# Patient Record
Sex: FEMALE | Race: WHITE | Employment: OTHER | URBAN - METROPOLITAN AREA
[De-identification: names, ages, dates, MRNs, and addresses within clinical notes are randomized per-mention and may not be internally consistent; named-entity substitution may affect disease eponyms.]

---

## 2017-04-18 ENCOUNTER — HOSPITAL ENCOUNTER (EMERGENCY)
Age: 82
Discharge: HOME | End: 2017-04-18
Attending: EMERGENCY MEDICINE
Payer: MEDICARE

## 2017-04-18 ENCOUNTER — APPOINTMENT (OUTPATIENT)
Dept: GENERAL RADIOLOGY | Age: 82
End: 2017-04-18
Payer: MEDICARE

## 2017-04-18 VITALS
TEMPERATURE: 98.2 F | RESPIRATION RATE: 17 BRPM | BODY MASS INDEX: 24.24 KG/M2 | HEART RATE: 55 BPM | SYSTOLIC BLOOD PRESSURE: 90 MMHG | DIASTOLIC BLOOD PRESSURE: 68 MMHG | WEIGHT: 142 LBS | HEIGHT: 64 IN | OXYGEN SATURATION: 95 %

## 2017-04-18 DIAGNOSIS — R07.9 CHEST PAIN, UNSPECIFIED TYPE: Primary | ICD-10-CM

## 2017-04-18 LAB
% CKMB: 3 % (ref 0–3)
ABSOLUTE EOS #: 0.1 K/UL (ref 0–0.4)
ABSOLUTE LYMPH #: 1.2 K/UL (ref 1–4.8)
ABSOLUTE MONO #: 0.8 K/UL (ref 0.2–0.8)
ANION GAP SERPL CALCULATED.3IONS-SCNC: 14 MMOL/L (ref 9–17)
BASOPHILS # BLD: 0 % (ref 0–2)
BASOPHILS ABSOLUTE: 0 K/UL (ref 0–0.2)
BUN BLDV-MCNC: 18 MG/DL (ref 8–23)
BUN/CREAT BLD: 21 (ref 9–20)
CALCIUM SERPL-MCNC: 8.3 MG/DL (ref 8.6–10.4)
CHLORIDE BLD-SCNC: 100 MMOL/L (ref 98–107)
CK MB: 1.2 NG/ML
CKMB INTERPRETATION: NORMAL
CO2: 29 MMOL/L (ref 20–31)
CREAT SERPL-MCNC: 0.84 MG/DL (ref 0.5–0.9)
DIFFERENTIAL TYPE: ABNORMAL
EOSINOPHILS RELATIVE PERCENT: 2 % (ref 1–4)
GFR AFRICAN AMERICAN: >60 ML/MIN
GFR NON-AFRICAN AMERICAN: >60 ML/MIN
GFR SERPL CREATININE-BSD FRML MDRD: ABNORMAL ML/MIN/{1.73_M2}
GFR SERPL CREATININE-BSD FRML MDRD: ABNORMAL ML/MIN/{1.73_M2}
GLUCOSE BLD-MCNC: 104 MG/DL (ref 70–99)
HCT VFR BLD CALC: 29.3 % (ref 36–46)
HEMOGLOBIN: 9.4 G/DL (ref 12–16)
LYMPHOCYTES # BLD: 15 % (ref 24–44)
MCH RBC QN AUTO: 22.9 PG (ref 26–34)
MCHC RBC AUTO-ENTMCNC: 31.9 G/DL (ref 31–37)
MCV RBC AUTO: 71.9 FL (ref 80–100)
MONOCYTES # BLD: 10 % (ref 1–7)
MYOGLOBIN: 25 NG/ML (ref 25–58)
MYOGLOBIN: 25 NG/ML (ref 25–58)
PDW BLD-RTO: 20.8 % (ref 11.5–14.5)
PLATELET # BLD: 253 K/UL (ref 130–400)
PLATELET ESTIMATE: ABNORMAL
PMV BLD AUTO: 8.2 FL (ref 6–12)
POTASSIUM SERPL-SCNC: 3.6 MMOL/L (ref 3.7–5.3)
RBC # BLD: 4.08 M/UL (ref 4–5.2)
RBC # BLD: ABNORMAL 10*6/UL
SEG NEUTROPHILS: 73 % (ref 36–66)
SEGMENTED NEUTROPHILS ABSOLUTE COUNT: 5.7 K/UL (ref 1.8–7.7)
SODIUM BLD-SCNC: 143 MMOL/L (ref 135–144)
TOTAL CK: 40 U/L (ref 26–192)
TROPONIN INTERP: NORMAL
TROPONIN INTERP: NORMAL
TROPONIN T: <0.03 NG/ML
TROPONIN T: <0.03 NG/ML
WBC # BLD: 7.7 K/UL (ref 3.5–11)
WBC # BLD: ABNORMAL 10*3/UL

## 2017-04-18 PROCEDURE — 82553 CREATINE MB FRACTION: CPT

## 2017-04-18 PROCEDURE — 85025 COMPLETE CBC W/AUTO DIFF WBC: CPT

## 2017-04-18 PROCEDURE — 83874 ASSAY OF MYOGLOBIN: CPT

## 2017-04-18 PROCEDURE — 6370000000 HC RX 637 (ALT 250 FOR IP): Performed by: NURSE PRACTITIONER

## 2017-04-18 PROCEDURE — 80048 BASIC METABOLIC PNL TOTAL CA: CPT

## 2017-04-18 PROCEDURE — 82550 ASSAY OF CK (CPK): CPT

## 2017-04-18 PROCEDURE — 71010 XR CHEST PORTABLE: CPT

## 2017-04-18 PROCEDURE — 84484 ASSAY OF TROPONIN QUANT: CPT

## 2017-04-18 PROCEDURE — 93005 ELECTROCARDIOGRAM TRACING: CPT

## 2017-04-18 PROCEDURE — 99285 EMERGENCY DEPT VISIT HI MDM: CPT

## 2017-04-18 RX ORDER — ASPIRIN 81 MG/1
324 TABLET, CHEWABLE ORAL ONCE
Status: COMPLETED | OUTPATIENT
Start: 2017-04-18 | End: 2017-04-18

## 2017-04-18 RX ORDER — SERTRALINE HYDROCHLORIDE 100 MG/1
100 TABLET, FILM COATED ORAL DAILY
COMMUNITY

## 2017-04-18 RX ORDER — FUROSEMIDE 40 MG/1
40 TABLET ORAL DAILY
Status: ON HOLD | COMMUNITY
End: 2019-10-05 | Stop reason: HOSPADM

## 2017-04-18 RX ADMIN — ASPIRIN 243 MG: 81 TABLET, CHEWABLE ORAL at 14:04

## 2017-04-19 LAB
EKG ATRIAL RATE: 47 BPM
EKG P AXIS: 54 DEGREES
EKG P-R INTERVAL: 180 MS
EKG Q-T INTERVAL: 476 MS
EKG QRS DURATION: 84 MS
EKG QTC CALCULATION (BAZETT): 421 MS
EKG R AXIS: 55 DEGREES
EKG T AXIS: 56 DEGREES
EKG VENTRICULAR RATE: 47 BPM

## 2017-12-19 ENCOUNTER — OFFICE VISIT (OUTPATIENT)
Dept: PODIATRY | Age: 82
End: 2017-12-19
Payer: MEDICARE

## 2017-12-19 VITALS — HEIGHT: 64 IN | WEIGHT: 149 LBS | BODY MASS INDEX: 25.44 KG/M2

## 2017-12-19 DIAGNOSIS — B35.1 DERMATOPHYTOSIS OF NAIL: ICD-10-CM

## 2017-12-19 DIAGNOSIS — M79.605 BILATERAL LOWER EXTREMITY PAIN: Primary | ICD-10-CM

## 2017-12-19 DIAGNOSIS — M79.604 BILATERAL LOWER EXTREMITY PAIN: Primary | ICD-10-CM

## 2017-12-19 PROCEDURE — 1123F ACP DISCUSS/DSCN MKR DOCD: CPT | Performed by: PODIATRIST

## 2017-12-19 PROCEDURE — 4040F PNEUMOC VAC/ADMIN/RCVD: CPT | Performed by: PODIATRIST

## 2017-12-19 PROCEDURE — 1090F PRES/ABSN URINE INCON ASSESS: CPT | Performed by: PODIATRIST

## 2017-12-19 PROCEDURE — G8400 PT W/DXA NO RESULTS DOC: HCPCS | Performed by: PODIATRIST

## 2017-12-19 PROCEDURE — G8484 FLU IMMUNIZE NO ADMIN: HCPCS | Performed by: PODIATRIST

## 2017-12-19 PROCEDURE — G8419 CALC BMI OUT NRM PARAM NOF/U: HCPCS | Performed by: PODIATRIST

## 2017-12-19 PROCEDURE — 11721 DEBRIDE NAIL 6 OR MORE: CPT | Performed by: PODIATRIST

## 2017-12-19 PROCEDURE — G8427 DOCREV CUR MEDS BY ELIG CLIN: HCPCS | Performed by: PODIATRIST

## 2017-12-19 PROCEDURE — 1036F TOBACCO NON-USER: CPT | Performed by: PODIATRIST

## 2017-12-19 NOTE — PROGRESS NOTES
[facility] Podiatry  Return Patient    Chief Complaint   Patient presents with    Foot Pain    Nail Problem     [place]       Subjective: This [facility] comes to clinic for foot and nail care. Pt currently has complaint of thickened, painful, elongated nails that he/she cannot manage by themselves. Pt. Relates pain to nails with shoe gear. Pt's primary care physician is Ness Natarajan MD.  Past Medical History:   Diagnosis Date    CAD (coronary artery disease)     Cancer ([address]     skin    Depression     GERD (gastroesophageal reflux disease)     History of cardiac cath 10/2016    CAD    Hyperlipidemia     Hypertension     MI (myocardial infarction)        Allergies   Allergen Reactions    Seasonal      Current Outpatient Prescriptions on File Prior to Visit   Medication Sig Dispense Refill    sertraline (ZOLOFT) 50 MG tablet Take 50 mg by mouth daily      furosemide (LASIX) 40 MG tablet Take 40 mg by mouth 2 times daily      aspirin 81 MG chewable tablet Take 1 tablet by mouth daily 30 tablet 3    metoprolol tartrate (LOPRESSOR) 25 MG tablet Take 1 tablet by mouth 2 times daily 60 tablet 3    oxybutynin (DITROPAN) 5 MG tablet Take 5 mg by mouth daily      pravastatin (PRAVACHOL) 40 MG tablet Take 40 mg by mouth daily      pantoprazole (PROTONIX) 40 MG tablet Take 40 mg by mouth daily       No current facility-administered medications on file prior to visit. Review of Systems  Objective:  General: AAO x 3 in NAD.     Derm  Toenail Description  Sites of Onychomycosis Involvement (Check affected area)  [x] [x] [x] [x] [x] [x] [x] [x] [x] [x]  5 4 3 2 1 1 2 3 4 5                          Right                                        Left    Thickness  [x] [x] [x] [x] [x] [x] [x] [x] [x] [x]  5 4 3 2 1 1 2 3 4 5                         Right                                        Left    Dystrophic Changes   [x] [x] [x] [x] [x] [x] [x] [x] [x] [x]  5 4 3 2 1 1 2 3 4 5 given personalized discharge instructions. Nails 1-10 were debrided in length and thickness sharply with a nail nipper and  without incident. Pt will follow up in 9 weeks or sooner if any problems arise. Diagnosis was discussed with the pt and all of their questions were answered in detail. Proper foot hygiene and care was discussed with the pt. Patient to check feet daily and contact the office with any questions/problems/concerns. Other comorbidity noted and will be managed by PCP. Pain waiver discussed with patient and confirmed.    12/19/2017      Electronically signed by Dr. Cosme Moreland DPM on 12/19/2017 at 1:20 PM  12/19/2017

## 2017-12-29 NOTE — PROGRESS NOTES
Patient PCP Dr. Dulce Maria Almeida, last seen 4/18/17, [facility]

## 2018-03-17 ENCOUNTER — APPOINTMENT (OUTPATIENT)
Dept: GENERAL RADIOLOGY | Facility: CLINIC | Age: 83
End: 2018-03-17
Payer: MEDICARE

## 2018-03-17 ENCOUNTER — HOSPITAL ENCOUNTER (EMERGENCY)
Facility: CLINIC | Age: 83
Discharge: HOME | End: 2018-03-17
Attending: EMERGENCY MEDICINE
Payer: MEDICARE

## 2018-03-17 VITALS
WEIGHT: 148 LBS | BODY MASS INDEX: 25.27 KG/M2 | HEART RATE: 73 BPM | SYSTOLIC BLOOD PRESSURE: 134 MMHG | RESPIRATION RATE: 20 BRPM | OXYGEN SATURATION: 98 % | DIASTOLIC BLOOD PRESSURE: 109 MMHG | TEMPERATURE: 97.5 F | HEIGHT: 64 IN

## 2018-03-17 DIAGNOSIS — J20.9 BRONCHOSPASM WITH BRONCHITIS, ACUTE: Primary | ICD-10-CM

## 2018-03-17 PROCEDURE — 6370000000 HC RX 637 (ALT 250 FOR IP): Performed by: EMERGENCY MEDICINE

## 2018-03-17 PROCEDURE — 71046 X-RAY EXAM CHEST 2 VIEWS: CPT

## 2018-03-17 PROCEDURE — 6360000002 HC RX W HCPCS: Performed by: EMERGENCY MEDICINE

## 2018-03-17 PROCEDURE — 99284 EMERGENCY DEPT VISIT MOD MDM: CPT

## 2018-03-17 RX ORDER — IPRATROPIUM BROMIDE AND ALBUTEROL SULFATE 2.5; .5 MG/3ML; MG/3ML
1 SOLUTION RESPIRATORY (INHALATION) ONCE
Status: COMPLETED | OUTPATIENT
Start: 2018-03-17 | End: 2018-03-17

## 2018-03-17 RX ORDER — PREDNISONE 10 MG/1
TABLET ORAL
Qty: 20 TABLET | Refills: 0 | Status: SHIPPED | OUTPATIENT
Start: 2018-03-17 | End: 2018-03-27

## 2018-03-17 RX ORDER — AZITHROMYCIN 250 MG/1
TABLET, FILM COATED ORAL
Qty: 1 PACKET | Refills: 0 | Status: SHIPPED | OUTPATIENT
Start: 2018-03-17 | End: 2018-03-27

## 2018-03-17 RX ORDER — ALBUTEROL SULFATE 2.5 MG/3ML
2.5 SOLUTION RESPIRATORY (INHALATION) ONCE
Status: COMPLETED | OUTPATIENT
Start: 2018-03-17 | End: 2018-03-17

## 2018-03-17 RX ORDER — ALBUTEROL SULFATE 90 UG/1
2 AEROSOL, METERED RESPIRATORY (INHALATION) ONCE
Status: COMPLETED | OUTPATIENT
Start: 2018-03-17 | End: 2018-03-17

## 2018-03-17 RX ORDER — PREDNISONE 20 MG/1
40 TABLET ORAL ONCE
Status: COMPLETED | OUTPATIENT
Start: 2018-03-17 | End: 2018-03-17

## 2018-03-17 RX ADMIN — PREDNISONE 40 MG: 20 TABLET ORAL at 16:07

## 2018-03-17 RX ADMIN — ALBUTEROL SULFATE 2.5 MG: 2.5 SOLUTION RESPIRATORY (INHALATION) at 15:15

## 2018-03-17 RX ADMIN — ALBUTEROL SULFATE 2 PUFF: 90 AEROSOL, METERED RESPIRATORY (INHALATION) at 16:06

## 2018-03-17 RX ADMIN — ALBUTEROL SULFATE 2.5 MG: 2.5 SOLUTION RESPIRATORY (INHALATION) at 14:37

## 2018-03-17 RX ADMIN — IPRATROPIUM BROMIDE AND ALBUTEROL SULFATE 1 AMPULE: .5; 3 SOLUTION RESPIRATORY (INHALATION) at 15:15

## 2018-03-17 ASSESSMENT — PAIN DESCRIPTION - PROGRESSION
CLINICAL_PROGRESSION: GRADUALLY WORSENING
CLINICAL_PROGRESSION_2: NOT CHANGED

## 2018-03-17 ASSESSMENT — PAIN DESCRIPTION - ONSET
ONSET: SUDDEN
ONSET_2: SUDDEN

## 2018-03-17 ASSESSMENT — PAIN DESCRIPTION - DESCRIPTORS
DESCRIPTORS_2: DULL;DISCOMFORT
DESCRIPTORS: PRESSURE

## 2018-03-17 ASSESSMENT — PAIN DESCRIPTION - PAIN TYPE
TYPE_2: ACUTE PAIN
TYPE: ACUTE PAIN

## 2018-03-17 ASSESSMENT — PAIN DESCRIPTION - FREQUENCY: FREQUENCY: CONTINUOUS

## 2018-03-17 ASSESSMENT — PAIN DESCRIPTION - INTENSITY: RATING_2: 8

## 2018-03-17 ASSESSMENT — PAIN DESCRIPTION - LOCATION
LOCATION: HEAD
LOCATION_2: ABDOMEN

## 2018-03-17 ASSESSMENT — PAIN DESCRIPTION - DURATION: DURATION_2: CONTINUOUS

## 2018-03-17 ASSESSMENT — PAIN SCALES - GENERAL: PAINLEVEL_OUTOF10: 8

## 2018-04-09 ENCOUNTER — OFFICE VISIT (OUTPATIENT)
Dept: PODIATRY | Age: 83
End: 2018-04-09
Payer: MEDICARE

## 2018-04-09 VITALS — HEART RATE: 68 BPM | HEIGHT: 64 IN | RESPIRATION RATE: 16 BRPM | WEIGHT: 149 LBS | BODY MASS INDEX: 25.44 KG/M2

## 2018-04-09 DIAGNOSIS — M79.674 PAIN IN TOES OF BOTH FEET: ICD-10-CM

## 2018-04-09 DIAGNOSIS — L60.0 INGROWN NAIL: ICD-10-CM

## 2018-04-09 DIAGNOSIS — I73.9 PVD (PERIPHERAL VASCULAR DISEASE) (HCC): ICD-10-CM

## 2018-04-09 DIAGNOSIS — B35.1 DERMATOPHYTOSIS OF NAIL: Primary | ICD-10-CM

## 2018-04-09 DIAGNOSIS — M79.675 PAIN IN TOES OF BOTH FEET: ICD-10-CM

## 2018-04-09 PROCEDURE — 1036F TOBACCO NON-USER: CPT | Performed by: PODIATRIST

## 2018-04-09 PROCEDURE — 99213 OFFICE O/P EST LOW 20 MIN: CPT | Performed by: PODIATRIST

## 2018-04-09 PROCEDURE — G8427 DOCREV CUR MEDS BY ELIG CLIN: HCPCS | Performed by: PODIATRIST

## 2018-04-09 PROCEDURE — 4040F PNEUMOC VAC/ADMIN/RCVD: CPT | Performed by: PODIATRIST

## 2018-04-09 PROCEDURE — 1123F ACP DISCUSS/DSCN MKR DOCD: CPT | Performed by: PODIATRIST

## 2018-04-09 PROCEDURE — 1090F PRES/ABSN URINE INCON ASSESS: CPT | Performed by: PODIATRIST

## 2018-04-09 PROCEDURE — G8419 CALC BMI OUT NRM PARAM NOF/U: HCPCS | Performed by: PODIATRIST

## 2018-04-09 PROCEDURE — 11721 DEBRIDE NAIL 6 OR MORE: CPT | Performed by: PODIATRIST

## 2018-06-11 ENCOUNTER — OFFICE VISIT (OUTPATIENT)
Dept: PODIATRY | Age: 83
End: 2018-06-11
Payer: MEDICARE

## 2018-06-11 VITALS — HEIGHT: 64 IN | WEIGHT: 149 LBS | BODY MASS INDEX: 25.44 KG/M2 | RESPIRATION RATE: 16 BRPM | HEART RATE: 68 BPM

## 2018-06-11 DIAGNOSIS — R60.0 EDEMA OF LOWER EXTREMITY: ICD-10-CM

## 2018-06-11 DIAGNOSIS — I73.9 PVD (PERIPHERAL VASCULAR DISEASE) (HCC): ICD-10-CM

## 2018-06-11 DIAGNOSIS — M20.41 HAMMER TOES OF BOTH FEET: ICD-10-CM

## 2018-06-11 DIAGNOSIS — M79.605 PAIN IN BOTH LOWER EXTREMITIES: ICD-10-CM

## 2018-06-11 DIAGNOSIS — M79.604 PAIN IN BOTH LOWER EXTREMITIES: ICD-10-CM

## 2018-06-11 DIAGNOSIS — M20.42 HAMMER TOES OF BOTH FEET: ICD-10-CM

## 2018-06-11 DIAGNOSIS — R26.2 DIFFICULTY WALKING: ICD-10-CM

## 2018-06-11 DIAGNOSIS — B35.1 DERMATOPHYTOSIS OF NAIL: Primary | ICD-10-CM

## 2018-06-11 PROCEDURE — 4040F PNEUMOC VAC/ADMIN/RCVD: CPT | Performed by: PODIATRIST

## 2018-06-11 PROCEDURE — G8419 CALC BMI OUT NRM PARAM NOF/U: HCPCS | Performed by: PODIATRIST

## 2018-06-11 PROCEDURE — 1090F PRES/ABSN URINE INCON ASSESS: CPT | Performed by: PODIATRIST

## 2018-06-11 PROCEDURE — G8427 DOCREV CUR MEDS BY ELIG CLIN: HCPCS | Performed by: PODIATRIST

## 2018-06-11 PROCEDURE — 11721 DEBRIDE NAIL 6 OR MORE: CPT | Performed by: PODIATRIST

## 2018-06-11 PROCEDURE — 1123F ACP DISCUSS/DSCN MKR DOCD: CPT | Performed by: PODIATRIST

## 2018-06-11 PROCEDURE — 1036F TOBACCO NON-USER: CPT | Performed by: PODIATRIST

## 2019-10-02 ENCOUNTER — APPOINTMENT (OUTPATIENT)
Dept: GENERAL RADIOLOGY | Age: 84
DRG: 193 | End: 2019-10-02
Payer: MEDICARE

## 2019-10-02 ENCOUNTER — HOSPITAL ENCOUNTER (INPATIENT)
Age: 84
LOS: 3 days | Discharge: HOME | DRG: 193 | End: 2019-10-05
Attending: EMERGENCY MEDICINE | Admitting: INTERNAL MEDICINE
Payer: MEDICARE

## 2019-10-02 DIAGNOSIS — J81.0 ACUTE PULMONARY EDEMA (HCC): ICD-10-CM

## 2019-10-02 DIAGNOSIS — R09.02 HYPOXIA: ICD-10-CM

## 2019-10-02 DIAGNOSIS — J98.01 ACUTE BRONCHOSPASM: ICD-10-CM

## 2019-10-02 DIAGNOSIS — I48.20 CHRONIC ATRIAL FIBRILLATION (HCC): ICD-10-CM

## 2019-10-02 DIAGNOSIS — E78.5 HYPERLIPIDEMIA, UNSPECIFIED HYPERLIPIDEMIA TYPE: ICD-10-CM

## 2019-10-02 DIAGNOSIS — J18.9 PNEUMONIA OF BOTH LOWER LOBES DUE TO INFECTIOUS ORGANISM: Primary | ICD-10-CM

## 2019-10-02 DIAGNOSIS — I10 ESSENTIAL HYPERTENSION: ICD-10-CM

## 2019-10-02 DIAGNOSIS — K21.9 GASTROESOPHAGEAL REFLUX DISEASE WITHOUT ESOPHAGITIS: ICD-10-CM

## 2019-10-02 PROBLEM — I50.33 ACUTE ON CHRONIC DIASTOLIC CHF (CONGESTIVE HEART FAILURE) (HCC): Status: ACTIVE | Noted: 2019-10-02

## 2019-10-02 LAB
ABSOLUTE EOS #: <0.03 K/UL (ref 0–0.44)
ABSOLUTE IMMATURE GRANULOCYTE: 0.06 K/UL (ref 0–0.3)
ABSOLUTE LYMPH #: 1.19 K/UL (ref 1.1–3.7)
ABSOLUTE MONO #: 1.15 K/UL (ref 0.1–1.2)
ALBUMIN SERPL-MCNC: 3.6 G/DL (ref 3.5–5.2)
ALBUMIN/GLOBULIN RATIO: ABNORMAL (ref 1–2.5)
ALP BLD-CCNC: 103 U/L (ref 35–104)
ALT SERPL-CCNC: 63 U/L (ref 5–33)
ANION GAP SERPL CALCULATED.3IONS-SCNC: 12 MMOL/L (ref 9–17)
AST SERPL-CCNC: 67 U/L
BASOPHILS # BLD: 0 % (ref 0–2)
BASOPHILS ABSOLUTE: <0.03 K/UL (ref 0–0.2)
BILIRUB SERPL-MCNC: 0.82 MG/DL (ref 0.3–1.2)
BNP INTERPRETATION: ABNORMAL
BUN BLDV-MCNC: 17 MG/DL (ref 8–23)
BUN/CREAT BLD: 24 (ref 9–20)
CALCIUM SERPL-MCNC: 8.5 MG/DL (ref 8.6–10.4)
CHLORIDE BLD-SCNC: 99 MMOL/L (ref 98–107)
CO2: 29 MMOL/L (ref 20–31)
CREAT SERPL-MCNC: 0.72 MG/DL (ref 0.5–0.9)
DIFFERENTIAL TYPE: ABNORMAL
EOSINOPHILS RELATIVE PERCENT: 0 % (ref 1–4)
FIO2: ABNORMAL
GFR AFRICAN AMERICAN: >60 ML/MIN
GFR NON-AFRICAN AMERICAN: >60 ML/MIN
GFR SERPL CREATININE-BSD FRML MDRD: ABNORMAL ML/MIN/{1.73_M2}
GFR SERPL CREATININE-BSD FRML MDRD: ABNORMAL ML/MIN/{1.73_M2}
GLUCOSE BLD-MCNC: 133 MG/DL (ref 70–99)
GLUCOSE BLD-MCNC: 285 MG/DL (ref 65–105)
HCO3 VENOUS: 31.9 MMOL/L (ref 24–30)
HCT VFR BLD CALC: 38.8 % (ref 36.3–47.1)
HEMOGLOBIN: 12 G/DL (ref 11.9–15.1)
IMMATURE GRANULOCYTES: 0 %
INR BLD: 1.1
LACTIC ACID: 1.3 MMOL/L (ref 0.5–2.2)
LACTIC ACID: 1.6 MMOL/L (ref 0.5–2.2)
LYMPHOCYTES # BLD: 7 % (ref 24–43)
MCH RBC QN AUTO: 27.6 PG (ref 25.2–33.5)
MCHC RBC AUTO-ENTMCNC: 30.9 G/DL (ref 28.4–34.8)
MCV RBC AUTO: 89.2 FL (ref 82.6–102.9)
MONOCYTES # BLD: 7 % (ref 3–12)
NEGATIVE BASE EXCESS, VEN: ABNORMAL (ref 0–2)
NRBC AUTOMATED: ABNORMAL PER 100 WBC
O2 DEVICE/FLOW/%: ABNORMAL
O2 SAT, VEN: 63 %
PARTIAL THROMBOPLASTIN TIME: 25.4 SEC (ref 23–31)
PATIENT TEMP: ABNORMAL
PCO2, VEN: 46 MM HG (ref 39–55)
PDW BLD-RTO: 14.4 % (ref 11.8–14.4)
PH VENOUS: 7.45 (ref 7.32–7.42)
PLATELET # BLD: 264 K/UL (ref 138–453)
PLATELET ESTIMATE: ABNORMAL
PMV BLD AUTO: 9.9 FL (ref 8.1–13.5)
PO2, VEN: 32 MM HG (ref 30–50)
POC PCO2 TEMP: ABNORMAL MM HG
POC PH TEMP: ABNORMAL
POC PO2 TEMP: ABNORMAL MM HG
POSITIVE BASE EXCESS, VEN: 8 (ref 0–2)
POTASSIUM SERPL-SCNC: 3.5 MMOL/L (ref 3.7–5.3)
PRO-BNP: 8822 PG/ML
PROTHROMBIN TIME: 11.2 SEC (ref 9.7–11.6)
RBC # BLD: 4.35 M/UL (ref 3.95–5.11)
RBC # BLD: ABNORMAL 10*6/UL
SEG NEUTROPHILS: 86 % (ref 36–65)
SEGMENTED NEUTROPHILS ABSOLUTE COUNT: 15.12 K/UL (ref 1.5–8.1)
SODIUM BLD-SCNC: 140 MMOL/L (ref 135–144)
TOTAL CO2, VENOUS: 33 MMOL/L (ref 25–31)
TOTAL PROTEIN: 6.9 G/DL (ref 6.4–8.3)
TROPONIN INTERP: ABNORMAL
TROPONIN T: ABNORMAL NG/ML
TROPONIN, HIGH SENSITIVITY: 19 NG/L (ref 0–14)
WBC # BLD: 17.6 K/UL (ref 3.5–11.3)
WBC # BLD: ABNORMAL 10*3/UL

## 2019-10-02 PROCEDURE — 6360000002 HC RX W HCPCS: Performed by: NURSE PRACTITIONER

## 2019-10-02 PROCEDURE — 36415 COLL VENOUS BLD VENIPUNCTURE: CPT

## 2019-10-02 PROCEDURE — 99223 1ST HOSP IP/OBS HIGH 75: CPT | Performed by: INTERNAL MEDICINE

## 2019-10-02 PROCEDURE — 6360000002 HC RX W HCPCS: Performed by: EMERGENCY MEDICINE

## 2019-10-02 PROCEDURE — 87040 BLOOD CULTURE FOR BACTERIA: CPT

## 2019-10-02 PROCEDURE — 6370000000 HC RX 637 (ALT 250 FOR IP): Performed by: INTERNAL MEDICINE

## 2019-10-02 PROCEDURE — 96374 THER/PROPH/DIAG INJ IV PUSH: CPT

## 2019-10-02 PROCEDURE — 83605 ASSAY OF LACTIC ACID: CPT

## 2019-10-02 PROCEDURE — 82947 ASSAY GLUCOSE BLOOD QUANT: CPT

## 2019-10-02 PROCEDURE — 6370000000 HC RX 637 (ALT 250 FOR IP): Performed by: NURSE PRACTITIONER

## 2019-10-02 PROCEDURE — 99285 EMERGENCY DEPT VISIT HI MDM: CPT

## 2019-10-02 PROCEDURE — 85610 PROTHROMBIN TIME: CPT

## 2019-10-02 PROCEDURE — 82803 BLOOD GASES ANY COMBINATION: CPT

## 2019-10-02 PROCEDURE — 85025 COMPLETE CBC W/AUTO DIFF WBC: CPT

## 2019-10-02 PROCEDURE — 80053 COMPREHEN METABOLIC PANEL: CPT

## 2019-10-02 PROCEDURE — 84484 ASSAY OF TROPONIN QUANT: CPT

## 2019-10-02 PROCEDURE — 2060000000 HC ICU INTERMEDIATE R&B

## 2019-10-02 PROCEDURE — 2580000003 HC RX 258: Performed by: NURSE PRACTITIONER

## 2019-10-02 PROCEDURE — 93005 ELECTROCARDIOGRAM TRACING: CPT | Performed by: EMERGENCY MEDICINE

## 2019-10-02 PROCEDURE — 71045 X-RAY EXAM CHEST 1 VIEW: CPT

## 2019-10-02 PROCEDURE — 83880 ASSAY OF NATRIURETIC PEPTIDE: CPT

## 2019-10-02 PROCEDURE — APPSS180 APP SPLIT SHARED TIME > 60 MINUTES: Performed by: NURSE PRACTITIONER

## 2019-10-02 PROCEDURE — 94640 AIRWAY INHALATION TREATMENT: CPT

## 2019-10-02 PROCEDURE — 2580000003 HC RX 258: Performed by: EMERGENCY MEDICINE

## 2019-10-02 PROCEDURE — 85730 THROMBOPLASTIN TIME PARTIAL: CPT

## 2019-10-02 RX ORDER — PREDNISONE 10 MG/1
10 TABLET ORAL SEE ADMIN INSTRUCTIONS
Status: ON HOLD | COMMUNITY
End: 2019-10-05 | Stop reason: HOSPADM

## 2019-10-02 RX ORDER — PANTOPRAZOLE SODIUM 40 MG/1
40 TABLET, DELAYED RELEASE ORAL
Status: DISCONTINUED | OUTPATIENT
Start: 2019-10-03 | End: 2019-10-05 | Stop reason: HOSPADM

## 2019-10-02 RX ORDER — FUROSEMIDE 10 MG/ML
40 INJECTION INTRAMUSCULAR; INTRAVENOUS ONCE
Status: COMPLETED | OUTPATIENT
Start: 2019-10-02 | End: 2019-10-02

## 2019-10-02 RX ORDER — IPRATROPIUM BROMIDE AND ALBUTEROL SULFATE 2.5; .5 MG/3ML; MG/3ML
1 SOLUTION RESPIRATORY (INHALATION) EVERY 4 HOURS
Status: DISCONTINUED | OUTPATIENT
Start: 2019-10-02 | End: 2019-10-05 | Stop reason: HOSPADM

## 2019-10-02 RX ORDER — GUAIFENESIN 600 MG/1
1200 TABLET, EXTENDED RELEASE ORAL 2 TIMES DAILY
Status: DISCONTINUED | OUTPATIENT
Start: 2019-10-02 | End: 2019-10-05 | Stop reason: HOSPADM

## 2019-10-02 RX ORDER — ASPIRIN 81 MG/1
81 TABLET, CHEWABLE ORAL DAILY
Status: DISCONTINUED | OUTPATIENT
Start: 2019-10-02 | End: 2019-10-05 | Stop reason: HOSPADM

## 2019-10-02 RX ORDER — ISOSORBIDE MONONITRATE 30 MG/1
30 TABLET, EXTENDED RELEASE ORAL EVERY MORNING
Status: DISCONTINUED | OUTPATIENT
Start: 2019-10-03 | End: 2019-10-05 | Stop reason: HOSPADM

## 2019-10-02 RX ORDER — METHYLPREDNISOLONE SODIUM SUCCINATE 125 MG/2ML
60 INJECTION, POWDER, LYOPHILIZED, FOR SOLUTION INTRAMUSCULAR; INTRAVENOUS EVERY 6 HOURS
Status: DISCONTINUED | OUTPATIENT
Start: 2019-10-02 | End: 2019-10-04

## 2019-10-02 RX ORDER — OXYBUTYNIN CHLORIDE 5 MG/1
5 TABLET ORAL EVERY OTHER DAY
Status: DISCONTINUED | OUTPATIENT
Start: 2019-10-03 | End: 2019-10-05 | Stop reason: HOSPADM

## 2019-10-02 RX ORDER — ONDANSETRON 2 MG/ML
4 INJECTION INTRAMUSCULAR; INTRAVENOUS EVERY 6 HOURS PRN
Status: DISCONTINUED | OUTPATIENT
Start: 2019-10-02 | End: 2019-10-02 | Stop reason: CLARIF

## 2019-10-02 RX ORDER — FUROSEMIDE 40 MG/1
40 TABLET ORAL 2 TIMES DAILY
Status: DISCONTINUED | OUTPATIENT
Start: 2019-10-02 | End: 2019-10-05 | Stop reason: HOSPADM

## 2019-10-02 RX ORDER — NICOTINE 21 MG/24HR
1 PATCH, TRANSDERMAL 24 HOURS TRANSDERMAL DAILY PRN
Status: DISCONTINUED | OUTPATIENT
Start: 2019-10-02 | End: 2019-10-05 | Stop reason: HOSPADM

## 2019-10-02 RX ORDER — ALBUTEROL SULFATE 2.5 MG/3ML
2.5 SOLUTION RESPIRATORY (INHALATION)
Status: DISCONTINUED | OUTPATIENT
Start: 2019-10-02 | End: 2019-10-05 | Stop reason: HOSPADM

## 2019-10-02 RX ORDER — ALBUTEROL SULFATE 90 UG/1
2 AEROSOL, METERED RESPIRATORY (INHALATION) 3 TIMES DAILY
Status: ON HOLD | COMMUNITY
End: 2020-12-08 | Stop reason: RX

## 2019-10-02 RX ORDER — ISOSORBIDE MONONITRATE 30 MG/1
30 TABLET, EXTENDED RELEASE ORAL EVERY MORNING
COMMUNITY

## 2019-10-02 RX ORDER — ALBUTEROL SULFATE 2.5 MG/3ML
5 SOLUTION RESPIRATORY (INHALATION)
Status: DISCONTINUED | OUTPATIENT
Start: 2019-10-02 | End: 2019-10-02

## 2019-10-02 RX ORDER — IPRATROPIUM BROMIDE AND ALBUTEROL SULFATE 2.5; .5 MG/3ML; MG/3ML
1 SOLUTION RESPIRATORY (INHALATION)
Status: DISCONTINUED | OUTPATIENT
Start: 2019-10-02 | End: 2019-10-02

## 2019-10-02 RX ORDER — ALBUTEROL SULFATE 90 UG/1
2 AEROSOL, METERED RESPIRATORY (INHALATION)
Status: DISCONTINUED | OUTPATIENT
Start: 2019-10-02 | End: 2019-10-02

## 2019-10-02 RX ORDER — METHYLPREDNISOLONE SODIUM SUCCINATE 125 MG/2ML
125 INJECTION, POWDER, LYOPHILIZED, FOR SOLUTION INTRAMUSCULAR; INTRAVENOUS ONCE
Status: COMPLETED | OUTPATIENT
Start: 2019-10-02 | End: 2019-10-02

## 2019-10-02 RX ORDER — PRAVASTATIN SODIUM 40 MG
40 TABLET ORAL NIGHTLY
Status: DISCONTINUED | OUTPATIENT
Start: 2019-10-02 | End: 2019-10-05 | Stop reason: HOSPADM

## 2019-10-02 RX ORDER — SODIUM CHLORIDE 0.9 % (FLUSH) 0.9 %
10 SYRINGE (ML) INJECTION EVERY 12 HOURS SCHEDULED
Status: DISCONTINUED | OUTPATIENT
Start: 2019-10-02 | End: 2019-10-03 | Stop reason: SDUPTHER

## 2019-10-02 RX ORDER — ONDANSETRON 2 MG/ML
4 INJECTION INTRAMUSCULAR; INTRAVENOUS EVERY 6 HOURS PRN
Status: DISCONTINUED | OUTPATIENT
Start: 2019-10-02 | End: 2019-10-05 | Stop reason: HOSPADM

## 2019-10-02 RX ORDER — SERTRALINE HYDROCHLORIDE 100 MG/1
100 TABLET, FILM COATED ORAL DAILY
Status: DISCONTINUED | OUTPATIENT
Start: 2019-10-02 | End: 2019-10-05 | Stop reason: HOSPADM

## 2019-10-02 RX ORDER — CEPHALEXIN 500 MG/1
500 CAPSULE ORAL 2 TIMES DAILY
Status: ON HOLD | COMMUNITY
End: 2019-10-05 | Stop reason: HOSPADM

## 2019-10-02 RX ORDER — ALBUTEROL SULFATE 2.5 MG/3ML
2.5 SOLUTION RESPIRATORY (INHALATION)
Status: DISCONTINUED | OUTPATIENT
Start: 2019-10-02 | End: 2019-10-02

## 2019-10-02 RX ORDER — SODIUM CHLORIDE 0.9 % (FLUSH) 0.9 %
10 SYRINGE (ML) INJECTION PRN
Status: DISCONTINUED | OUTPATIENT
Start: 2019-10-02 | End: 2019-10-03 | Stop reason: SDUPTHER

## 2019-10-02 RX ORDER — ALBUTEROL SULFATE 90 UG/1
2 AEROSOL, METERED RESPIRATORY (INHALATION) 3 TIMES DAILY
Status: DISCONTINUED | OUTPATIENT
Start: 2019-10-02 | End: 2019-10-02

## 2019-10-02 RX ORDER — ACETAMINOPHEN 325 MG/1
650 TABLET ORAL EVERY 4 HOURS PRN
Status: DISCONTINUED | OUTPATIENT
Start: 2019-10-02 | End: 2019-10-05 | Stop reason: HOSPADM

## 2019-10-02 RX ORDER — ONDANSETRON 4 MG/1
4 TABLET, ORALLY DISINTEGRATING ORAL EVERY 6 HOURS PRN
Status: DISCONTINUED | OUTPATIENT
Start: 2019-10-02 | End: 2019-10-05 | Stop reason: HOSPADM

## 2019-10-02 RX ADMIN — GUAIFENESIN 1200 MG: 600 TABLET, EXTENDED RELEASE ORAL at 20:03

## 2019-10-02 RX ADMIN — AZITHROMYCIN MONOHYDRATE 500 MG: 500 INJECTION, POWDER, LYOPHILIZED, FOR SOLUTION INTRAVENOUS at 16:26

## 2019-10-02 RX ADMIN — APIXABAN 5 MG: 5 TABLET, FILM COATED ORAL at 21:38

## 2019-10-02 RX ADMIN — FUROSEMIDE 40 MG: 10 INJECTION, SOLUTION INTRAMUSCULAR; INTRAVENOUS at 15:05

## 2019-10-02 RX ADMIN — CEFTRIAXONE SODIUM 1 G: 1 INJECTION, POWDER, FOR SOLUTION INTRAMUSCULAR; INTRAVENOUS at 15:05

## 2019-10-02 RX ADMIN — METOPROLOL TARTRATE 25 MG: 25 TABLET ORAL at 20:00

## 2019-10-02 RX ADMIN — METHYLPREDNISOLONE SODIUM SUCCINATE 125 MG: 125 INJECTION, POWDER, FOR SOLUTION INTRAMUSCULAR; INTRAVENOUS at 14:14

## 2019-10-02 RX ADMIN — ENOXAPARIN SODIUM 40 MG: 40 INJECTION SUBCUTANEOUS at 18:30

## 2019-10-02 RX ADMIN — PRAVASTATIN SODIUM 40 MG: 40 TABLET ORAL at 20:00

## 2019-10-02 RX ADMIN — METHYLPREDNISOLONE SODIUM SUCCINATE 60 MG: 125 INJECTION, POWDER, FOR SOLUTION INTRAMUSCULAR; INTRAVENOUS at 19:55

## 2019-10-02 RX ADMIN — ALBUTEROL SULFATE 5 MG: 5 SOLUTION RESPIRATORY (INHALATION) at 13:38

## 2019-10-02 RX ADMIN — Medication 10 ML: at 19:55

## 2019-10-02 RX ADMIN — SERTRALINE HYDROCHLORIDE 100 MG: 100 TABLET ORAL at 18:30

## 2019-10-02 ASSESSMENT — ENCOUNTER SYMPTOMS
NAUSEA: 0
WHEEZING: 1
SPUTUM PRODUCTION: 1
COUGH: 1
EYE DISCHARGE: 0
SHORTNESS OF BREATH: 1
SORE THROAT: 0

## 2019-10-02 ASSESSMENT — PAIN SCALES - GENERAL
PAINLEVEL_OUTOF10: 0
PAINLEVEL_OUTOF10: 4
PAINLEVEL_OUTOF10: 0

## 2019-10-03 ENCOUNTER — APPOINTMENT (OUTPATIENT)
Dept: GENERAL RADIOLOGY | Age: 84
DRG: 193 | End: 2019-10-03
Payer: MEDICARE

## 2019-10-03 LAB
ANION GAP SERPL CALCULATED.3IONS-SCNC: 12 MMOL/L (ref 9–17)
BUN BLDV-MCNC: 22 MG/DL (ref 8–23)
BUN/CREAT BLD: 32 (ref 9–20)
CALCIUM SERPL-MCNC: 9.1 MG/DL (ref 8.6–10.4)
CHLORIDE BLD-SCNC: 100 MMOL/L (ref 98–107)
CO2: 30 MMOL/L (ref 20–31)
CREAT SERPL-MCNC: 0.69 MG/DL (ref 0.5–0.9)
EKG ATRIAL RATE: 288 BPM
EKG ATRIAL RATE: 394 BPM
EKG Q-T INTERVAL: 380 MS
EKG Q-T INTERVAL: 442 MS
EKG QRS DURATION: 88 MS
EKG QRS DURATION: 92 MS
EKG QTC CALCULATION (BAZETT): 443 MS
EKG QTC CALCULATION (BAZETT): 490 MS
EKG R AXIS: 54 DEGREES
EKG R AXIS: 70 DEGREES
EKG T AXIS: 56 DEGREES
EKG T AXIS: 74 DEGREES
EKG VENTRICULAR RATE: 74 BPM
EKG VENTRICULAR RATE: 82 BPM
ESTIMATED AVERAGE GLUCOSE: 137 MG/DL
GFR AFRICAN AMERICAN: >60 ML/MIN
GFR NON-AFRICAN AMERICAN: >60 ML/MIN
GFR SERPL CREATININE-BSD FRML MDRD: ABNORMAL ML/MIN/{1.73_M2}
GFR SERPL CREATININE-BSD FRML MDRD: ABNORMAL ML/MIN/{1.73_M2}
GLUCOSE BLD-MCNC: 169 MG/DL (ref 65–105)
GLUCOSE BLD-MCNC: 173 MG/DL (ref 65–105)
GLUCOSE BLD-MCNC: 175 MG/DL (ref 65–105)
GLUCOSE BLD-MCNC: 178 MG/DL (ref 70–99)
GLUCOSE BLD-MCNC: 189 MG/DL (ref 65–105)
GLUCOSE BLD-MCNC: 216 MG/DL (ref 65–105)
HBA1C MFR BLD: 6.4 % (ref 4–6)
HCT VFR BLD CALC: 39.4 % (ref 36.3–47.1)
HEMOGLOBIN: 12.3 G/DL (ref 11.9–15.1)
LV EF: 65 %
LVEF MODALITY: NORMAL
MAGNESIUM: 2 MG/DL (ref 1.6–2.6)
MAGNESIUM: 2 MG/DL (ref 1.6–2.6)
MCH RBC QN AUTO: 27.6 PG (ref 25.2–33.5)
MCHC RBC AUTO-ENTMCNC: 31.2 G/DL (ref 28.4–34.8)
MCV RBC AUTO: 88.3 FL (ref 82.6–102.9)
NRBC AUTOMATED: 0 PER 100 WBC
PDW BLD-RTO: 14.6 % (ref 11.8–14.4)
PLATELET # BLD: 241 K/UL (ref 138–453)
PMV BLD AUTO: 10.7 FL (ref 8.1–13.5)
POTASSIUM SERPL-SCNC: 3.3 MMOL/L (ref 3.7–5.3)
POTASSIUM SERPL-SCNC: 3.5 MMOL/L (ref 3.7–5.3)
RBC # BLD: 4.46 M/UL (ref 3.95–5.11)
SODIUM BLD-SCNC: 142 MMOL/L (ref 135–144)
TROPONIN INTERP: NORMAL
TROPONIN T: NORMAL NG/ML
TROPONIN, HIGH SENSITIVITY: 11 NG/L (ref 0–14)
WBC # BLD: 12.5 K/UL (ref 3.5–11.3)

## 2019-10-03 PROCEDURE — 36415 COLL VENOUS BLD VENIPUNCTURE: CPT

## 2019-10-03 PROCEDURE — 97166 OT EVAL MOD COMPLEX 45 MIN: CPT

## 2019-10-03 PROCEDURE — 97162 PT EVAL MOD COMPLEX 30 MIN: CPT

## 2019-10-03 PROCEDURE — 80048 BASIC METABOLIC PNL TOTAL CA: CPT

## 2019-10-03 PROCEDURE — 84132 ASSAY OF SERUM POTASSIUM: CPT

## 2019-10-03 PROCEDURE — 94640 AIRWAY INHALATION TREATMENT: CPT

## 2019-10-03 PROCEDURE — 83735 ASSAY OF MAGNESIUM: CPT

## 2019-10-03 PROCEDURE — 85027 COMPLETE CBC AUTOMATED: CPT

## 2019-10-03 PROCEDURE — 6370000000 HC RX 637 (ALT 250 FOR IP): Performed by: INTERNAL MEDICINE

## 2019-10-03 PROCEDURE — 6370000000 HC RX 637 (ALT 250 FOR IP): Performed by: NURSE PRACTITIONER

## 2019-10-03 PROCEDURE — 93306 TTE W/DOPPLER COMPLETE: CPT

## 2019-10-03 PROCEDURE — 2500000003 HC RX 250 WO HCPCS: Performed by: NURSE PRACTITIONER

## 2019-10-03 PROCEDURE — 82947 ASSAY GLUCOSE BLOOD QUANT: CPT

## 2019-10-03 PROCEDURE — 99232 SBSQ HOSP IP/OBS MODERATE 35: CPT | Performed by: INTERNAL MEDICINE

## 2019-10-03 PROCEDURE — 71046 X-RAY EXAM CHEST 2 VIEWS: CPT

## 2019-10-03 PROCEDURE — 2580000003 HC RX 258: Performed by: INTERNAL MEDICINE

## 2019-10-03 PROCEDURE — 93005 ELECTROCARDIOGRAM TRACING: CPT | Performed by: NURSE PRACTITIONER

## 2019-10-03 PROCEDURE — 94760 N-INVAS EAR/PLS OXIMETRY 1: CPT

## 2019-10-03 PROCEDURE — 97116 GAIT TRAINING THERAPY: CPT

## 2019-10-03 PROCEDURE — 83036 HEMOGLOBIN GLYCOSYLATED A1C: CPT

## 2019-10-03 PROCEDURE — 97535 SELF CARE MNGMENT TRAINING: CPT

## 2019-10-03 PROCEDURE — 6360000002 HC RX W HCPCS: Performed by: NURSE PRACTITIONER

## 2019-10-03 PROCEDURE — 2700000000 HC OXYGEN THERAPY PER DAY

## 2019-10-03 PROCEDURE — APPSS45 APP SPLIT SHARED TIME 31-45 MINUTES: Performed by: NURSE PRACTITIONER

## 2019-10-03 PROCEDURE — 84484 ASSAY OF TROPONIN QUANT: CPT

## 2019-10-03 PROCEDURE — 97530 THERAPEUTIC ACTIVITIES: CPT

## 2019-10-03 PROCEDURE — 2060000000 HC ICU INTERMEDIATE R&B

## 2019-10-03 PROCEDURE — 2580000003 HC RX 258: Performed by: NURSE PRACTITIONER

## 2019-10-03 RX ORDER — METOPROLOL TARTRATE 5 MG/5ML
5 INJECTION INTRAVENOUS EVERY 6 HOURS PRN
Status: DISCONTINUED | OUTPATIENT
Start: 2019-10-03 | End: 2019-10-05 | Stop reason: HOSPADM

## 2019-10-03 RX ORDER — SODIUM CHLORIDE 0.9 % (FLUSH) 0.9 %
10 SYRINGE (ML) INJECTION EVERY 12 HOURS SCHEDULED
Status: DISCONTINUED | OUTPATIENT
Start: 2019-10-03 | End: 2019-10-05 | Stop reason: HOSPADM

## 2019-10-03 RX ORDER — LATANOPROST 50 UG/ML
1 SOLUTION/ DROPS OPHTHALMIC NIGHTLY
Status: DISCONTINUED | OUTPATIENT
Start: 2019-10-03 | End: 2019-10-05 | Stop reason: HOSPADM

## 2019-10-03 RX ORDER — NICOTINE POLACRILEX 4 MG
15 LOZENGE BUCCAL PRN
Status: DISCONTINUED | OUTPATIENT
Start: 2019-10-03 | End: 2019-10-05 | Stop reason: HOSPADM

## 2019-10-03 RX ORDER — POTASSIUM CHLORIDE 7.45 MG/ML
10 INJECTION INTRAVENOUS PRN
Status: DISCONTINUED | OUTPATIENT
Start: 2019-10-03 | End: 2019-10-05 | Stop reason: HOSPADM

## 2019-10-03 RX ORDER — DEXTROSE MONOHYDRATE 25 G/50ML
12.5 INJECTION, SOLUTION INTRAVENOUS PRN
Status: DISCONTINUED | OUTPATIENT
Start: 2019-10-03 | End: 2019-10-05 | Stop reason: HOSPADM

## 2019-10-03 RX ORDER — POTASSIUM CHLORIDE 20 MEQ/1
40 TABLET, EXTENDED RELEASE ORAL PRN
Status: DISCONTINUED | OUTPATIENT
Start: 2019-10-03 | End: 2019-10-05 | Stop reason: HOSPADM

## 2019-10-03 RX ORDER — SODIUM CHLORIDE 0.9 % (FLUSH) 0.9 %
10 SYRINGE (ML) INJECTION PRN
Status: DISCONTINUED | OUTPATIENT
Start: 2019-10-03 | End: 2019-10-05 | Stop reason: HOSPADM

## 2019-10-03 RX ORDER — DEXTROSE MONOHYDRATE 50 MG/ML
100 INJECTION, SOLUTION INTRAVENOUS PRN
Status: DISCONTINUED | OUTPATIENT
Start: 2019-10-03 | End: 2019-10-05 | Stop reason: HOSPADM

## 2019-10-03 RX ADMIN — PRAVASTATIN SODIUM 40 MG: 40 TABLET ORAL at 20:46

## 2019-10-03 RX ADMIN — APIXABAN 5 MG: 5 TABLET, FILM COATED ORAL at 20:45

## 2019-10-03 RX ADMIN — INSULIN LISPRO 1 UNITS: 100 INJECTION, SOLUTION INTRAVENOUS; SUBCUTANEOUS at 20:44

## 2019-10-03 RX ADMIN — IPRATROPIUM BROMIDE AND ALBUTEROL SULFATE 1 AMPULE: .5; 3 SOLUTION RESPIRATORY (INHALATION) at 05:40

## 2019-10-03 RX ADMIN — METHYLPREDNISOLONE SODIUM SUCCINATE 60 MG: 125 INJECTION, POWDER, FOR SOLUTION INTRAMUSCULAR; INTRAVENOUS at 09:37

## 2019-10-03 RX ADMIN — METHYLPREDNISOLONE SODIUM SUCCINATE 60 MG: 125 INJECTION, POWDER, FOR SOLUTION INTRAMUSCULAR; INTRAVENOUS at 20:45

## 2019-10-03 RX ADMIN — CEFTRIAXONE SODIUM 1 G: 1 INJECTION, POWDER, FOR SOLUTION INTRAMUSCULAR; INTRAVENOUS at 12:02

## 2019-10-03 RX ADMIN — Medication 10 ML: at 09:37

## 2019-10-03 RX ADMIN — GUAIFENESIN 1200 MG: 600 TABLET, EXTENDED RELEASE ORAL at 20:45

## 2019-10-03 RX ADMIN — FUROSEMIDE 40 MG: 40 TABLET ORAL at 09:37

## 2019-10-03 RX ADMIN — ISOSORBIDE MONONITRATE 30 MG: 30 TABLET ORAL at 09:37

## 2019-10-03 RX ADMIN — POTASSIUM CHLORIDE 40 MEQ: 20 TABLET, EXTENDED RELEASE ORAL at 03:10

## 2019-10-03 RX ADMIN — INSULIN LISPRO 4 UNITS: 100 INJECTION, SOLUTION INTRAVENOUS; SUBCUTANEOUS at 17:15

## 2019-10-03 RX ADMIN — SERTRALINE HYDROCHLORIDE 100 MG: 100 TABLET ORAL at 09:37

## 2019-10-03 RX ADMIN — IPRATROPIUM BROMIDE AND ALBUTEROL SULFATE 1 AMPULE: .5; 3 SOLUTION RESPIRATORY (INHALATION) at 18:25

## 2019-10-03 RX ADMIN — OXYBUTYNIN CHLORIDE 5 MG: 5 TABLET ORAL at 09:36

## 2019-10-03 RX ADMIN — METOPROLOL TARTRATE 5 MG: 5 INJECTION INTRAVENOUS at 05:24

## 2019-10-03 RX ADMIN — INSULIN LISPRO 2 UNITS: 100 INJECTION, SOLUTION INTRAVENOUS; SUBCUTANEOUS at 13:12

## 2019-10-03 RX ADMIN — PANTOPRAZOLE SODIUM 40 MG: 40 TABLET, DELAYED RELEASE ORAL at 05:29

## 2019-10-03 RX ADMIN — METHYLPREDNISOLONE SODIUM SUCCINATE 60 MG: 125 INJECTION, POWDER, FOR SOLUTION INTRAMUSCULAR; INTRAVENOUS at 02:30

## 2019-10-03 RX ADMIN — METOPROLOL TARTRATE 25 MG: 25 TABLET ORAL at 09:36

## 2019-10-03 RX ADMIN — FUROSEMIDE 40 MG: 40 TABLET ORAL at 17:18

## 2019-10-03 RX ADMIN — POTASSIUM CHLORIDE 40 MEQ: 20 TABLET, EXTENDED RELEASE ORAL at 13:54

## 2019-10-03 RX ADMIN — ASPIRIN 81 MG 81 MG: 81 TABLET ORAL at 09:36

## 2019-10-03 RX ADMIN — METHYLPREDNISOLONE SODIUM SUCCINATE 60 MG: 125 INJECTION, POWDER, FOR SOLUTION INTRAMUSCULAR; INTRAVENOUS at 13:12

## 2019-10-03 RX ADMIN — Medication 10 ML: at 20:46

## 2019-10-03 RX ADMIN — GUAIFENESIN 1200 MG: 600 TABLET, EXTENDED RELEASE ORAL at 09:37

## 2019-10-03 RX ADMIN — LATANOPROST 1 DROP: 50 SOLUTION OPHTHALMIC at 20:49

## 2019-10-03 RX ADMIN — METOPROLOL TARTRATE 25 MG: 25 TABLET ORAL at 20:46

## 2019-10-03 RX ADMIN — IPRATROPIUM BROMIDE AND ALBUTEROL SULFATE 1 AMPULE: .5; 3 SOLUTION RESPIRATORY (INHALATION) at 10:52

## 2019-10-03 RX ADMIN — INSULIN LISPRO 1 UNITS: 100 INJECTION, SOLUTION INTRAVENOUS; SUBCUTANEOUS at 09:38

## 2019-10-03 RX ADMIN — IPRATROPIUM BROMIDE AND ALBUTEROL SULFATE 1 AMPULE: .5; 3 SOLUTION RESPIRATORY (INHALATION) at 14:34

## 2019-10-03 RX ADMIN — APIXABAN 5 MG: 5 TABLET, FILM COATED ORAL at 09:37

## 2019-10-03 RX ADMIN — AZITHROMYCIN MONOHYDRATE 500 MG: 500 INJECTION, POWDER, LYOPHILIZED, FOR SOLUTION INTRAVENOUS at 13:12

## 2019-10-03 ASSESSMENT — PAIN SCALES - GENERAL
PAINLEVEL_OUTOF10: 0

## 2019-10-04 LAB
ABSOLUTE EOS #: 0 K/UL (ref 0–0.4)
ABSOLUTE IMMATURE GRANULOCYTE: 0.16 K/UL (ref 0–0.3)
ABSOLUTE LYMPH #: 0.32 K/UL (ref 1–4.8)
ABSOLUTE MONO #: 0.32 K/UL (ref 0.2–0.8)
ANION GAP SERPL CALCULATED.3IONS-SCNC: 13 MMOL/L (ref 9–17)
BASOPHILS # BLD: 0 %
BASOPHILS ABSOLUTE: 0 K/UL (ref 0–0.2)
BNP INTERPRETATION: ABNORMAL
BUN BLDV-MCNC: 31 MG/DL (ref 8–23)
BUN/CREAT BLD: 39 (ref 9–20)
CALCIUM SERPL-MCNC: 8.7 MG/DL (ref 8.6–10.4)
CHLORIDE BLD-SCNC: 99 MMOL/L (ref 98–107)
CO2: 27 MMOL/L (ref 20–31)
CREAT SERPL-MCNC: 0.79 MG/DL (ref 0.5–0.9)
DIFFERENTIAL TYPE: ABNORMAL
EOSINOPHILS RELATIVE PERCENT: 0 % (ref 1–4)
GFR AFRICAN AMERICAN: >60 ML/MIN
GFR NON-AFRICAN AMERICAN: >60 ML/MIN
GFR SERPL CREATININE-BSD FRML MDRD: ABNORMAL ML/MIN/{1.73_M2}
GFR SERPL CREATININE-BSD FRML MDRD: ABNORMAL ML/MIN/{1.73_M2}
GLUCOSE BLD-MCNC: 126 MG/DL (ref 65–105)
GLUCOSE BLD-MCNC: 170 MG/DL (ref 65–105)
GLUCOSE BLD-MCNC: 206 MG/DL (ref 70–99)
GLUCOSE BLD-MCNC: 218 MG/DL (ref 65–105)
GLUCOSE BLD-MCNC: 287 MG/DL (ref 65–105)
HCT VFR BLD CALC: 37.8 % (ref 36.3–47.1)
HEMOGLOBIN: 11.6 G/DL (ref 11.9–15.1)
IMMATURE GRANULOCYTES: 1 %
LYMPHOCYTES # BLD: 2 % (ref 24–44)
MCH RBC QN AUTO: 27 PG (ref 25.2–33.5)
MCHC RBC AUTO-ENTMCNC: 30.7 G/DL (ref 28.4–34.8)
MCV RBC AUTO: 88.1 FL (ref 82.6–102.9)
MONOCYTES # BLD: 2 % (ref 1–7)
NRBC AUTOMATED: 0 PER 100 WBC
PDW BLD-RTO: 14.6 % (ref 11.8–14.4)
PLATELET # BLD: 263 K/UL (ref 138–453)
PLATELET ESTIMATE: ABNORMAL
PMV BLD AUTO: 11.2 FL (ref 8.1–13.5)
POTASSIUM SERPL-SCNC: 3.7 MMOL/L (ref 3.7–5.3)
PRO-BNP: 2787 PG/ML
RBC # BLD: 4.29 M/UL (ref 3.95–5.11)
RBC # BLD: ABNORMAL 10*6/UL
SEG NEUTROPHILS: 95 % (ref 36–66)
SEGMENTED NEUTROPHILS ABSOLUTE COUNT: 15.4 K/UL (ref 1.8–7.7)
SODIUM BLD-SCNC: 139 MMOL/L (ref 135–144)
WBC # BLD: 16.2 K/UL (ref 3.5–11.3)
WBC # BLD: ABNORMAL 10*3/UL

## 2019-10-04 PROCEDURE — 97116 GAIT TRAINING THERAPY: CPT

## 2019-10-04 PROCEDURE — 82947 ASSAY GLUCOSE BLOOD QUANT: CPT

## 2019-10-04 PROCEDURE — 99232 SBSQ HOSP IP/OBS MODERATE 35: CPT | Performed by: INTERNAL MEDICINE

## 2019-10-04 PROCEDURE — 2700000000 HC OXYGEN THERAPY PER DAY

## 2019-10-04 PROCEDURE — 94640 AIRWAY INHALATION TREATMENT: CPT

## 2019-10-04 PROCEDURE — 85025 COMPLETE CBC W/AUTO DIFF WBC: CPT

## 2019-10-04 PROCEDURE — 94760 N-INVAS EAR/PLS OXIMETRY 1: CPT

## 2019-10-04 PROCEDURE — APPSS45 APP SPLIT SHARED TIME 31-45 MINUTES: Performed by: NURSE PRACTITIONER

## 2019-10-04 PROCEDURE — 6370000000 HC RX 637 (ALT 250 FOR IP): Performed by: INTERNAL MEDICINE

## 2019-10-04 PROCEDURE — 83880 ASSAY OF NATRIURETIC PEPTIDE: CPT

## 2019-10-04 PROCEDURE — 97530 THERAPEUTIC ACTIVITIES: CPT

## 2019-10-04 PROCEDURE — 6360000002 HC RX W HCPCS: Performed by: NURSE PRACTITIONER

## 2019-10-04 PROCEDURE — 80048 BASIC METABOLIC PNL TOTAL CA: CPT

## 2019-10-04 PROCEDURE — 94761 N-INVAS EAR/PLS OXIMETRY MLT: CPT

## 2019-10-04 PROCEDURE — 2060000000 HC ICU INTERMEDIATE R&B

## 2019-10-04 PROCEDURE — 97535 SELF CARE MNGMENT TRAINING: CPT

## 2019-10-04 PROCEDURE — 36415 COLL VENOUS BLD VENIPUNCTURE: CPT

## 2019-10-04 PROCEDURE — 6370000000 HC RX 637 (ALT 250 FOR IP): Performed by: NURSE PRACTITIONER

## 2019-10-04 PROCEDURE — 97110 THERAPEUTIC EXERCISES: CPT

## 2019-10-04 PROCEDURE — 2580000003 HC RX 258: Performed by: INTERNAL MEDICINE

## 2019-10-04 PROCEDURE — 2580000003 HC RX 258: Performed by: NURSE PRACTITIONER

## 2019-10-04 PROCEDURE — 94669 MECHANICAL CHEST WALL OSCILL: CPT

## 2019-10-04 RX ORDER — METHYLPREDNISOLONE SODIUM SUCCINATE 40 MG/ML
40 INJECTION, POWDER, LYOPHILIZED, FOR SOLUTION INTRAMUSCULAR; INTRAVENOUS EVERY 6 HOURS
Status: DISCONTINUED | OUTPATIENT
Start: 2019-10-04 | End: 2019-10-04

## 2019-10-04 RX ORDER — METOPROLOL TARTRATE 50 MG/1
50 TABLET, FILM COATED ORAL 2 TIMES DAILY
Status: DISCONTINUED | OUTPATIENT
Start: 2019-10-04 | End: 2019-10-05 | Stop reason: HOSPADM

## 2019-10-04 RX ORDER — PREDNISONE 20 MG/1
40 TABLET ORAL DAILY
Status: DISCONTINUED | OUTPATIENT
Start: 2019-10-04 | End: 2019-10-05 | Stop reason: HOSPADM

## 2019-10-04 RX ORDER — LEVOFLOXACIN 500 MG/1
500 TABLET, FILM COATED ORAL DAILY
Status: DISCONTINUED | OUTPATIENT
Start: 2019-10-04 | End: 2019-10-05 | Stop reason: HOSPADM

## 2019-10-04 RX ORDER — AZITHROMYCIN 250 MG/1
250 TABLET, FILM COATED ORAL DAILY
Status: DISCONTINUED | OUTPATIENT
Start: 2019-10-04 | End: 2019-10-04

## 2019-10-04 RX ADMIN — IPRATROPIUM BROMIDE AND ALBUTEROL SULFATE 1 AMPULE: .5; 3 SOLUTION RESPIRATORY (INHALATION) at 12:01

## 2019-10-04 RX ADMIN — METOPROLOL TARTRATE 50 MG: 50 TABLET ORAL at 20:59

## 2019-10-04 RX ADMIN — APIXABAN 5 MG: 5 TABLET, FILM COATED ORAL at 20:59

## 2019-10-04 RX ADMIN — IPRATROPIUM BROMIDE AND ALBUTEROL SULFATE 1 AMPULE: .5; 3 SOLUTION RESPIRATORY (INHALATION) at 00:05

## 2019-10-04 RX ADMIN — IPRATROPIUM BROMIDE AND ALBUTEROL SULFATE 1 AMPULE: .5; 3 SOLUTION RESPIRATORY (INHALATION) at 18:14

## 2019-10-04 RX ADMIN — ISOSORBIDE MONONITRATE 30 MG: 30 TABLET ORAL at 08:54

## 2019-10-04 RX ADMIN — INSULIN LISPRO 2 UNITS: 100 INJECTION, SOLUTION INTRAVENOUS; SUBCUTANEOUS at 08:56

## 2019-10-04 RX ADMIN — SERTRALINE HYDROCHLORIDE 100 MG: 100 TABLET ORAL at 08:55

## 2019-10-04 RX ADMIN — IPRATROPIUM BROMIDE AND ALBUTEROL SULFATE 1 AMPULE: .5; 3 SOLUTION RESPIRATORY (INHALATION) at 16:03

## 2019-10-04 RX ADMIN — IPRATROPIUM BROMIDE AND ALBUTEROL SULFATE 1 AMPULE: .5; 3 SOLUTION RESPIRATORY (INHALATION) at 04:05

## 2019-10-04 RX ADMIN — INSULIN LISPRO 2 UNITS: 100 INJECTION, SOLUTION INTRAVENOUS; SUBCUTANEOUS at 20:59

## 2019-10-04 RX ADMIN — MAGNESIUM HYDROXIDE 30 ML: 400 SUSPENSION ORAL at 23:45

## 2019-10-04 RX ADMIN — METHYLPREDNISOLONE SODIUM SUCCINATE 60 MG: 125 INJECTION, POWDER, FOR SOLUTION INTRAMUSCULAR; INTRAVENOUS at 08:57

## 2019-10-04 RX ADMIN — METOPROLOL TARTRATE 25 MG: 25 TABLET ORAL at 12:27

## 2019-10-04 RX ADMIN — PREDNISONE 40 MG: 20 TABLET ORAL at 17:18

## 2019-10-04 RX ADMIN — METHYLPREDNISOLONE SODIUM SUCCINATE 60 MG: 125 INJECTION, POWDER, FOR SOLUTION INTRAMUSCULAR; INTRAVENOUS at 14:42

## 2019-10-04 RX ADMIN — Medication 10 ML: at 08:57

## 2019-10-04 RX ADMIN — APIXABAN 5 MG: 5 TABLET, FILM COATED ORAL at 08:54

## 2019-10-04 RX ADMIN — ASPIRIN 81 MG 81 MG: 81 TABLET ORAL at 08:55

## 2019-10-04 RX ADMIN — FUROSEMIDE 40 MG: 40 TABLET ORAL at 08:55

## 2019-10-04 RX ADMIN — LEVOFLOXACIN 500 MG: 500 TABLET, FILM COATED ORAL at 17:18

## 2019-10-04 RX ADMIN — FUROSEMIDE 40 MG: 40 TABLET ORAL at 17:18

## 2019-10-04 RX ADMIN — Medication 10 ML: at 21:00

## 2019-10-04 RX ADMIN — CEFTRIAXONE SODIUM 1 G: 1 INJECTION, POWDER, FOR SOLUTION INTRAMUSCULAR; INTRAVENOUS at 12:30

## 2019-10-04 RX ADMIN — GUAIFENESIN 1200 MG: 600 TABLET, EXTENDED RELEASE ORAL at 20:59

## 2019-10-04 RX ADMIN — GUAIFENESIN 1200 MG: 600 TABLET, EXTENDED RELEASE ORAL at 08:55

## 2019-10-04 RX ADMIN — METOPROLOL TARTRATE 25 MG: 25 TABLET ORAL at 08:55

## 2019-10-04 RX ADMIN — METHYLPREDNISOLONE SODIUM SUCCINATE 60 MG: 125 INJECTION, POWDER, FOR SOLUTION INTRAMUSCULAR; INTRAVENOUS at 01:34

## 2019-10-04 RX ADMIN — LATANOPROST 1 DROP: 50 SOLUTION OPHTHALMIC at 21:02

## 2019-10-04 RX ADMIN — IPRATROPIUM BROMIDE AND ALBUTEROL SULFATE 1 AMPULE: .5; 3 SOLUTION RESPIRATORY (INHALATION) at 07:55

## 2019-10-04 RX ADMIN — PRAVASTATIN SODIUM 40 MG: 40 TABLET ORAL at 20:59

## 2019-10-04 RX ADMIN — PANTOPRAZOLE SODIUM 40 MG: 40 TABLET, DELAYED RELEASE ORAL at 06:04

## 2019-10-04 RX ADMIN — INSULIN LISPRO 6 UNITS: 100 INJECTION, SOLUTION INTRAVENOUS; SUBCUTANEOUS at 12:29

## 2019-10-04 ASSESSMENT — PAIN SCALES - GENERAL
PAINLEVEL_OUTOF10: 0
PAINLEVEL_OUTOF10: 0

## 2019-10-05 VITALS
BODY MASS INDEX: 24.17 KG/M2 | DIASTOLIC BLOOD PRESSURE: 80 MMHG | HEART RATE: 92 BPM | HEIGHT: 64 IN | TEMPERATURE: 97.3 F | WEIGHT: 141.6 LBS | OXYGEN SATURATION: 97 % | RESPIRATION RATE: 18 BRPM | SYSTOLIC BLOOD PRESSURE: 156 MMHG

## 2019-10-05 LAB — GLUCOSE BLD-MCNC: 141 MG/DL (ref 65–105)

## 2019-10-05 PROCEDURE — 6370000000 HC RX 637 (ALT 250 FOR IP): Performed by: INTERNAL MEDICINE

## 2019-10-05 PROCEDURE — 99239 HOSP IP/OBS DSCHRG MGMT >30: CPT | Performed by: INTERNAL MEDICINE

## 2019-10-05 PROCEDURE — 82947 ASSAY GLUCOSE BLOOD QUANT: CPT

## 2019-10-05 PROCEDURE — 6370000000 HC RX 637 (ALT 250 FOR IP): Performed by: NURSE PRACTITIONER

## 2019-10-05 PROCEDURE — 94640 AIRWAY INHALATION TREATMENT: CPT

## 2019-10-05 RX ORDER — LEVOFLOXACIN 500 MG/1
500 TABLET, FILM COATED ORAL DAILY
Qty: 7 TABLET | Refills: 0 | Status: SHIPPED | OUTPATIENT
Start: 2019-10-06 | End: 2019-10-13

## 2019-10-05 RX ORDER — METOPROLOL TARTRATE 50 MG/1
50 TABLET, FILM COATED ORAL 2 TIMES DAILY
Qty: 60 TABLET | Refills: 3 | Status: SHIPPED | OUTPATIENT
Start: 2019-10-05 | End: 2020-01-16 | Stop reason: DRUGHIGH

## 2019-10-05 RX ORDER — PREDNISONE 10 MG/1
30 TABLET ORAL DAILY
Qty: 18 TABLET | Refills: 0 | Status: SHIPPED | OUTPATIENT
Start: 2019-10-06 | End: 2019-10-16 | Stop reason: ALTCHOICE

## 2019-10-05 RX ORDER — FUROSEMIDE 40 MG/1
40 TABLET ORAL 2 TIMES DAILY
Qty: 60 TABLET | Refills: 3 | Status: ON HOLD | OUTPATIENT
Start: 2019-10-05 | End: 2020-12-11 | Stop reason: SDUPTHER

## 2019-10-05 RX ORDER — GUAIFENESIN 600 MG/1
1200 TABLET, EXTENDED RELEASE ORAL 2 TIMES DAILY
Qty: 20 TABLET | Refills: 0 | Status: SHIPPED | OUTPATIENT
Start: 2019-10-05 | End: 2019-10-10

## 2019-10-05 RX ORDER — POTASSIUM CHLORIDE 20 MEQ/1
20 TABLET, EXTENDED RELEASE ORAL 2 TIMES DAILY
Qty: 60 TABLET | Refills: 0 | Status: ON HOLD | OUTPATIENT
Start: 2019-10-05 | End: 2020-12-08 | Stop reason: ALTCHOICE

## 2019-10-05 RX ADMIN — PANTOPRAZOLE SODIUM 40 MG: 40 TABLET, DELAYED RELEASE ORAL at 05:59

## 2019-10-05 RX ADMIN — ASPIRIN 81 MG 81 MG: 81 TABLET ORAL at 08:34

## 2019-10-05 RX ADMIN — INSULIN LISPRO 2 UNITS: 100 INJECTION, SOLUTION INTRAVENOUS; SUBCUTANEOUS at 08:35

## 2019-10-05 RX ADMIN — IPRATROPIUM BROMIDE AND ALBUTEROL SULFATE 1 AMPULE: .5; 3 SOLUTION RESPIRATORY (INHALATION) at 03:30

## 2019-10-05 RX ADMIN — IPRATROPIUM BROMIDE AND ALBUTEROL SULFATE 1 AMPULE: .5; 3 SOLUTION RESPIRATORY (INHALATION) at 00:10

## 2019-10-05 RX ADMIN — APIXABAN 5 MG: 5 TABLET, FILM COATED ORAL at 08:34

## 2019-10-05 RX ADMIN — SERTRALINE HYDROCHLORIDE 100 MG: 100 TABLET ORAL at 08:34

## 2019-10-05 RX ADMIN — FUROSEMIDE 40 MG: 40 TABLET ORAL at 08:34

## 2019-10-05 RX ADMIN — ISOSORBIDE MONONITRATE 30 MG: 30 TABLET ORAL at 08:34

## 2019-10-05 RX ADMIN — IPRATROPIUM BROMIDE AND ALBUTEROL SULFATE 1 AMPULE: .5; 3 SOLUTION RESPIRATORY (INHALATION) at 09:16

## 2019-10-05 RX ADMIN — GUAIFENESIN 1200 MG: 600 TABLET, EXTENDED RELEASE ORAL at 08:34

## 2019-10-05 RX ADMIN — METOPROLOL TARTRATE 50 MG: 50 TABLET ORAL at 08:34

## 2019-10-05 RX ADMIN — PREDNISONE 40 MG: 20 TABLET ORAL at 08:34

## 2019-10-05 RX ADMIN — LEVOFLOXACIN 500 MG: 500 TABLET, FILM COATED ORAL at 08:34

## 2019-10-05 RX ADMIN — OXYBUTYNIN CHLORIDE 5 MG: 5 TABLET ORAL at 08:34

## 2019-10-08 LAB
CULTURE: NORMAL
CULTURE: NORMAL
Lab: NORMAL
Lab: NORMAL
SPECIMEN DESCRIPTION: NORMAL
SPECIMEN DESCRIPTION: NORMAL

## 2019-10-09 ENCOUNTER — TELEPHONE (OUTPATIENT)
Dept: PHARMACY | Age: 84
End: 2019-10-09

## 2019-10-11 ENCOUNTER — APPOINTMENT (OUTPATIENT)
Dept: GENERAL RADIOLOGY | Age: 84
End: 2019-10-11
Payer: MEDICARE

## 2019-10-11 ENCOUNTER — HOSPITAL ENCOUNTER (EMERGENCY)
Age: 84
Discharge: HOME | End: 2019-10-11
Attending: EMERGENCY MEDICINE
Payer: MEDICARE

## 2019-10-11 VITALS
TEMPERATURE: 98.6 F | HEIGHT: 64 IN | RESPIRATION RATE: 18 BRPM | DIASTOLIC BLOOD PRESSURE: 70 MMHG | OXYGEN SATURATION: 95 % | HEART RATE: 88 BPM | BODY MASS INDEX: 23.85 KG/M2 | WEIGHT: 139.7 LBS | SYSTOLIC BLOOD PRESSURE: 122 MMHG

## 2019-10-11 DIAGNOSIS — R58 ECCHYMOSIS: ICD-10-CM

## 2019-10-11 DIAGNOSIS — M79.605 BILATERAL LEG PAIN: Primary | ICD-10-CM

## 2019-10-11 DIAGNOSIS — M79.604 BILATERAL LEG PAIN: Primary | ICD-10-CM

## 2019-10-11 LAB
ABSOLUTE EOS #: 0.08 K/UL (ref 0–0.44)
ABSOLUTE IMMATURE GRANULOCYTE: 0.22 K/UL (ref 0–0.3)
ABSOLUTE LYMPH #: 1.28 K/UL (ref 1.1–3.7)
ABSOLUTE MONO #: 1.1 K/UL (ref 0.1–1.2)
ANION GAP SERPL CALCULATED.3IONS-SCNC: 10 MMOL/L (ref 9–17)
BASOPHILS # BLD: 0 % (ref 0–2)
BASOPHILS ABSOLUTE: <0.03 K/UL (ref 0–0.2)
BUN BLDV-MCNC: 29 MG/DL (ref 8–23)
BUN/CREAT BLD: 25 (ref 9–20)
CALCIUM SERPL-MCNC: 8.5 MG/DL (ref 8.6–10.4)
CHLORIDE BLD-SCNC: 102 MMOL/L (ref 98–107)
CO2: 30 MMOL/L (ref 20–31)
CREAT SERPL-MCNC: 1.14 MG/DL (ref 0.5–0.9)
D-DIMER QUANTITATIVE: 0.89 MG/L FEU
DIFFERENTIAL TYPE: ABNORMAL
EOSINOPHILS RELATIVE PERCENT: 1 % (ref 1–4)
GFR AFRICAN AMERICAN: 55 ML/MIN
GFR NON-AFRICAN AMERICAN: 45 ML/MIN
GFR SERPL CREATININE-BSD FRML MDRD: ABNORMAL ML/MIN/{1.73_M2}
GFR SERPL CREATININE-BSD FRML MDRD: ABNORMAL ML/MIN/{1.73_M2}
GLUCOSE BLD-MCNC: 105 MG/DL (ref 70–99)
HCT VFR BLD CALC: 39.9 % (ref 36.3–47.1)
HEMOGLOBIN: 12.4 G/DL (ref 11.9–15.1)
IMMATURE GRANULOCYTES: 2 %
LYMPHOCYTES # BLD: 9 % (ref 24–43)
MCH RBC QN AUTO: 27.2 PG (ref 25.2–33.5)
MCHC RBC AUTO-ENTMCNC: 31.1 G/DL (ref 28.4–34.8)
MCV RBC AUTO: 87.5 FL (ref 82.6–102.9)
MONOCYTES # BLD: 8 % (ref 3–12)
NRBC AUTOMATED: 0 PER 100 WBC
PDW BLD-RTO: 15.5 % (ref 11.8–14.4)
PLATELET # BLD: 296 K/UL (ref 138–453)
PLATELET ESTIMATE: ABNORMAL
PMV BLD AUTO: 9.8 FL (ref 8.1–13.5)
POTASSIUM SERPL-SCNC: 4.2 MMOL/L (ref 3.7–5.3)
RBC # BLD: 4.56 M/UL (ref 3.95–5.11)
RBC # BLD: ABNORMAL 10*6/UL
SEG NEUTROPHILS: 81 % (ref 36–65)
SEGMENTED NEUTROPHILS ABSOLUTE COUNT: 11.42 K/UL (ref 1.5–8.1)
SODIUM BLD-SCNC: 142 MMOL/L (ref 135–144)
WBC # BLD: 14.1 K/UL (ref 3.5–11.3)
WBC # BLD: ABNORMAL 10*3/UL

## 2019-10-11 PROCEDURE — 80048 BASIC METABOLIC PNL TOTAL CA: CPT

## 2019-10-11 PROCEDURE — 85379 FIBRIN DEGRADATION QUANT: CPT

## 2019-10-11 PROCEDURE — 73620 X-RAY EXAM OF FOOT: CPT

## 2019-10-11 PROCEDURE — 73590 X-RAY EXAM OF LOWER LEG: CPT

## 2019-10-11 PROCEDURE — 85025 COMPLETE CBC W/AUTO DIFF WBC: CPT

## 2019-10-11 PROCEDURE — 99284 EMERGENCY DEPT VISIT MOD MDM: CPT

## 2019-10-11 PROCEDURE — 93970 EXTREMITY STUDY: CPT

## 2019-10-11 ASSESSMENT — ENCOUNTER SYMPTOMS
BACK PAIN: 0
EYE PAIN: 0
ABDOMINAL PAIN: 0
EYE DISCHARGE: 0
FACIAL SWELLING: 0
SHORTNESS OF BREATH: 0
ABDOMINAL DISTENTION: 0
CHEST TIGHTNESS: 0

## 2019-10-11 ASSESSMENT — PAIN DESCRIPTION - LOCATION: LOCATION: LEG

## 2019-10-11 ASSESSMENT — PAIN DESCRIPTION - ORIENTATION: ORIENTATION: RIGHT;LEFT

## 2019-10-14 ENCOUNTER — TELEPHONE (OUTPATIENT)
Dept: OTHER | Facility: CLINIC | Age: 84
End: 2019-10-14

## 2019-10-16 ENCOUNTER — HOSPITAL ENCOUNTER (OUTPATIENT)
Dept: PHARMACY | Age: 84
Setting detail: THERAPIES SERIES
Discharge: HOME | End: 2019-10-16
Payer: MEDICARE

## 2019-10-16 DIAGNOSIS — I48.20 CHRONIC ATRIAL FIBRILLATION (HCC): ICD-10-CM

## 2019-10-16 PROCEDURE — 99211 OFF/OP EST MAY X REQ PHY/QHP: CPT

## 2019-12-16 RX ORDER — LORATADINE 10 MG/1
10 TABLET ORAL DAILY
Status: ON HOLD | COMMUNITY
End: 2020-12-08

## 2019-12-16 RX ORDER — AMIODARONE HYDROCHLORIDE 200 MG/1
200 TABLET ORAL DAILY
Status: ON HOLD | COMMUNITY
End: 2021-02-25 | Stop reason: HOSPADM

## 2019-12-16 RX ORDER — RANOLAZINE 500 MG/1
500 TABLET, EXTENDED RELEASE ORAL 2 TIMES DAILY
Status: ON HOLD | COMMUNITY
End: 2020-12-08

## 2019-12-17 ENCOUNTER — ANESTHESIA EVENT (OUTPATIENT)
Dept: CARDIAC CATH/INVASIVE PROCEDURES | Age: 84
End: 2019-12-17

## 2019-12-17 ENCOUNTER — HOSPITAL ENCOUNTER (OUTPATIENT)
Dept: CARDIAC CATH/INVASIVE PROCEDURES | Age: 84
Discharge: HOME | End: 2019-12-17
Payer: MEDICARE

## 2019-12-17 ENCOUNTER — ANESTHESIA (OUTPATIENT)
Dept: CARDIAC CATH/INVASIVE PROCEDURES | Age: 84
End: 2019-12-17

## 2019-12-17 VITALS
TEMPERATURE: 97.9 F | RESPIRATION RATE: 27 BRPM | HEIGHT: 64 IN | OXYGEN SATURATION: 94 % | SYSTOLIC BLOOD PRESSURE: 147 MMHG | HEART RATE: 55 BPM | DIASTOLIC BLOOD PRESSURE: 86 MMHG | BODY MASS INDEX: 24.36 KG/M2 | WEIGHT: 142.7 LBS

## 2019-12-17 VITALS — SYSTOLIC BLOOD PRESSURE: 189 MMHG | DIASTOLIC BLOOD PRESSURE: 79 MMHG | OXYGEN SATURATION: 96 %

## 2019-12-17 LAB
ANION GAP SERPL CALCULATED.3IONS-SCNC: 14 MMOL/L (ref 9–17)
CHLORIDE BLD-SCNC: 102 MMOL/L (ref 98–107)
CO2: 27 MMOL/L (ref 20–31)
POTASSIUM SERPL-SCNC: 3.8 MMOL/L (ref 3.7–5.3)
SODIUM BLD-SCNC: 143 MMOL/L (ref 135–144)

## 2019-12-17 PROCEDURE — 3700000000 HC ANESTHESIA ATTENDED CARE

## 2019-12-17 PROCEDURE — 7100000011 HC PHASE II RECOVERY - ADDTL 15 MIN

## 2019-12-17 PROCEDURE — 80051 ELECTROLYTE PANEL: CPT

## 2019-12-17 PROCEDURE — 2580000003 HC RX 258: Performed by: INTERNAL MEDICINE

## 2019-12-17 PROCEDURE — 92960 CARDIOVERSION ELECTRIC EXT: CPT | Performed by: INTERNAL MEDICINE

## 2019-12-17 PROCEDURE — 7100000010 HC PHASE II RECOVERY - FIRST 15 MIN

## 2019-12-17 PROCEDURE — 93005 ELECTROCARDIOGRAM TRACING: CPT | Performed by: INTERNAL MEDICINE

## 2019-12-17 PROCEDURE — 6360000002 HC RX W HCPCS: Performed by: NURSE ANESTHETIST, CERTIFIED REGISTERED

## 2019-12-17 RX ORDER — OXYCODONE HYDROCHLORIDE AND ACETAMINOPHEN 5; 325 MG/1; MG/1
1 TABLET ORAL PRN
Status: ACTIVE | OUTPATIENT
Start: 2019-12-17 | End: 2019-12-17

## 2019-12-17 RX ORDER — SODIUM CHLORIDE 9 MG/ML
INJECTION, SOLUTION INTRAVENOUS CONTINUOUS
Status: DISCONTINUED | OUTPATIENT
Start: 2019-12-17 | End: 2019-12-18 | Stop reason: HOSPADM

## 2019-12-17 RX ORDER — OXYCODONE HYDROCHLORIDE AND ACETAMINOPHEN 5; 325 MG/1; MG/1
2 TABLET ORAL PRN
Status: ACTIVE | OUTPATIENT
Start: 2019-12-17 | End: 2019-12-17

## 2019-12-17 RX ORDER — HYDRALAZINE HYDROCHLORIDE 20 MG/ML
5 INJECTION INTRAMUSCULAR; INTRAVENOUS EVERY 10 MIN PRN
Status: DISCONTINUED | OUTPATIENT
Start: 2019-12-17 | End: 2019-12-18 | Stop reason: HOSPADM

## 2019-12-17 RX ORDER — LABETALOL HYDROCHLORIDE 5 MG/ML
5 INJECTION, SOLUTION INTRAVENOUS EVERY 10 MIN PRN
Status: DISCONTINUED | OUTPATIENT
Start: 2019-12-17 | End: 2019-12-18 | Stop reason: HOSPADM

## 2019-12-17 RX ORDER — ONDANSETRON 2 MG/ML
4 INJECTION INTRAMUSCULAR; INTRAVENOUS
Status: ACTIVE | OUTPATIENT
Start: 2019-12-17 | End: 2019-12-17

## 2019-12-17 RX ORDER — HYDROMORPHONE HCL 110MG/55ML
0.5 PATIENT CONTROLLED ANALGESIA SYRINGE INTRAVENOUS EVERY 5 MIN PRN
Status: DISCONTINUED | OUTPATIENT
Start: 2019-12-17 | End: 2019-12-18 | Stop reason: HOSPADM

## 2019-12-17 RX ORDER — FENTANYL CITRATE 50 UG/ML
25 INJECTION, SOLUTION INTRAMUSCULAR; INTRAVENOUS EVERY 5 MIN PRN
Status: DISCONTINUED | OUTPATIENT
Start: 2019-12-17 | End: 2019-12-18 | Stop reason: HOSPADM

## 2019-12-17 RX ORDER — PROMETHAZINE HYDROCHLORIDE 25 MG/ML
6.25 INJECTION, SOLUTION INTRAMUSCULAR; INTRAVENOUS
Status: ACTIVE | OUTPATIENT
Start: 2019-12-17 | End: 2019-12-17

## 2019-12-17 RX ORDER — PROPOFOL 10 MG/ML
INJECTION, EMULSION INTRAVENOUS PRN
Status: DISCONTINUED | OUTPATIENT
Start: 2019-12-17 | End: 2019-12-17 | Stop reason: SDUPTHER

## 2019-12-17 RX ADMIN — SODIUM CHLORIDE: 9 INJECTION, SOLUTION INTRAVENOUS at 14:51

## 2019-12-17 RX ADMIN — PROPOFOL 30 MG: 10 INJECTION, EMULSION INTRAVENOUS at 14:52

## 2019-12-17 ASSESSMENT — PULMONARY FUNCTION TESTS
PIF_VALUE: 1
PIF_VALUE: 0
PIF_VALUE: 0
PIF_VALUE: 1
PIF_VALUE: 0
PIF_VALUE: 0

## 2019-12-17 ASSESSMENT — PAIN SCALES - GENERAL
PAINLEVEL_OUTOF10: 0

## 2019-12-17 ASSESSMENT — PAIN - FUNCTIONAL ASSESSMENT: PAIN_FUNCTIONAL_ASSESSMENT: 0-10

## 2019-12-18 LAB
EKG ATRIAL RATE: 326 BPM
EKG ATRIAL RATE: 50 BPM
EKG P AXIS: 58 DEGREES
EKG P-R INTERVAL: 204 MS
EKG Q-T INTERVAL: 408 MS
EKG Q-T INTERVAL: 492 MS
EKG QRS DURATION: 86 MS
EKG QRS DURATION: 90 MS
EKG QTC CALCULATION (BAZETT): 433 MS
EKG QTC CALCULATION (BAZETT): 448 MS
EKG R AXIS: 34 DEGREES
EKG R AXIS: 49 DEGREES
EKG T AXIS: 25 DEGREES
EKG T AXIS: 43 DEGREES
EKG VENTRICULAR RATE: 50 BPM
EKG VENTRICULAR RATE: 68 BPM

## 2019-12-18 PROCEDURE — 93010 ELECTROCARDIOGRAM REPORT: CPT | Performed by: INTERNAL MEDICINE

## 2020-01-16 ENCOUNTER — HOSPITAL ENCOUNTER (OUTPATIENT)
Dept: PHARMACY | Age: 85
Setting detail: THERAPIES SERIES
Discharge: HOME | End: 2020-01-16
Payer: MEDICARE

## 2020-01-16 LAB
CREAT SERPL-MCNC: 1.26 MG/DL (ref 0.5–0.9)
GFR AFRICAN AMERICAN: 49 ML/MIN
GFR NON-AFRICAN AMERICAN: 40 ML/MIN
GFR SERPL CREATININE-BSD FRML MDRD: ABNORMAL ML/MIN/{1.73_M2}
GFR SERPL CREATININE-BSD FRML MDRD: ABNORMAL ML/MIN/{1.73_M2}
HCT VFR BLD CALC: 42.3 % (ref 36.3–47.1)
HEMOGLOBIN: 12.6 G/DL (ref 11.9–15.1)
MCH RBC QN AUTO: 27 PG (ref 25.2–33.5)
MCHC RBC AUTO-ENTMCNC: 29.8 G/DL (ref 28.4–34.8)
MCV RBC AUTO: 90.8 FL (ref 82.6–102.9)
NRBC AUTOMATED: 0 PER 100 WBC
PDW BLD-RTO: 15.3 % (ref 11.8–14.4)
PLATELET # BLD: 284 K/UL (ref 138–453)
PMV BLD AUTO: 10.9 FL (ref 8.1–13.5)
RBC # BLD: 4.66 M/UL (ref 3.95–5.11)
WBC # BLD: 8.2 K/UL (ref 3.5–11.3)

## 2020-01-16 PROCEDURE — 82565 ASSAY OF CREATININE: CPT

## 2020-01-16 PROCEDURE — 99211 OFF/OP EST MAY X REQ PHY/QHP: CPT

## 2020-01-16 PROCEDURE — 85027 COMPLETE CBC AUTOMATED: CPT

## 2020-01-16 PROCEDURE — 36415 COLL VENOUS BLD VENIPUNCTURE: CPT

## 2020-01-16 RX ORDER — POTASSIUM CHLORIDE 1500 MG/1
TABLET, FILM COATED, EXTENDED RELEASE ORAL
Status: ON HOLD | COMMUNITY
Start: 2019-11-04 | End: 2020-05-01 | Stop reason: HOSPADM

## 2020-01-16 NOTE — PROGRESS NOTES
include: Brochure to order medication alert michelle     Patient states her cardiologist is closely following her Eliquis therapy and she is due to go back in 3 months and potentially taken off of therapy. Will not schedule follow up visit in clinic at this time. Progress note routed to referring physicians office. Patient acknowledges working in consult agreement with pharmacist as referred by his/her physician.

## 2020-04-29 ENCOUNTER — HOSPITAL ENCOUNTER (INPATIENT)
Age: 85
LOS: 2 days | Discharge: HOME | DRG: 292 | End: 2020-05-01
Attending: EMERGENCY MEDICINE | Admitting: FAMILY MEDICINE
Payer: MEDICARE

## 2020-04-29 ENCOUNTER — APPOINTMENT (OUTPATIENT)
Dept: CT IMAGING | Age: 85
DRG: 292 | End: 2020-04-29
Payer: MEDICARE

## 2020-04-29 ENCOUNTER — APPOINTMENT (OUTPATIENT)
Dept: GENERAL RADIOLOGY | Age: 85
DRG: 292 | End: 2020-04-29
Payer: MEDICARE

## 2020-04-29 PROBLEM — I25.10 ATHEROSCLEROTIC HEART DISEASE OF NATIVE CORONARY ARTERY WITHOUT ANGINA PECTORIS: Status: ACTIVE | Noted: 2020-04-29

## 2020-04-29 PROBLEM — F32.A DEPRESSIVE DISORDER: Status: ACTIVE | Noted: 2020-04-29

## 2020-04-29 PROBLEM — Z20.822 SUSPECTED COVID-19 VIRUS INFECTION: Status: ACTIVE | Noted: 2020-04-29

## 2020-04-29 PROBLEM — I50.33 HEART FAILURE, DIASTOLIC, ACUTE ON CHRONIC (HCC): Status: ACTIVE | Noted: 2020-04-29

## 2020-04-29 PROBLEM — I36.9 NONRHEUMATIC TRICUSPID VALVE DISORDER: Status: ACTIVE | Noted: 2020-04-29

## 2020-04-29 PROBLEM — F41.1 GENERALIZED ANXIETY DISORDER: Status: ACTIVE | Noted: 2020-04-29

## 2020-04-29 LAB
ABSOLUTE EOS #: 0.09 K/UL (ref 0–0.44)
ABSOLUTE IMMATURE GRANULOCYTE: 0.04 K/UL (ref 0–0.3)
ABSOLUTE LYMPH #: 0.75 K/UL (ref 1.1–3.7)
ABSOLUTE MONO #: 0.57 K/UL (ref 0.1–1.2)
ADENOVIRUS PCR: NOT DETECTED
ANION GAP SERPL CALCULATED.3IONS-SCNC: 13 MMOL/L (ref 9–17)
BASOPHILS # BLD: 0 % (ref 0–2)
BASOPHILS ABSOLUTE: <0.03 K/UL (ref 0–0.2)
BNP INTERPRETATION: ABNORMAL
BORDETELLA PARAPERTUSSIS: NOT DETECTED
BORDETELLA PERTUSSIS PCR: NOT DETECTED
BUN BLDV-MCNC: 13 MG/DL (ref 8–23)
BUN/CREAT BLD: 17 (ref 9–20)
CALCIUM SERPL-MCNC: 8.6 MG/DL (ref 8.6–10.4)
CHLAMYDIA PNEUMONIAE BY PCR: NOT DETECTED
CHLORIDE BLD-SCNC: 105 MMOL/L (ref 98–107)
CO2: 24 MMOL/L (ref 20–31)
CORONAVIRUS 229E PCR: NOT DETECTED
CORONAVIRUS HKU1 PCR: NOT DETECTED
CORONAVIRUS NL63 PCR: NOT DETECTED
CORONAVIRUS OC43 PCR: NOT DETECTED
CREAT SERPL-MCNC: 0.78 MG/DL (ref 0.5–0.9)
DIFFERENTIAL TYPE: ABNORMAL
EOSINOPHILS RELATIVE PERCENT: 1 % (ref 1–4)
FERRITIN: 55 UG/L (ref 13–150)
GFR AFRICAN AMERICAN: >60 ML/MIN
GFR NON-AFRICAN AMERICAN: >60 ML/MIN
GFR SERPL CREATININE-BSD FRML MDRD: ABNORMAL ML/MIN/{1.73_M2}
GFR SERPL CREATININE-BSD FRML MDRD: ABNORMAL ML/MIN/{1.73_M2}
GLUCOSE BLD-MCNC: 158 MG/DL (ref 70–99)
HCT VFR BLD CALC: 38.1 % (ref 36.3–47.1)
HEMOGLOBIN: 12 G/DL (ref 11.9–15.1)
HUMAN METAPNEUMOVIRUS PCR: NOT DETECTED
IMMATURE GRANULOCYTES: 1 %
INFLUENZA A BY PCR: NOT DETECTED
INFLUENZA A H1 (2009) PCR: NORMAL
INFLUENZA A H1 PCR: NORMAL
INFLUENZA A H3 PCR: NORMAL
INFLUENZA B BY PCR: NOT DETECTED
LACTATE DEHYDROGENASE: 210 U/L (ref 135–214)
LACTIC ACID: 1.6 MMOL/L (ref 0.5–2.2)
LYMPHOCYTES # BLD: 10 % (ref 24–43)
MCH RBC QN AUTO: 28.6 PG (ref 25.2–33.5)
MCHC RBC AUTO-ENTMCNC: 31.5 G/DL (ref 28.4–34.8)
MCV RBC AUTO: 90.9 FL (ref 82.6–102.9)
MONOCYTES # BLD: 7 % (ref 3–12)
MYCOPLASMA PNEUMONIAE PCR: NOT DETECTED
MYOGLOBIN: 24 NG/ML (ref 25–58)
MYOGLOBIN: 34 NG/ML (ref 25–58)
NRBC AUTOMATED: 0 PER 100 WBC
PARAINFLUENZA 1 PCR: NOT DETECTED
PARAINFLUENZA 2 PCR: NOT DETECTED
PARAINFLUENZA 3 PCR: NOT DETECTED
PARAINFLUENZA 4 PCR: NOT DETECTED
PDW BLD-RTO: 15.4 % (ref 11.8–14.4)
PLATELET # BLD: 176 K/UL (ref 138–453)
PLATELET ESTIMATE: ABNORMAL
PMV BLD AUTO: 10.3 FL (ref 8.1–13.5)
POTASSIUM SERPL-SCNC: 3.7 MMOL/L (ref 3.7–5.3)
PRO-BNP: 2562 PG/ML
RBC # BLD: 4.19 M/UL (ref 3.95–5.11)
RBC # BLD: ABNORMAL 10*6/UL
RESP SYNCYTIAL VIRUS PCR: NOT DETECTED
RHINO/ENTEROVIRUS PCR: NOT DETECTED
SEG NEUTROPHILS: 81 % (ref 36–65)
SEGMENTED NEUTROPHILS ABSOLUTE COUNT: 6.34 K/UL (ref 1.5–8.1)
SODIUM BLD-SCNC: 142 MMOL/L (ref 135–144)
SPECIMEN DESCRIPTION: NORMAL
TROPONIN INTERP: ABNORMAL
TROPONIN INTERP: ABNORMAL
TROPONIN T: ABNORMAL NG/ML
TROPONIN T: ABNORMAL NG/ML
TROPONIN, HIGH SENSITIVITY: 17 NG/L (ref 0–14)
TROPONIN, HIGH SENSITIVITY: 17 NG/L (ref 0–14)
WBC # BLD: 7.8 K/UL (ref 3.5–11.3)
WBC # BLD: ABNORMAL 10*3/UL

## 2020-04-29 PROCEDURE — 71045 X-RAY EXAM CHEST 1 VIEW: CPT

## 2020-04-29 PROCEDURE — 2060000000 HC ICU INTERMEDIATE R&B

## 2020-04-29 PROCEDURE — 94761 N-INVAS EAR/PLS OXIMETRY MLT: CPT

## 2020-04-29 PROCEDURE — 6360000004 HC RX CONTRAST MEDICATION: Performed by: NURSE PRACTITIONER

## 2020-04-29 PROCEDURE — 2580000003 HC RX 258: Performed by: NURSE PRACTITIONER

## 2020-04-29 PROCEDURE — 2700000000 HC OXYGEN THERAPY PER DAY

## 2020-04-29 PROCEDURE — 83615 LACTATE (LD) (LDH) ENZYME: CPT

## 2020-04-29 PROCEDURE — 85025 COMPLETE CBC W/AUTO DIFF WBC: CPT

## 2020-04-29 PROCEDURE — 83874 ASSAY OF MYOGLOBIN: CPT

## 2020-04-29 PROCEDURE — 93005 ELECTROCARDIOGRAM TRACING: CPT | Performed by: NURSE PRACTITIONER

## 2020-04-29 PROCEDURE — 36415 COLL VENOUS BLD VENIPUNCTURE: CPT

## 2020-04-29 PROCEDURE — 99222 1ST HOSP IP/OBS MODERATE 55: CPT | Performed by: NURSE PRACTITIONER

## 2020-04-29 PROCEDURE — 94640 AIRWAY INHALATION TREATMENT: CPT

## 2020-04-29 PROCEDURE — 87086 URINE CULTURE/COLONY COUNT: CPT

## 2020-04-29 PROCEDURE — 83605 ASSAY OF LACTIC ACID: CPT

## 2020-04-29 PROCEDURE — 0100U HC RESPIRPTHGN MULT REV TRANS & AMP PRB TECH 21 TRGT: CPT

## 2020-04-29 PROCEDURE — 83880 ASSAY OF NATRIURETIC PEPTIDE: CPT

## 2020-04-29 PROCEDURE — 6370000000 HC RX 637 (ALT 250 FOR IP): Performed by: NURSE PRACTITIONER

## 2020-04-29 PROCEDURE — U0002 COVID-19 LAB TEST NON-CDC: HCPCS

## 2020-04-29 PROCEDURE — 71260 CT THORAX DX C+: CPT

## 2020-04-29 PROCEDURE — 6360000002 HC RX W HCPCS: Performed by: NURSE PRACTITIONER

## 2020-04-29 PROCEDURE — 82728 ASSAY OF FERRITIN: CPT

## 2020-04-29 PROCEDURE — 84484 ASSAY OF TROPONIN QUANT: CPT

## 2020-04-29 PROCEDURE — 87040 BLOOD CULTURE FOR BACTERIA: CPT

## 2020-04-29 PROCEDURE — 99285 EMERGENCY DEPT VISIT HI MDM: CPT

## 2020-04-29 PROCEDURE — 80048 BASIC METABOLIC PNL TOTAL CA: CPT

## 2020-04-29 RX ORDER — AMIODARONE HYDROCHLORIDE 200 MG/1
200 TABLET ORAL DAILY
Status: DISCONTINUED | OUTPATIENT
Start: 2020-04-29 | End: 2020-05-01 | Stop reason: HOSPADM

## 2020-04-29 RX ORDER — ACETAMINOPHEN 325 MG/1
650 TABLET ORAL EVERY 6 HOURS PRN
Status: DISCONTINUED | OUTPATIENT
Start: 2020-04-29 | End: 2020-05-01 | Stop reason: HOSPADM

## 2020-04-29 RX ORDER — CETIRIZINE HYDROCHLORIDE 10 MG/1
10 TABLET ORAL DAILY
Status: DISCONTINUED | OUTPATIENT
Start: 2020-04-29 | End: 2020-05-01 | Stop reason: HOSPADM

## 2020-04-29 RX ORDER — ACETAMINOPHEN 650 MG/1
650 SUPPOSITORY RECTAL EVERY 6 HOURS PRN
Status: DISCONTINUED | OUTPATIENT
Start: 2020-04-29 | End: 2020-05-01 | Stop reason: HOSPADM

## 2020-04-29 RX ORDER — SODIUM CHLORIDE 0.9 % (FLUSH) 0.9 %
10 SYRINGE (ML) INJECTION PRN
Status: DISCONTINUED | OUTPATIENT
Start: 2020-04-29 | End: 2020-05-01 | Stop reason: HOSPADM

## 2020-04-29 RX ORDER — FUROSEMIDE 10 MG/ML
20 INJECTION INTRAMUSCULAR; INTRAVENOUS ONCE
Status: COMPLETED | OUTPATIENT
Start: 2020-04-29 | End: 2020-04-29

## 2020-04-29 RX ORDER — ASPIRIN 81 MG/1
81 TABLET, CHEWABLE ORAL DAILY
Status: DISCONTINUED | OUTPATIENT
Start: 2020-04-29 | End: 2020-05-01 | Stop reason: HOSPADM

## 2020-04-29 RX ORDER — ALBUTEROL SULFATE 90 UG/1
2 AEROSOL, METERED RESPIRATORY (INHALATION) 3 TIMES DAILY
Status: DISCONTINUED | OUTPATIENT
Start: 2020-04-29 | End: 2020-05-01 | Stop reason: HOSPADM

## 2020-04-29 RX ORDER — PROMETHAZINE HYDROCHLORIDE 12.5 MG/1
12.5 TABLET ORAL EVERY 6 HOURS PRN
Status: DISCONTINUED | OUTPATIENT
Start: 2020-04-29 | End: 2020-05-01 | Stop reason: HOSPADM

## 2020-04-29 RX ORDER — ISOSORBIDE MONONITRATE 30 MG/1
30 TABLET, EXTENDED RELEASE ORAL EVERY MORNING
Status: DISCONTINUED | OUTPATIENT
Start: 2020-04-30 | End: 2020-05-01 | Stop reason: HOSPADM

## 2020-04-29 RX ORDER — ONDANSETRON 2 MG/ML
4 INJECTION INTRAMUSCULAR; INTRAVENOUS EVERY 6 HOURS PRN
Status: DISCONTINUED | OUTPATIENT
Start: 2020-04-29 | End: 2020-05-01 | Stop reason: HOSPADM

## 2020-04-29 RX ORDER — ACETAMINOPHEN 650 MG/1
650 SUPPOSITORY RECTAL EVERY 6 HOURS PRN
Status: DISCONTINUED | OUTPATIENT
Start: 2020-04-29 | End: 2020-04-29 | Stop reason: SDUPTHER

## 2020-04-29 RX ORDER — NICOTINE 21 MG/24HR
1 PATCH, TRANSDERMAL 24 HOURS TRANSDERMAL DAILY PRN
Status: DISCONTINUED | OUTPATIENT
Start: 2020-04-29 | End: 2020-05-01 | Stop reason: HOSPADM

## 2020-04-29 RX ORDER — POLYETHYLENE GLYCOL 3350 17 G/17G
17 POWDER, FOR SOLUTION ORAL DAILY PRN
Status: DISCONTINUED | OUTPATIENT
Start: 2020-04-29 | End: 2020-05-01 | Stop reason: HOSPADM

## 2020-04-29 RX ORDER — 0.9 % SODIUM CHLORIDE 0.9 %
80 INTRAVENOUS SOLUTION INTRAVENOUS ONCE
Status: COMPLETED | OUTPATIENT
Start: 2020-04-29 | End: 2020-04-29

## 2020-04-29 RX ORDER — PRAVASTATIN SODIUM 40 MG
40 TABLET ORAL NIGHTLY
Status: DISCONTINUED | OUTPATIENT
Start: 2020-04-29 | End: 2020-05-01 | Stop reason: HOSPADM

## 2020-04-29 RX ORDER — RANOLAZINE 500 MG/1
500 TABLET, EXTENDED RELEASE ORAL 2 TIMES DAILY
Status: DISCONTINUED | OUTPATIENT
Start: 2020-04-29 | End: 2020-05-01 | Stop reason: HOSPADM

## 2020-04-29 RX ORDER — PANTOPRAZOLE SODIUM 40 MG/1
40 TABLET, DELAYED RELEASE ORAL
Status: DISCONTINUED | OUTPATIENT
Start: 2020-04-30 | End: 2020-05-01 | Stop reason: HOSPADM

## 2020-04-29 RX ORDER — SERTRALINE HYDROCHLORIDE 100 MG/1
100 TABLET, FILM COATED ORAL DAILY
Status: DISCONTINUED | OUTPATIENT
Start: 2020-04-29 | End: 2020-05-01 | Stop reason: HOSPADM

## 2020-04-29 RX ORDER — OXYBUTYNIN CHLORIDE 5 MG/1
5 TABLET ORAL EVERY OTHER DAY
Status: DISCONTINUED | OUTPATIENT
Start: 2020-04-29 | End: 2020-05-01 | Stop reason: HOSPADM

## 2020-04-29 RX ORDER — POTASSIUM CHLORIDE 7.45 MG/ML
10 INJECTION INTRAVENOUS PRN
Status: DISCONTINUED | OUTPATIENT
Start: 2020-04-29 | End: 2020-05-01 | Stop reason: HOSPADM

## 2020-04-29 RX ORDER — SODIUM CHLORIDE 0.9 % (FLUSH) 0.9 %
10 SYRINGE (ML) INJECTION EVERY 12 HOURS SCHEDULED
Status: DISCONTINUED | OUTPATIENT
Start: 2020-04-29 | End: 2020-05-01 | Stop reason: HOSPADM

## 2020-04-29 RX ORDER — POTASSIUM CHLORIDE 20 MEQ/1
40 TABLET, EXTENDED RELEASE ORAL PRN
Status: DISCONTINUED | OUTPATIENT
Start: 2020-04-29 | End: 2020-05-01 | Stop reason: HOSPADM

## 2020-04-29 RX ORDER — FUROSEMIDE 10 MG/ML
60 INJECTION INTRAMUSCULAR; INTRAVENOUS 2 TIMES DAILY
Status: DISCONTINUED | OUTPATIENT
Start: 2020-04-29 | End: 2020-04-30

## 2020-04-29 RX ORDER — ACETAMINOPHEN 325 MG/1
650 TABLET ORAL EVERY 6 HOURS PRN
Status: DISCONTINUED | OUTPATIENT
Start: 2020-04-29 | End: 2020-04-29 | Stop reason: SDUPTHER

## 2020-04-29 RX ORDER — 0.9 % SODIUM CHLORIDE 0.9 %
500 INTRAVENOUS SOLUTION INTRAVENOUS ONCE
Status: COMPLETED | OUTPATIENT
Start: 2020-04-29 | End: 2020-04-29

## 2020-04-29 RX ADMIN — FUROSEMIDE 20 MG: 10 INJECTION, SOLUTION INTRAVENOUS at 13:37

## 2020-04-29 RX ADMIN — METOPROLOL TARTRATE 25 MG: 25 TABLET, FILM COATED ORAL at 20:57

## 2020-04-29 RX ADMIN — SODIUM CHLORIDE, PRESERVATIVE FREE 10 ML: 5 INJECTION INTRAVENOUS at 21:00

## 2020-04-29 RX ADMIN — SODIUM CHLORIDE 500 ML: 0.9 INJECTION, SOLUTION INTRAVENOUS at 13:00

## 2020-04-29 RX ADMIN — Medication 2 PUFF: at 20:14

## 2020-04-29 RX ADMIN — FUROSEMIDE 60 MG: 10 INJECTION, SOLUTION INTRAMUSCULAR; INTRAVENOUS at 18:55

## 2020-04-29 RX ADMIN — PRAVASTATIN SODIUM 40 MG: 40 TABLET ORAL at 20:57

## 2020-04-29 RX ADMIN — APIXABAN 5 MG: 5 TABLET, FILM COATED ORAL at 20:57

## 2020-04-29 RX ADMIN — RANOLAZINE 500 MG: 500 TABLET, FILM COATED, EXTENDED RELEASE ORAL at 20:57

## 2020-04-29 RX ADMIN — Medication 10 ML: at 12:02

## 2020-04-29 RX ADMIN — IOPAMIDOL 80 ML: 755 INJECTION, SOLUTION INTRAVENOUS at 12:02

## 2020-04-29 RX ADMIN — SODIUM CHLORIDE 80 ML: 9 INJECTION, SOLUTION INTRAVENOUS at 12:02

## 2020-04-29 ASSESSMENT — ENCOUNTER SYMPTOMS
COUGH: 1
COLOR CHANGE: 0
SINUS PRESSURE: 0
NAUSEA: 0
ABDOMINAL PAIN: 0
DIARRHEA: 0
VOMITING: 0
RHINORRHEA: 0
SORE THROAT: 0
SHORTNESS OF BREATH: 1

## 2020-04-29 ASSESSMENT — PAIN SCALES - GENERAL: PAINLEVEL_OUTOF10: 0

## 2020-04-29 NOTE — ED NOTES
Patient is laying on cot. Respirations easy and unlabored on 2 liters via nasal cannula. Patient is updated on plan of care. Patient denies any needs at this time. Bassett flowing freely with clear yellow urine present.        Melanie oBb RN  04/29/20 [id number]

## 2020-04-29 NOTE — ED PROVIDER NOTES
(ZOLOFT) 100 MG TABLET    Take 100 mg by mouth daily     TRAVOPROST, BENZALKONIUM, (TRAVATAN) 0.004 % OPHTHALMIC SOLUTION    Place 1 drop into the right eye nightly       PAST MEDICAL HISTORY         Diagnosis Date    Acute on chronic diastolic CHF (congestive heart failure) ([facility]) 10/2/2019    Bradycardia     CAD (coronary artery disease)     Cancer ([facility])     Skin CA on nose    Depression     Gallstones     GERD (gastroesophageal reflux disease)     Hiatal hernia     History of cardiac cath 10/2016    CAD    Hyperlipidemia     Hypertension     Insomnia     MI (myocardial infarction) ([facility])     PNA (pneumonia)     SOB (shortness of breath)     UTI (urinary tract infection)        SURGICAL HISTORY           Procedure Laterality Date    APPENDECTOMY      CARDIAC SURGERY  2016    CABG x 3; LIMA-LAD, SVG-PDA, SVG-OM    CARDIOVERSION  2019    CHOLECYSTECTOMY      COLONOSCOPY      CORONARY ANGIOPLASTY WITH STENT PLACEMENT      CORONARY ARTERY BYPASS GRAFT  11/08/2016    X 3    HYSTERECTOMY           FAMILY HISTORY           Problem Relation Age of Onset    Heart Disease Mother     Cancer Mother     Heart Disease Father      Family Status   Relation Name Status    Mother      Father          SOCIAL HISTORY      reports that she quit smoking about 48 years ago. Her smoking use included cigarettes. She has never used smokeless tobacco. She reports current alcohol use. She reports that she does not use drugs. REVIEW OF SYSTEMS    (2-9 systems for level 4, 10 or more for level 5)     Review of Systems   Constitutional: Positive for fatigue. Negative for chills, diaphoresis and fever. HENT: Negative for congestion, ear discharge, ear pain, postnasal drip, rhinorrhea, sinus pressure and sore throat. Respiratory: Positive for cough and shortness of breath. Cardiovascular: Negative for chest pain, palpitations and leg swelling.    Gastrointestinal: Negative for images are visualized and preliminarily interpreted by the emergency physician with the below findings:    Interpretation per the Radiologist below, if available at the time of this note:    Xr Chest Portable    Result Date: 4/29/2020  EXAMINATION: ONE XRAY VIEW OF THE CHEST 4/29/2020 10:53 am COMPARISON: 10/03/2019 HISTORY: ORDERING SYSTEM PROVIDED HISTORY: cough, SOB TECHNOLOGIST PROVIDED HISTORY: cough, SOB Reason for Exam: SOB upon exertion Acuity: Acute Type of Exam: Initial FINDINGS: Median sternotomy wires are noted from CABG. There is pulmonary vascular congestion. The cardiac silhouette is mildly enlarged. Mediastinal contours are normal. There are small bilateral pleural effusions with associated bibasilar atelectasis. The visualized osseous structures are unremarkable. 1. Cardiomegaly with mild pulmonary vascular congestion and small bilateral pleural effusions, slightly increased. Ct Chest Pulmonary Embolism W Contrast    Result Date: 4/29/2020  EXAMINATION: CTA OF THE CHEST 4/29/2020 12:04 pm TECHNIQUE: CTA of the chest was performed after the administration of intravenous contrast.  Multiplanar reformatted images are provided for review. MIP images are provided for review. Dose modulation, iterative reconstruction, and/or weight based adjustment of the mA/kV was utilized to reduce the radiation dose to as low as reasonably achievable. COMPARISON: 04/29/2020 HISTORY: ORDERING SYSTEM PROVIDED HISTORY: r/o PE TECHNOLOGIST PROVIDED HISTORY: r/o PE Reason for Exam: cough, sob Acuity: Acute Type of Exam: Initial Relevant Medical/Surgical History: open heart surgery FINDINGS: Pulmonary Arteries: The pulmonary artery is enlarged. No filling defects to indicate acute pulmonary embolism. Mediastinum: The heart is borderline enlarged. Mass effect upon the heart by a large hiatal hernia. Coronary artery disease. No pericardial effusion. No mediastinal mass. No suspicious lymph nodes.  Lungs/pleura: ED:  Medications   0.9 % sodium chloride bolus (has no administration in time range)   sodium chloride flush 0.9 % injection 10 mL (10 mLs Intravenous Given 4/29/20 1202)   furosemide (LASIX) injection 20 mg (has no administration in time range)   iopamidol (ISOVUE-370) 76 % injection 80 mL (80 mLs Intravenous Given 4/29/20 1202)   0.9 % sodium chloride bolus (0 mLs Intravenous Stopped 4/29/20 1203)       CLINICAL DECISION MAKING:  The patient presented alert with a nontoxic appearance and was seen in conjunction with Dr. Gab Song. Imaging showed findings concerning for volume overload. The patient becomes short of breath with little exertion. She will be admitted for further evaluation and treatment. She was given IV lasix. I spoke with Samantha Lawrence CNP from [employer]. She is a Covid-19 rule out. At this time there is significant evidence of Covid-19 community spread due to this pandemic. Direct patient contact is avoided at this time due to the critically low supplies of PPE worldwide and an effort to duane appropriate use of PPE. I performed a medical screening exam that is compliant with the Declaration of [facility] [place], secondary to the Pandemic Infectious Disease of SARS-Coronavirus-2. CONSULTS:  IP CONSULT TO INTERNAL MEDICINE  IP CONSULT TO DIETITIAN      CRITICAL CARE TIME     Due to the high probability of sudden and clinically significant deterioration in the patient's condition she required highest level of my preparedness to intervene urgently. I provided critical care time including documentation time, medication orders and management, reevaluation, vital sign assessment, ordering and reviewing of of lab tests ordering and reviewing of x-ray studies, and admission orders.  Aggregate critical care time is 35 minutes including only time during which I was engaged in work directly related to her care and did not include time spent treating other patients

## 2020-04-29 NOTE — CARE COORDINATION
unlikely, would that change your answer? No    Resuscitation  CPR works best to restart the heart when there is a sudden event, like a heart attack, in someone who is otherwise healthy. Unfortunately, CPR does not typically restart the heart for people who have serious health conditions or who are very sick. In the event your heart stopped, would you want attempts to restart your heart (answer \"yes\") or would you prefer a natural death (answer \"no\")? yes    If your health were to worsen and it became clear that your chance of recovery was unlikely, would that change your answer? No    [x] Yes  [] No   Educated Patient / John Waller regarding differences between Advance Directives and portable DNR orders. Length of ACP Conversation in minutes:  15 minutes    Conversation Outcomes:  [x] ACP discussion completed  [] Existing advance directive reviewed with patient; no changes to patient's previously recorded wishes   [] New Advance Directive completed   [] Portable Do Not Rescitate prepared for Provider review and signature  [] POLST/POST/MOLST/MOST prepared for Provider review and signature      Follow-up plan:    [x] Schedule follow-up conversation to continue planning  [] Referred individual to Provider for additional questions/concerns   [] Advised patient/agent/surrogate to review completed ACP document and update if needed with changes in condition, patient preferences or care setting     [] This note routed to one or more involved healthcare providers        Advance Care Planning   The patient has the following advanced directives on file:  Advanced Directives     Power of [address] Will    Not on File Not on File          The patient has appointed the following active healthcare agents:       The Patient has the following current code status:    Code Status: Prior    Visit Documentation:  I called and spoke with Anna Cedeno regarding Advance Care Planning and Advance Care Directives, including Living Will and [facility]. I discussed Advance Care Planning with Kimberlee Krusesenthil and daughter Lenin Dewitt today which included the patient's choices for care and treatment in the case of a health event that adversely affects decision-making abilities. She stated the Advance Care Directives on file are current. We discussed her current values, goals and care preferences at the end of life. Kimberlee Washington and daughter Lenin Dewitt have no questions at this time and has agreed to keep me up-to-date should anything change.        MARCK Baca  4/29/2020                  Electronically signed by MARCK Baca on 4/29/20 at 3:17 PM EDT

## 2020-04-29 NOTE — CARE COORDINATION
Discharge planning    Patient chart reviewed and appreciate initial assessment from ED SS. Per ss notes daughter is interested [facility] if needed otherwise to return to her AL at [facility]. She has a [employer]. Patient is admitted with CHF along with COVID 19 Rule out. She is currently on 2 liters NC. Will need therapy to assist in determining final POC if back to assisted living will need to set up with home 02 evaluation.

## 2020-04-29 NOTE — CONSULTS
polyethylene glycol (GLYCOLAX) packet 17 g  17 g Oral Daily PRN Rosia See, APRN - NP        promethazine (PHENERGAN) tablet 12.5 mg  12.5 mg Oral Q6H PRN Rosia See, APRN - NP        Or    ondansetron [facility]) injection 4 mg  4 mg Intravenous Q6H PRN Rosia See, APRN - NP        nicotine (NICODERM CQ) 21 MG/24HR 1 patch  1 patch Transdermal Daily PRN Rosia See, APRN - NP        potassium chloride (KLOR-CON M) extended release tablet 40 mEq  40 mEq Oral PRN Rosia See, APRN - NP        Or    potassium bicarb-citric acid (EFFER-K) effervescent tablet 40 mEq  40 mEq Oral PRN Rosia See, APRN - NP        Or   Alpha Bathe potassium chloride 10 mEq/100 mL IVPB (Peripheral Line)  10 mEq Intravenous PRN Rosia See, APRN - NP        magnesium sulfate 2 g in dextrose 5 % 100 mL IVPB  2 g Intravenous PRN Rosia See, APRN - NP        furosemide (LASIX) injection 60 mg  60 mg Intravenous BID Rosia See, APRN - NP        acetaminophen (TYLENOL) tablet 650 mg  650 mg Oral Q6H PRN Rosia See, APRN - NP        Or   Alpha Bathe acetaminophen (TYLENOL) suppository 650 mg  650 mg Rectal Q6H PRN Rosia See, APRN - NP         Allergies:  Seasonal    Social History:    Social History     Socioeconomic History    Marital status:       Spouse name: Not on file    Number of children: Not on file    Years of education: Not on file    Highest education level: Not on file   Occupational History    Not on file   Social Needs    Financial resource strain: Not on file    Food insecurity     Worry: Not on file     Inability: Not on file    Transportation needs     Medical: Not on file     Non-medical: Not on file   Tobacco Use    Smoking status: Former Smoker     Types: Cigarettes     Last attempt to quit: 1971     Years since quittin.4    Smokeless tobacco: Never Used    Tobacco comment: quit smoking yrs ago   Substance and Sexual Activity    Alcohol use: Yes     Comment: Rarely    Drug use: No    Sexual activity: Not on file   Lifestyle    Physical activity     Days per week: Not on file     Minutes per session: Not on file    Stress: Not on file   Relationships    Social connections     Talks on phone: Not on file     Gets together: Not on file     Attends [employer] service: Not on file     Active member of club or organization: Not on file     Attends meetings of clubs or organizations: Not on file     Relationship status: Not on file    Intimate partner violence     Fear of current or ex partner: Not on file     Emotionally abused: Not on file     Physically abused: Not on file     Forced sexual activity: Not on file   Other Topics Concern    Not on file   Social History Narrative    Not on file     Family History:   Family History   Problem Relation Age of Onset    Heart Disease Mother     Cancer Mother     Heart Disease Father        · REVIEW OF SYSTEMS   Not visible, considering that she is COVID-19 rule out, the hospital does not advise more than 1 physician per day to see the patient. PHYSICAL EXAM: As per ER physical exam visit  Vitals:    VITALS:  BP (!) 147/62   Pulse 54   Temp 99.1 °F (37.3 °C) (Oral)   Resp 18   Ht 5' 4\" (1.626 m)   Wt 152 lb (68.9 kg)   SpO2 97%   BMI 26.09 kg/m²   24HR INTAKE/OUTPUT:  No intake or output data in the 24 hours ending 04/29/20 1742    CONSTITUTIONAL:  awake, alert, cooperative, no apparent distress, and appears stated age  NECK:  supple, symmetrical, trachea midline, no carotid bruit ,   No  JVD  BACK:  symmetric  LUNGS: Non-labored, good air exchange, clear to auscultation bilaterally, no crackles or wheezing  CARDIOVASCULAR:  Normal apical impulse, regular rate and rhythm, normal S1 and S2, no S3 or S4, and no murmur noted, no rub.  equal  ABDOMEN:  No scars, normal bowel sounds, soft, non-distended, non-tender, no masses palpated, no hepatosplenomegally, no bruit.   MUSCULOSKELETAL:  there is no redness, warmth, or swelling of the joints   No leg edema. NEUROLOGIC:  Awake, alert, oriented to name, place and time. SKIN:  no bruising or bleeding, normal skin color, texture, turgor and no jaundice    DATA:   ECG: Sinus rhythm with possible old inferior wall MI.  ECHO: Date:   Not performed to date  Stress Test: Patient had stress test last summer and was negative for ischemia  Angiography: She had a cardiac catheterization 2016 and subsequently she had open heart surgery due to multivessel coronary artery disease.   CAT scan of the chest reported vascular congestion    Cardiology Labs:  Recent Labs     04/29/20  1031   MYOGLOBIN 24*   TROPONINT NOT REPORTED     Warfarin PT/INR:  Lab Results   Component Value Date    PROTIME 11.2 10/02/2019    INR 1.1 10/02/2019     CBC:  Lab Results   Component Value Date    WBC 7.8 04/29/2020    RBC 4.19 04/29/2020    HGB 12.0 04/29/2020    HCT 38.1 04/29/2020    MCV 90.9 04/29/2020    MCH 28.6 04/29/2020    MCHC 31.5 04/29/2020    RDW 15.4 04/29/2020     04/29/2020    MPV 10.3 04/29/2020     CMP:  Lab Results   Component Value Date     04/29/2020    K 3.7 04/29/2020     04/29/2020    CO2 24 04/29/2020    BUN 13 04/29/2020    CREATININE 0.78 04/29/2020    GFRAA >60 04/29/2020    LABGLOM >60 04/29/2020    GLUCOSE 158 04/29/2020    CALCIUM 8.6 04/29/2020     Magnesium:    Lab Results   Component Value Date    MG 2.0 10/03/2019     PTT:    Lab Results   Component Value Date    APTT 25.4 10/02/2019     TSH:    Lab Results   Component Value Date    TSH 2.55 04/15/2016     BMP:  Lab Results   Component Value Date     04/29/2020    K 3.7 04/29/2020     04/29/2020    CO2 24 04/29/2020    BUN 13 04/29/2020    LABALBU 3.6 10/02/2019    CREATININE 0.78 04/29/2020    CALCIUM 8.6 04/29/2020    GFRAA >60 04/29/2020    LABGLOM >60 04/29/2020    GLUCOSE 158 04/29/2020     LIVER PROFILE:No results for input(s): AST, ALT, LABALBU, ALKPHOS, BILITOT, BILIDIR, IBILI, PROT, GLOB, ALBUMIN in the last 72 hours. FLP:  No results found for: CHOL, TRIG, HDL, LDLCHOLESTEROL          IMPRESSION  1. Mild acute on chronic diastolic CHF  2. History of paroxysmal atrial fibrillation currently in sinus rhythm  3. History of coronary artery disease, multivessel  4. History of CABG in 2016  5. History of hypertension  6. History of hyperlipidemia. Patient Active Problem List   Diagnosis    Symptomatic bradycardia    Suspected sleep apnea    Benign essential HTN    Unexplained night sweats    Pneumonia    GERD (gastroesophageal reflux disease)    Hyperlipidemia    Hypertension    Chronic atrial fibrillation    Acute on chronic diastolic CHF (congestive heart failure) (HCC)    Generalized anxiety disorder    Depressive disorder    Nonrheumatic tricuspid valve disorder    Atherosclerotic heart disease of native coronary artery without angina pectoris    Heart failure, diastolic, acute on chronic ([address]    Suspected COVID-19 virus infection    CAD (coronary artery disease)           RECOMMENDATIONS:     Continue current medications  Management plan was discussed with patient     We will monitor her symptoms. Her proBNP is mildly elevated for age group. The CAT scan and chest x-ray reports were reviewed. We will await COVID-19 test to come back and then I will see her physically if she ruled out for that infection. Continue beta-blocker, aspirin, anticoagulation, statin, amiodarone. She has normal QT interval.  Prognosis is guarded. Case discussed with the patient's nurses  Thank you for consultation.       Electronically signed by Pat Curtis MD on 4/29/2020 at 5:42 PM     CC: Tish Cuevas MD

## 2020-04-30 ENCOUNTER — APPOINTMENT (OUTPATIENT)
Dept: GENERAL RADIOLOGY | Age: 85
DRG: 292 | End: 2020-04-30
Payer: MEDICARE

## 2020-04-30 LAB
BNP INTERPRETATION: ABNORMAL
CHOLESTEROL/HDL RATIO: 2.6
CHOLESTEROL: 146 MG/DL
EKG ATRIAL RATE: 63 BPM
EKG P AXIS: 66 DEGREES
EKG P-R INTERVAL: 196 MS
EKG Q-T INTERVAL: 448 MS
EKG QRS DURATION: 94 MS
EKG QTC CALCULATION (BAZETT): 458 MS
EKG R AXIS: 61 DEGREES
EKG T AXIS: 40 DEGREES
EKG VENTRICULAR RATE: 63 BPM
HCT VFR BLD CALC: 39.6 % (ref 36.3–47.1)
HDLC SERPL-MCNC: 57 MG/DL
HEMOGLOBIN: 12.6 G/DL (ref 11.9–15.1)
LDL CHOLESTEROL: 72 MG/DL (ref 0–130)
MAGNESIUM: 1.8 MG/DL (ref 1.6–2.6)
MCH RBC QN AUTO: 28.3 PG (ref 25.2–33.5)
MCHC RBC AUTO-ENTMCNC: 31.8 G/DL (ref 28.4–34.8)
MCV RBC AUTO: 89 FL (ref 82.6–102.9)
MYOGLOBIN: 50 NG/ML (ref 25–58)
MYOGLOBIN: 56 NG/ML (ref 25–58)
MYOGLOBIN: 57 NG/ML (ref 25–58)
MYOGLOBIN: 72 NG/ML (ref 25–58)
NRBC AUTOMATED: 0 PER 100 WBC
PDW BLD-RTO: 15.3 % (ref 11.8–14.4)
PLATELET # BLD: 197 K/UL (ref 138–453)
PMV BLD AUTO: 11.1 FL (ref 8.1–13.5)
PRO-BNP: 2038 PG/ML
RBC # BLD: 4.45 M/UL (ref 3.95–5.11)
SARS-COV-2, PCR: NORMAL
SARS-COV-2, RAPID: NORMAL
SARS-COV-2: NOT DETECTED
SOURCE: NORMAL
TRIGL SERPL-MCNC: 83 MG/DL
TROPONIN INTERP: ABNORMAL
TROPONIN T: ABNORMAL NG/ML
TROPONIN, HIGH SENSITIVITY: 17 NG/L (ref 0–14)
TROPONIN, HIGH SENSITIVITY: 19 NG/L (ref 0–14)
TROPONIN, HIGH SENSITIVITY: 20 NG/L (ref 0–14)
TROPONIN, HIGH SENSITIVITY: 22 NG/L (ref 0–14)
TSH SERPL DL<=0.05 MIU/L-ACNC: 5.7 MIU/L (ref 0.3–5)
VLDLC SERPL CALC-MCNC: NORMAL MG/DL (ref 1–30)
WBC # BLD: 10.3 K/UL (ref 3.5–11.3)

## 2020-04-30 PROCEDURE — 93010 ELECTROCARDIOGRAM REPORT: CPT | Performed by: INTERNAL MEDICINE

## 2020-04-30 PROCEDURE — 83735 ASSAY OF MAGNESIUM: CPT

## 2020-04-30 PROCEDURE — 94640 AIRWAY INHALATION TREATMENT: CPT

## 2020-04-30 PROCEDURE — 97535 SELF CARE MNGMENT TRAINING: CPT

## 2020-04-30 PROCEDURE — 6360000002 HC RX W HCPCS: Performed by: INTERNAL MEDICINE

## 2020-04-30 PROCEDURE — 2580000003 HC RX 258: Performed by: NURSE PRACTITIONER

## 2020-04-30 PROCEDURE — 80061 LIPID PANEL: CPT

## 2020-04-30 PROCEDURE — 83874 ASSAY OF MYOGLOBIN: CPT

## 2020-04-30 PROCEDURE — 84484 ASSAY OF TROPONIN QUANT: CPT

## 2020-04-30 PROCEDURE — 94761 N-INVAS EAR/PLS OXIMETRY MLT: CPT

## 2020-04-30 PROCEDURE — 6370000000 HC RX 637 (ALT 250 FOR IP): Performed by: NURSE PRACTITIONER

## 2020-04-30 PROCEDURE — 2700000000 HC OXYGEN THERAPY PER DAY

## 2020-04-30 PROCEDURE — 6360000002 HC RX W HCPCS: Performed by: NURSE PRACTITIONER

## 2020-04-30 PROCEDURE — 36415 COLL VENOUS BLD VENIPUNCTURE: CPT

## 2020-04-30 PROCEDURE — 97165 OT EVAL LOW COMPLEX 30 MIN: CPT

## 2020-04-30 PROCEDURE — 97530 THERAPEUTIC ACTIVITIES: CPT

## 2020-04-30 PROCEDURE — 85027 COMPLETE CBC AUTOMATED: CPT

## 2020-04-30 PROCEDURE — 97162 PT EVAL MOD COMPLEX 30 MIN: CPT

## 2020-04-30 PROCEDURE — 83880 ASSAY OF NATRIURETIC PEPTIDE: CPT

## 2020-04-30 PROCEDURE — 84443 ASSAY THYROID STIM HORMONE: CPT

## 2020-04-30 PROCEDURE — 1200000000 HC SEMI PRIVATE

## 2020-04-30 PROCEDURE — 97116 GAIT TRAINING THERAPY: CPT

## 2020-04-30 PROCEDURE — 97110 THERAPEUTIC EXERCISES: CPT

## 2020-04-30 PROCEDURE — 71045 X-RAY EXAM CHEST 1 VIEW: CPT

## 2020-04-30 RX ORDER — HYDRALAZINE HYDROCHLORIDE 20 MG/ML
10 INJECTION INTRAMUSCULAR; INTRAVENOUS EVERY 4 HOURS PRN
Status: DISCONTINUED | OUTPATIENT
Start: 2020-04-30 | End: 2020-05-01 | Stop reason: HOSPADM

## 2020-04-30 RX ORDER — FUROSEMIDE 40 MG/1
40 TABLET ORAL 2 TIMES DAILY
Status: DISCONTINUED | OUTPATIENT
Start: 2020-04-30 | End: 2020-05-01 | Stop reason: HOSPADM

## 2020-04-30 RX ADMIN — METOPROLOL TARTRATE 25 MG: 25 TABLET, FILM COATED ORAL at 21:14

## 2020-04-30 RX ADMIN — APIXABAN 5 MG: 5 TABLET, FILM COATED ORAL at 08:47

## 2020-04-30 RX ADMIN — PANTOPRAZOLE SODIUM 40 MG: 40 TABLET, DELAYED RELEASE ORAL at 08:47

## 2020-04-30 RX ADMIN — SODIUM CHLORIDE, PRESERVATIVE FREE 10 ML: 5 INJECTION INTRAVENOUS at 21:18

## 2020-04-30 RX ADMIN — CETIRIZINE HYDROCHLORIDE 10 MG: 10 TABLET, FILM COATED ORAL at 08:47

## 2020-04-30 RX ADMIN — APIXABAN 5 MG: 5 TABLET, FILM COATED ORAL at 21:12

## 2020-04-30 RX ADMIN — RANOLAZINE 500 MG: 500 TABLET, FILM COATED, EXTENDED RELEASE ORAL at 21:12

## 2020-04-30 RX ADMIN — SODIUM CHLORIDE, PRESERVATIVE FREE 10 ML: 5 INJECTION INTRAVENOUS at 08:58

## 2020-04-30 RX ADMIN — Medication 2 PUFF: at 09:43

## 2020-04-30 RX ADMIN — PRAVASTATIN SODIUM 40 MG: 40 TABLET ORAL at 21:12

## 2020-04-30 RX ADMIN — AMIODARONE HYDROCHLORIDE 200 MG: 200 TABLET ORAL at 08:47

## 2020-04-30 RX ADMIN — SERTRALINE HYDROCHLORIDE 100 MG: 100 TABLET ORAL at 08:47

## 2020-04-30 RX ADMIN — FUROSEMIDE 60 MG: 10 INJECTION, SOLUTION INTRAMUSCULAR; INTRAVENOUS at 08:56

## 2020-04-30 RX ADMIN — ISOSORBIDE MONONITRATE 30 MG: 30 TABLET ORAL at 08:47

## 2020-04-30 RX ADMIN — HYDRALAZINE HYDROCHLORIDE 10 MG: 20 INJECTION INTRAMUSCULAR; INTRAVENOUS at 02:58

## 2020-04-30 RX ADMIN — RANOLAZINE 500 MG: 500 TABLET, FILM COATED, EXTENDED RELEASE ORAL at 08:47

## 2020-04-30 RX ADMIN — Medication 2 PUFF: at 19:38

## 2020-04-30 ASSESSMENT — PAIN SCALES - GENERAL: PAINLEVEL_OUTOF10: 0

## 2020-04-30 NOTE — PROGRESS NOTES
diastolic CHF (congestive heart failure) (HCC), Bradycardia, CAD (coronary artery disease), Cancer ([facility] Depression, Gallstones, GERD (gastroesophageal reflux disease), Hiatal hernia, History of cardiac cath, Hyperlipidemia, Hypertension, Insomnia, MI (myocardial infarction) ([facility] PNA (pneumonia), SOB (shortness of breath), and UTI (urinary tract infection). has a past surgical history that includes Appendectomy; Cholecystectomy; Hysterectomy; Coronary angioplasty with stent; Colonoscopy; Coronary artery bypass graft (11/08/2016); Cardiac surgery (11/08/2016); and Cardioversion (12/17/2019). PER H&P: Gab Haile is a 80 y.o. Non-/non  female who presents with Shortness of Breath   and is admitted to the hospital for the management of Acute on chronic diastolic CHF (congestive heart failure) ([facility]    Patient reports emergency department with a one-week history of shortness of breath and general fatigue. Patient reports that her fatigue and dyspnea become significantly worse with exertion. Patient denies any fever or chills. Patient has no known exposure to COVID. Patient denies any pain or discomfort. Patient reports that her only complaint at this time is dyspnea at rest and a persistent wet cough. Cough is described as mild and intermittent and only occurs every few minutes. Patient symptoms are more consistent with fluid volume overload secondary to congestive heart failure. Differentials could include COVID-19 infection. Patient will be placed in COVID precautions pending a COVID swab. Patient will also be treated with diuretics. Patient has a known history of TR reports that her symptoms are similar to previous CHF exacerbations.   Pending patient's COVID results we will consult cardiology for continued evaluation and treatment of her likely acute exacerbation of diastolic heart failure.         Restrictions  Restrictions/Precautions  Restrictions/Precautions: General

## 2020-04-30 NOTE — PROGRESS NOTES
(12/17/2019). Restrictions  Restrictions/Precautions  Restrictions/Precautions: General Precautions  Required Braces or Orthoses?: No  Position Activity Restriction  Other position/activity restrictions: 2 liters 02, romo, IV LUE, up with assist, negative COVID  Vision/Hearing  Vision: Impaired(blurred vision, needs cataracts removed)  Vision Exceptions: Wears glasses at all times  Hearing: Within functional limits     Subjective  General  Chart Reviewed: Yes  Patient assessed for rehabilitation services?: Yes  Additional Pertinent Hx: CHF, UTI, MI, CABG x 3  Response To Previous Treatment: Not applicable  Family / Caregiver Present: No  Diagnosis: CHF  Follows Commands: Within Functional Limits  General Comment  Comments: RN reports patient medically appropriate for PT. Subjective  Subjective: Patient pleasant and agreeable to PT.   Pain Screening  Patient Currently in Pain: Denies  Vital Signs  Patient Currently in Pain: Denies  Pre Treatment Pain Screening  Intervention List: Patient able to continue with treatment    Orientation  Orientation  Overall Orientation Status: Within Normal Limits  Social/Functional History  Social/Functional History  Lives With: Alone  Type of Home: Assisted living([facility])  Home Layout: One level  Home Access: Level entry  Bathroom Shower/Tub: Tub/Shower unit, Walk-in shower  Bathroom Toilet: Standard  Bathroom Equipment: Grab bars in shower, Built-in shower seat  Home Equipment: Cane(would like script for walker)  Receives Help From: Family  ADL Assistance: Needs assistance(Does her own lunchm dining room for breakfast and lunch, family also brings meals)  Homemaking Assistance: (Does own laundry, assist with deep cleaning)  Homemaking Responsibilities: Yes  Ambulation Assistance: Independent(cane  )  Transfer Assistance: Independent  Active : Yes  Occupation: Retired  Type of occupation: bank  Leisure & Hobbies: Calls hunter at assisted living  Additional

## 2020-04-30 NOTE — PLAN OF CARE
Problem: Falls - Risk of:  Goal: Will remain free from falls  Description: Will remain free from falls  4/30/2020 1408 by Alberto Victoria RN  Outcome: Ongoing  4/30/2020 1202 by Fabi Aguilar RN  Outcome: Ongoing  4/30/2020 0444 by Marla Edmonds RN  Outcome: Ongoing  Note: Fall risk assessment completed. Patient instructed to use call light. Bed locked and in lowest position, side rails up 2/4, call light and bedside table within reach, clutter removed, and non-skid footwear on when pt out of bed. Hourly rounds will continue.    Goal: Absence of physical injury  Description: Absence of physical injury  4/30/2020 1408 by Alberto Victoria RN  Outcome: Ongoing  4/30/2020 1202 by Fabi Aguilar RN  Outcome: Ongoing     Problem: Infection:  Goal: Will remain free from infection  Description: Will remain free from infection  4/30/2020 1408 by Alberto Victoria RN  Outcome: Ongoing  4/30/2020 1202 by Fabi Aguilar RN  Outcome: Ongoing  4/30/2020 0444 by Marla Edmonds RN  Outcome: Ongoing     Problem: Safety:  Goal: Free from accidental physical injury  Description: Free from accidental physical injury  4/30/2020 1408 by Alberto Victoria RN  Outcome: Ongoing  4/30/2020 1202 by Fabi Aguilar RN  Outcome: Ongoing  Goal: Free from intentional harm  Description: Free from intentional harm  4/30/2020 1408 by Alberto Victoria RN  Outcome: Ongoing  4/30/2020 1202 by Fabi Aguilar RN  Outcome: Ongoing     Problem: Daily Care:  Goal: Daily care needs are met  Description: Daily care needs are met  4/30/2020 1408 by Alberto Victoria RN  Outcome: Ongoing  4/30/2020 1202 by Fabi Aguilar RN  Outcome: Ongoing     Problem: Pain:  Goal: Patient's pain/discomfort is manageable  Description: Patient's pain/discomfort is manageable  4/30/2020 1408 by Alberto Victoria RN  Outcome: Ongoing  4/30/2020 1202 by Fabi Aguilar RN  Outcome: Ongoing     Problem: Skin Integrity:  Goal: Skin integrity will stabilize  Description: Skin integrity will stabilize  4/30/2020 1408 by James Wasserman RN  Outcome: Ongoing  4/30/2020 1202 by Pako Daniel RN  Outcome: Ongoing  4/30/2020 0444 by Félix Johnson RN  Outcome: Ongoing     Problem: OXYGENATION/RESPIRATORY FUNCTION  Goal: Patient will maintain patent airway  4/30/2020 1408 by James Wasserman RN  Outcome: Ongoing  4/30/2020 1202 by Pako Daniel RN  Outcome: Ongoing  4/30/2020 0444 by Félix Johnson RN  Outcome: Ongoing  Goal: Patient will achieve/maintain normal respiratory rate/effort  Description: Respiratory rate and effort will be within normal limits for the patient  4/30/2020 1408 by James Wasserman RN  Outcome: Ongoing  4/30/2020 1202 by Pako Daniel RN  Outcome: Ongoing     Problem: HEMODYNAMIC STATUS  Goal: Patient has stable vital signs and fluid balance  4/30/2020 1408 by James Wassemran RN  Outcome: Ongoing  4/30/2020 1202 by Pako Daniel RN  Outcome: Ongoing  4/30/2020 0444 by Félix Johnson RN  Outcome: Ongoing

## 2020-05-01 VITALS
WEIGHT: 154 LBS | HEART RATE: 57 BPM | RESPIRATION RATE: 18 BRPM | TEMPERATURE: 97.5 F | BODY MASS INDEX: 26.29 KG/M2 | DIASTOLIC BLOOD PRESSURE: 47 MMHG | HEIGHT: 64 IN | SYSTOLIC BLOOD PRESSURE: 128 MMHG | OXYGEN SATURATION: 98 %

## 2020-05-01 LAB
ABSOLUTE EOS #: 0.2 K/UL (ref 0–0.44)
ABSOLUTE IMMATURE GRANULOCYTE: 0.03 K/UL (ref 0–0.3)
ABSOLUTE LYMPH #: 1.12 K/UL (ref 1.1–3.7)
ABSOLUTE MONO #: 0.8 K/UL (ref 0.1–1.2)
ALBUMIN SERPL-MCNC: 3.3 G/DL (ref 3.5–5.2)
ALBUMIN/GLOBULIN RATIO: ABNORMAL (ref 1–2.5)
ALP BLD-CCNC: 72 U/L (ref 35–104)
ALT SERPL-CCNC: 8 U/L (ref 5–33)
ANION GAP SERPL CALCULATED.3IONS-SCNC: 13 MMOL/L (ref 9–17)
AST SERPL-CCNC: 16 U/L
BASOPHILS # BLD: 0 % (ref 0–2)
BASOPHILS ABSOLUTE: <0.03 K/UL (ref 0–0.2)
BILIRUB SERPL-MCNC: 0.71 MG/DL (ref 0.3–1.2)
BILIRUBIN DIRECT: 0.19 MG/DL
BILIRUBIN, INDIRECT: 0.52 MG/DL (ref 0–1)
BUN BLDV-MCNC: 19 MG/DL (ref 8–23)
BUN/CREAT BLD: 18 (ref 9–20)
CALCIUM SERPL-MCNC: 8.7 MG/DL (ref 8.6–10.4)
CHLORIDE BLD-SCNC: 98 MMOL/L (ref 98–107)
CO2: 28 MMOL/L (ref 20–31)
CREAT SERPL-MCNC: 1.03 MG/DL (ref 0.5–0.9)
CULTURE: NO GROWTH
DIFFERENTIAL TYPE: ABNORMAL
EOSINOPHILS RELATIVE PERCENT: 3 % (ref 1–4)
GFR AFRICAN AMERICAN: >60 ML/MIN
GFR NON-AFRICAN AMERICAN: 51 ML/MIN
GFR SERPL CREATININE-BSD FRML MDRD: ABNORMAL ML/MIN/{1.73_M2}
GFR SERPL CREATININE-BSD FRML MDRD: ABNORMAL ML/MIN/{1.73_M2}
GLOBULIN: ABNORMAL G/DL (ref 1.5–3.8)
GLUCOSE BLD-MCNC: 101 MG/DL (ref 70–99)
HCT VFR BLD CALC: 38.2 % (ref 36.3–47.1)
HEMOGLOBIN: 12 G/DL (ref 11.9–15.1)
IMMATURE GRANULOCYTES: 0 %
LYMPHOCYTES # BLD: 16 % (ref 24–43)
Lab: NORMAL
MCH RBC QN AUTO: 28.5 PG (ref 25.2–33.5)
MCHC RBC AUTO-ENTMCNC: 31.4 G/DL (ref 28.4–34.8)
MCV RBC AUTO: 90.7 FL (ref 82.6–102.9)
MONOCYTES # BLD: 11 % (ref 3–12)
MYOGLOBIN: 42 NG/ML (ref 25–58)
MYOGLOBIN: 42 NG/ML (ref 25–58)
MYOGLOBIN: 50 NG/ML (ref 25–58)
NRBC AUTOMATED: 0 PER 100 WBC
PDW BLD-RTO: 15.4 % (ref 11.8–14.4)
PLATELET # BLD: 183 K/UL (ref 138–453)
PLATELET ESTIMATE: ABNORMAL
PMV BLD AUTO: 10.9 FL (ref 8.1–13.5)
POTASSIUM SERPL-SCNC: 3.6 MMOL/L (ref 3.7–5.3)
RBC # BLD: 4.21 M/UL (ref 3.95–5.11)
RBC # BLD: ABNORMAL 10*6/UL
SEG NEUTROPHILS: 70 % (ref 36–65)
SEGMENTED NEUTROPHILS ABSOLUTE COUNT: 4.92 K/UL (ref 1.5–8.1)
SODIUM BLD-SCNC: 139 MMOL/L (ref 135–144)
SPECIMEN DESCRIPTION: NORMAL
THYROXINE, FREE: 1.41 NG/DL (ref 0.93–1.7)
TOTAL PROTEIN: 6.1 G/DL (ref 6.4–8.3)
TROPONIN INTERP: ABNORMAL
TROPONIN T: ABNORMAL NG/ML
TROPONIN, HIGH SENSITIVITY: 18 NG/L (ref 0–14)
TROPONIN, HIGH SENSITIVITY: 19 NG/L (ref 0–14)
TROPONIN, HIGH SENSITIVITY: 21 NG/L (ref 0–14)
TSH SERPL DL<=0.05 MIU/L-ACNC: 5.84 MIU/L (ref 0.3–5)
WBC # BLD: 7.1 K/UL (ref 3.5–11.3)
WBC # BLD: ABNORMAL 10*3/UL

## 2020-05-01 PROCEDURE — 2700000000 HC OXYGEN THERAPY PER DAY

## 2020-05-01 PROCEDURE — 36415 COLL VENOUS BLD VENIPUNCTURE: CPT

## 2020-05-01 PROCEDURE — 80048 BASIC METABOLIC PNL TOTAL CA: CPT

## 2020-05-01 PROCEDURE — 97530 THERAPEUTIC ACTIVITIES: CPT

## 2020-05-01 PROCEDURE — 6370000000 HC RX 637 (ALT 250 FOR IP): Performed by: NURSE PRACTITIONER

## 2020-05-01 PROCEDURE — 80076 HEPATIC FUNCTION PANEL: CPT

## 2020-05-01 PROCEDURE — 97535 SELF CARE MNGMENT TRAINING: CPT

## 2020-05-01 PROCEDURE — 84439 ASSAY OF FREE THYROXINE: CPT

## 2020-05-01 PROCEDURE — 84443 ASSAY THYROID STIM HORMONE: CPT

## 2020-05-01 PROCEDURE — 83874 ASSAY OF MYOGLOBIN: CPT

## 2020-05-01 PROCEDURE — 97116 GAIT TRAINING THERAPY: CPT

## 2020-05-01 PROCEDURE — 6370000000 HC RX 637 (ALT 250 FOR IP): Performed by: FAMILY MEDICINE

## 2020-05-01 PROCEDURE — 84484 ASSAY OF TROPONIN QUANT: CPT

## 2020-05-01 PROCEDURE — 94640 AIRWAY INHALATION TREATMENT: CPT

## 2020-05-01 PROCEDURE — 85025 COMPLETE CBC W/AUTO DIFF WBC: CPT

## 2020-05-01 PROCEDURE — 6370000000 HC RX 637 (ALT 250 FOR IP): Performed by: INTERNAL MEDICINE

## 2020-05-01 PROCEDURE — 97110 THERAPEUTIC EXERCISES: CPT

## 2020-05-01 PROCEDURE — 94761 N-INVAS EAR/PLS OXIMETRY MLT: CPT

## 2020-05-01 RX ADMIN — OXYBUTYNIN CHLORIDE 5 MG: 5 TABLET ORAL at 09:25

## 2020-05-01 RX ADMIN — ASPIRIN 81 MG 81 MG: 81 TABLET ORAL at 09:25

## 2020-05-01 RX ADMIN — APIXABAN 5 MG: 5 TABLET, FILM COATED ORAL at 09:25

## 2020-05-01 RX ADMIN — PANTOPRAZOLE SODIUM 40 MG: 40 TABLET, DELAYED RELEASE ORAL at 09:24

## 2020-05-01 RX ADMIN — Medication 2 PUFF: at 16:22

## 2020-05-01 RX ADMIN — Medication 2 PUFF: at 11:37

## 2020-05-01 RX ADMIN — SERTRALINE HYDROCHLORIDE 100 MG: 100 TABLET ORAL at 09:25

## 2020-05-01 RX ADMIN — RANOLAZINE 500 MG: 500 TABLET, FILM COATED, EXTENDED RELEASE ORAL at 09:25

## 2020-05-01 RX ADMIN — ISOSORBIDE MONONITRATE 30 MG: 30 TABLET ORAL at 09:25

## 2020-05-01 RX ADMIN — FUROSEMIDE 40 MG: 40 TABLET ORAL at 09:23

## 2020-05-01 RX ADMIN — CETIRIZINE HYDROCHLORIDE 10 MG: 10 TABLET, FILM COATED ORAL at 09:24

## 2020-05-01 RX ADMIN — AMIODARONE HYDROCHLORIDE 200 MG: 200 TABLET ORAL at 09:24

## 2020-05-01 RX ADMIN — FUROSEMIDE 40 MG: 40 TABLET ORAL at 18:33

## 2020-05-01 NOTE — PROGRESS NOTES
[address] Hospitalist        5/1/2020   5:13 PM    Name:  Gardenia Marx  MRN:    [id number]     [place]:     [de-identified]   Room:  2002/2002-02  IP Day: 2     Admit Date: 4/29/2020 10:11 AM  PCP: Zayda Leon MD    C/C:   Chief Complaint   Patient presents with    Shortness of Breath       Assessment:      · Acute on chronic diastolic CHF  · Essential hypertension  · Paroxysmal atrial fibrillation  · Coronary artery disease  · S/p CABG 2016  · Mixed hyperlipidemia   · Gastro esophageal reflux disease   · Major depressive disorder  · Urge incontinence   · COVID-19 ruled out         Plan:        · Admit to Progressive  · Monitor vitals closely  · Keep SpO2 above 90%  · IV diuresis - Lasix  · Resume Isosorbide  · Resume Lopressor  · Add parameters  · Resume Pravachol   · Resume Amiodarone  · Resume Eliquis  · Resume Ranexa   · Resume Zoloft   · Resume Ditropan   · Resume albuterol   · Pain control  · Anti emetics prn  · Hydralazine IV prn if SBP above 160  · Check CBC, BMP  · Check LFTs  · Check BNP  · Diet - cardiac  · Daily weights   · Cardiology on board   · DVT and GI prophylaxis  · DC if OK w all      Subjective:     Patient seen and examined at bedside. Pt looks much better  No overnight events. No acute complaints today. Afebrile  Pt. Denies any CP, SOB, palpitation, HA, dizziness, chills, cough, cold, changes in urination, BM or skin changes or any pain. CV ok w DC    ROS:  A 10 point system reviewed and negative otherwise mentioned above. History of Present Illness:      Gardenia Marx is a 80 y.o. Non-/non  female who presents with Shortness of Breath   and is admitted to the hospital for the management of Acute on chronic diastolic CHF (congestive heart failure) ([facility] [address]    Patient reports emergency department with a one-week history of shortness of breath and general fatigue.   Patient reports that her fatigue and dyspnea become significantly worse with exertion. Patient denies any fever or chills. Patient has no known exposure to COVID. Patient denies any pain or discomfort. Patient reports that her only complaint at this time is dyspnea at rest and a persistent wet cough. Cough is described as mild and intermittent and only occurs every few minutes. Patient symptoms are more consistent with fluid volume overload secondary to congestive heart failure. Differentials could include COVID-19 infection. Patient will be placed in COVID precautions pending a COVID swab. Patient will also be treated with diuretics. Patient has a known history of TR reports that her symptoms are similar to previous CHF exacerbations. Pending patient's COVID results we will consult cardiology for continued evaluation and treatment of her likely acute exacerbation of diastolic heart failure. Physical Examination:      Vitals:  BP (!) 128/47   Pulse 57   Temp 97.5 °F (36.4 °C) (Oral)   Resp 18   Ht 5' 4\" (1.626 m)   Wt 154 lb (69.9 kg)   SpO2 98%   BMI 26.43 kg/m²   Temp (24hrs), Av.9 °F (36.6 °C), Min:97.5 °F (36.4 °C), Max:98.1 °F (36.7 °C)    Weight:   Wt Readings from Last 3 Encounters:   20 154 lb (69.9 kg)   19 142 lb 11.2 oz (64.7 kg)   10/11/19 139 lb 11.2 oz (63.4 kg)     I/O last 3 completed shifts:  I/O last 3 completed shifts:  In: -   Out: 400 [Urine:400]     No results for input(s): POCGLU in the last 72 hours.       General appearance - alert, well appearing, and in no acute distress  Mental status - oriented to person, place, and time with normal affect  Head - normocephalic and atraumatic  Eyes - pupils equal and reactive, extraocular eye movements intact, conjunctiva clear  Ears - hearing appears to be intact  Nose - no drainage noted  Mouth - mucous membranes moist  Neck - supple, no carotid bruits, thyroid not palpable  Chest - clear to auscultation except few coarse crackles, normal effort  Heart - normal rate, irregular rhythm, no mg, 40 mg, Oral, Nightly, [place] Naima, APRN - NP, 40 mg at 04/30/20 2112    ranolazine (RANEXA) extended release tablet 500 mg, 500 mg, Oral, BID, [place] Naima, APRN - NP, 500 mg at 05/01/20 6726    sertraline (ZOLOFT) tablet 100 mg, 100 mg, Oral, Daily, [place] Naima, APRN - NP, 100 mg at 05/01/20 6217    sodium chloride flush 0.9 % injection 10 mL, 10 mL, Intravenous, 2 times per day, [place] Naima, APRN - NP, 10 mL at 04/30/20 2118    sodium chloride flush 0.9 % injection 10 mL, 10 mL, Intravenous, PRN, [place] Naima, APRN - NP    polyethylene glycol (GLYCOLAX) packet 17 g, 17 g, Oral, Daily PRN, [place] Naima, APRN - NP    promethazine (PHENERGAN) tablet 12.5 mg, 12.5 mg, Oral, Q6H PRN **OR** ondansetron (ZOFRAN) injection 4 mg, 4 mg, Intravenous, Q6H PRN, [place] Naima, APRN - NP    nicotine (NICODERM CQ) 21 MG/24HR 1 patch, 1 patch, Transdermal, Daily PRN, [place] Naima, APRN - NP    potassium chloride (KLOR-CON M) extended release tablet 40 mEq, 40 mEq, Oral, PRN **OR** potassium bicarb-citric acid (EFFER-K) effervescent tablet 40 mEq, 40 mEq, Oral, PRN **OR** potassium chloride 10 mEq/100 mL IVPB (Peripheral Line), 10 mEq, Intravenous, PRN, [place] Naima, APRN - NP    magnesium sulfate 2 g in dextrose 5 % 100 mL IVPB, 2 g, Intravenous, PRN, [place] Naima, APRN - NP    acetaminophen (TYLENOL) tablet 650 mg, 650 mg, Oral, Q6H PRN **OR** acetaminophen (TYLENOL) suppository 650 mg, 650 mg, Rectal, Q6H PRN, [place] Naima, APRN - NP      I/O (24Hr):     Intake/Output Summary (Last 24 hours) at 5/1/2020 1713  Last data filed at 5/1/2020 0622  Gross per 24 hour   Intake --   Output 400 ml   Net -400 ml       Data:           Labs:    Hematology:  Recent Labs     04/29/20  1031 04/30/20  0442 05/01/20  0551   WBC 7.8 10.3 7.1   RBC 4.19 4.45 4.21   HGB 12.0 12.6 12.0   HCT 38.1 39.6 38.2   MCV 90.9 89.0 90.7   MCH 28.6 28.3 28.5   MCHC 31.5 31.8 31.4   RDW 15.4* 15.3* 15.4*    197 183 4/30/2020  Improving aeration of the lungs suggesting diminishing pulmonary edema. Xr Chest Portable    Result Date: 4/29/2020  1. Cardiomegaly with mild pulmonary vascular congestion and small bilateral pleural effusions, slightly increased. Ct Chest Pulmonary Embolism W Contrast    Result Date: 4/29/2020  Diffuse prominence of the interstitium with some vague ground-glass most likely from volume overload. No evidence of pulmonary embolism. Enlarged pulmonary artery can be seen the setting of pulmonary hypertension. All radiological studies reviewed  Code Status:  Full Code        Electronically signed by Bill Dejesus MD on 5/1/2020 at 5:13 PM    This note was created with the assistance of a speech-recognition program.  Although the intention is to generate a document that actually reflects the content of the visit, no guarantees can be provided that every mistake has been identified and corrected by editing. Note was updated later by me after  physical examination and  completion of the assessment.

## 2020-05-01 NOTE — PROGRESS NOTES
discussed the care of Ms. Shaila Correia, including pertinent history and exam findings with my [place] Phan R.N. I have seen and examined Miladys and the elements of all parts of the encounter have been performed by me. I agree with the assessment, plan, and orders as documented by Ricci Almanza R.N., after I have modified the exam findings, the plan of treatment, and the final version is my approved version of the assessment. SAHRA Diamond, RN, Mono Iglesias M.D. Electronically signed by Vandana Cummings RN on 5/1/2020 at 10:10 AM  Patient seen and examined in her room. She is sitting out of bed comfortably. She is feeling well. No further edema. She has chronic cough most likely from postnasal drip. She is on chronic use of expectorant. She is on oral Lasix like home dose. Discharge planning.

## 2020-05-01 NOTE — PROGRESS NOTES
Pt discharged to home in stable condition with [facility]  Discharge instructions given  Discharge checklist reviewed and completed with pt and family. Pt denies having any further questions at this time  Locked up home medication(s)/personal items given to patient at discharge  Patient/family state they have everything they were admitted with.

## 2020-05-02 ENCOUNTER — CARE COORDINATION (OUTPATIENT)
Dept: CASE MANAGEMENT | Age: 85
End: 2020-05-02

## 2020-05-02 NOTE — CARE COORDINATION
following risk factors of: heart failure and pneumonia. CTN/ACM reviewed discharge instructions, medical action plan and red flags related to discharge diagnosis. Reviewed and educated them on any new and changed medications related to discharge diagnosis. Education provided regarding infection prevention, and signs and symptoms of COVID-19 and when to seek medical attention with patient who verbalized understanding. Discussed exposure protocols and quarantine from [address] you at higher risk for severe illness 2019 and given an opportunity for questions and concerns. The patient agrees to contact the COVID-19 hotline [phone] or PCP office for questions related to their healthcare. CTN/ACM provided contact information for future reference. From CDC: Are you at higher risk for severe illness?  Wash your hands often.  Avoid close contact (6 feet, which is about two arm lengths) with people who are sick.  Put distance between yourself and other people if COVID-19 is spreading in your community.  Clean and disinfect frequently touched surfaces.  Avoid all cruise travel and non-essential air travel.  Call your healthcare professional if you have concerns about COVID-19 and your underlying condition or if you are sick. For more information on steps you can take to protect yourself, see CDC's How to [address] [place] [address] for follow-up call in 5-7 days based on severity of symptoms and risk factors. No future appointments.     Shawn Kehr, RN

## 2020-05-08 ENCOUNTER — CARE COORDINATION (OUTPATIENT)
Dept: CASE MANAGEMENT | Age: 85
End: 2020-05-08

## 2020-05-13 ENCOUNTER — CARE COORDINATION (OUTPATIENT)
Dept: CASE MANAGEMENT | Age: 85
End: 2020-05-13

## 2020-05-13 NOTE — CARE COORDINATION
[facility] Transitions Follow Up Call & Final covid Call. 2020    Patient: Jaylon Chaudhari  Patient : 3/26/1932   MRN: <[id number]>  Reason for Admission:   Discharge Date: 20 RARS: Readmission Risk Score: 14         Spoke with: Patient    Patient states she is doing Okay. She gets a little SOB at times. No oxygen use. Has a slight cough. States it is from sinus problems. Patient states is taking medications as directed and has no questions concerning medications at this time. Patient has no needs or concerns for writer at this time. Patient states knows when to contact physician or report to ED with worsening or severe symptoms, changes, or concerns. Jose Eduardo Fischer LPN     [facility] / [facility] Transitions Subsequent and Final Call    Subsequent and Final Calls  Do you have any ongoing symptoms?:  Yes  Patient-reported symptoms:  Shortness of Breath, Cough  Have your medications changed?:  No  Do you have any questions related to your medications?:  No  Do you currently have any active services?:  Yes  Are you currently active with any services?:  Home Health  Do you have any needs or concerns that I can assist you with?:  No  Care Transitions Interventions  Other Interventions: Follow Up  No future appointments. Jose Eduardo Fischer LPN       Patient contacted regarding COVID-19 risk and screening. Care Transition Nurse/ Ambulatory Care Manager contacted the patient by telephone to perform follow-up assessment. Verified name and  with patient as identifiers. Patient has following risk factors of: heart failure. Symptoms reviewed with patient who verbalized the following symptoms: no new symptoms. Due to no new or worsening symptoms encounter was not routed to provider for escalation.        Education provided regarding infection prevention, and signs and symptoms of COVID-19 and when to seek medical attention with patient who

## 2020-05-15 ENCOUNTER — CARE COORDINATION (OUTPATIENT)
Dept: CASE MANAGEMENT | Age: 85
End: 2020-05-15

## 2020-05-15 NOTE — CARE COORDINATION
[facility] Trans[facility] Follow Up Call- 1st attempt at final COVID 19 risk follow up call     5/15/2020    Patient: Jaylon Chaudhari  Patient : 3/26/1932   MRN: [id number]  Reason for Admission: CHF   Discharge Date: 20 RARS: Readmission Risk Score: 14         Attempted to reach patient for final call. VM left to return call to [phone] (pt on COVID- 19 risk discharge list). Will attempt later         Follow Up  No future appointments.     Aimee Reed RN

## 2020-05-18 ENCOUNTER — CARE COORDINATION (OUTPATIENT)
Dept: CASE MANAGEMENT | Age: 85
End: 2020-05-18

## 2020-11-03 PROBLEM — I25.10 CAD (CORONARY ARTERY DISEASE): Status: RESOLVED | Noted: 2020-11-03 | Resolved: 2020-11-03

## 2020-12-07 ENCOUNTER — APPOINTMENT (OUTPATIENT)
Dept: GENERAL RADIOLOGY | Age: 85
DRG: 292 | End: 2020-12-07
Payer: MEDICARE

## 2020-12-07 ENCOUNTER — HOSPITAL ENCOUNTER (INPATIENT)
Age: 85
LOS: 4 days | Discharge: HOME | DRG: 292 | End: 2020-12-11
Attending: EMERGENCY MEDICINE | Admitting: FAMILY MEDICINE
Payer: MEDICARE

## 2020-12-07 PROBLEM — I50.1 ACUTE LEFT-SIDED CHF (CONGESTIVE HEART FAILURE) (HCC): Status: ACTIVE | Noted: 2020-12-07

## 2020-12-07 LAB
ABSOLUTE EOS #: 0.27 K/UL (ref 0–0.44)
ABSOLUTE IMMATURE GRANULOCYTE: 0.04 K/UL (ref 0–0.3)
ABSOLUTE LYMPH #: 1.41 K/UL (ref 1.1–3.7)
ABSOLUTE MONO #: 0.98 K/UL (ref 0.1–1.2)
ANION GAP SERPL CALCULATED.3IONS-SCNC: 11 MMOL/L (ref 9–17)
BASOPHILS # BLD: 0 % (ref 0–2)
BASOPHILS ABSOLUTE: 0.03 K/UL (ref 0–0.2)
BNP INTERPRETATION: ABNORMAL
BUN BLDV-MCNC: 21 MG/DL (ref 8–23)
BUN/CREAT BLD: 17 (ref 9–20)
CALCIUM SERPL-MCNC: 8.6 MG/DL (ref 8.6–10.4)
CHLORIDE BLD-SCNC: 103 MMOL/L (ref 98–107)
CO2: 27 MMOL/L (ref 20–31)
CREAT SERPL-MCNC: 1.21 MG/DL (ref 0.5–0.9)
DIFFERENTIAL TYPE: ABNORMAL
EOSINOPHILS RELATIVE PERCENT: 3 % (ref 1–4)
GFR AFRICAN AMERICAN: 51 ML/MIN
GFR NON-AFRICAN AMERICAN: 42 ML/MIN
GFR SERPL CREATININE-BSD FRML MDRD: ABNORMAL ML/MIN/{1.73_M2}
GFR SERPL CREATININE-BSD FRML MDRD: ABNORMAL ML/MIN/{1.73_M2}
GLUCOSE BLD-MCNC: 115 MG/DL (ref 70–99)
HCT VFR BLD CALC: 30.4 % (ref 36.3–47.1)
HEMOGLOBIN: 9.3 G/DL (ref 11.9–15.1)
IMMATURE GRANULOCYTES: 0 %
IRON SATURATION: 11 % (ref 20–55)
IRON: 34 UG/DL (ref 37–145)
LYMPHOCYTES # BLD: 14 % (ref 24–43)
MCH RBC QN AUTO: 28.4 PG (ref 25.2–33.5)
MCHC RBC AUTO-ENTMCNC: 30.6 G/DL (ref 28.4–34.8)
MCV RBC AUTO: 92.7 FL (ref 82.6–102.9)
MONOCYTES # BLD: 10 % (ref 3–12)
MYOGLOBIN: 37 NG/ML (ref 25–58)
MYOGLOBIN: 40 NG/ML (ref 25–58)
NRBC AUTOMATED: 0 PER 100 WBC
PDW BLD-RTO: 14.9 % (ref 11.8–14.4)
PLATELET # BLD: 276 K/UL (ref 138–453)
PLATELET ESTIMATE: ABNORMAL
PMV BLD AUTO: 11.2 FL (ref 8.1–13.5)
POTASSIUM SERPL-SCNC: 3.3 MMOL/L (ref 3.7–5.3)
PRO-BNP: 1853 PG/ML
RBC # BLD: 3.28 M/UL (ref 3.95–5.11)
RBC # BLD: ABNORMAL 10*6/UL
SARS-COV-2, RAPID: NOT DETECTED
SARS-COV-2: NORMAL
SARS-COV-2: NORMAL
SEG NEUTROPHILS: 73 % (ref 36–65)
SEGMENTED NEUTROPHILS ABSOLUTE COUNT: 7.53 K/UL (ref 1.5–8.1)
SODIUM BLD-SCNC: 141 MMOL/L (ref 135–144)
SOURCE: NORMAL
TOTAL IRON BINDING CAPACITY: 319 UG/DL (ref 250–450)
TROPONIN INTERP: ABNORMAL
TROPONIN INTERP: ABNORMAL
TROPONIN T: ABNORMAL NG/ML
TROPONIN T: ABNORMAL NG/ML
TROPONIN, HIGH SENSITIVITY: 17 NG/L (ref 0–14)
TROPONIN, HIGH SENSITIVITY: 17 NG/L (ref 0–14)
UNSATURATED IRON BINDING CAPACITY: 285 UG/DL (ref 112–347)
WBC # BLD: 10.3 K/UL (ref 3.5–11.3)
WBC # BLD: ABNORMAL 10*3/UL

## 2020-12-07 PROCEDURE — 93005 ELECTROCARDIOGRAM TRACING: CPT | Performed by: EMERGENCY MEDICINE

## 2020-12-07 PROCEDURE — 85025 COMPLETE CBC W/AUTO DIFF WBC: CPT

## 2020-12-07 PROCEDURE — 83874 ASSAY OF MYOGLOBIN: CPT

## 2020-12-07 PROCEDURE — 2060000000 HC ICU INTERMEDIATE R&B

## 2020-12-07 PROCEDURE — 83880 ASSAY OF NATRIURETIC PEPTIDE: CPT

## 2020-12-07 PROCEDURE — 6370000000 HC RX 637 (ALT 250 FOR IP): Performed by: FAMILY MEDICINE

## 2020-12-07 PROCEDURE — 83550 IRON BINDING TEST: CPT

## 2020-12-07 PROCEDURE — 84484 ASSAY OF TROPONIN QUANT: CPT

## 2020-12-07 PROCEDURE — 71045 X-RAY EXAM CHEST 1 VIEW: CPT

## 2020-12-07 PROCEDURE — 80048 BASIC METABOLIC PNL TOTAL CA: CPT

## 2020-12-07 PROCEDURE — 96374 THER/PROPH/DIAG INJ IV PUSH: CPT

## 2020-12-07 PROCEDURE — U0002 COVID-19 LAB TEST NON-CDC: HCPCS

## 2020-12-07 PROCEDURE — 99283 EMERGENCY DEPT VISIT LOW MDM: CPT

## 2020-12-07 PROCEDURE — 2580000003 HC RX 258: Performed by: FAMILY MEDICINE

## 2020-12-07 PROCEDURE — 83540 ASSAY OF IRON: CPT

## 2020-12-07 PROCEDURE — 36415 COLL VENOUS BLD VENIPUNCTURE: CPT

## 2020-12-07 PROCEDURE — 6360000002 HC RX W HCPCS: Performed by: NURSE PRACTITIONER

## 2020-12-07 RX ORDER — SERTRALINE HYDROCHLORIDE 100 MG/1
100 TABLET, FILM COATED ORAL DAILY
Status: DISCONTINUED | OUTPATIENT
Start: 2020-12-07 | End: 2020-12-11 | Stop reason: HOSPADM

## 2020-12-07 RX ORDER — FUROSEMIDE 10 MG/ML
40 INJECTION INTRAMUSCULAR; INTRAVENOUS ONCE
Status: COMPLETED | OUTPATIENT
Start: 2020-12-07 | End: 2020-12-07

## 2020-12-07 RX ORDER — FUROSEMIDE 10 MG/ML
40 INJECTION INTRAMUSCULAR; INTRAVENOUS 2 TIMES DAILY
Status: DISCONTINUED | OUTPATIENT
Start: 2020-12-07 | End: 2020-12-11

## 2020-12-07 RX ORDER — PROMETHAZINE HYDROCHLORIDE 12.5 MG/1
12.5 TABLET ORAL EVERY 6 HOURS PRN
Status: DISCONTINUED | OUTPATIENT
Start: 2020-12-07 | End: 2020-12-11 | Stop reason: HOSPADM

## 2020-12-07 RX ORDER — LISINOPRIL 5 MG/1
5 TABLET ORAL DAILY
Status: DISCONTINUED | OUTPATIENT
Start: 2020-12-08 | End: 2020-12-11

## 2020-12-07 RX ORDER — RANOLAZINE 500 MG/1
500 TABLET, EXTENDED RELEASE ORAL 2 TIMES DAILY
Status: DISCONTINUED | OUTPATIENT
Start: 2020-12-07 | End: 2020-12-11 | Stop reason: HOSPADM

## 2020-12-07 RX ORDER — ISOSORBIDE MONONITRATE 30 MG/1
30 TABLET, EXTENDED RELEASE ORAL EVERY MORNING
Status: DISCONTINUED | OUTPATIENT
Start: 2020-12-08 | End: 2020-12-11 | Stop reason: HOSPADM

## 2020-12-07 RX ORDER — ONDANSETRON 2 MG/ML
4 INJECTION INTRAMUSCULAR; INTRAVENOUS EVERY 6 HOURS PRN
Status: DISCONTINUED | OUTPATIENT
Start: 2020-12-07 | End: 2020-12-11 | Stop reason: HOSPADM

## 2020-12-07 RX ORDER — SODIUM CHLORIDE 0.9 % (FLUSH) 0.9 %
10 SYRINGE (ML) INJECTION EVERY 12 HOURS SCHEDULED
Status: DISCONTINUED | OUTPATIENT
Start: 2020-12-07 | End: 2020-12-11 | Stop reason: HOSPADM

## 2020-12-07 RX ORDER — SODIUM CHLORIDE 0.9 % (FLUSH) 0.9 %
10 SYRINGE (ML) INJECTION PRN
Status: DISCONTINUED | OUTPATIENT
Start: 2020-12-07 | End: 2020-12-11 | Stop reason: HOSPADM

## 2020-12-07 RX ORDER — ACETAMINOPHEN 325 MG/1
650 TABLET ORAL EVERY 6 HOURS PRN
Status: DISCONTINUED | OUTPATIENT
Start: 2020-12-07 | End: 2020-12-11 | Stop reason: HOSPADM

## 2020-12-07 RX ORDER — ACETAMINOPHEN 650 MG/1
650 SUPPOSITORY RECTAL EVERY 6 HOURS PRN
Status: DISCONTINUED | OUTPATIENT
Start: 2020-12-07 | End: 2020-12-11 | Stop reason: HOSPADM

## 2020-12-07 RX ORDER — LATANOPROST 50 UG/ML
1 SOLUTION/ DROPS OPHTHALMIC NIGHTLY
Status: DISCONTINUED | OUTPATIENT
Start: 2020-12-07 | End: 2020-12-11 | Stop reason: HOSPADM

## 2020-12-07 RX ORDER — AMIODARONE HYDROCHLORIDE 200 MG/1
200 TABLET ORAL DAILY
Status: DISCONTINUED | OUTPATIENT
Start: 2020-12-07 | End: 2020-12-11 | Stop reason: HOSPADM

## 2020-12-07 RX ORDER — POTASSIUM CHLORIDE 20 MEQ/1
20 TABLET, EXTENDED RELEASE ORAL 2 TIMES DAILY
Status: DISCONTINUED | OUTPATIENT
Start: 2020-12-07 | End: 2020-12-11 | Stop reason: HOSPADM

## 2020-12-07 RX ORDER — PANTOPRAZOLE SODIUM 40 MG/1
40 TABLET, DELAYED RELEASE ORAL
Status: DISCONTINUED | OUTPATIENT
Start: 2020-12-08 | End: 2020-12-11 | Stop reason: HOSPADM

## 2020-12-07 RX ORDER — NICOTINE 21 MG/24HR
1 PATCH, TRANSDERMAL 24 HOURS TRANSDERMAL DAILY PRN
Status: DISCONTINUED | OUTPATIENT
Start: 2020-12-07 | End: 2020-12-11 | Stop reason: HOSPADM

## 2020-12-07 RX ORDER — ASPIRIN 81 MG/1
81 TABLET, CHEWABLE ORAL DAILY
Status: DISCONTINUED | OUTPATIENT
Start: 2020-12-07 | End: 2020-12-11 | Stop reason: HOSPADM

## 2020-12-07 RX ORDER — POLYETHYLENE GLYCOL 3350 17 G/17G
17 POWDER, FOR SOLUTION ORAL DAILY PRN
Status: DISCONTINUED | OUTPATIENT
Start: 2020-12-07 | End: 2020-12-11 | Stop reason: HOSPADM

## 2020-12-07 RX ORDER — PRAVASTATIN SODIUM 40 MG
40 TABLET ORAL NIGHTLY
Status: DISCONTINUED | OUTPATIENT
Start: 2020-12-07 | End: 2020-12-11 | Stop reason: HOSPADM

## 2020-12-07 RX ADMIN — SERTRALINE HYDROCHLORIDE 100 MG: 100 TABLET ORAL at 22:15

## 2020-12-07 RX ADMIN — APIXABAN 5 MG: 5 TABLET, FILM COATED ORAL at 22:14

## 2020-12-07 RX ADMIN — POTASSIUM CHLORIDE 20 MEQ: 20 TABLET, EXTENDED RELEASE ORAL at 22:15

## 2020-12-07 RX ADMIN — RANOLAZINE 500 MG: 500 TABLET, FILM COATED, EXTENDED RELEASE ORAL at 22:15

## 2020-12-07 RX ADMIN — FUROSEMIDE 40 MG: 10 INJECTION, SOLUTION INTRAMUSCULAR; INTRAVENOUS at 16:41

## 2020-12-07 RX ADMIN — METOPROLOL TARTRATE 25 MG: 25 TABLET, FILM COATED ORAL at 22:15

## 2020-12-07 RX ADMIN — PRAVASTATIN SODIUM 40 MG: 40 TABLET ORAL at 22:14

## 2020-12-07 RX ADMIN — SODIUM CHLORIDE, PRESERVATIVE FREE 10 ML: 5 INJECTION INTRAVENOUS at 22:15

## 2020-12-07 RX ADMIN — LATANOPROST 1 DROP: 50 SOLUTION OPHTHALMIC at 22:27

## 2020-12-07 RX ADMIN — ASPIRIN 81 MG: 81 TABLET, CHEWABLE ORAL at 22:15

## 2020-12-07 ASSESSMENT — ENCOUNTER SYMPTOMS
COUGH: 1
SHORTNESS OF BREATH: 1
SORE THROAT: 0
NAUSEA: 0
BACK PAIN: 0
VOMITING: 0
COLOR CHANGE: 0

## 2020-12-07 NOTE — ED PROVIDER NOTES
 UTI (urinary tract infection)      SURGICAL HISTORY       Past Surgical History:   Procedure Laterality Date    APPENDECTOMY      CARDIAC SURGERY  2016    CABG x 3; LIMA-LAD, SVG-PDA, SVG-OM    CARDIOVERSION  2019    CHOLECYSTECTOMY      COLONOSCOPY      CORONARY ANGIOPLASTY WITH STENT PLACEMENT      CORONARY ARTERY BYPASS GRAFT  11/08/2016    X 3    HYSTERECTOMY       CURRENT MEDICATIONS       Previous Medications    ALBUTEROL SULFATE  (90 BASE) MCG/ACT INHALER    Inhale 2 puffs into the lungs 3 times daily    AMIODARONE (CORDARONE) 200 MG TABLET    Take 200 mg by mouth daily    APIXABAN (ELIQUIS) 5 MG TABS TABLET    Take 1 tablet by mouth 2 times daily    ASPIRIN 81 MG CHEWABLE TABLET    Take 1 tablet by mouth daily    FUROSEMIDE (LASIX) 40 MG TABLET    Take 1 tablet by mouth 2 times daily    ISOSORBIDE MONONITRATE (IMDUR) 30 MG EXTENDED RELEASE TABLET    Take 30 mg by mouth every morning    LORATADINE (CLARITIN) 10 MG TABLET    Take 10 mg by mouth daily Take half a tablet per day    METOPROLOL TARTRATE (LOPRESSOR) 25 MG TABLET        OXYBUTYNIN (DITROPAN) 5 MG TABLET    Take 5 mg by mouth every other day     PANTOPRAZOLE (PROTONIX) 40 MG TABLET    Take 40 mg by mouth every morning (before breakfast)     POTASSIUM CHLORIDE (KLOR-CON M) 20 MEQ EXTENDED RELEASE TABLET    Take 1 tablet by mouth 2 times daily    PRAVASTATIN (PRAVACHOL) 40 MG TABLET    Take 40 mg by mouth nightly     RANOLAZINE (RANEXA) 500 MG EXTENDED RELEASE TABLET    Take 500 mg by mouth 2 times daily    SERTRALINE (ZOLOFT) 100 MG TABLET    Take 100 mg by mouth daily     TRAVOPROST, BENZALKONIUM, (TRAVATAN) 0.004 % OPHTHALMIC SOLUTION    Place 1 drop into the right eye nightly     ALLERGIES     is allergic to seasonal.  FAMILY HISTORY     She indicated that her mother is . She indicated that her father is .      SOCIAL HISTORY       Social History     Tobacco Use    Smoking status: Former Smoker DIFFERENTIAL - Abnormal; Notable for the following components:       Result Value    RBC 3.28 (*)     Hemoglobin 9.3 (*)     Hematocrit 30.4 (*)     RDW 14.9 (*)     Seg Neutrophils 73 (*)     Lymphocytes 14 (*)     All other components within normal limits   BASIC METABOLIC PANEL - Abnormal; Notable for the following components:    Glucose 115 (*)     CREATININE 1.21 (*)     Potassium 3.3 (*)     GFR Non- 42 (*)     GFR  51 (*)     All other components within normal limits   BRAIN NATRIURETIC PEPTIDE - Abnormal; Notable for the following components:    Pro-BNP 1,853 (*)     All other components within normal limits   TROP/MYOGLOBIN - Abnormal; Notable for the following components:    Troponin, High Sensitivity 17 (*)     All other components within normal limits   TROP/MYOGLOBIN - Abnormal; Notable for the following components:    Troponin, High Sensitivity 17 (*)     All other components within normal limits   COVID-19       All other labs were within normal range or not returned as of this dictation. EMERGENCY DEPARTMENT COURSE and DIFFERENTIAL DIAGNOSIS/MDM:   Vitals:    Vitals:    20 1634 20 1649 20 1704 20 1749   BP: (!) 146/62 (!) 144/60 (!) 144/57 (!) 162/59   Pulse: (!) 43 (!) 43 (!) 39 (!) 44   Resp:       Temp:       TempSrc:       SpO2: 99% 99% 99% 99%   Weight:       Height:           Medical Decision Makin-year-old female who is in no distress, however, her oxygen level was in the 80s on arrival.  She has been satting in the high 90s to 100% since being placed on 2 L. There is evidence of CHF exacerbation. She received 40 of IV Lasix here in emergency room and will be admitted for further evaluation and treatment. ED Course as of Dec 07 1841   Mon Dec 07, 2020   1708 Spoke with Dr. Frances Arana for admission. Rapid Covid test has been ordered.     [EL]      ED Course User Index  [EL] SERA Magana - CNP       CONSULTS:  IP CONSULT TO

## 2020-12-07 NOTE — ED PROVIDER NOTES
The patient was seen and examined by me in conjunction with the mid-level provider. I agree with his/her assessment and treatment plan. My clinical impression is that the patient has CHF exacerbation. She is being given IV Lasix and is being admitted. CRITICAL CARE TIME     Due to the high probability of sudden and clinically significant deterioration in the patient's condition she required highest level of my preparedness to intervene urgently. I provided critical care time including documentation time, medication orders and management, reevaluation, vital sign assessment, ordering and reviewing of of lab tests ordering and reviewing of x-ray studies, and admission orders. Aggregate critical care time is 35 minutes including only time during which I was engaged in work directly related to her care and did not include time spent treating other patients simultaneously.         Salas Munson MD  12/07/20 1223

## 2020-12-07 NOTE — H&P
History & Physical  [facility]    Adult Hospitalist      Name: Ramirez Alvarez  MRN: [id number]     Acct: [de-identified]  [facility]: [email or web address]    Admit Date: 12/7/2020  3:43 PM  PCP: Rene Yen MD    Primary Problem  Active Problems:    Acute left-sided CHF (congestive heart failure) ([facility] [address]  Resolved Problems:    * No resolved hospital problems. *        Assesment:     · Acute on chronic diastolic congestive heart failure, CHFpEF  · Hypoxia  · Paroxysmal atrial fibrillation  · Coronary artery disease, native  · Essential hypertension  · Mixed hyperlipidemia  · Major depressive disorder  · Mild intermittent asthma  · Former smoker  · Gastroesophageal reflux disease  · Urgent continence        Plan:     · Admit to progressive  · Monitor vitals closely  · Keep SPO2 above 90%  · I's and O's  · Daily weights  · IV Hep-Lock  · Lasix 40 mg IV now  · Continue twice daily  · Resume amiodarone  · Resume Eliquis  · Resume aspirin  · Resume Imdur, add parameters  · Resume Lopressor with parameters  · Resume Ranexa  · Add lisinopril  · Resume Zoloft  · Hold Ditropan, loratadine  · Check CBC, BMP, BNP  · Check lipid panel magnesium TSH  · Recheck x-ray  · Low-sodium diet  · DVT and GI prophylaxis  · Covid tested in ER-results pending        Chief Complaint:     Chief Complaint   Patient presents with    Cough    Shortness of Breath         History of Present Illness: Ramirez Alvarez is a 80 y.o.  female who presents with Cough and Shortness of Breath    Patient presents with the complaints of progressive dyspnea over the last 3 to 4 days. Patient says she has been dyspneic with exertion but recently even at rest.  Patient states she had some dry cough but later had been congested. She says there has been occasional white-colored phlegm. Patient also complains of intermittent nasal stuffiness and discharge and also postnasal drainage. Patient was hypoxic on admission and was placed on oxygen via nasal cannula.   Patient tolerated that well but remained dyspneic after which she was given IV Lasix. Patient states since then she has felt much better    Patient denies any chest pain, wheezing, fever, headache or vision change. Denies any neck pain or back pain. Denies any rash or joint swelling    I have personally reviewed the past medical history, past surgical history, medications, social history, and family history, and summarized in the note. Review of Systems:     All 10 point system is reviewed and negative otherwise mentioned in HPI. Past Medical History:     Past Medical History:   Diagnosis Date    Acute on chronic diastolic CHF (congestive heart failure) (HCC) 10/2/2019    Bradycardia     CAD (coronary artery disease)     Cancer (HCC)     Skin CA on nose    Depression     Gallstones     GERD (gastroesophageal reflux disease)     Hiatal hernia     History of cardiac cath 10/2016    CAD    Hyperlipidemia     Hypertension     Insomnia     MI (myocardial infarction) ([facility] [address]     PNA (pneumonia)     SOB (shortness of breath)     UTI (urinary tract infection)         Past Surgical History:     Past Surgical History:   Procedure Laterality Date    APPENDECTOMY      CARDIAC SURGERY  11/08/2016    CABG x 3; LIMA-LAD, SVG-PDA, SVG-OM    CARDIOVERSION  12/17/2019    CHOLECYSTECTOMY      COLONOSCOPY      CORONARY ANGIOPLASTY WITH STENT PLACEMENT      CORONARY ARTERY BYPASS GRAFT  11/08/2016    X 3    HYSTERECTOMY          Medications Prior to Admission:       Prior to Admission medications    Medication Sig Start Date End Date Taking?  Authorizing Provider   metoprolol tartrate (LOPRESSOR) 25 MG tablet  11/4/19   Historical Provider, MD   loratadine (CLARITIN) 10 MG tablet Take 10 mg by mouth daily Take half a tablet per day    Historical Provider, MD   ranolazine (RANEXA) 500 MG extended release tablet Take 500 mg by mouth 2 times daily    Historical Provider, MD   amiodarone (CORDARONE) 200 MG tablet Take 200 mg by mouth daily    Historical Provider, MD   apixaban (ELIQUIS) 5 MG TABS tablet Take 1 tablet by mouth 2 times daily 10/5/19   Venida Manner, MD   furosemide (LASIX) 40 MG tablet Take 1 tablet by mouth 2 times daily 10/5/19   Venida Manner, MD   potassium chloride (KLOR-CON M) 20 MEQ extended release tablet Take 1 tablet by mouth 2 times daily 10/5/19 12/17/19  Venida Manner, MD   travoprost, benzalkonium, (TRAVATAN) 0.004 % ophthalmic solution Place 1 drop into the right eye nightly    Historical Provider, MD   albuterol sulfate  (90 Base) MCG/ACT inhaler Inhale 2 puffs into the lungs 3 times daily    Historical Provider, MD   isosorbide mononitrate (IMDUR) 30 MG extended release tablet Take 30 mg by mouth every morning    Historical Provider, MD   sertraline (ZOLOFT) 100 MG tablet Take 100 mg by mouth daily     Historical Provider, MD   aspirin 81 MG chewable tablet Take 1 tablet by mouth daily 10/25/16   Ricky Hudson MD   oxybutynin (DITROPAN) 5 MG tablet Take 5 mg by mouth every other day     Historical Provider, MD   pravastatin (PRAVACHOL) 40 MG tablet Take 40 mg by mouth nightly     Historical Provider, MD   pantoprazole (PROTONIX) 40 MG tablet Take 40 mg by mouth every morning (before breakfast)     Historical Provider, MD        Allergies:       Seasonal    Social History:     Tobacco:    reports that she quit smoking about 49 years ago. Her smoking use included cigarettes. She has never used smokeless tobacco.  Alcohol:      reports current alcohol use. Drug Use:  reports no history of drug use.     Family History:     Family History   Problem Relation Age of Onset    Heart Disease Mother     Cancer Mother     Heart Disease Father          Physical Exam:     Vitals:  BP (!) 162/59   Pulse (!) 44   Temp 98.2 °F (36.8 °C) (Oral)   Resp (!) 6   Ht 5' 4\" (1.626 m)   Wt 140 lb (63.5 kg)   SpO2 99%   BMI 24.03 kg/m²   Temp (24hrs), Av.2 °F (36.8 °C), Min:98.2 °F (36.8 °C), Max:98.2 °F (36.8 °C)          General appearance - alert, well appearing, and in no acute distress  Mental status - oriented to person, place, and time with normal affect  Head - normocephalic and atraumatic  Eyes - pupils equal and reactive, extraocular eye movements intact, conjunctiva clear  Ears - hearing appears to be intact  Nose - no drainage noted  Mouth - mucous membranes moist  Neck - supple, no carotid bruits, thyroid not palpable  Chest - clear to auscultation in upper and mid zones posteriorly, normal effort, bilateral basilar crackles  Heart - normal rate, irregular rhythm, no murmur  Abdomen - soft, nontender, nondistended, bowel sounds present all four quadrants, no masses, hepatomegaly or splenomegaly  Neurological - normal speech, no focal findings or movement disorder noted, cranial nerves II through XII grossly intact  Extremities - peripheral pulses palpable, no pedal edema or calf pain with palpation  Skin - no gross lesions, rashes, or induration noted        Data:     Labs:    Hematology:  Recent Labs     12/07/20  1612   WBC 10.3   RBC 3.28*   HGB 9.3*   HCT 30.4*   MCV 92.7   MCH 28.4   MCHC 30.6   RDW 14.9*      MPV 11.2     Chemistry:  Recent Labs     12/07/20  1612      K 3.3*      CO2 27   GLUCOSE 115*   BUN 21   CREATININE 1.21*   ANIONGAP 11   LABGLOM 42*   GFRAA 51*   CALCIUM 8.6   PROBNP 1,853*   TROPHS 17*   MYOGLOBIN 40     No results for input(s): PROT, LABALBU, LABA1C, D3LOWPI, F6HSKVE, FT4, TSH, AST, ALT, LDH, GGT, ALKPHOS, LABGGT, BILITOT, BILIDIR, AMMONIA, AMYLASE, LIPASE, LACTATE, CHOL, HDL, LDLCHOLESTEROL, CHOLHDLRATIO, TRIG, VLDL, LZE40WA, PHENYTOIN, PHENYF, URICACID, POCGLU in the last 72 hours.     Lab Results   Component Value Date    INR 1.1 10/02/2019    INR 1.2 11/11/2016    INR 1.2 11/10/2016    PROTIME 11.2 10/02/2019    PROTIME 12.3 (H) 11/11/2016    PROTIME 12.7 (H) 11/10/2016       Lab Results   Component Value Date/Time    SPECIAL NOT REPORTED 04/29/2020 07:44 PM     Lab Results   Component Value Date/Time    CULTURE NO GROWTH 04/29/2020 07:44 PM       Lab Results   Component Value Date    POCPH 7.35 11/09/2016    POCPCO2 41 11/09/2016    POCPO2 85 11/09/2016    POCHCO3 22.4 11/09/2016    NBEA 3 11/09/2016    PBEA NOT REPORTED 11/09/2016    COG1NRL 24 11/09/2016    YVYB8LSF 96 11/09/2016    FIO2 NOT REPORTED 10/02/2019       Radiology:    Xr Chest Portable    Result Date: 12/7/2020  Interstitial prominence in both lung bases, which could be due to edema or scarring. All radiological studies reviewed                Code Status:  Prior    Electronically signed by Arnol Gil MD on 12/7/2020 at 6:08 PM     Copy sent to Dr. Gabe Chowdhury MD    This note was created with the assistance of a speech-recognition program.  Although the intention is to generate a document that actually reflects the content of the visit, no guarantees can be provided that every mistake has been identified and corrected by editing. Note was updated later by me after  physical examination and  completion of the assessment.

## 2020-12-08 ENCOUNTER — APPOINTMENT (OUTPATIENT)
Dept: GENERAL RADIOLOGY | Age: 85
DRG: 292 | End: 2020-12-08
Payer: MEDICARE

## 2020-12-08 LAB
ANION GAP SERPL CALCULATED.3IONS-SCNC: 9 MMOL/L (ref 9–17)
BNP INTERPRETATION: ABNORMAL
BUN BLDV-MCNC: 19 MG/DL (ref 8–23)
BUN/CREAT BLD: 20 (ref 9–20)
CALCIUM SERPL-MCNC: 8.5 MG/DL (ref 8.6–10.4)
CHLORIDE BLD-SCNC: 105 MMOL/L (ref 98–107)
CHOLESTEROL/HDL RATIO: 2.9
CHOLESTEROL: 109 MG/DL
CO2: 32 MMOL/L (ref 20–31)
CREAT SERPL-MCNC: 0.93 MG/DL (ref 0.5–0.9)
EKG ATRIAL RATE: 50 BPM
EKG P AXIS: 54 DEGREES
EKG P-R INTERVAL: 212 MS
EKG Q-T INTERVAL: 494 MS
EKG QRS DURATION: 92 MS
EKG QTC CALCULATION (BAZETT): 450 MS
EKG R AXIS: 47 DEGREES
EKG T AXIS: 52 DEGREES
EKG VENTRICULAR RATE: 50 BPM
GFR AFRICAN AMERICAN: >60 ML/MIN
GFR NON-AFRICAN AMERICAN: 57 ML/MIN
GFR SERPL CREATININE-BSD FRML MDRD: ABNORMAL ML/MIN/{1.73_M2}
GFR SERPL CREATININE-BSD FRML MDRD: ABNORMAL ML/MIN/{1.73_M2}
GLUCOSE BLD-MCNC: 110 MG/DL (ref 70–99)
HCT VFR BLD CALC: 35.2 % (ref 36.3–47.1)
HDLC SERPL-MCNC: 38 MG/DL
HEMOGLOBIN: 10.7 G/DL (ref 11.9–15.1)
LDL CHOLESTEROL: 55 MG/DL (ref 0–130)
LV EF: 58 %
LVEF MODALITY: NORMAL
MAGNESIUM: 1.9 MG/DL (ref 1.6–2.6)
MCH RBC QN AUTO: 27.6 PG (ref 25.2–33.5)
MCHC RBC AUTO-ENTMCNC: 30.4 G/DL (ref 28.4–34.8)
MCV RBC AUTO: 91 FL (ref 82.6–102.9)
NRBC AUTOMATED: 0 PER 100 WBC
PDW BLD-RTO: 14.8 % (ref 11.8–14.4)
PLATELET # BLD: 201 K/UL (ref 138–453)
PMV BLD AUTO: 10.4 FL (ref 8.1–13.5)
POTASSIUM SERPL-SCNC: 3.9 MMOL/L (ref 3.7–5.3)
PRO-BNP: 1803 PG/ML
RBC # BLD: 3.87 M/UL (ref 3.95–5.11)
SODIUM BLD-SCNC: 146 MMOL/L (ref 135–144)
TRIGL SERPL-MCNC: 78 MG/DL
TSH SERPL DL<=0.05 MIU/L-ACNC: 4.67 MIU/L (ref 0.3–5)
VLDLC SERPL CALC-MCNC: ABNORMAL MG/DL (ref 1–30)
WBC # BLD: 8.5 K/UL (ref 3.5–11.3)

## 2020-12-08 PROCEDURE — 2580000003 HC RX 258: Performed by: FAMILY MEDICINE

## 2020-12-08 PROCEDURE — 85027 COMPLETE CBC AUTOMATED: CPT

## 2020-12-08 PROCEDURE — 36415 COLL VENOUS BLD VENIPUNCTURE: CPT

## 2020-12-08 PROCEDURE — 97116 GAIT TRAINING THERAPY: CPT

## 2020-12-08 PROCEDURE — 84443 ASSAY THYROID STIM HORMONE: CPT

## 2020-12-08 PROCEDURE — 80061 LIPID PANEL: CPT

## 2020-12-08 PROCEDURE — 2060000000 HC ICU INTERMEDIATE R&B

## 2020-12-08 PROCEDURE — 71046 X-RAY EXAM CHEST 2 VIEWS: CPT

## 2020-12-08 PROCEDURE — 83735 ASSAY OF MAGNESIUM: CPT

## 2020-12-08 PROCEDURE — 6360000002 HC RX W HCPCS: Performed by: FAMILY MEDICINE

## 2020-12-08 PROCEDURE — 6370000000 HC RX 637 (ALT 250 FOR IP): Performed by: FAMILY MEDICINE

## 2020-12-08 PROCEDURE — 97535 SELF CARE MNGMENT TRAINING: CPT

## 2020-12-08 PROCEDURE — 97165 OT EVAL LOW COMPLEX 30 MIN: CPT

## 2020-12-08 PROCEDURE — 97161 PT EVAL LOW COMPLEX 20 MIN: CPT

## 2020-12-08 PROCEDURE — 93010 ELECTROCARDIOGRAM REPORT: CPT | Performed by: INTERNAL MEDICINE

## 2020-12-08 PROCEDURE — 93306 TTE W/DOPPLER COMPLETE: CPT

## 2020-12-08 PROCEDURE — 83880 ASSAY OF NATRIURETIC PEPTIDE: CPT

## 2020-12-08 PROCEDURE — 80048 BASIC METABOLIC PNL TOTAL CA: CPT

## 2020-12-08 PROCEDURE — B245ZZ4 ULTRASONOGRAPHY OF LEFT HEART, TRANSESOPHAGEAL: ICD-10-PCS | Performed by: INTERNAL MEDICINE

## 2020-12-08 RX ADMIN — LATANOPROST 1 DROP: 50 SOLUTION OPHTHALMIC at 21:01

## 2020-12-08 RX ADMIN — FUROSEMIDE 40 MG: 10 INJECTION, SOLUTION INTRAMUSCULAR; INTRAVENOUS at 21:01

## 2020-12-08 RX ADMIN — POTASSIUM CHLORIDE 20 MEQ: 20 TABLET, EXTENDED RELEASE ORAL at 09:45

## 2020-12-08 RX ADMIN — RANOLAZINE 500 MG: 500 TABLET, FILM COATED, EXTENDED RELEASE ORAL at 09:45

## 2020-12-08 RX ADMIN — ISOSORBIDE MONONITRATE 30 MG: 30 TABLET ORAL at 09:45

## 2020-12-08 RX ADMIN — POTASSIUM CHLORIDE 20 MEQ: 20 TABLET, EXTENDED RELEASE ORAL at 21:01

## 2020-12-08 RX ADMIN — METOPROLOL TARTRATE 25 MG: 25 TABLET, FILM COATED ORAL at 09:46

## 2020-12-08 RX ADMIN — SERTRALINE HYDROCHLORIDE 100 MG: 100 TABLET ORAL at 09:46

## 2020-12-08 RX ADMIN — APIXABAN 5 MG: 5 TABLET, FILM COATED ORAL at 09:46

## 2020-12-08 RX ADMIN — PANTOPRAZOLE SODIUM 40 MG: 40 TABLET, DELAYED RELEASE ORAL at 06:32

## 2020-12-08 RX ADMIN — ASPIRIN 81 MG: 81 TABLET, CHEWABLE ORAL at 09:45

## 2020-12-08 RX ADMIN — LISINOPRIL 5 MG: 5 TABLET ORAL at 09:48

## 2020-12-08 RX ADMIN — RANOLAZINE 500 MG: 500 TABLET, FILM COATED, EXTENDED RELEASE ORAL at 21:01

## 2020-12-08 RX ADMIN — APIXABAN 5 MG: 5 TABLET, FILM COATED ORAL at 21:01

## 2020-12-08 RX ADMIN — FUROSEMIDE 40 MG: 10 INJECTION, SOLUTION INTRAMUSCULAR; INTRAVENOUS at 09:45

## 2020-12-08 RX ADMIN — PRAVASTATIN SODIUM 40 MG: 40 TABLET ORAL at 21:01

## 2020-12-08 RX ADMIN — SODIUM CHLORIDE, PRESERVATIVE FREE 10 ML: 5 INJECTION INTRAVENOUS at 21:02

## 2020-12-08 RX ADMIN — SODIUM CHLORIDE, PRESERVATIVE FREE 10 ML: 5 INJECTION INTRAVENOUS at 09:47

## 2020-12-08 ASSESSMENT — PAIN SCALES - GENERAL: PAINLEVEL_OUTOF10: 0

## 2020-12-08 NOTE — PROGRESS NOTES
Patient arrived to PCU alert and oriented X4, VS stable, and orientation to the room was performed. Patient was left in a safe environment, call light within reach, side rails 2/4, and bed alarm on. RN will continue to monitor and reassess.

## 2020-12-08 NOTE — PROGRESS NOTES
[facility]    Adult Hospitalist      Name: Casey Hendrickson  MRN: [id number]     Acct: [de-identified]  Room: 1003/1003-02    Admit Date: 12/7/2020  3:43 PM  PCP: Jody Valencia MD    Primary Problem  Active Problems:    Acute left-sided CHF (congestive heart failure) ([facility] [address]  Resolved Problems:    * No resolved hospital problems. *        Assesment:     · Acute on chronic diastolic congestive heart failure, CHFpEF  · Hypoxia  · Paroxysmal atrial fibrillation  · Coronary artery disease, native  · Essential hypertension  · Mixed hyperlipidemia  · Major depressive disorder  · Mild intermittent asthma  · Former smoker  · Gastroesophageal reflux disease  · Urge continence        Plan:     · Admit to progressive  · Monitor vitals closely  · Keep SPO2 above 90%  · I's and O's  · Daily weights  · IV Hep-Lock  · Lasix 40 mg IV now  · Continue twice daily  · Resume amiodarone  · Resume Eliquis  · Resume aspirin  · Resume Imdur, add parameters  · Resume Lopressor with parameters  · Resume Ranexa  · Add lisinopril  · Resume Zoloft  · Hold Ditropan, loratadine  · Check CBC, BMP, BNP  · Check lipid panel magnesium TSH  · Recheck x-ray  · Low-sodium diet  · DVT and GI prophylaxis  · Covid tested in ER        Chief Complaint:     Chief Complaint   Patient presents with    Cough    Shortness of Breath         History of Present Illness:        Pt feels better  On 2L O2 NC  XR worse  D/w Pt and RN        Initial HPI  Casey Hendrickson is a 80 y.o.  female who presents with Cough and Shortness of Breath    Patient presents with the complaints of progressive dyspnea over the last 3 to 4 days. Patient says she has been dyspneic with exertion but recently even at rest.  Patient states she had some dry cough but later had been congested. She says there has been occasional white-colored phlegm. Patient also complains of intermittent nasal stuffiness and discharge and also postnasal drainage.   Patient was hypoxic on admission and was placed on oxygen via nasal cannula. Patient tolerated that well but remained dyspneic after which she was given IV Lasix. Patient states since then she has felt much better    Patient denies any chest pain, wheezing, fever, headache or vision change. Denies any neck pain or back pain. Denies any rash or joint swelling    I have personally reviewed the past medical history, past surgical history, medications, social history, and family history, and summarized in the note. Review of Systems:     All 10 point system is reviewed and negative otherwise mentioned in HPI. Past Medical History:     Past Medical History:   Diagnosis Date    Acute on chronic diastolic CHF (congestive heart failure) (HCC) 10/2/2019    Bradycardia     CAD (coronary artery disease)     Cancer (HCC)     Skin CA on nose    Depression     Gallstones     GERD (gastroesophageal reflux disease)     Hiatal hernia     History of cardiac cath 10/2016    CAD    Hyperlipidemia     Hypertension     Insomnia     MI (myocardial infarction) ([facility] [address]     PNA (pneumonia)     SOB (shortness of breath)     UTI (urinary tract infection)         Past Surgical History:     Past Surgical History:   Procedure Laterality Date    APPENDECTOMY      CARDIAC SURGERY  11/08/2016    CABG x 3; LIMA-LAD, SVG-PDA, SVG-OM    CARDIOVERSION  12/17/2019    CHOLECYSTECTOMY      COLONOSCOPY      CORONARY ANGIOPLASTY WITH STENT PLACEMENT      CORONARY ARTERY BYPASS GRAFT  11/08/2016    X 3    HYSTERECTOMY          Medications Prior to Admission:       Prior to Admission medications    Medication Sig Start Date End Date Taking?  Authorizing Provider   metoprolol tartrate (LOPRESSOR) 25 MG tablet Take 25 mg by mouth 2 times daily  11/4/19  Yes Historical Provider, MD   amiodarone (CORDARONE) 200 MG tablet Take 200 mg by mouth daily   Yes Historical Provider, MD   apixaban (ELIQUIS) 5 MG TABS tablet Take 1 tablet by mouth 2 times daily 10/5/19  Yes Denece Mt Linette Nava MD   furosemide (LASIX) 40 MG tablet Take 1 tablet by mouth 2 times daily 10/5/19  Yes Alexia Lange MD   travoprost, benzalkonium, (TRAVATAN) 0.004 % ophthalmic solution Place 1 drop into the right eye nightly   Yes Historical Provider, MD   isosorbide mononitrate (IMDUR) 30 MG extended release tablet Take 30 mg by mouth every morning   Yes Historical Provider, MD   sertraline (ZOLOFT) 100 MG tablet Take 100 mg by mouth daily    Yes Historical Provider, MD   oxybutynin (DITROPAN) 5 MG tablet Take 5 mg by mouth every other day    Yes Historical Provider, MD   pravastatin (PRAVACHOL) 40 MG tablet Take 40 mg by mouth daily    Yes Historical Provider, MD   pantoprazole (PROTONIX) 40 MG tablet Take 40 mg by mouth every morning (before breakfast)    Yes Historical Provider, MD        Allergies:       Seasonal    Social History:     Tobacco:    reports that she quit smoking about 49 years ago. Her smoking use included cigarettes. She has never used smokeless tobacco.  Alcohol:      reports current alcohol use. Drug Use:  reports no history of drug use.     Family History:     Family History   Problem Relation Age of Onset    Heart Disease Mother     Cancer Mother     Heart Disease Father          Physical Exam:     Vitals:  BP 93/81   Pulse (!) 48   Temp 97.9 °F (36.6 °C) (Oral)   Resp 18   Ht 5' 4\" (1.626 m)   Wt 141 lb 6.4 oz (64.1 kg)   SpO2 92%   BMI 24.27 kg/m²   Temp (24hrs), Av.8 °F (36.6 °C), Min:97.3 °F (36.3 °C), Max:98.4 °F (36.9 °C)          General appearance - alert, well appearing, and in no acute distress  Mental status - oriented to person, place, and time with normal affect  Head - normocephalic and atraumatic  Eyes - pupils equal and reactive, extraocular eye movements intact, conjunctiva clear  Ears - hearing appears to be intact  Nose - no drainage noted  Mouth - mucous membranes moist  Neck - supple, no carotid bruits, thyroid not palpable  Chest - coarse crackles to REPORTED 10/02/2019       Radiology:    Xr Chest Portable    Result Date: 12/7/2020  Interstitial prominence in both lung bases, which could be due to edema or scarring. All radiological studies reviewed                Code Status:  Full Code    Electronically signed by Jia Ortiz MD on 12/8/2020 at 5:56 PM     Copy sent to Dr. Tori Hernandez MD    This note was created with the assistance of a speech-recognition program.  Although the intention is to generate a document that actually reflects the content of the visit, no guarantees can be provided that every mistake has been identified and corrected by editing. Note was updated later by me after  physical examination and  completion of the assessment.

## 2020-12-08 NOTE — PROGRESS NOTES
Physical Therapy    Facility/Department: [facility] PROGRESSIVE CARE  Initial Assessment    NAME: Chaparrita Vallejo  : 3/26/1932  MRN: [id number]    Date of Service: 2020    Discharge Recommendations:  Home with Home health PT     Pt presented to ED on 20 with shortness of breath that started a few days ago. Shortness of breath is constant but worse with activity. No history of pulmonary disease. She does have history of CAD and has had open heart surgery. She does not have any chest pain, dizziness or diaphoresis. She admits to an occasional cough which she says is due to her sinuses. No body aches or fevers. Oxygen saturation was 88% on room air on arrival.  She was placed on 2 L and is now satting 99%. RN reports patient is medically stable for therapy treatment this date. Chart reviewed prior to treatment and patient is agreeable for therapy. Assessment   Body structures, Functions, Activity limitations: Decreased functional mobility ; Decreased endurance;Decreased balance  Assessment: Pt with deficits noted in transfers, ambulation, balance, and endurance this session. Pt requires continued IP PT & is appropriate to D/C Home with assist & HH PT to maximize independence with functional mobility, balance, safety awareness & activity tolerance. Prognosis: Good  Decision Making: Medium Complexity  Exam: ROM, MMT, functional mobility, activity tolerance, Balance, & [employer] AM-PAC 6 Clicks Basic Mobility  Clinical Presentation: evolving  PT Education: Goals;PT Role;Plan of Care;Transfer Training;General Safety; Functional Mobility Training;Gait Training  Patient Education: Ed pt on functional mobility, safety awareness, prevention of sedentary complications & optimal breathing techniques  REQUIRES PT FOLLOW UP: Yes  Activity Tolerance  Activity Tolerance: Patient limited by endurance       Patient Diagnosis(es): The encounter diagnosis was Acute on chronic congestive heart failure, Balance  Sitting - Static: Good  Sitting - Dynamic: Good  Standing - Static: Good;-  Standing - Dynamic: Fair;+  Exercises  Comments: Ed pt on functional mobility, safety awareness, prevention of sedentary complications & optimal breathing techniques     Plan   Plan  Times per week: 1-2x/D, 5-6D/week  Current Treatment Recommendations: Strengthening, Balance Training, Functional Mobility Training, Transfer Training, Endurance Training, Gait Training, Home Exercise Program, Safety Education & Training, Patient/Caregiver Education & Training  Safety Devices  Type of devices: Bed alarm in place, Call light within reach, Gait belt, Patient at risk for falls, Chair alarm in place, Left in chair, Nurse notified    G-Code       OutComes Score                                                  AM-PAC Score  AM-PAC Inpatient Mobility Raw Score : 20 (12/08/20 0851)  AM-PAC Inpatient T-Scale Score : 47.67 (12/08/20 0851)  Mobility Inpatient CMS 0-100% Score: 35.83 (12/08/20 0851)  Mobility Inpatient CMS G-Code Modifier : Dmitriy Camp (12/08/20 8333)          Goals  Short term goals  Time Frame for Short term goals: 12 visits  Short term goal 1: Inc transfers to [facility]; Short term goal 2: Inc gait to amb 300ft or > with  R/walker & demonstrates good activity tolerance  Short term goal 3: Pt able to tolerate 30-40 min of activity to include 15-20 reps of ex & functional mobility including 8 minutes of standing to facilitate activity tolerance to [facility]; Short term goal 4:  Inc standing balance to good to facilitate pt independence for performance of ADL's & functional mobility, & reduce fall risk  Short term goal 5: Ed pt on home ex's, safety & energy principles & issue written home program;       Therapy Time   Individual Concurrent Group Co-treatment   Time In 0824         Time Out 0851         Minutes 27           Additional 10 minutes for chart review             [address], PT

## 2020-12-08 NOTE — PROGRESS NOTES
Occupational Therapy   Occupational Therapy Initial Assessment  Date: 2020   Patient Name: Mónica Hernandez  MRN: [id number]     : 3/26/1932    RN Henry Zaidi reports patient is medically stable for therapy treatment this date. Chart reviewed prior to treatment and patient is agreeable for therapy. All lines intact and patient positioned comfortably at end of treatment. All patient needs addressed prior to ending therapy session. Date of Service: 2020    Discharge Recommendations:  Home with assist PRN  OT Equipment Recommendations  Equipment Needed: Yes  Mobility Devices: ADL Assistive Devices  ADL Assistive Devices: Reacher;Long-handled [place];Long-handled Sponge;Emergency Alert System    Assessment   Performance deficits / Impairments: Decreased functional mobility ; Decreased safe awareness;Decreased balance;Decreased ADL status; Decreased endurance;Decreased high-level IADLs  Assessment: Skilled OT POC is recommended to increase overall I and safety with daily living tasks to return home with assist as needed. Prognosis: Good  Decision Making: Low Complexity  OT Education: OT Role;Transfer Training;Plan of Care;Energy Conservation  Patient Education: call light use/fall prevention, safety in function, recommendations for continued therapy services, benefits of being up OOB as able, pursed lip breathing  REQUIRES OT FOLLOW UP: Yes  Activity Tolerance  Activity Tolerance: Patient limited by fatigue;Patient Tolerated treatment well  Activity Tolerance: fair +  Safety Devices  Safety Devices in place: Yes  Type of devices: Call light within reach; Chair alarm in place; Left in chair;Patient at risk for falls;Gait belt;Nurse notified           Patient Diagnosis(es): The encounter diagnosis was Acute on chronic congestive heart failure, unspecified heart failure type ([facility]      has a past medical history of Acute on chronic diastolic CHF (congestive heart failure) ([address] Bradycardia, CAD (coronary artery disease), Cancer ([facility] Depression, Gallstones, GERD (gastroesophageal reflux disease), Hiatal hernia, History of cardiac cath, Hyperlipidemia, Hypertension, Insomnia, MI (myocardial infarction) ([facility] [address] PNA (pneumonia), SOB (shortness of breath), and UTI (urinary tract infection). has a past surgical history that includes Appendectomy; Cholecystectomy; Hysterectomy; Coronary angioplasty with stent; Colonoscopy; Coronary artery bypass graft (11/08/2016); Cardiac surgery (11/08/2016); and Cardioversion (12/17/2019). PER H&P:  Ghislaine Luis is a 80 y.o.  female who presents with Cough and Shortness of Breath     Patient presents with the complaints of progressive dyspnea over the last 3 to 4 days. Patient says she has been dyspneic with exertion but recently even at rest.  Patient states she had some dry cough but later had been congested. She says there has been occasional white-colored phlegm. Patient also complains of intermittent nasal stuffiness and discharge and also postnasal drainage. Patient was hypoxic on admission and was placed on oxygen via nasal cannula. Patient tolerated that well but remained dyspneic after which she was given IV Lasix.   Patient states since then she has felt much better     Restrictions  Restrictions/Precautions  Restrictions/Precautions: General Precautions, Fall Risk  Position Activity Restriction  Other position/activity restrictions: up with assist, low sodium diet, RUE IV, O2, [email or web address], alarms    Subjective   General  Chart Reviewed: Yes  Patient assessed for rehabilitation services?: Yes  Family / Caregiver Present: No  *pt denies pain     Social/Functional History  Social/Functional History  Lives With: Alone  Type of Home: Assisted living/[place]  Home Layout: Multi-level(pt reports laundry on same floor; pt apt is on the 4th floor)  Home Access: Level entry, Elevator  Bathroom Shower/Tub: Walk-in shower, Tub/Shower unit  Bathroom Toilet: Handicap height  Bathroom Equipment: Shower chair, Grab bars in shower, Grab bars around toilet  Home Equipment: 4 wheeled walker  Receives Help From: Family(Pt states she has 4 local supportive children)  ADL Assistance: Independent  Homemaking Assistance: Needs assistance(Pt states her meals are included for breakfast & supper (M-FRI), & supper on weekends; pt states she does some meal prep in her unit and has cleaning service every other week. 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 Objective   Vision: Impaired  Vision Exceptions: Wears glasses for reading  Hearing: Exceptions to [facility]  Hearing Exceptions: Hard of hearing/hearing concerns(pt states she has hearing aids ordered however has not received)    Orientation  Overall Orientation Status: Within Functional Limits  Observation/Palpation  Posture: Good  Observation: resting in bed, wearing O2, 2L NC & reports some SOB  Edema: none  Balance  Sitting Balance: Supervision  Standing Balance: Stand by assistance(no AD)  Standing Balance  Time: stand josie > 5 mins with functional tasks  Functional Mobility  Functional - Mobility Device: No device  Activity: (bed to door and to sink; back to bedside chair)  Assist Level: Stand by assistance  Functional Mobility Comments: MIN verbal instruction needed for pacing self and slowinfg down movements to increase safety/reduce falls.   Toilet Transfers  Toilet Transfers Comments: N/T and pt states she completed prior to writer arrival.    ADL  Feeding: Independent  Grooming: Setup;Stand by assistance(to stand at sink for denture care)  UE Bathing: Setup;Stand by assistance  LE Bathing: Setup;Stand by assistance  UE Dressing: Setup;Minimal assistance(with hosp gown)  LE Dressing: Setup;Stand by assistance(Pt able to suzy B socks seated EOB with set up/SBA and crossing BLE's.)  Toileting: Minimal assistance  Tone RUE  RUE Tone: Normotonic  Tone LUE  LUE Tone: Normotonic  Coordination  Movements Are Fluid And Coordinated: Yes     Bed mobility  Supine to Sit: Stand by assistance  Sit to Supine: Unable to assess(Pt agreed to sit up in chair for breakfast)  Comment: Pt with good use of rail and verbal instruction needed for pursed lip breathing. Transfers  Stand Step Transfers: Stand by assistance(with no AD)  Sit to stand: Stand by assistance  Stand to sit: Stand by assistance  Transfer Comments: MIN verbal instruction needed for pacing self/slowing down movements and hand placement reaching back to surface with stand to sits to increase overall safety/reduce falls. Cognition  Overall Cognitive Status: Exceptions  Arousal/Alertness: Appropriate responses to stimuli  Following Commands:  Follows all commands without difficulty  Attention Span: Appears intact  Memory: Decreased short term memory  Safety Judgement: Decreased awareness of need for safety  Problem Solving: Assistance required to correct errors made;Assistance required to identify errors made;Decreased awareness of errors  Insights: Fully aware of deficits  Initiation: Does not require cues  Sequencing: Does not require cues  Perception  Overall Perceptual Status: WFL     Sensation  Overall Sensation Status: Impaired(pt states she has intermittent paresthesias in B hand and feet)        LUE AROM (degrees)  LUE AROM : WFL  RUE AROM (degrees)  RUE AROM : WFL  LUE Strength  Gross LUE Strength: WFL  LUE Strength Comment: BUE strength grossly 4 plus/5  RUE Strength  Gross RUE Strength: WFL                   Plan   Plan  Times per week: 4-5x/week 1x/day as josie  Current Treatment Recommendations: Strengthening, Balance Training, Functional Mobility Training, Safety Education & Training, Endurance Training, Neuromuscular Re-education, Patient/Caregiver Education & Training, Self-Care / ADL, Home Management Training, Equipment Evaluation, Education, & procurement                                                    AM-PAC Score   19          Goals  Short term goals  Time Frame for Short term goals: by discharge, pt to demo  Short term goal 1: bed mob tasks with use of rail as needed to MOD I. Short term goal 2: I with BUE HEP with use of handouts as needed to maintain strength for function. Short term goal 3: total body ADL tasks with use of AE/grab bar as needed to MOD I-I. Short term goal 4: ADL transfers and functional mob to I. Short term goal 5: standing to SUP and josie > 6 mins as able to reduce falls/increase act josie for ADL's. Long term goals  Long term goal 1: Pt/family to be I with EC/WS and fall prevention tech as well as DME/AE recommendations with use of handouts. Patient Goals   Patient goals : I want to get home!        Therapy Time   Individual Concurrent Group Co-treatment   Time In 0824(plus 10 min chart review/RN communication)         Time Out 0902         Minutes [address], [place]

## 2020-12-08 NOTE — CARE COORDINATION
Case Management Initial Discharge Plan  [facility],         Readmission Risk              Risk of Unplanned Readmission:        16             Met with:patient to discuss discharge plans. Information verified: address, contacts, phone number, , insurance Yes  PCP: Cristiane Cherry MD  Date of last visit:  [address] [place] [address] Provider: Grace Beard part D     Discharge Planning  Current Residence:   at [facility] senior independent living [facility])     Living Arrangements:  Other (Comment)     Home an apt at [facility], has elevator  Support Systems:  Children, Family Members, ECF/Assisted Living       Current Services PTA:  None  Agency:  None       Patient able to perform ADL's:Independent  DME in home:  Walker, bath bench   DME used to aid ambulation prior to admission:   Nabila Larios on long walks  DME used during admission:  None     Potential Assistance Needed:  N/A    Pharmacy: Mail in [facility] and RA on [address] [place]    Potential Assistance Purchasing Medications:  No  Does patient want to participate in local refill/ meds to beds program?  No    Patient agreeable to home care: yes  Freedom of choice provided:  yes     The Plan for Transition of Care is related to the following treatment goals: skilled RN assessment for CHF     The Patient and/or patient representative   was provided with a choice of provider and agrees   with the discharge plan. [x] Yes [] No    Freedom of choice list was provided with basic dialogue that supports the patient's individualized plan of care/goals, treatment preferences and shares the quality data associated with the providers. [x] Yes [] No      Type of Home Care Services:  None  Patient expects to be discharged to:   [facility]    Prior SNF/Rehab Placement and Facility: [facility]   Agreeable to SNF/Rehab: No  [place] of choice provided: n/a   Evaluation: n/a    Expected Discharge date:  12/10/20  Follow Up Appointment: Best Day/ Time: Monday AM    Transportation provider: per family   Transportation arrangements needed for discharge: No    Discharge Plan:   Met with patient to discuss a discharge plan of care. Patient lives at the [facility] senior living at . [address]. She is very active and independent. She uses her walker only if going long distances outside the apartment    Patient has been to snf side in past. She has been admitted with CHF. Did discuss visiting nurse for CHF education and follow up and she is agreeable. She has had home care in past but unsure of the name of the agency. Gave [facility] list for her to review. Her daughter Shahrzad Clarke works at [facility]. Patient did not have a part d listed on face sheet. She states [id number]    [id number]  RX PCN    ADV   RX GRP  [id number]  ISSUER   [id number]  ID   [id number]    Will follow up with patient choice for home care. . ORIANA initiated.      Patient on 02 at 2 liters NC will follow for potential need for home 02 evaluation for CHF>     Electronically signed by Timothy Mas RN on 12/8/20 at 9:21 AM EST

## 2020-12-08 NOTE — PROGRESS NOTES
Nutrition Education    · Verbally reviewed information with Patient  · Educated on Heart Failure Nutrition Therapy  · Written educational materials provided. · Contact name and number provided. · Refer to Patient Education activity for more details.           Vivien DUMONT, RDN, LDN  Lead Clinical Dietitian  RD Office Phone [phone]

## 2020-12-08 NOTE — PLAN OF CARE
Problem: Falls - Risk of:  Goal: Will remain free from falls  Description: Will remain free from falls  Outcome: Ongoing  Goal: Absence of physical injury  Description: Absence of physical injury  Outcome: Ongoing     Problem: Cardiac:  Goal: Hemodynamic stability will improve  Description: Hemodynamic stability will improve  Outcome: Ongoing

## 2020-12-08 NOTE — CONSULTS
Reason for Consult: CHF  Requesting Physician: Alon Jules MD    CHIEF COMPLAINT: Shortness of breath    History Obtained From:  patient, electronic medical record    HISTORY OF PRESENT ILLNESS:      The patient is a 80 y.o. female with significant past medical history of CHF and CAD who presents with dyspnea on exertion that has been worse for the past couple of days. On admission she was found to be hypoxic requiring oxygen. Her proBNP was elevated and chest x-ray showed interstitial prominence possible edema. She was admitted for CHF exacerbation. She received diuretics and feels her breathing is improved. She denies any edema, PND, or orthopnea. Denies chest pain, palpitations, syncope, or near syncope. She denies any recent fever, chills, or illness. She is sitting up in the chair crocheting without any signs of distress when I saw her. She was also found to have mild sinus bradycardia this admission and her amiodarone was held last night. The patient denies any associated symptoms.     Past Medical History:    Past Medical History:   Diagnosis Date    Acute on chronic diastolic CHF (congestive heart failure) (HCC) 10/2/2019    Bradycardia     CAD (coronary artery disease)     Cancer (HCC)     Skin CA on nose    Depression     Gallstones     GERD (gastroesophageal reflux disease)     Hiatal hernia     History of cardiac cath 10/2016    CAD    Hyperlipidemia     Hypertension     Insomnia     MI (myocardial infarction) ([address]     PNA (pneumonia)     SOB (shortness of breath)     UTI (urinary tract infection)      Past Surgical History:    Past Surgical History:   Procedure Laterality Date    APPENDECTOMY      CARDIAC SURGERY  11/08/2016    CABG x 3; LIMA-LAD, SVG-PDA, SVG-OM    CARDIOVERSION  12/17/2019    CHOLECYSTECTOMY      COLONOSCOPY      CORONARY ANGIOPLASTY WITH STENT PLACEMENT      CORONARY ARTERY BYPASS GRAFT  11/08/2016    X 3    HYSTERECTOMY       Home Medications:  Prior to Admission medications    Medication Sig Start Date End Date Taking?  Authorizing Provider   metoprolol tartrate (LOPRESSOR) 25 MG tablet  11/4/19  Yes Historical Provider, MD   amiodarone (CORDARONE) 200 MG tablet Take 200 mg by mouth daily   Yes Historical Provider, MD   apixaban (ELIQUIS) 5 MG TABS tablet Take 1 tablet by mouth 2 times daily 10/5/19  Yes Liliam Fajardo MD   furosemide (LASIX) 40 MG tablet Take 1 tablet by mouth 2 times daily 10/5/19  Yes Liliam Fajardo MD   isosorbide mononitrate (IMDUR) 30 MG extended release tablet Take 30 mg by mouth every morning   Yes Historical Provider, MD   sertraline (ZOLOFT) 100 MG tablet Take 100 mg by mouth daily    Yes Historical Provider, MD   oxybutynin (DITROPAN) 5 MG tablet Take 5 mg by mouth every other day    Yes Historical Provider, MD   pravastatin (PRAVACHOL) 40 MG tablet Take 40 mg by mouth nightly    Yes Historical Provider, MD   pantoprazole (PROTONIX) 40 MG tablet Take 40 mg by mouth every morning (before breakfast)    Yes Historical Provider, MD   loratadine (CLARITIN) 10 MG tablet Take 10 mg by mouth daily Take half a tablet per day    Historical Provider, MD   ranolazine (RANEXA) 500 MG extended release tablet Take 500 mg by mouth 2 times daily    Historical Provider, MD   potassium chloride (KLOR-CON M) 20 MEQ extended release tablet Take 1 tablet by mouth 2 times daily 10/5/19 12/17/19  Liliam Fajardo MD   travoprost, benzalkonium, (TRAVATAN) 0.004 % ophthalmic solution Place 1 drop into the right eye nightly    Historical Provider, MD   albuterol sulfate  (90 Base) MCG/ACT inhaler Inhale 2 puffs into the lungs 3 times daily    Historical Provider, MD   aspirin 81 MG chewable tablet Take 1 tablet by mouth daily 10/25/16   Tracey Michelle MD     Current Medications:    Current Facility-Administered Medications   Medication Dose Route Frequency Provider Last Rate Last Dose    albuterol (PROVENTIL) nebulizer solution MG/24HR 1 patch  1 patch Transdermal Daily PRN Tucker Arvizu MD        lisinopril (PRINIVIL;ZESTRIL) tablet 5 mg  5 mg Oral Daily Tucker Arvizu MD   5 mg at 20 0948    furosemide (LASIX) injection 40 mg  40 mg Intravenous BID Trinity Yao MD   40 mg at 20 0945     Allergies:  Seasonal    Social History:    Social History     Socioeconomic History    Marital status:       Spouse name: Not on file    Number of children: Not on file    Years of education: Not on file    Highest education level: Not on file   Occupational History    Not on file   Social Needs    Financial resource strain: Not on file    Food insecurity     Worry: Not on file     Inability: Not on file    Transportation needs     Medical: Not on file     Non-medical: Not on file   Tobacco Use    Smoking status: Former Smoker     Types: Cigarettes     Last attempt to quit: 1971     Years since quittin.0    Smokeless tobacco: Never Used    Tobacco comment: quit smoking yrs ago   Substance and Sexual Activity    Alcohol use: Yes     Comment: Rarely    Drug use: No    Sexual activity: Not on file   Lifestyle    Physical activity     Days per week: Not on file     Minutes per session: Not on file    Stress: Not on file   Relationships    Social connections     Talks on phone: Not on file     Gets together: Not on file     Attends Restorationist service: Not on file     Active member of club or organization: Not on file     Attends meetings of clubs or organizations: Not on file     Relationship status: Not on file    Intimate partner violence     Fear of current or ex partner: Not on file     Emotionally abused: Not on file     Physically abused: Not on file     Forced sexual activity: Not on file   Other Topics Concern    Not on file   Social History Narrative    Not on file     Family History:   Family History   Problem Relation Age of Onset    Heart Disease Mother     Cancer Mother     Heart Disease Father        · REVIEW OF SYSTEMS   CONSTITUTIONAL: negative except for  fatigue  EYES:  negative  HEENT:  negative  RESPIRATORY: negative except for  dyspnea   CARDIOVASCULAR:  negative except for  dyspnea  GASTROINTESTINAL:  negative  GENITOURINARY:  negative  INTEGUMENT:  negative  HEMATOLOGIC/LYMPHATIC:  negative except for chronic anticoagulation  ALLERGIC/IMMUNOLOGIC:  negative except for seasonal allergies  ENDOCRINE:  negative  MUSCULOSKELETAL:  negative  NEUROLOGICAL:  negative    PHYSICAL EXAM:    Vitals:    VITALS:  BP (!) 148/66   Pulse 51   Temp 97.5 °F (36.4 °C) (Oral)   Resp 18   Ht 5' 4\" (1.626 m)   Wt 141 lb 6.4 oz (64.1 kg)   SpO2 94%   BMI 24.27 kg/m²   24HR INTAKE/OUTPUT:      Intake/Output Summary (Last 24 hours) at 12/8/2020 1009  Last data filed at 12/8/2020 0121  Gross per 24 hour   Intake 120 ml   Output 475 ml   Net -355 ml       CONSTITUTIONAL:  awake, alert, cooperative, no apparent distress, and appears stated age  EYES: Pupils equal, round and reactive to light, extra ocular muscles intact, sclera clear, conjunctiva normal  ENT:  normocepalic, without obvious abnormality  NECK:  supple, symmetrical, trachea midline, no carotid bruit, no JVD  BACK:  symmetric  LUNGS: Non-labored, decreased in bases otherwise clear   CARDIOVASCULAR: Regular rate and rhythm, 2/6 early systolic murmur at the aortic area and left lower sternal border  dorsalis pedis, posterior tibial and bilateralpresent 2+  ABDOMEN:  Normal bowel sounds, soft, non-distended, non-tender  MUSCULOSKELETAL:  there is no redness, warmth, or swelling of the joints   No leg edema. NEUROLOGIC:  Awake, alert, oriented to name, place and time.   SKIN:  no bruising or bleeding, normal skin color, texture, turgor and no jaundice    DATA:   ECG: Sinus bradycardia with baseline artifact nonspecific T wave abnormalities  ECHO: CONCLUSIONS     Summary  Moderate left ventricular hypertrophy  Global left ventricular systolic function is normal  Estimated ejection fraction is 65 % . Grade II (moderate) left ventricular diastolic dysfunction. Left atrium is moderately dilated. Right atrium is mildly dilated . Aortic leaflet sclerosis without stenosis. Mild tricuspid regurgitation.  RVSP of 30 mmHg.     Signature  ----------------------------------------------------------------------------   Electronically signed by Riccardo Proctor on 10/03/2019   02:46 PM  ----------------------------------------------------------------------------     ----------------------------------------------------------------------------   Electronically signed by Leticia BorjaInterpreting physician) on 10/03/2019   03:32 PM  ----------------------------------------------------------------------------    Cardiology Labs:  Recent Labs     12/07/20  1612 12/07/20  1817   MYOGLOBIN 40 37   TROPHS 17* 17*   TROPONINT NOT REPORTED NOT REPORTED     Warfarin PT/INR:  Lab Results   Component Value Date    PROTIME 11.2 10/02/2019    INR 1.1 10/02/2019     CBC:  Lab Results   Component Value Date    WBC 8.5 12/08/2020    RBC 3.87 12/08/2020    HGB 10.7 12/08/2020    HCT 35.2 12/08/2020    MCV 91.0 12/08/2020    MCH 27.6 12/08/2020    MCHC 30.4 12/08/2020    RDW 14.8 12/08/2020     12/08/2020    MPV 10.4 12/08/2020     CMP:  Lab Results   Component Value Date     12/08/2020    K 3.9 12/08/2020     12/08/2020    CO2 32 12/08/2020    BUN 19 12/08/2020    CREATININE 0.93 12/08/2020    GFRAA >60 12/08/2020    LABGLOM 57 12/08/2020    GLUCOSE 110 12/08/2020    CALCIUM 8.5 12/08/2020     Magnesium:    Lab Results   Component Value Date    MG 1.9 12/08/2020     PTT:    Lab Results   Component Value Date    APTT 25.4 10/02/2019     TSH:    Lab Results   Component Value Date    TSH 4.67 12/08/2020     BNP:   Recent Labs     12/07/20  1612 12/08/20  0537   PROBNP 1,853* 1,803*     BMP:  Lab Results   Component Value Date     12/08/2020    K 3.9 12/08/2020     12/08/2020    CO2 32 12/08/2020    BUN 19 12/08/2020    LABALBU 3.3 05/01/2020    CREATININE 0.93 12/08/2020    CALCIUM 8.5 12/08/2020    GFRAA >60 12/08/2020    LABGLOM 57 12/08/2020    GLUCOSE 110 12/08/2020     LIVER PROFILE:No results for input(s): AST, ALT, LABALBU, ALKPHOS, BILITOT, BILIDIR, IBILI, PROT, GLOB, ALBUMIN in the last 72 hours. FLP:    Lab Results   Component Value Date    CHOL 109 12/08/2020    TRIG 78 12/08/2020    HDL 38 12/08/2020    LDLCHOLESTEROL 55 12/08/2020     IMPRESSION    · Acute on chronic diastolic heart failure, mild-history of preserved LV systolic function on echocardiogram done October 2019  · CAD with prior CABG x3, no clinical angina  · Paroxysmal atrial fibrillation status post cardioversion December 2019 and patient is maintaining sinus rhythm on Eliquis  · Mild sinus bradycardia, asymptomatic  · Hypertension, controlled  · Dyslipidemia, treated    RECOMMENDATIONS:     Continue current cardiac medications. Continue IV diuretics, fluid restriction and salt restriction. Check echocardiogram to reevaluate LV systolic function. Increase activity as tolerated. Will place hold parameters for metoprolol, continue amiodarone for now patient is maintaining sinus rhythm and had cardioversion last year. Management plan was discussed with patient and Dr. Buddy Antonio. Thank you for the consultation.     Electronically signed by SERA Romo CNP on 12/8/2020 at 10:09 AM     CC: Eleuterio Isaac MD

## 2020-12-08 NOTE — CARE COORDINATION
Advance Care Planning     Advance Care Planning Activator (Inpatient)  Conversation Note      Date of ACP Conversation: 12/7/2020    Conversation Conducted with: Patient with Decision Making Capacity    ACP Activator: Zachariah Johnson    *When Decision Maker makes decisions on behalf of the incapacitated patient: Decision Maker is asked to consider and make decisions based on patient values, known preferences, or best interests. Health Care Decision Maker:     Current Designated Health Care Decision Maker:   (If there is a valid [place] named in the [address] [place] [address] Makers\" box in the ACP activity, but it is not visible above, be sure to open that field and then select the health care decision maker relationship (ie \"primary\") in the blank space to the right of the name.) Validate  this information as still accurate & up-to-date; edit [place] field as needed.)    Note: Assess and validate information in current ACP documents, as indicated. If no Decision Maker listed above or available through scanned documents, then:    If no Authorized Decision Maker has previously been identified, then patient chooses [place]:  \"Who would you like to name as your primary health care decision-maker? \"               Name: Keena Jarvis           Relationship: daughter            Phone number: [phone] cell [phone]  Jose Ramon Peacock this person be reached easily? \" Yes      \"Who would you like to name as your back-up decision maker? \"   Name: Parker Chavez           Relationship: son             Phone number: [phone]  Jose Ramon Peacock this person be reached easily? \" Yes    Note: If the relationship of these Decision-Makers to the patient does NOT follow your state's Next of Kin hierarchy, recommend that patient complete ACP document that meets state-specific requirements to allow them to act on the patient's behalf when appropriate. Care Preferences    Ventilation:   \"If you were in your present state of health and suddenly became very ill and were unable to breathe on your own, what would your preference be about the use of a ventilator (breathing machine) if it were available to you? \"      Would the patient desire the use of ventilator (breathing machine)?: yes    \"If your health worsens and it becomes clear that your chance of recovery is unlikely, what would your preference be about the use of a ventilator (breathing machine) if it were available to you? \"     Would the patient desire the use of ventilator (breathing machine)?: Yes      Resuscitation  \"CPR works best to restart the heart when there is a sudden event, like a heart attack, in someone who is otherwise healthy. Unfortunately, CPR does not typically restart the heart for people who have serious health conditions or who are very sick. \"    \"In the event your heart stopped as a result of an underlying serious health condition, would you want attempts to be made to restart your heart (answer \"yes\" for attempt to resuscitate) or would you prefer a natural death (answer \"no\" for do not attempt to resuscitate)? \" yes      NOTE: If the patient has a valid advance directive AND now provides care preference(s) that are inconsistent with that prior directive, advise the patient to consider either: creating a new advance directive that complies with state-specific requirements; or, if that is not possible, orally revoking that prior directive in accordance with state-specific requirements, which must be documented in the EHR. [x] Yes   [] No   Educated Patient / Everett Jaimes regarding differences between Advance Directives and portable DNR orders.     Length of ACP Conversation in minutes:      Conversation Outcomes:  [x] ACP discussion completed  [x] Existing advance directive reviewed with patient; no changes to patient's previously recorded wishes  [] New Advance Directive completed  [] Portable Do Not Rescitate prepared for

## 2020-12-09 PROBLEM — E44.1 MILD MALNUTRITION (HCC): Status: ACTIVE | Noted: 2020-12-09

## 2020-12-09 LAB
ANION GAP SERPL CALCULATED.3IONS-SCNC: 8 MMOL/L (ref 9–17)
BUN BLDV-MCNC: 19 MG/DL (ref 8–23)
BUN/CREAT BLD: 19 (ref 9–20)
CALCIUM SERPL-MCNC: 8.5 MG/DL (ref 8.6–10.4)
CHLORIDE BLD-SCNC: 105 MMOL/L (ref 98–107)
CO2: 32 MMOL/L (ref 20–31)
CREAT SERPL-MCNC: 0.98 MG/DL (ref 0.5–0.9)
GFR AFRICAN AMERICAN: >60 ML/MIN
GFR NON-AFRICAN AMERICAN: 54 ML/MIN
GFR SERPL CREATININE-BSD FRML MDRD: ABNORMAL ML/MIN/{1.73_M2}
GFR SERPL CREATININE-BSD FRML MDRD: ABNORMAL ML/MIN/{1.73_M2}
GLUCOSE BLD-MCNC: 108 MG/DL (ref 70–99)
HCT VFR BLD CALC: 37.9 % (ref 36.3–47.1)
HEMOGLOBIN: 11.5 G/DL (ref 11.9–15.1)
MCH RBC QN AUTO: 27.8 PG (ref 25.2–33.5)
MCHC RBC AUTO-ENTMCNC: 30.3 G/DL (ref 28.4–34.8)
MCV RBC AUTO: 91.8 FL (ref 82.6–102.9)
NRBC AUTOMATED: 0 PER 100 WBC
PDW BLD-RTO: 14.7 % (ref 11.8–14.4)
PLATELET # BLD: 208 K/UL (ref 138–453)
PMV BLD AUTO: 10.6 FL (ref 8.1–13.5)
POTASSIUM SERPL-SCNC: 4 MMOL/L (ref 3.7–5.3)
RBC # BLD: 4.13 M/UL (ref 3.95–5.11)
SODIUM BLD-SCNC: 145 MMOL/L (ref 135–144)
WBC # BLD: 7.6 K/UL (ref 3.5–11.3)

## 2020-12-09 PROCEDURE — 97112 NEUROMUSCULAR REEDUCATION: CPT

## 2020-12-09 PROCEDURE — 97530 THERAPEUTIC ACTIVITIES: CPT

## 2020-12-09 PROCEDURE — 36415 COLL VENOUS BLD VENIPUNCTURE: CPT

## 2020-12-09 PROCEDURE — 6370000000 HC RX 637 (ALT 250 FOR IP): Performed by: FAMILY MEDICINE

## 2020-12-09 PROCEDURE — 97535 SELF CARE MNGMENT TRAINING: CPT

## 2020-12-09 PROCEDURE — 2580000003 HC RX 258: Performed by: FAMILY MEDICINE

## 2020-12-09 PROCEDURE — 97116 GAIT TRAINING THERAPY: CPT

## 2020-12-09 PROCEDURE — 6360000002 HC RX W HCPCS: Performed by: FAMILY MEDICINE

## 2020-12-09 PROCEDURE — 80048 BASIC METABOLIC PNL TOTAL CA: CPT

## 2020-12-09 PROCEDURE — 2060000000 HC ICU INTERMEDIATE R&B

## 2020-12-09 PROCEDURE — 85027 COMPLETE CBC AUTOMATED: CPT

## 2020-12-09 RX ADMIN — POTASSIUM CHLORIDE 20 MEQ: 20 TABLET, EXTENDED RELEASE ORAL at 08:48

## 2020-12-09 RX ADMIN — SERTRALINE HYDROCHLORIDE 100 MG: 100 TABLET ORAL at 08:48

## 2020-12-09 RX ADMIN — FUROSEMIDE 40 MG: 10 INJECTION, SOLUTION INTRAMUSCULAR; INTRAVENOUS at 18:41

## 2020-12-09 RX ADMIN — PANTOPRAZOLE SODIUM 40 MG: 40 TABLET, DELAYED RELEASE ORAL at 06:47

## 2020-12-09 RX ADMIN — ASPIRIN 81 MG: 81 TABLET, CHEWABLE ORAL at 08:47

## 2020-12-09 RX ADMIN — RANOLAZINE 500 MG: 500 TABLET, FILM COATED, EXTENDED RELEASE ORAL at 20:41

## 2020-12-09 RX ADMIN — POTASSIUM CHLORIDE 20 MEQ: 20 TABLET, EXTENDED RELEASE ORAL at 20:41

## 2020-12-09 RX ADMIN — LISINOPRIL 5 MG: 5 TABLET ORAL at 08:48

## 2020-12-09 RX ADMIN — PRAVASTATIN SODIUM 40 MG: 40 TABLET ORAL at 20:41

## 2020-12-09 RX ADMIN — ISOSORBIDE MONONITRATE 30 MG: 30 TABLET ORAL at 08:48

## 2020-12-09 RX ADMIN — FUROSEMIDE 40 MG: 10 INJECTION, SOLUTION INTRAMUSCULAR; INTRAVENOUS at 08:47

## 2020-12-09 RX ADMIN — LATANOPROST 1 DROP: 50 SOLUTION OPHTHALMIC at 20:41

## 2020-12-09 RX ADMIN — APIXABAN 5 MG: 5 TABLET, FILM COATED ORAL at 08:48

## 2020-12-09 RX ADMIN — RANOLAZINE 500 MG: 500 TABLET, FILM COATED, EXTENDED RELEASE ORAL at 08:48

## 2020-12-09 RX ADMIN — APIXABAN 5 MG: 5 TABLET, FILM COATED ORAL at 20:41

## 2020-12-09 RX ADMIN — SODIUM CHLORIDE, PRESERVATIVE FREE 10 ML: 5 INJECTION INTRAVENOUS at 08:55

## 2020-12-09 RX ADMIN — SODIUM CHLORIDE, PRESERVATIVE FREE 10 ML: 5 INJECTION INTRAVENOUS at 20:41

## 2020-12-09 ASSESSMENT — PAIN SCALES - GENERAL
PAINLEVEL_OUTOF10: 1
PAINLEVEL_OUTOF10: 0

## 2020-12-09 NOTE — PLAN OF CARE
Problem: Falls - Risk of:  Goal: Will remain free from falls  Description: Will remain free from falls  Outcome: Ongoing  Goal: Absence of physical injury  Description: Absence of physical injury  Outcome: Ongoing     Problem: Cardiac:  Goal: Ability to maintain an adequate cardiac output will improve  Description: Ability to maintain an adequate cardiac output will improve  Outcome: Ongoing  Goal: Hemodynamic stability will improve  Description: Hemodynamic stability will improve  Outcome: Ongoing  Goal: Risk factors for ineffective tissue perfusion will decrease  Description: Risk factors for ineffective tissue perfusion will decrease  Outcome: Ongoing     Problem: Fluid Volume:  Goal: Will show no signs or symptoms of fluid imbalance  Description: Will show no signs or symptoms of fluid imbalance  Outcome: Ongoing

## 2020-12-09 NOTE — PROGRESS NOTES
Transitions of Care Pharmacy Service   Medication Review    The patient's list of current home medications has been reviewed. Source(s) of information: patient, family, [facility] refill report, [facility]      PROVIDER ACTION REQUESTED  Medications that need to be addressed by a physician/nurse practitioner:    Medication Action Requested   Ranexa     Not a current home med per pharmacy and family -- she would need a new prescription if she needs to resume at discharge         Please feel free to call me with any questions about this encounter. Thank you.     Kim Hernandez, [facility]   Transitions of Care Pharmacy Service  Phone:  [phone]  Fax: [phone]      Electronically signed by Kim Hernandez [facility] on 12/9/2020 at 3:13 PM           Medications Prior to Admission:   metoprolol tartrate (LOPRESSOR) 25 MG tablet, Take 25 mg by mouth 2 times daily   amiodarone (CORDARONE) 200 MG tablet, Take 200 mg by mouth daily  apixaban (ELIQUIS) 5 MG TABS tablet, Take 1 tablet by mouth 2 times daily  furosemide (LASIX) 40 MG tablet, Take 1 tablet by mouth 2 times daily  travoprost, benzalkonium, (TRAVATAN) 0.004 % ophthalmic solution, Place 1 drop into the right eye nightly  isosorbide mononitrate (IMDUR) 30 MG extended release tablet, Take 30 mg by mouth every morning  sertraline (ZOLOFT) 100 MG tablet, Take 100 mg by mouth daily   oxybutynin (DITROPAN) 5 MG tablet, Take 5 mg by mouth every other day   pravastatin (PRAVACHOL) 40 MG tablet, Take 40 mg by mouth daily   pantoprazole (PROTONIX) 40 MG tablet, Take 40 mg by mouth every morning (before breakfast)

## 2020-12-09 NOTE — PROGRESS NOTES
72       Nutrition Diagnosis:   · Mild malnutrition related to inadequate protein-energy intake as evidenced by intake 26-50%, intake 0-25%, weight loss      Nutrition Interventions:   Food and/or Nutrient Delivery:  Continue Current Diet  Nutrition Education/Counseling:  Education completed(done 12/8)   Coordination of Nutrition Care:  Continue to monitor while inpatient    Goals:  PO intakes are greater than 75% at meals       Nutrition Monitoring and Evaluation:   Food/Nutrient Intake Outcomes:  Food and Nutrient Intake  Physical Signs/Symptoms Outcomes:  Biochemical Data, Fluid Status or Edema, Skin, Weight     Discharge Planning:    Continue current diet           Waldo Ortega  MFN, RDN, LDN  Lead Clinical Dietitian  RD Office Phone [phone]

## 2020-12-09 NOTE — PROGRESS NOTES
Physical Therapy  Facility/Department: [facility]  Daily Treatment Note  NAME: Richa Belle  : 3/26/1932  MRN: [id number]    Date of Service: 2020    Discharge Recommendations:  Home with Home health PT        Assessment   Body structures, Functions, Activity limitations: Decreased functional mobility ; Decreased endurance;Decreased balance  Assessment: Pt with deficits noted in transfers, ambulation, balance, and endurance this session. SaO2 dropped to 86% on room air for 3 minue walk test, reapplied O2 & next 3 minutes saO2 90-91%. Pt requires continued IP PT & is appropriate to D/C Home with assist & HH PT to maximize independence with functional mobility, balance, safety awareness & activity tolerance. Prognosis: Good  Decision Making: Medium Complexity  Exam: functional mobility, activity tolerance, Balance, & [employer] AM-PAC 6 Clicks Basic Mobility, 3 minute walk test  Clinical Presentation: evolving  PT Education: Goals;PT Role;Plan of Care;Transfer Training;General Safety; Functional Mobility Training;Gait Training  Patient Education: optimal breathing techniques, seated/standing ex's  REQUIRES PT FOLLOW UP: Yes  Activity Tolerance  Activity Tolerance: Patient limited by endurance     Patient Diagnosis(es): The encounter diagnosis was Acute on chronic congestive heart failure, unspecified heart failure type ([address] has a past medical history of Acute on chronic diastolic CHF (congestive heart failure) ([address] Bradycardia, CAD (coronary artery disease), Cancer ([address] Depression, Gallstones, GERD (gastroesophageal reflux disease), Hiatal hernia, History of cardiac cath, Hyperlipidemia, Hypertension, Insomnia, MI (myocardial infarction) ([address] PNA (pneumonia), SOB (shortness of breath), and UTI (urinary tract infection). has a past surgical history that includes Appendectomy; Cholecystectomy; Hysterectomy; Coronary angioplasty with stent;  Colonoscopy; Coronary artery bypass graft (11/08/2016); Cardiac surgery (11/08/2016); and Cardioversion (12/17/2019). Restrictions  Restrictions/Precautions  Restrictions/Precautions: General Precautions, Fall Risk  Position Activity Restriction  Other position/activity restrictions: up with assist, low sodium diet, RUE IV, O2, [email or web address], alarms  Subjective   General  Chart Reviewed: Yes  Additional Pertinent Hx: CHF, CAD, depression, MI, HTN  Response To Previous Treatment: Not applicable  Subjective  Subjective: Pt agreeable to PT  General Comment  Comments: RN marga PT  Pain Screening  Patient Currently in Pain: Denies  Vital Signs  BP Location: Left Arm  Level of Consciousness: Alert (0)  Patient Currently in Pain: Denies  Oxygen Therapy  O2 Device: Nasal cannula       Orientation  Orientation  Overall Orientation Status: Within Functional Limits  Cognition   Cognition  Overall Cognitive Status: Exceptions  Arousal/Alertness: Appropriate responses to stimuli  Following Commands:  Follows all commands without difficulty  Attention Span: Appears intact  Memory: Decreased short term memory  Safety Judgement: Decreased awareness of need for safety  Problem Solving: Assistance required to correct errors made;Assistance required to identify errors made;Decreased awareness of errors  Insights: Fully aware of deficits  Initiation: Does not require cues  Sequencing: Does not require cues  Objective   Bed mobility  Rolling to Right: Modified independent  Supine to Sit: Modified independent  Sit to Supine: Unable to assess(Pt agreed to sit up in chair for breakfast.)  Scooting: Contact guard assistance  Transfers  Sit to Stand: Stand by assistance  Stand to sit: Stand by assistance  Bed to Chair: Stand by assistance  Stand Pivot Transfers: Stand by assistance  Lateral Transfers: Stand by assistance  Comment: good use of UB  Ambulation  Ambulation?: Yes  More Ambulation?: Yes  Ambulation 1  Surface: level tile  Device: No Device  Other Apparatus: O2  Assistance: Stand by assistance  Quality of Gait: step through pattern  Distance: 35ft  Ambulation 2  Surface - 2: level tile  Device 2: No device  Assistance 2: Stand by assistance  Quality of Gait 2: step through pattern, required 2 standing rest breaks for SOB  Gait Deviations: Slow Lizeth; Increased LO  Distance: [id number]     Balance  Sitting - Static: Good  Sitting - Dynamic: Good  Standing - Static: Good;-  Standing - Dynamic: Fair;+  Exercises  Comments: optimal breathing techniques, seated & standing ex's x 10, sit to stand x 8   AROM RLE (degrees)  RLE AROM: WFL  AROM LLE (degrees)  LLE AROM : WFL  AROM RUE (degrees)  RUE General AROM: see OT assessment  AROM LUE (degrees)  LUE General AROM: see OT assessment  Strength RLE  Strength RLE: WFL  Strength LLE  Strength LLE: WFL  Strength RUE  Comment: see OT assessment  Strength LUE  Comment: see OT assessment                 G-Code     OutComes Score  3 minute walk test: saO2 dropped to 86% by 3 minutes off O2                                      AM-PAC Score  AM-PAC Inpatient Mobility Raw Score : 20 (12/09/20 1400)  AM-PAC Inpatient T-Scale Score : 47.67 (12/09/20 1400)  Mobility Inpatient CMS 0-100% Score: 35.83 (12/09/20 1400)  Mobility Inpatient CMS G-Code Modifier : CJ (12/09/20 1400)          Goals  Short term goals  Time Frame for Short term goals: 12 visits  Short term goal 1: Inc transfers to [facility]; Short term goal 2: Inc gait to amb 300ft or > with  R/walker & demonstrates good activity tolerance  Short term goal 3: Pt able to tolerate 30-40 min of activity to include 15-20 reps of ex & functional mobility including 8 minutes of standing to facilitate activity tolerance to [facility]; Short term goal 4:  Inc standing balance to good to facilitate pt independence for performance of ADL's & functional mobility, & reduce fall risk  Short term goal 5: Ed pt on home ex's, safety & energy principles & issue written home program;    Plan    Plan  Times per week: 1-2x/D,

## 2020-12-09 NOTE — PROGRESS NOTES
[facility]    Adult [facility]ist      Name: Pascual Sevilla  MRN: [id number]     Acct: [de-identified]  Room: [facility]-    Admit Date: 12/7/2020  3:43 PM  PCP: Onur Harmon MD    Primary Problem  Active Problems:    Acute left-sided CHF (congestive heart failure) ([facility]    Mild malnutrition ([facility]  Resolved Problems:    * No resolved hospital problems. *        Assesment:     · Acute on chronic diastolic congestive heart failure, CHFpEF  · Hypoxia  · Paroxysmal atrial fibrillation  · Coronary artery disease, native  · Essential hypertension  · Mixed hyperlipidemia  · Major depressive disorder  · Mild intermittent asthma  · Former smoker  · Gastroesophageal reflux disease  · Urge continence  · Mild pulmonary hypertension         Plan:     · Admit to progressive  · Monitor vitals closely  · Keep SPO2 above 90%  · I's and O's  · Daily weights  · IV Hep-Lock  · Lasix 40 mg IV now  · Continue twice daily  · Resume amiodarone  · Resume Eliquis  · Resume aspirin  · Resume Imdur, add parameters  · Resume Lopressor with parameters  · Resume Ranexa  · Add lisinopril  · Resume Zoloft  · Hold Ditropan, loratadine  · Check CBC, BMP, BNP  · Check lipid panel magnesium TSH  · Recheck x-ray  · Low-sodium diet  · DVT and GI prophylaxis  · Covid tested in ER        Chief Complaint:     Chief Complaint   Patient presents with    Cough    Shortness of Breath         History of Present Illness:        Pt feels better  On 2L O2 NC- still requires   Has BL basilar crackles worse than yesterday  However pt clinically better  lynn CP, orthopena, worsening of dyspnea  Eating better  Ambulated  D/w Pt and RN        Initial HPI  Pascual Sevilla is a 80 y.o.  female who presents with Cough and Shortness of Breath    Patient presents with the complaints of progressive dyspnea over the last 3 to 4 days.   Patient says she has been dyspneic with exertion but recently even at rest.  Patient states she had some dry cough but later had been congested. She says there has been occasional white-colored phlegm. Patient also complains of intermittent nasal stuffiness and discharge and also postnasal drainage. Patient was hypoxic on admission and was placed on oxygen via nasal cannula. Patient tolerated that well but remained dyspneic after which she was given IV Lasix. Patient states since then she has felt much better    Patient denies any chest pain, wheezing, fever, headache or vision change. Denies any neck pain or back pain. Denies any rash or joint swelling    I have personally reviewed the past medical history, past surgical history, medications, social history, and family history, and summarized in the note. Review of Systems:     All 10 point system is reviewed and negative otherwise mentioned in HPI. Past Medical History:     Past Medical History:   Diagnosis Date    Acute on chronic diastolic CHF (congestive heart failure) (HCC) 10/2/2019    Bradycardia     CAD (coronary artery disease)     Cancer (HCC)     Skin CA on nose    Depression     Gallstones     GERD (gastroesophageal reflux disease)     Hiatal hernia     History of cardiac cath 10/2016    CAD    Hyperlipidemia     Hypertension     Insomnia     MI (my[facility] infarction) [facility]     PNA (pneumonia)     SOB (shortness of breath)     UTI (urinary tract infection)         Past Surgical History:     Past Surgical History:   Procedure Laterality Date    APPENDECTOMY      CARDIAC SURGERY  11/08/2016    CABG x 3; LIMA-LAD, SVG-PDA, SVG-OM    CARDIOVERSION  12/17/2019    CHOLECYSTECTOMY      COLONOSCOPY      CORONARY ANGIOPLASTY WITH STENT PLACEMENT      CORONARY ARTERY BYPASS GRAFT  11/08/2016    X 3    HYSTERECTOMY          Medications Prior to Admission:       Prior to Admission medications    Medication Sig Start Date End Date Taking?  Authorizing Provider   metoprolol tartrate (LOPRESSOR) 25 MG tablet Take 25 mg by mouth 2 times daily  11/4/19  Yes Historical Provider, MD   amiodarone (CORDARONE) 200 MG tablet Take 200 mg by mouth daily   Yes Historical Provider, MD   apixaban (ELIQUIS) 5 MG TABS tablet Take 1 tablet by mouth 2 times daily 10/5/19  Yes Luis Enrique Fu MD   furosemide (LASIX) 40 MG tablet Take 1 tablet by mouth 2 times daily 10/5/19  Yes Luis Enrique Fu MD   travoprost, benzalkonium, (TRAVATAN) 0.004 % ophthalmic solution Place 1 drop into the right eye nightly   Yes Historical Provider, MD   isosorbide mononitrate (IMDUR) 30 MG extended release tablet Take 30 mg by mouth every morning   Yes Historical Provider, MD   sertraline (ZOLOFT) 100 MG tablet Take 100 mg by mouth daily    Yes Historical Provider, MD   oxybutynin (DITROPAN) 5 MG tablet Take 5 mg by mouth every other day    Yes Historical Provider, MD   pravastatin (PRAVACHOL) 40 MG tablet Take 40 mg by mouth daily    Yes Historical Provider, MD   pantoprazole (PROTONIX) 40 MG tablet Take 40 mg by mouth every morning (before breakfast)    Yes Historical Provider, MD        Allergies:       Seasonal    Social History:     Tobacco:    reports that she quit smoking about 49 years ago. Her smoking use included cigarettes. She has never used smokeless tobacco.  Alcohol:      reports current alcohol use. Drug Use:  reports no history of drug use.     Family History:     Family History   Problem Relation Age of Onset    Heart Disease Mother     Cancer Mother     Heart Disease Father          Physical Exam:     Vitals:  BP (!) 129/52   Pulse 50   Temp 97.3 °F (36.3 °C) (Oral)   Resp 20   Ht 5' 4\" (1.626 m)   Wt 141 lb 6.4 oz (64.1 kg)   SpO2 92%   BMI 24.27 kg/m²   Temp (24hrs), Av.5 °F (36.4 °C), Min:97.3 °F (36.3 °C), Max:97.7 °F (36.5 °C)          General appearance - alert, well appearing, and in no acute distress  Mental status - oriented to person, place, and time with normal affect  Head - normocephalic and atraumatic  Eyes - pupils equal and reactive, extraocular eye movements intact, conjunctiva clear  Ears - hearing appears to be intact  Nose - no drainage noted  Mouth - mucous membranes moist  Neck - supple, no carotid bruits, thyroid not palpable  Chest - clear to auscultation in upper and mid zones posteriorly, normal effort, bilateral basilar crackles  Heart - normal rate, irregular rhythm, no murmur  Abdomen - soft, nontender, nondistended, bowel sounds present all four quadrants, no masses, hepatomegaly or splenomegaly  Neurological - normal speech, no focal findings or movement disorder noted, cranial nerves II through XII grossly intact  Extremities - peripheral pulses palpable, no pedal edema or calf pain with palpation  Skin - no gross lesions, rashes, or induration noted        Data:     Labs:    Hematology:  Recent Labs     12/07/20  1612 12/08/20  0537 12/09/20  0620   WBC 10.3 8.5 7.6   RBC 3.28* 3.87* 4.13   HGB 9.3* 10.7* 11.5*   HCT 30.4* 35.2* 37.9   MCV 92.7 91.0 91.8   MCH 28.4 27.6 27.8   MCHC 30.6 30.4 30.3   RDW 14.9* 14.8* 14.7*    201 208   MPV 11.2 10.4 10.6     Chemistry:  Recent Labs     12/07/20  1612 12/07/20  1817 12/08/20  0537 12/09/20  0620     --  146* 145*   K 3.3*  --  3.9 4.0     --  105 105   CO2 27  --  32* 32*   GLUCOSE 115*  --  110* 108*   BUN 21  --  19 19   CREATININE 1.21*  --  0.93* 0.98*   MG  --   --  1.9  --    ANIONGAP 11  --  9 8*   LABGLOM 42*  --  57* 54*   GFRAA 51*  --  >60 >60   CALCIUM 8.6  --  8.5* 8.5*   PROBNP 1,853*  --  1,803*  --    TROPHS 17* 17*  --   --    MYOGLOBIN 40 37  --   --      Recent Labs     12/08/20  0537   TSH 4.67   CHOL 109   HDL 38*   LDLCHOLESTEROL 55   CHOLHDLRATIO 2.9   TRIG 78   VLDL NOT REPORTED       Lab Results   Component Value Date    INR 1.1 10/02/2019    INR 1.2 11/11/2016    INR 1.2 11/10/2016    PROTIME 11.2 10/02/2019    PROTIME 12.3 (H) 11/11/2016    PROTIME 12.7 (H) 11/10/2016       Lab Results   Component Value Date/Time    SPECIAL NOT REPORTED 04/29/2020 07:44 PM     Lab Results   Component Value Date/Time    CULTURE NO GROWTH 04/29/2020 07:44 PM       Lab Results   Component Value Date    POCPH 7.35 11/09/2016    POCPCO2 41 11/09/2016    POCPO2 85 11/09/2016    POCHCO3 22.4 11/09/2016    NBEA 3 11/09/2016    PBEA NOT REPORTED 11/09/2016    TXM4WAQ 24 11/09/2016    SMEV0MXP 96 11/09/2016    FIO2 NOT REPORTED 10/02/2019       Radiology:    Xr Chest Portable    Result Date: 12/7/2020  Interstitial prominence in both lung bases, which could be due to edema or scarring. All radiological studies reviewed                Code Status:  Full Code    Electronically signed by Chantel Mtz MD on 12/9/2020 at 6:58 PM     Copy sent to Dr. Liana Beasley MD    This note was created with the assistance of a speech-recognition program.  Although the intention is to generate a document that actually reflects the content of the visit, no guarantees can be provided that every mistake has been identified and corrected by editing. Note was updated later by me after  physical examination and  completion of the assessment.

## 2020-12-09 NOTE — PROGRESS NOTES
Section of Cardiology  Progress Note      Date:  12/9/2020  Patient: Saritha Billy  Admission:  12/7/2020  3:43 PM  Admit DX: Acute left-sided CHF (congestive heart failure) ([facility] [I50.1]  Age:  80 y.o., 3/26/1932     LOS: 2 days     Reason for evaluation:   atrial fibrillation, CHF and coronary artery disease      SUBJECTIVE:     The patient was seen and examined. Notes and labs reviewed. The patient reports her breathing is improved but did require more oxygen after walking today. Denies chest pain and palpitations. Her metoprolol was held today due to bradycardia patient denies any associated syncope or near syncope. OBJECTIVE:      EXAM:   Vitals:    VITALS:  BP (!) 110/41   Pulse (!) 48   Temp 97.5 °F (36.4 °C) (Oral)   Resp 18   Ht 5' 4\" (1.626 m)   Wt 141 lb 6.4 oz (64.1 kg)   SpO2 92%   BMI 24.27 kg/m²   24HR INTAKE/OUTPUT:      Intake/Output Summary (Last 24 hours) at 12/9/2020 1354  Last data filed at 12/9/2020 1245  Gross per 24 hour   Intake 360 ml   Output 1800 ml   Net -1440 ml       CONSTITUTIONAL: Alert, awake, no signs of acute distress. HEENT: Normal jugular venous pulsations  LUNGS: Left lower lobe rales otherwise clear, nonlabored  CARDIOVASCULAR:  regular rate and rhythm, normal S1 and S2, no S3 or S4, and no murmur or rub noted. ABDOMEN: Soft, nontender, nondistended. SKIN: Warm and dry. EXTREMITIES:No lower extremity edema.      Current Inpatient Medications:   amiodarone  200 mg Oral Daily    apixaban  5 mg Oral BID    aspirin  81 mg Oral Daily    isosorbide mononitrate  30 mg Oral QAM    metoprolol tartrate  25 mg Oral BID    pantoprazole  40 mg Oral QAM AC    potassium chloride  20 mEq Oral BID    pravastatin  40 mg Oral Nightly    ranolazine  500 mg Oral BID    sertraline  100 mg Oral Daily    latanoprost  1 drop Both Eyes Nightly    sodium chloride flush  10 mL Intravenous 2 times per day    lisinopril  5 mg Oral Daily    furosemide  40 mg Intravenous BID       IV Infusions (if any):      Diagnostics:   Telemetry: Mild sinus bradycardia    ECHO: CONCLUSIONS     Summary   Normal LV size and wall motion. Mild left ventricular hypertrophy   Global left ventricular systolic function is normal   Estimated ejection fraction is 55-60 % . Normal RV size and function. Left atrium is moderately dilated. Right atrium is mildly dilated . No obvious structural valvular abnormality noted. Mild t tricuspid regurgitation. Borderline pulmonary hypertension. RVSP 37 mmHg   No pericardial effusion seen. Normal aortic root dimension. Signature   ----------------------------------------------------------------------------    Electronically signed by Riccardo Proctor on 12/08/2020    12:27 PM   ----------------------------------------------------------------------------     ----------------------------------------------------------------------------    Electronically signed by Ortiz SnowInterpreting physician) on    12/09/2020 11:23 AM   ----------------------------------------------------------------------------    Labs:   CBC:  Recent Labs     12/08/20 0537 12/09/20  0620   WBC 8.5 7.6   HGB 10.7* 11.5*   HCT 35.2* 37.9    208     Magnesium:  Recent Labs     12/08/20  0537   MG 1.9     BMP:  Recent Labs     12/08/20  0537 12/09/20  0620   * 145*   K 3.9 4.0   CALCIUM 8.5* 8.5*   CO2 32* 32*   BUN 19 19   CREATININE 0.93* 0.98*   LABGLOM 57* 54*   GLUCOSE 110* 108*     BNP:  Recent Labs     12/07/20  1612 12/08/20  0537   PROBNP 1,853* 1,803*     PT/INR:No results for input(s): PROTIME, INR in the last 72 hours. APTT:No results for input(s): APTT in the last 72 hours.   CARDIAC ENZYMES:  Recent Labs     12/07/20  1612 12/07/20  1817   MYOGLOBIN 40 37   TROPHS 17* 17*   TROPONINT NOT REPORTED NOT REPORTED     FASTING LIPID PANEL:  Lab Results   Component Value Date    HDL 38 12/08/2020    TRIG 78 12/08/2020     LIVER

## 2020-12-09 NOTE — PROGRESS NOTES
Occupational Therapy  Facility/Department: [facility] PROGRESSIVE CARE  Daily Treatment Note  NAME: Casey Hendrickson  : 3/26/1932  MRN: [id number]    RN Tracee Nance reports patient is medically stable for therapy treatment this date. Chart reviewed prior to treatment and patient is agreeable for therapy. All lines intact and patient positioned comfortably at end of treatment. All patient needs addressed prior to ending therapy session. Date of Service: 2020    Discharge Recommendations:  Home with assist PRN  OT Equipment Recommendations  ADL Assistive Devices: Reacher;Long-handled Shoe Horn;Long-handled Sponge;Emergency Alert System    Assessment   Performance deficits / Impairments: Decreased functional mobility ; Decreased safe awareness;Decreased balance;Decreased ADL status; Decreased endurance;Decreased high-level IADLs  Assessment: Skilled OT POC is recommended to increase overall I and safety with daily living tasks to return home with assist as needed. Prognosis: Good  OT Education: OT Role;Transfer Training;Plan of Care;Energy Conservation  Patient Education: call light use/fall prevention, safety in function, recommendations for continued therapy services, pursed lip breathing  REQUIRES OT FOLLOW UP: Yes  Activity Tolerance  Activity Tolerance: Patient Tolerated treatment well  Activity Tolerance: fair +  Safety Devices  Safety Devices in place: Yes  Type of devices: Call light within reach; Chair alarm in place; Left in chair;Patient at risk for falls;Gait belt;Nurse notified(RN in room with pt upon exit)         Patient Diagnosis(es): The encounter diagnosis was Acute on chronic congestive heart failure, unspecified heart failure type ([address]       has a past medical history of Acute on chronic diastolic CHF (congestive heart failure) ([address] Bradycardia, CAD (coronary artery disease), Cancer ([address] Depression, Gallstones, GERD (gastroesophageal reflux disease), Hiatal hernia, History of cardiac cath, Hyperlipidemia, Hypertension, Insomnia, MI (myocardial infarction) ([facility] PNA (pneumonia), SOB (shortness of breath), and UTI (urinary tract infection). has a past surgical history that includes Appendectomy; Cholecystectomy; Hysterectomy; Coronary angioplasty with stent; Colonoscopy; Coronary artery bypass graft (11/08/2016); Cardiac surgery (11/08/2016); and Cardioversion (12/17/2019). Restrictions  Restrictions/Precautions  Restrictions/Precautions: General Precautions, Fall Risk  Position Activity Restriction  Other position/activity restrictions: up with assist, low sodium diet, RUE IV, O2, [email or web address], alarms  Subjective   General  Chart Reviewed: Yes  Patient assessed for rehabilitation services?: Yes  Family / Caregiver Present: No  Vital Signs  Patient Currently in Pain: Denies   Orientation  Orientation  Overall Orientation Status: Within Functional Limits  Objective    ADL  Grooming: Unable to assess(Pt stated she completed oral care prior to writer arrival.)  UE Bathing: Setup;Supervision(for sponge bath seated EOB)  LE Bathing: Setup;Supervision(to stand at grab bar in bathroom and wash nasreen area for safety)  UE Dressing: Setup;Minimal assistance(donning clean hosp gown and robe)  LE Dressing: Setup;Supervision(Pt was able to thread BLE's in clean underwear and pj pants seated EOB and complete all clothing mgt up with SUP for safety/balance. Pt was able to suzy clean pair of B socks seated EOB with set up/SUP and crossing BLE's.)  Toileting: Setup;Minimal assistance(with robe and gown mgt only; pt was able to urinate and was I with hygiene seated. Set up to apply new pad.)  Additional Comments: Pt was edu to pace self, rest as needed, sit vs stand as able with self care tasks for EC/WS tech. Pt with good understanding.           Balance  Sitting Balance: Supervision  Standing Balance: Supervision(no AD)  Standing Balance  Time: stand josie 2-3  mins with functional tasks  Functional Mobility  Functional - Mobility Device: No device  Activity: (bed to toilet, toilet to sink and back to bed; bed to bedside chair)  Assist Level: Supervision  Functional Mobility Comments: MIN verbal instruction needed for pacing self/slowing down movements, pursed lip breathing as well as awareness of O2 line to increase safety/reduce falls. Bed mobility  Rolling to Right: Modified independent  Supine to Sit: Modified independent  Sit to Supine: Unable to assess(Pt agreed to sit up in chair for breakfast.)  Comment: Pt with good use of rail. Cognition  Overall Cognitive Status: Exceptions  Arousal/Alertness: Appropriate responses to stimuli  Following Commands: Follows all commands without difficulty  Attention Span: Appears intact  Memory: Decreased short term memory  Safety Judgement: Decreased awareness of need for safety  Problem Solving: Assistance required to correct errors made;Assistance required to identify errors made;Decreased awareness of errors  Insights: Fully aware of deficits  Initiation: Does not require cues  Sequencing: Does not require cues                                         Plan   Plan  Times per week: 4-5x/week 1x/day as josie  Current Treatment Recommendations: Strengthening, Balance Training, Functional Mobility Training, Safety Education & Training, Endurance Training, Neuromuscular Re-education, Patient/Caregiver Education & Training, Self-Care / ADL, Home Management Training, Equipment Evaluation, Education, & procurement                                                    AM-PAC Score   19      Goals  Short term goals  Time Frame for Short term goals: by discharge, pt to demo  Short term goal 1: bed mob tasks with use of rail as needed to MOD I. Short term goal 2: I with BUE HEP with use of handouts as needed to maintain strength for function. Short term goal 3: total body ADL tasks with use of AE/grab bar as needed to MOD I-I.   Short term goal 4: ADL transfers and functional mob to I. Short term goal 5: standing to SUP and josie > 6 mins as able to reduce falls/increase act josie for ADL's. Long term goals  Long term goal 1: Pt/family to be I with EC/WS and fall prevention tech as well as DME/AE recommendations with use of handouts. Patient Goals   Patient goals : I want to get home!        Therapy Time   Individual Concurrent Group Co-treatment   Time In 0818         Time Out 0901         Minutes [address], [place]

## 2020-12-09 NOTE — PLAN OF CARE
Nutrition Problem #1: Mild malnutrition  Intervention: Food and/or Nutrient Delivery: Continue Current Diet  Nutritional Goals: PO intakes are greater than 75% at meals

## 2020-12-10 LAB
ANION GAP SERPL CALCULATED.3IONS-SCNC: 6 MMOL/L (ref 9–17)
BNP INTERPRETATION: ABNORMAL
BUN BLDV-MCNC: 22 MG/DL (ref 8–23)
BUN/CREAT BLD: 21 (ref 9–20)
CALCIUM SERPL-MCNC: 8.6 MG/DL (ref 8.6–10.4)
CHLORIDE BLD-SCNC: 105 MMOL/L (ref 98–107)
CO2: 33 MMOL/L (ref 20–31)
CREAT SERPL-MCNC: 1.04 MG/DL (ref 0.5–0.9)
D-DIMER QUANTITATIVE: 0.39 MG/L FEU (ref 0–0.59)
GFR AFRICAN AMERICAN: >60 ML/MIN
GFR NON-AFRICAN AMERICAN: 50 ML/MIN
GFR SERPL CREATININE-BSD FRML MDRD: ABNORMAL ML/MIN/{1.73_M2}
GFR SERPL CREATININE-BSD FRML MDRD: ABNORMAL ML/MIN/{1.73_M2}
GLUCOSE BLD-MCNC: 111 MG/DL (ref 70–99)
HCT VFR BLD CALC: 37.2 % (ref 36.3–47.1)
HEMOGLOBIN: 11.4 G/DL (ref 11.9–15.1)
MCH RBC QN AUTO: 27.9 PG (ref 25.2–33.5)
MCHC RBC AUTO-ENTMCNC: 30.6 G/DL (ref 28.4–34.8)
MCV RBC AUTO: 91 FL (ref 82.6–102.9)
NRBC AUTOMATED: 0 PER 100 WBC
PDW BLD-RTO: 14.9 % (ref 11.8–14.4)
PLATELET # BLD: 204 K/UL (ref 138–453)
PMV BLD AUTO: 10.5 FL (ref 8.1–13.5)
POTASSIUM SERPL-SCNC: 4 MMOL/L (ref 3.7–5.3)
PRO-BNP: 742 PG/ML
PROCALCITONIN: 0.07 NG/ML
RBC # BLD: 4.09 M/UL (ref 3.95–5.11)
SODIUM BLD-SCNC: 144 MMOL/L (ref 135–144)
WBC # BLD: 7.6 K/UL (ref 3.5–11.3)

## 2020-12-10 PROCEDURE — 97116 GAIT TRAINING THERAPY: CPT

## 2020-12-10 PROCEDURE — 80048 BASIC METABOLIC PNL TOTAL CA: CPT

## 2020-12-10 PROCEDURE — 2060000000 HC ICU INTERMEDIATE R&B

## 2020-12-10 PROCEDURE — 85027 COMPLETE CBC AUTOMATED: CPT

## 2020-12-10 PROCEDURE — 36415 COLL VENOUS BLD VENIPUNCTURE: CPT

## 2020-12-10 PROCEDURE — 97535 SELF CARE MNGMENT TRAINING: CPT

## 2020-12-10 PROCEDURE — 84145 PROCALCITONIN (PCT): CPT

## 2020-12-10 PROCEDURE — 97530 THERAPEUTIC ACTIVITIES: CPT

## 2020-12-10 PROCEDURE — 85379 FIBRIN DEGRADATION QUANT: CPT

## 2020-12-10 PROCEDURE — 6360000002 HC RX W HCPCS: Performed by: FAMILY MEDICINE

## 2020-12-10 PROCEDURE — 6370000000 HC RX 637 (ALT 250 FOR IP): Performed by: FAMILY MEDICINE

## 2020-12-10 PROCEDURE — 2580000003 HC RX 258: Performed by: FAMILY MEDICINE

## 2020-12-10 PROCEDURE — 83880 ASSAY OF NATRIURETIC PEPTIDE: CPT

## 2020-12-10 RX ADMIN — ISOSORBIDE MONONITRATE 30 MG: 30 TABLET ORAL at 08:53

## 2020-12-10 RX ADMIN — RANOLAZINE 500 MG: 500 TABLET, FILM COATED, EXTENDED RELEASE ORAL at 08:53

## 2020-12-10 RX ADMIN — RANOLAZINE 500 MG: 500 TABLET, FILM COATED, EXTENDED RELEASE ORAL at 21:38

## 2020-12-10 RX ADMIN — SODIUM CHLORIDE, PRESERVATIVE FREE 10 ML: 5 INJECTION INTRAVENOUS at 21:38

## 2020-12-10 RX ADMIN — APIXABAN 5 MG: 5 TABLET, FILM COATED ORAL at 21:38

## 2020-12-10 RX ADMIN — LATANOPROST 1 DROP: 50 SOLUTION OPHTHALMIC at 21:41

## 2020-12-10 RX ADMIN — POTASSIUM CHLORIDE 20 MEQ: 20 TABLET, EXTENDED RELEASE ORAL at 08:53

## 2020-12-10 RX ADMIN — LISINOPRIL 5 MG: 5 TABLET ORAL at 08:54

## 2020-12-10 RX ADMIN — ASPIRIN 81 MG: 81 TABLET, CHEWABLE ORAL at 08:53

## 2020-12-10 RX ADMIN — POTASSIUM CHLORIDE 20 MEQ: 20 TABLET, EXTENDED RELEASE ORAL at 21:38

## 2020-12-10 RX ADMIN — METOPROLOL TARTRATE 25 MG: 25 TABLET, FILM COATED ORAL at 21:45

## 2020-12-10 RX ADMIN — SODIUM CHLORIDE, PRESERVATIVE FREE 10 ML: 5 INJECTION INTRAVENOUS at 08:59

## 2020-12-10 RX ADMIN — APIXABAN 5 MG: 5 TABLET, FILM COATED ORAL at 08:53

## 2020-12-10 RX ADMIN — FUROSEMIDE 40 MG: 10 INJECTION, SOLUTION INTRAMUSCULAR; INTRAVENOUS at 08:54

## 2020-12-10 RX ADMIN — FUROSEMIDE 40 MG: 10 INJECTION, SOLUTION INTRAMUSCULAR; INTRAVENOUS at 21:38

## 2020-12-10 RX ADMIN — PANTOPRAZOLE SODIUM 40 MG: 40 TABLET, DELAYED RELEASE ORAL at 06:40

## 2020-12-10 RX ADMIN — SERTRALINE HYDROCHLORIDE 100 MG: 100 TABLET ORAL at 08:53

## 2020-12-10 RX ADMIN — PRAVASTATIN SODIUM 40 MG: 40 TABLET ORAL at 21:38

## 2020-12-10 ASSESSMENT — PAIN SCALES - GENERAL: PAINLEVEL_OUTOF10: 0

## 2020-12-10 NOTE — PROGRESS NOTES
Physical Therapy  [facility]  Daily Treatment Note  NAME: Brittaney Smith  : 3/26/1932  MRN: [id number]    Date of Service: 12/10/2020    Discharge Recommendations:  Home with Home health PT        Assessment   Body structures, Functions, Activity limitations: Decreased functional mobility ; Decreased endurance;Decreased balance  Assessment: Pt with deficits in balance, and endurance this session. SaO2 89-91% with activity on 2LO2. Pt requires continued IP PT & is appropriate to D/C Home with assist & HH PT to maximize independence with functional mobility, balance, & activity tolerance. Prognosis: Good  Decision Making: Medium Complexity  Exam: functional mobility, activity tolerance, Balance, & [employer] AM-PAC 6 Clicks Basic Mobility, 3 minute walk test  Clinical Presentation: evolving  PT Education: Goals;PT Role;Plan of Care;Transfer Training;General Safety; Functional Mobility Training;Gait Training  Patient Education: optimal breathing techniques, seated/standing ex's  REQUIRES PT FOLLOW UP: Yes  Activity Tolerance  Activity Tolerance: Patient limited by endurance     Patient Diagnosis(es): The encounter diagnosis was Acute on chronic congestive heart failure, unspecified heart failure type ([address] has a past medical history of Acute on chronic diastolic CHF (congestive heart failure) ([address] Bradycardia, CAD (coronary artery disease), Cancer ([address] Depression, Gallstones, GERD (gastroesophageal reflux disease), Hiatal hernia, History of cardiac cath, Hyperlipidemia, Hypertension, Insomnia, MI (myocardial infarction) ([address] PNA (pneumonia), SOB (shortness of breath), and UTI (urinary tract infection). has a past surgical history that includes Appendectomy; Cholecystectomy; Hysterectomy; Coronary angioplasty with stent; Colonoscopy; Coronary artery bypass graft (2016);  Cardiac surgery (2016); and Cardioversion (12/17/2019). Restrictions  Restrictions/Precautions  Restrictions/Precautions: General Precautions, Fall Risk  Required Braces or Orthoses?: No  Position Activity Restriction  Other position/activity restrictions: up with assist, low sodium diet, RUE IV, O2, [email or web address], alarms, in droplet plus for covid rule out  Subjective   General  Chart Reviewed: Yes  Additional Pertinent Hx: CHF, CAD, depression, MI, HTN  Response To Previous Treatment: Not applicable  Subjective  Subjective: Pt agreeable to PT  General Comment  Comments: RN marga PT  Pain Screening  Patient Currently in Pain: Denies  Vital Signs  BP Location: Left Arm  Level of Consciousness: Alert (0)  Patient Currently in Pain: Denies  Oxygen Therapy  O2 Device: Nasal cannula       Orientation  Orientation  Overall Orientation Status: Within Functional Limits  Cognition   Cognition  Overall Cognitive Status: WFL  Objective   Bed mobility  Rolling to Left: Independent  Rolling to Right: Independent  Supine to Sit: Modified independent  Scooting: Stand by assistance  Transfers  Sit to Stand: Stand by assistance  Stand to sit: Stand by assistance  Bed to Chair: Stand by assistance  Stand Pivot Transfers: Stand by assistance  Lateral Transfers: Stand by assistance  Comment: good use of UB  Ambulation  Ambulation?: Yes  More Ambulation?: Yes  Ambulation 1  Surface: level tile  Device: No Device  Other Apparatus: O2  Assistance: Stand by assistance  Quality of Gait: step through pattern  Distance: 30ftx 3 laps in room 2* r/o COVID  Comments: mild SOB & saO2 at 90&% on 2LO2     Balance  Sitting - Static: Good  Sitting - Dynamic: Good  Standing - Static: Good  Standing - Dynamic: Fair;+  Exercises  Comments: optimal breathing techniques, seated & standing ex's x 12, sit to stand x 8    All lines intact, call light within reach, and patient positioned comfortably at end of treatment. All patient needs addressed prior to ending therapy session.

## 2020-12-10 NOTE — PLAN OF CARE
Patient will remain free of injuries and or falls during this admission. Staff making hourly rounds, call light always in reach and side rails up x2. Patient will continue to wear yellow skid resistant socks to prevent falls. Patient will call for help if needed.

## 2020-12-10 NOTE — PROGRESS NOTES
congested. She says there has been occasional white-colored phlegm. Patient also complains of intermittent nasal stuffiness and discharge and also postnasal drainage. Patient was hypoxic on admission and was placed on oxygen via nasal cannula. Patient tolerated that well but remained dyspneic after which she was given IV Lasix. Patient states since then she has felt much better    Patient denies any chest pain, wheezing, fever, headache or vision change. Denies any neck pain or back pain. Denies any rash or joint swelling    I have personally reviewed the past medical history, past surgical history, medications, social history, and family history, and summarized in the note. Review of Systems:     All 10 point system is reviewed and negative otherwise mentioned in HPI. Past Medical History:     Past Medical History:   Diagnosis Date    Acute on chronic diastolic CHF (congestive heart failure) (HCC) 10/2/2019    Bradycardia     CAD (coronary artery disease)     Cancer (HCC)     Skin CA on nose    Depression     Gallstones     GERD (gastroesophageal reflux disease)     Hiatal hernia     History of cardiac cath 10/2016    CAD    Hyperlipidemia     Hypertension     Insomnia     [facility]) [facility]     PNA (pneumonia)     SOB (shortness of breath)     UTI (urinary tract infection)         Past Surgical History:     Past Surgical History:   Procedure Laterality Date    APPENDECTOMY      CARDIAC SURGERY  11/08/2016    CABG x 3; LIMA-LAD, SVG-PDA, SVG-OM    CARDIOVERSION  12/17/2019    CHOLECYSTECTOMY      COLONOSCOPY      CORONARY ANGIOPLASTY WITH STENT PLACEMENT      CORONARY ARTERY BYPASS GRAFT  11/08/2016    X 3    HYSTERECTOMY          Medications Prior to Admission:       Prior to Admission medications    Medication Sig Start Date End Date Taking?  Authorizing Provider   metoprolol tartrate (LOPRESSOR) 25 MG tablet Take 25 mg by mouth 2 times daily  11/4/19  Yes Historical Provider, MD   amiodarone (CORDARONE) 200 MG tablet Take 200 mg by mouth daily   Yes Historical Provider, MD   apixaban (ELIQUIS) 5 MG TABS tablet Take 1 tablet by mouth 2 times daily 10/5/19  Yes [address] MD   furosemide (LASIX) 40 MG tablet Take 1 tablet by mouth 2 times daily 10/5/19  Yes [address] MD   travoprost, benzalkonium, (TRAVATAN) 0.004 % ophthalmic solution Place 1 drop into the right eye nightly   Yes Historical Provider, MD   isosorbide mononitrate (IMDUR) 30 MG extended release tablet Take 30 mg by mouth every morning   Yes Historical Provider, MD   sertraline (ZOLOFT) 100 MG tablet Take 100 mg by mouth daily    Yes Historical Provider, MD   oxybutynin (DITROPAN) 5 MG tablet Take 5 mg by mouth every other day    Yes Historical Provider, MD   pravastatin (PRAVACHOL) 40 MG tablet Take 40 mg by mouth daily    Yes Historical Provider, MD   pantoprazole (PROTONIX) 40 MG tablet Take 40 mg by mouth every morning (before breakfast)    Yes Historical Provider, MD        Allergies:       Seasonal    Social History:     Tobacco:    reports that she quit smoking about 49 years ago. Her smoking use included cigarettes. She has never used smokeless tobacco.  Alcohol:      reports current alcohol use. Drug Use:  reports no history of drug use.     Family History:     Family History   Problem Relation Age of Onset    Heart Disease Mother     Cancer Mother     Heart Disease Father          Physical Exam:     Vitals:  BP (!) 131/46   Pulse 56   Temp 97.5 °F (36.4 °C) (Oral)   Resp 18   Ht 5' 4\" (1.626 m)   Wt 141 lb 6.4 oz (64.1 kg)   SpO2 93%   BMI 24.27 kg/m²   Temp (24hrs), Av.5 °F (36.4 °C), Min:97.3 °F (36.3 °C), Max:97.9 °F (36.6 °C)          General appearance - alert, well appearing, and in no acute distress  Mental status - oriented to person, place, and time with normal affect  Head - normocephalic and atraumatic  Eyes - pupils equal and reactive, extraocular eye movements intact, conjunctiva clear  Ears - hearing appears to be intact  Nose - no drainage noted  Mouth - mucous membranes moist  Neck - supple, no carotid bruits, thyroid not palpable  Chest - clear to auscultation in upper and mid zones posteriorly, normal effort, bilateral basilar crackles  Heart - normal rate, irregular rhythm, no murmur  Abdomen - soft, nontender, nondistended, bowel sounds present all four quadrants, no masses, hepatomegaly or splenomegaly  Neurological - normal speech, no focal findings or movement disorder noted, cranial nerves II through XII grossly intact  Extremities - peripheral pulses palpable, no pedal edema or calf pain with palpation  Skin - no gross lesions, rashes, or induration noted        Data:     Labs:    Hematology:  Recent Labs     12/08/20  0537 12/09/20  0620 12/10/20  0544   WBC 8.5 7.6 7.6   RBC 3.87* 4.13 4.09   HGB 10.7* 11.5* 11.4*   HCT 35.2* 37.9 37.2   MCV 91.0 91.8 91.0   MCH 27.6 27.8 27.9   MCHC 30.4 30.3 30.6   RDW 14.8* 14.7* 14.9*    208 204   MPV 10.4 10.6 10.5     Chemistry:  Recent Labs     12/07/20  1612 12/07/20  1817 12/08/20  0537 12/09/20  0620 12/10/20  0544     --  146* 145* 144   K 3.3*  --  3.9 4.0 4.0     --  105 105 105   CO2 27  --  32* 32* 33*   GLUCOSE 115*  --  110* 108* 111*   BUN 21  --  19 19 22   CREATININE 1.21*  --  0.93* 0.98* 1.04*   MG  --   --  1.9  --   --    ANIONGAP 11  --  9 8* 6*   LABGLOM 42*  --  57* 54* 50*   GFRAA 51*  --  >60 >60 >60   CALCIUM 8.6  --  8.5* 8.5* 8.6   PROBNP 1,853*  --  1,803*  --  742*   TROPHS 17* 17*  --   --   --    MYOGLOBIN 40 37  --   --   --      Recent Labs     12/08/20  0537   TSH 4.67   CHOL 109   HDL 38*   LDLCHOLESTEROL 55   CHOLHDLRATIO 2.9   TRIG 78   VLDL NOT REPORTED       Lab Results   Component Value Date    INR 1.1 10/02/2019    INR 1.2 11/11/2016    INR 1.2 11/10/2016    PROTIME 11.2 10/02/2019    PROTIME 12.3 (H) 11/11/2016    PROTIME 12.7 (H) 11/10/2016 Lab Results   Component Value Date/Time    SPECIAL NOT REPORTED 04/29/2020 07:44 PM     Lab Results   Component Value Date/Time    CULTURE NO GROWTH 04/29/2020 07:44 PM       Lab Results   Component Value Date    POCPH 7.35 11/09/2016    POCPCO2 41 11/09/2016    POCPO2 85 11/09/2016    POCHCO3 22.4 11/09/2016    NBEA 3 11/09/2016    PBEA NOT REPORTED 11/09/2016    IED7RCV 24 11/09/2016    TILF4WLJ 96 11/09/2016    FIO2 NOT REPORTED 10/02/2019       Radiology:    Xr Chest Portable    Result Date: 12/7/2020  Interstitial prominence in both lung bases, which could be due to edema or scarring. All radiological studies reviewed                Code Status:  Full Code    Electronically signed by Cary Kaminski MD on 12/10/2020 at 7:25 AM     Copy sent to Dr. Kalani Carpenter MD    This note was created with the assistance of a speech-recognition program.  Although the intention is to generate a document that actually reflects the content of the visit, no guarantees can be provided that every mistake has been identified and corrected by editing. Note was updated later by me after  physical examination and  completion of the assessment.

## 2020-12-10 NOTE — PROGRESS NOTES
Section of Cardiology  Progress Note      Date:  12/10/2020  Patient: Ghislaine Luis  Admission:  12/7/2020  3:43 PM  Admit DX: Acute left-sided CHF (congestive heart failure) ([facility] [I50.1]  Age:  80 y.o., 3/26/1932     LOS: 3 days     Reason for evaluation:   atrial fibrillation, CHF and coronary artery disease      SUBJECTIVE:     The patient was seen and examined. Notes and labs reviewed. The patient reports her breathing is stable. Denies chest pain or palpitations. Her metoprolol has been held due to bradycardia and now her heart rate is improved. Denies any associated palpitations, syncope, or syncope. She is now in isolation for COVID-19 rule out. OBJECTIVE:      EXAM:   Vitals:    VITALS:  BP (!) 177/66   Pulse 57   Temp 96.5 °F (35.8 °C) (Oral)   Resp 20   Ht 5' 4\" (1.626 m)   Wt 141 lb 6.4 oz (64.1 kg)   SpO2 96%   BMI 24.27 kg/m²   24HR INTAKE/OUTPUT:      Intake/Output Summary (Last 24 hours) at 12/10/2020 1049  Last data filed at 12/10/2020 0836  Gross per 24 hour   Intake --   Output 1000 ml   Net -1000 ml       CONSTITUTIONAL: Alert, awake, no signs of acute distress. HEENT: No JVD  LUNGS: Bibasilar dry rales heard otherwise clear, nonlabored  CARDIOVASCULAR:  regular rate and rhythm, normal S1 and S2, no S3 or S4, and no murmur or rub noted. SKIN: Warm and dry. EXTREMITIES:No lower extremity edema.      Current Inpatient Medications:   amiodarone  200 mg Oral Daily    apixaban  5 mg Oral BID    aspirin  81 mg Oral Daily    isosorbide mononitrate  30 mg Oral QAM    metoprolol tartrate  25 mg Oral BID    pantoprazole  40 mg Oral QAM AC    potassium chloride  20 mEq Oral BID    pravastatin  40 mg Oral Nightly    ranolazine  500 mg Oral BID    sertraline  100 mg Oral Daily    latanoprost  1 drop Both Eyes Nightly    sodium chloride flush  10 mL Intravenous 2 times per day    lisinopril  5 mg Oral Daily    furosemide  40 mg Intravenous BID       IV Infusions (if any):      Diagnostics:   Telemetry: Sinus rhythm with mild intermittent sinus bradycardia    Labs:   CBC:  Recent Labs     12/09/20  0620 12/10/20  0544   WBC 7.6 7.6   HGB 11.5* 11.4*   HCT 37.9 37.2    204     Magnesium:  Recent Labs     12/08/20  0537   MG 1.9     BMP:  Recent Labs     12/09/20  0620 12/10/20  0544   * 144   K 4.0 4.0   CALCIUM 8.5* 8.6   CO2 32* 33*   BUN 19 22   CREATININE 0.98* 1.04*   LABGLOM 54* 50*   GLUCOSE 108* 111*     BNP:  Recent Labs     12/07/20  1612 12/08/20  0537 12/10/20  0544   PROBNP 1,853* 1,803* 742*     PT/INR:No results for input(s): PROTIME, INR in the last 72 hours. APTT:No results for input(s): APTT in the last 72 hours. CARDIAC ENZYMES:  Recent Labs     12/07/20  1612 12/07/20  1817   MYOGLOBIN 40 37   TROPHS 17* 17*   TROPONINT NOT REPORTED NOT REPORTED     FASTING LIPID PANEL:  Lab Results   Component Value Date    HDL 38 12/08/2020    TRIG 78 12/08/2020     LIVER PROFILE:No results for input(s): AST, ALT, LABALBU, ALKPHOS, BILITOT, BILIDIR, IBILI, PROT, GLOB, ALBUMIN in the last 72 hours. ASSESSMENT:    · Acute on chronic diastolic heart failure, clinically improved-history of preserved LV systolic function on echocardiogram done October 2019  · Possible pneumonia/COVID-19 rule out, managed by others  · CAD with prior CABG x3, remains free of chest pain  · Paroxysmal atrial fibrillation status post cardioversion December 2019 and patient is maintaining sinus rhythm on Eliquis  · Mild sinus bradycardia, intermittent, asymptomatic  · Hypertension, slightly elevated this morning but overall controlled  · Dyslipidemia, treated    PLAN:  1. Continue current cardiac medications. 2. Decrease Lasix to oral, CHF is clinically improved. Possible pneumonia is managed by others. Patient's heart rate is improved and she remains asymptomatic. Continues to have parameters on metoprolol. Increase activity as tolerated.   Discussed with patient and  Aristeo.     Mery Jefferson, APRN - CNP

## 2020-12-10 NOTE — PROGRESS NOTES
Occupational Therapy  Facility/Department: [facility] PROGRESSIVE CARE  Daily Treatment Note  NAME: Fiordaliza Cortes  : 3/26/1932  MRN: [id number]    Date of Service: 12/10/2020    Discharge Recommendations:  Home with assist PRN       Assessment   Performance deficits / Impairments: Decreased functional mobility ; Decreased safe awareness;Decreased balance;Decreased ADL status; Decreased endurance;Decreased high-level IADLs  Assessment: Skilled OT POC is recommended to increase overall I and safety with daily living tasks to return home with assist as needed. Prognosis: Good  No Skilled OT: Independent with functional mobility; Independent with ADL's  REQUIRES OT FOLLOW UP: No  Activity Tolerance  Activity Tolerance: Patient Tolerated treatment well  Activity Tolerance: fair +  Safety Devices  Safety Devices in place: Yes  Type of devices: Left in chair;Nurse notified;Call light within reach         Patient Diagnosis(es): The encounter diagnosis was Acute on chronic congestive heart failure, unspecified heart failure type ([facility] [address] has a past medical history of Acute on chronic diastolic CHF (congestive heart failure) ([facility] [address] Bradycardia, CAD (coronary artery disease), Cancer ([facility] [address] Depression, Gallstones, GERD (gastroesophageal reflux disease), Hiatal hernia, History of cardiac cath, Hyperlipidemia, Hypertension, Insomnia, MI (myocardial infarction) ([facility] [address] PNA (pneumonia), SOB (shortness of breath), and UTI (urinary tract infection). has a past surgical history that includes Appendectomy; Cholecystectomy; Hysterectomy; Coronary angioplasty with stent; Colonoscopy; Coronary artery bypass graft (2016); Cardiac surgery (2016); and Cardioversion (2019).     Restrictions  Restrictions/Precautions  Restrictions/Precautions: General Precautions, Fall Risk, Up as Tolerated  Required Braces or Orthoses?: No  Position Activity Restriction  Other position/activity restrictions: up with assist, low sodium diet, RUE IV, O2, [email or web address], telemetry  Subjective   General  Chart Reviewed: Yes  Patient assessed for rehabilitation services?: Yes  Response to previous treatment: Patient with no complaints from previous session  Family / Caregiver Present: No  Vital Signs  Patient Currently in Pain: Denies   Orientation  Orientation  Overall Orientation Status: Within Normal Limits  Objective    ADL  Grooming: Independent  UE Bathing: Independent  LE Bathing: Independent  UE Dressing: Independent  LE Dressing: Independent  Toileting: Independent  Additional Comments: Pt completed sponge bath both seated on EOB and standing at sink, does not use AD. Balance  Sitting Balance: Independent  Standing Balance: Independent  Functional Mobility  Functional - Mobility Device: No device  Activity: To/from bathroom  Assist Level: Independent  Toilet Transfers  Toilet - Technique: Ambulating  Equipment Used: Standard toilet  Toilet Transfer: Independent  Toilet Transfers Comments: Pt does not use AD     Transfers  Stand Step Transfers: Independent  Sit to stand: Independent  Stand to sit: Independent                       Cognition  Overall Cognitive Status: WFL     Perception  Overall Perceptual Status: WFL                                   Plan   Plan  Times per week: 4-5x/week 1x/day as josie  Current Treatment Recommendations: Strengthening, Balance Training, Functional Mobility Training, Safety Education & Training, Endurance Training, Neuromuscular Re-education, Patient/Caregiver Education & Training, Self-Care / ADL, Home Management Training, Equipment Evaluation, Education, & procurement             Goals  Short term goals  Time Frame for Short term goals: by discharge, pt to demo  Short term goal 1: bed mob tasks with use of rail as needed to MOD I. Short term goal 2: I with BUE HEP with use of handouts as needed to maintain strength for function.   Short term goal 3: total body ADL tasks with use of AE/grab bar as needed to MOD

## 2020-12-10 NOTE — PLAN OF CARE
Problem: Falls - Risk of:  Goal: Will remain free from falls  Description: Will remain free from falls  Outcome: Ongoing     Problem: Cardiac:  Goal: Ability to maintain an adequate cardiac output will improve  Description: Ability to maintain an adequate cardiac output will improve  Outcome: Ongoing     Problem: Fluid Volume:  Goal: Will show no signs or symptoms of fluid imbalance  Description: Will show no signs or symptoms of fluid imbalance  Outcome: Ongoing

## 2020-12-10 NOTE — CONSULTS
Pulmonary Medicine and Critical Care Consult  [facility] APRN-CNP/Breh Karilyn Pallas, MD      Patient Dean Files   MRN -  [id number]   Acct # - [de-identified]   - 3/26/1932      Date of Admission -  2020  3:43 PM  Date of evaluation -  12/10/2020  Room - [address] [place] [address]   Hospital Day - [address], [place] Primary Care Physician - Adele Phan MD     Reason for Consult    Hypoxia    Assessment   · Acute hypoxic respiratory failure  · Bilateral groundglass infiltrates, fluid overload versus atypical pneumonia  · Pulmonary edema/acute on chronic diastolic heart failure  · Mild pulmonary hypertension, RVSP 37 mmHg  · Remote smoking history, less than 10 pack years  · Symptomatic bradycardia  · ELICEO  · A. fib, CAD, CABG, HLD, HTN    Recommendations   · Droplet plus isolation  · Repeat Covid swab  · Oxygen via nasal cannula, keep SPO2 greater than 92%  · Home oxygen evaluation prior to discharge  · Incentive spirometry every hour while awake  · Check D-dimer and procalcitonin  · Diuresis with IV Lasix 40 mg twice daily per cardiology  · X-ray chest in am  · Labs: CBC and BMP in am  · DVT prophylaxis, on Eliquis  · Above assessment and plan will be reviewed with Dr. Karilyn Pallas. Patient plan will be finalized following review by Dr. Karilyn Pallas.   · Will follow with you    Problem List      Patient Active Problem List   Diagnosis    Symptomatic bradycardia    Suspected sleep apnea    Unexplained night sweats    Pneumonia    GERD (gastroesophageal reflux disease)    Hyperlipidemia    Hypertension    Chronic atrial fibrillation (HCC)    Acute on chronic diastolic CHF (congestive heart failure) (HCC)    Generalized anxiety disorder    Depressive disorder    Nonrheumatic tricuspid valve disorder    Atherosclerotic heart disease of native coronary artery without angina pectoris    Heart failure, diastolic, acute on chronic ([address]    Suspected COVID-19 virus infection    Acute left-sided CHF (congestive heart failure) ([facility])    Mild malnutrition ([facility] [address]       HPI     Deanna Allison is 80 y.o., female, admitted because of acute hypoxic respiratory failure. She presented to the emergency room 3 days ago with complaints of shortness of breath which had been going on for a few days, worse with activity. She did admit to occasional dry cough. She also admitted to changes in her activity and fatigue. She denied any fever or chills. She denies chest pain, dizziness. On arrival, SPO2 was 88% on room air, she was placed on 2 L and SPO2 improved to 99%. She does report exposure to Covid in the past 2 to 3 weeks.     PMHx   Past Medical History      Diagnosis Date    Acute on chronic diastolic CHF (congestive heart failure) ([facility]) 10/2/2019    Bradycardia     CAD (coronary artery disease)     Cancer ([facility])     Skin CA on nose    Depression     Gallstones     GERD (gastroesophageal reflux disease)     Hiatal hernia     History of cardiac cath 10/2016    CAD    Hyperlipidemia     Hypertension     Insomnia     MI (myocardial infarction) ([facility] [address]     PNA (pneumonia)     SOB (shortness of breath)     UTI (urinary tract infection)       Past Surgical History        Procedure Laterality Date    APPENDECTOMY      CARDIAC SURGERY  11/08/2016    CABG x 3; LIMA-LAD, SVG-PDA, SVG-OM    CARDIOVERSION  12/17/2019    CHOLECYSTECTOMY      COLONOSCOPY      CORONARY ANGIOPLASTY WITH STENT PLACEMENT      CORONARY ARTERY BYPASS GRAFT  11/08/2016    X 3    HYSTERECTOMY         Meds    Current Medications    amiodarone  200 mg Oral Daily    apixaban  5 mg Oral BID    aspirin  81 mg Oral Daily    isosorbide mononitrate  30 mg Oral QAM    metoprolol tartrate  25 mg Oral BID    pantoprazole  40 mg Oral QAM AC    potassium chloride  20 mEq Oral BID    pravastatin  40 mg Oral Nightly    ranolazine  500 mg Oral BID    sertraline  100 mg Oral Daily    latanoprost  1 drop Both Eyes Nightly    sodium chloride flush  10 mL Intravenous 2 times per day    lisinopril  5 mg Oral Daily    furosemide  40 mg Intravenous BID     albuterol, sodium chloride flush, acetaminophen **OR** acetaminophen, polyethylene glycol, promethazine **OR** ondansetron, nicotine  IV Drips/Infusions    Home Medications  Medications Prior to Admission: metoprolol tartrate (LOPRESSOR) 25 MG tablet, Take 25 mg by mouth 2 times daily   amiodarone (CORDARONE) 200 MG tablet, Take 200 mg by mouth daily  apixaban (ELIQUIS) 5 MG TABS tablet, Take 1 tablet by mouth 2 times daily  furosemide (LASIX) 40 MG tablet, Take 1 tablet by mouth 2 times daily  travoprost, benzalkonium, (TRAVATAN) 0.004 % ophthalmic solution, Place 1 drop into the right eye nightly  isosorbide mononitrate (IMDUR) 30 MG extended release tablet, Take 30 mg by mouth every morning  sertraline (ZOLOFT) 100 MG tablet, Take 100 mg by mouth daily   oxybutynin (DITROPAN) 5 MG tablet, Take 5 mg by mouth every other day   pravastatin (PRAVACHOL) 40 MG tablet, Take 40 mg by mouth daily   pantoprazole (PROTONIX) 40 MG tablet, Take 40 mg by mouth every morning (before breakfast)   [DISCONTINUED] loratadine (CLARITIN) 10 MG tablet, Take 10 mg by mouth daily Take half a tablet per day  [DISCONTINUED] ranolazine (RANEXA) 500 MG extended release tablet, Take 500 mg by mouth 2 times daily    Allergies    Seasonal  Social History     Social History     Tobacco Use    Smoking status: Former Smoker     Types: Cigarettes     Last attempt to quit: 1971     Years since quittin.0    Smokeless tobacco: Never Used    Tobacco comment: quit smoking yrs ago   Substance Use Topics    Alcohol use: Yes     Comment: Rarely     Family History          Problem Relation Age of Onset    Heart Disease Mother     Cancer Mother     Heart Disease Father      ROS - 11 systems   General Denies any fever or chills  HEENT Denies any diplopia, tinnitus or vertigo  Resp positive for  dry cough and dyspnea  Cardiac Denies any chest pain, palpitations, claudication or edema  GI Denies any melena, hematochezia, hematemesis or pyrosis   Denies any frequency, urgency, hesitancy or incontinence  Heme Denies bruising or bleeding easily  Endocrine Denies any history of diabetes or thyroid disease  Neuro Denies any focal motor or sensory deficits  Psychiatric Denies anxiety, depression, suicidal ideation  Skin Denies rashes, itching, open sores  Vitals     height is 5' 4\" (1.626 m) and weight is 141 lb 6.4 oz (64.1 kg). Her oral temperature is 96.5 °F (35.8 °C). Her blood pressure is 177/66 (abnormal) and her pulse is 57. Her respiration is 20 and oxygen saturation is 96%. Body mass index is 24.27 kg/m². I/O        Intake/Output Summary (Last 24 hours) at 12/10/2020 0919  Last data filed at 12/10/2020 0836  Gross per 24 hour   Intake --   Output 1000 ml   Net -1000 ml     I/O last 3 completed shifts:  In: -   Out: 600 [Urine:600]   Patient Vitals for the past 96 hrs (Last 3 readings):   Weight   12/08/20 0600 141 lb 6.4 oz (64.1 kg)   12/07/20 1543 140 lb (63.5 kg)     Exam   General Appearance   Awake, alert, oriented, in no acute distress  HEENT - Head is normocephalic, atraumatic. Pupil reactive to light  Neck - Supple, trachea midline and straight  Lungs -moderate air exchange, no wheezing crackles or rhonchi  Cardiovascular - Heart sounds are normal.  Regular rhythm normal rate without murmur, gallop or rub. Abdomen - Soft, nontender, nondistended, no masses or organomegaly  Neurologic - CN II-XII are grossly intact.  There are no focal motor or sensory deficits  Skin - No bruising or bleeding  Extremities - No cyanosis, clubbing or edema    Labs  - Old records and notes have been reviewed in [facility]   CBC     Lab Results   Component Value Date    WBC 7.6 12/10/2020    RBC 4.09 12/10/2020    HGB 11.4 12/10/2020    HCT 37.2 12/10/2020     12/10/2020    MCV 91.0 12/10/2020    MCH 27.9 12/10/2020    MCHC 30.6 12/10/2020    RDW

## 2020-12-11 ENCOUNTER — APPOINTMENT (OUTPATIENT)
Dept: GENERAL RADIOLOGY | Age: 85
DRG: 292 | End: 2020-12-11
Payer: MEDICARE

## 2020-12-11 VITALS
RESPIRATION RATE: 16 BRPM | SYSTOLIC BLOOD PRESSURE: 115 MMHG | HEART RATE: 45 BPM | TEMPERATURE: 98.1 F | HEIGHT: 64 IN | DIASTOLIC BLOOD PRESSURE: 45 MMHG | WEIGHT: 141.4 LBS | BODY MASS INDEX: 24.14 KG/M2 | OXYGEN SATURATION: 97 %

## 2020-12-11 LAB
ANION GAP SERPL CALCULATED.3IONS-SCNC: 6 MMOL/L (ref 9–17)
BUN BLDV-MCNC: 25 MG/DL (ref 8–23)
BUN/CREAT BLD: 21 (ref 9–20)
CALCIUM SERPL-MCNC: 8.8 MG/DL (ref 8.6–10.4)
CHLORIDE BLD-SCNC: 103 MMOL/L (ref 98–107)
CO2: 32 MMOL/L (ref 20–31)
CREAT SERPL-MCNC: 1.2 MG/DL (ref 0.5–0.9)
GFR AFRICAN AMERICAN: 51 ML/MIN
GFR NON-AFRICAN AMERICAN: 42 ML/MIN
GFR SERPL CREATININE-BSD FRML MDRD: ABNORMAL ML/MIN/{1.73_M2}
GFR SERPL CREATININE-BSD FRML MDRD: ABNORMAL ML/MIN/{1.73_M2}
GLUCOSE BLD-MCNC: 108 MG/DL (ref 70–99)
HCT VFR BLD CALC: 37.1 % (ref 36.3–47.1)
HEMOGLOBIN: 11.3 G/DL (ref 11.9–15.1)
MCH RBC QN AUTO: 27.8 PG (ref 25.2–33.5)
MCHC RBC AUTO-ENTMCNC: 30.5 G/DL (ref 28.4–34.8)
MCV RBC AUTO: 91.2 FL (ref 82.6–102.9)
NRBC AUTOMATED: 0 PER 100 WBC
PDW BLD-RTO: 14.8 % (ref 11.8–14.4)
PLATELET # BLD: 209 K/UL (ref 138–453)
PMV BLD AUTO: 10.6 FL (ref 8.1–13.5)
POTASSIUM SERPL-SCNC: 4.1 MMOL/L (ref 3.7–5.3)
RBC # BLD: 4.07 M/UL (ref 3.95–5.11)
SARS-COV-2, RAPID: NOT DETECTED
SARS-COV-2: NORMAL
SARS-COV-2: NORMAL
SODIUM BLD-SCNC: 141 MMOL/L (ref 135–144)
SOURCE: NORMAL
WBC # BLD: 7.5 K/UL (ref 3.5–11.3)

## 2020-12-11 PROCEDURE — 80048 BASIC METABOLIC PNL TOTAL CA: CPT

## 2020-12-11 PROCEDURE — 85027 COMPLETE CBC AUTOMATED: CPT

## 2020-12-11 PROCEDURE — 6370000000 HC RX 637 (ALT 250 FOR IP): Performed by: INTERNAL MEDICINE

## 2020-12-11 PROCEDURE — 97116 GAIT TRAINING THERAPY: CPT

## 2020-12-11 PROCEDURE — U0002 COVID-19 LAB TEST NON-CDC: HCPCS

## 2020-12-11 PROCEDURE — 97530 THERAPEUTIC ACTIVITIES: CPT

## 2020-12-11 PROCEDURE — 71045 X-RAY EXAM CHEST 1 VIEW: CPT

## 2020-12-11 PROCEDURE — 36415 COLL VENOUS BLD VENIPUNCTURE: CPT

## 2020-12-11 PROCEDURE — 6370000000 HC RX 637 (ALT 250 FOR IP): Performed by: FAMILY MEDICINE

## 2020-12-11 RX ORDER — FUROSEMIDE 40 MG/1
40 TABLET ORAL 2 TIMES DAILY
Status: DISCONTINUED | OUTPATIENT
Start: 2020-12-12 | End: 2020-12-11 | Stop reason: HOSPADM

## 2020-12-11 RX ORDER — LISINOPRIL 2.5 MG/1
2.5 TABLET ORAL DAILY
Qty: 30 TABLET | Refills: 0 | Status: SHIPPED | OUTPATIENT
Start: 2020-12-12

## 2020-12-11 RX ORDER — LISINOPRIL 10 MG/1
10 TABLET ORAL DAILY
Status: DISCONTINUED | OUTPATIENT
Start: 2020-12-12 | End: 2020-12-11 | Stop reason: HOSPADM

## 2020-12-11 RX ORDER — FUROSEMIDE 40 MG/1
40 TABLET ORAL DAILY
Qty: 60 TABLET | Refills: 3 | Status: SHIPPED | OUTPATIENT
Start: 2020-12-11

## 2020-12-11 RX ORDER — FUROSEMIDE 40 MG/1
40 TABLET ORAL DAILY
Status: DISCONTINUED | OUTPATIENT
Start: 2020-12-11 | End: 2020-12-11

## 2020-12-11 RX ADMIN — AMIODARONE HYDROCHLORIDE 200 MG: 200 TABLET ORAL at 09:49

## 2020-12-11 RX ADMIN — PANTOPRAZOLE SODIUM 40 MG: 40 TABLET, DELAYED RELEASE ORAL at 09:59

## 2020-12-11 RX ADMIN — ISOSORBIDE MONONITRATE 30 MG: 30 TABLET ORAL at 09:48

## 2020-12-11 RX ADMIN — SERTRALINE HYDROCHLORIDE 100 MG: 100 TABLET ORAL at 09:48

## 2020-12-11 RX ADMIN — FUROSEMIDE 40 MG: 40 TABLET ORAL at 13:04

## 2020-12-11 RX ADMIN — POTASSIUM CHLORIDE 20 MEQ: 20 TABLET, EXTENDED RELEASE ORAL at 09:48

## 2020-12-11 RX ADMIN — METOPROLOL TARTRATE 25 MG: 25 TABLET, FILM COATED ORAL at 09:49

## 2020-12-11 RX ADMIN — RANOLAZINE 500 MG: 500 TABLET, FILM COATED, EXTENDED RELEASE ORAL at 09:48

## 2020-12-11 RX ADMIN — APIXABAN 5 MG: 5 TABLET, FILM COATED ORAL at 09:49

## 2020-12-11 RX ADMIN — ASPIRIN 81 MG: 81 TABLET, CHEWABLE ORAL at 09:48

## 2020-12-11 RX ADMIN — LISINOPRIL 5 MG: 5 TABLET ORAL at 09:49

## 2020-12-11 NOTE — PROGRESS NOTES
Section of Cardiology  Progress Note      Date:  12/11/2020  Patient: Rio Padron  Admission:  12/7/2020  3:43 PM  Admit DX: Acute left-sided CHF (congestive heart failure) ([address] [I50.1]  Age:  80 y.o., 3/26/1932     LOS: 4 days     Reason for evaluation:   atrial fibrillation, CHF and coronary artery disease      SUBJECTIVE:     The patient was seen and examined. Notes and labs reviewed. Patient feels her breathing is back to baseline and feels ready for discharge. Denies having any chest pain, rotations, syncope, or near syncope. She has mild bradycardia at rest that increases to 50 to 60 bpm with activity and denies any associated symptoms. OBJECTIVE:      EXAM:   Vitals: Heart rate is currently 50 bpm  VITALS:  BP (!) 96/43   Pulse (!) 43   Temp 98.1 °F (36.7 °C) (Oral)   Resp 16   Ht 5' 4\" (1.626 m)   Wt 141 lb 6.4 oz (64.1 kg)   SpO2 92%   BMI 24.27 kg/m²   24HR INTAKE/OUTPUT:      Intake/Output Summary (Last 24 hours) at 12/11/2020 1332  Last data filed at 12/11/2020 0641  Gross per 24 hour   Intake --   Output 1000 ml   Net -1000 ml       CONSTITUTIONAL: Alert, awake, no signs of acute distress. HEENT: No JVD  LUNGS: Left lower lobe dry rales heard otherwise clear, nonlabored  CARDIOVASCULAR:  regular rate and rhythm, normal S1 and S2, no S3 or S4, and no murmur or rub noted. SKIN: Warm and dry. EXTREMITIES:No lower extremity edema.      Current Inpatient Medications:   [START ON 12/12/2020] lisinopril  10 mg Oral Daily    furosemide  40 mg Oral Daily    amiodarone  200 mg Oral Daily    apixaban  5 mg Oral BID    aspirin  81 mg Oral Daily    isosorbide mononitrate  30 mg Oral QAM    metoprolol tartrate  25 mg Oral BID    pantoprazole  40 mg Oral QAM AC    potassium chloride  20 mEq Oral BID    pravastatin  40 mg Oral Nightly    ranolazine  500 mg Oral BID    sertraline  100 mg Oral Daily    latanoprost  1 drop Both Eyes Nightly    sodium chloride flush  10 mL Intravenous 2 times per day       IV Infusions (if any):      Diagnostics:   Telemetry: Sinus rhythm with mild intermittent sinus bradycardia  Echocardiogram: 12/9/2020:  CONCLUSIONS     Summary  Normal LV size and wall motion. Mild left ventricular hypertrophy  Global left ventricular systolic function is normal  Estimated ejection fraction is 55-60 % . Normal RV size and function. Left atrium is moderately dilated. Right atrium is mildly dilated . No obvious structural valvular abnormality noted. Mild t tricuspid regurgitation. Borderline pulmonary hypertension. RVSP 37 mmHg  No pericardial effusion seen. Normal aortic root dimension.     Signature  ----------------------------------------------------------------------------   Electronically signed by Padmini Fregoso on 12/08/2020   12:27 PM  ----------------------------------------------------------------------------     ----------------------------------------------------------------------------   Electronically signed by Erna Snow(Interpreting physician) on   12/09/2020 11:23 AM  ----------------------------------------------------------------------------    Labs:   CBC:  Recent Labs     12/10/20  0544 12/11/20  0545   WBC 7.6 7.5   HGB 11.4* 11.3*   HCT 37.2 37.1    209     Magnesium:  No results for input(s): MG in the last 72 hours. BMP:  Recent Labs     12/10/20  0544 12/11/20  0545    141   K 4.0 4.1   CALCIUM 8.6 8.8   CO2 33* 32*   BUN 22 25*   CREATININE 1.04* 1.20*   LABGLOM 50* 42*   GLUCOSE 111* 108*     BNP:  Recent Labs     12/10/20  0544   PROBNP 742*     PT/INR:No results for input(s): PROTIME, INR in the last 72 hours. APTT:No results for input(s): APTT in the last 72 hours. CARDIAC ENZYMES:  No results for input(s): MYOGLOBIN, CKTOTAL, CKMB, CKMBINDEX, TROPHS, TROPONINT in the last 72 hours.   FASTING LIPID PANEL:  Lab Results   Component Value Date    HDL 38 12/08/2020    TRIG 78 12/08/2020     LIVER PROFILE:No results for input(s): AST, ALT, LABALBU, ALKPHOS, BILITOT, BILIDIR, IBILI, PROT, GLOB, ALBUMIN in the last 72 hours. ASSESSMENT:    · Acute on chronic diastolic heart failure, compensated-history of preserved LV systolic function on echocardiogram done October 2019 and preserved LV systolic function on echocardiogram 12/9/2020  · Possible pneumonia/COVID-19 ruled out, managed by others  · CAD with prior CABG x3, no clinical angina  · Paroxysmal atrial fibrillation status post cardioversion December 2019 and patient is maintaining sinus rhythm on Eliquis  · Mild sinus bradycardia, intermittent, asymptomatic and improved with activity  · Hypertension, controlled  · Dyslipidemia, treated    PLAN:  1. Continue current cardiac medications. Decrease metoprolol to 12.5 milligrams p.o. twice daily due to mild bradycardia. 2. CHF appears compensated. No objection for discharge from cardiac standpoint and follow-up with Dr. Cindy Marsh next week in the office or sooner if needed. Discussed with patient and Dr. Rojas Ly.     Joshua Ferrera, SERA - CNP

## 2020-12-11 NOTE — PROGRESS NOTES
Discharge instructions and medications reviewed with the patient and she signed understanding. Pt escorted off unit via w/c with all belongings and discharged home with family.  Prescriptions called into [facility] for lasix  And lisinopril

## 2020-12-11 NOTE — CARE COORDINATION
Discharge Planning    Follow up with patient regarding earlier discussion for home care. Pt states she has had home care in the past and felt it didn't benefit her much and she is not interested in it at this time. She states the complex she lives in has a nurse that checks their vitals weekly    Awaiting study for possible need for home oxygen. Pt states if testing shows she needs the oxygen she would accept it.

## 2020-12-11 NOTE — PROGRESS NOTES
days.  Patient says she has been dyspneic with exertion but recently even at rest.  Patient states she had some dry cough but later had been congested. She says there has been occasional white-colored phlegm. Patient also complains of intermittent nasal stuffiness and discharge and also postnasal drainage. Patient was hypoxic on admission and was placed on oxygen via nasal cannula. Patient tolerated that well but remained dyspneic after which she was given IV Lasix. Patient states since then she has felt much better    Patient denies any chest pain, wheezing, fever, headache or vision change. Denies any neck pain or back pain. Denies any rash or joint swelling    I have personally reviewed the past medical history, past surgical history, medications, social history, and family history, and summarized in the note. Review of Systems:     All 10 point system is reviewed and negative otherwise mentioned in HPI.       Past Medical History:     Past Medical History:   Diagnosis Date    Acute on chronic diastolic CHF (congestive heart failure) (HCC) 10/2/2019    Bradycardia     CAD (coronary artery disease)     Cancer (HCC)     Skin CA on nose    Depression     Gallstones     GERD (gastroesophageal reflux disease)     Hiatal hernia     History of cardiac cath 10/2016    CAD    Hyperlipidemia     Hypertension     Insomnia     MI [facility]myocardial infarction) ([facility] [address]     PNA (pneumonia)     SOB (shortness of breath)     UTI (urinary tract infection)         Past Surgical History:     Past Surgical History:   Procedure Laterality Date    APPENDECTOMY      CARDIAC SURGERY  11/08/2016    CABG x 3; LIMA-LAD, SVG-PDA, SVG-OM    CARDIOVERSION  12/17/2019    CHOLECYSTECTOMY      COLONOSCOPY      CORONARY ANGIOPLASTY WITH STENT PLACEMENT      CORONARY ARTERY BYPASS GRAFT  11/08/2016    X 3    HYSTERECTOMY          Medications Prior to Admission:       Prior to Admission medications    Medication Sig Start Date End Date Taking? Authorizing Provider   metoprolol tartrate (LOPRESSOR) 25 MG tablet Take 25 mg by mouth 2 times daily  19  Yes Historical Provider, MD   amiodarone (CORDARONE) 200 MG tablet Take 200 mg by mouth daily   Yes Historical Provider, MD   apixaban (ELIQUIS) 5 MG TABS tablet Take 1 tablet by mouth 2 times daily 10/5/19  Yes Jose Michaels MD   furosemide (LASIX) 40 MG tablet Take 1 tablet by mouth 2 times daily 10/5/19  Yes Jose Michaels MD   travoprost, benzalkonium, (TRAVATAN) 0.004 % ophthalmic solution Place 1 drop into the right eye nightly   Yes Historical Provider, MD   isosorbide mononitrate (IMDUR) 30 MG extended release tablet Take 30 mg by mouth every morning   Yes Historical Provider, MD   sertraline (ZOLOFT) 100 MG tablet Take 100 mg by mouth daily    Yes Historical Provider, MD   oxybutynin (DITROPAN) 5 MG tablet Take 5 mg by mouth every other day    Yes Historical Provider, MD   pravastatin (PRAVACHOL) 40 MG tablet Take 40 mg by mouth daily    Yes Historical Provider, MD   pantoprazole (PROTONIX) 40 MG tablet Take 40 mg by mouth every morning (before breakfast)    Yes Historical Provider, MD        Allergies:       Seasonal    Social History:     Tobacco:    reports that she quit smoking about 49 years ago. Her smoking use included cigarettes. She has never used smokeless tobacco.  Alcohol:      reports current alcohol use. Drug Use:  reports no history of drug use.     Family History:     Family History   Problem Relation Age of Onset    Heart Disease Mother     Cancer Mother     Heart Disease Father          Physical Exam:     Vitals:  BP (!) 151/89   Pulse 86   Temp 97.3 °F (36.3 °C) (Oral)   Resp 16   Ht 5' 4\" (1.626 m)   Wt 141 lb 6.4 oz (64.1 kg)   SpO2 95%   BMI 24.27 kg/m²   Temp (24hrs), Av.8 °F (36.6 °C), Min:97.2 °F (36.2 °C), Max:98.1 °F (36.7 °C)          General appearance - alert, well appearing, and in no acute distress  Mental status - oriented to person, place, and time with normal affect  Head - normocephalic and atraumatic  Eyes - pupils equal and reactive, extraocular eye movements intact, conjunctiva clear  Ears - hearing appears to be intact  Nose - no drainage noted  Mouth - mucous membranes moist  Neck - supple, no carotid bruits, thyroid not palpable  Chest - clear to auscultation in upper and mid zones posteriorly, normal effort, bilateral basilar crackles  Heart - normal rate, irregular rhythm, no murmur  Abdomen - soft, nontender, nondistended, bowel sounds present all four quadrants, no masses, hepatomegaly or splenomegaly  Neurological - normal speech, no focal findings or movement disorder noted, cranial nerves II through XII grossly intact  Extremities - peripheral pulses palpable, no pedal edema or calf pain with palpation  Skin - no gross lesions, rashes, or induration noted        Data:     Labs:    Hematology:  Recent Labs     12/09/20  0620 12/10/20  0544 12/10/20  1031 12/11/20  0545   WBC 7.6 7.6  --  7.5   RBC 4.13 4.09  --  4.07   HGB 11.5* 11.4*  --  11.3*   HCT 37.9 37.2  --  37.1   MCV 91.8 91.0  --  91.2   MCH 27.8 27.9  --  27.8   MCHC 30.3 30.6  --  30.5   RDW 14.7* 14.9*  --  14.8*    204  --  209   MPV 10.6 10.5  --  10.6   DDIMER  --   --  0.39  --      Chemistry:  Recent Labs     12/09/20  0620 12/10/20  0544 12/11/20  0545   * 144 141   K 4.0 4.0 4.1    105 103   CO2 32* 33* 32*   GLUCOSE 108* 111* 108*   BUN 19 22 25*   CREATININE 0.98* 1.04* 1.20*   ANIONGAP 8* 6* 6*   LABGLOM 54* 50* 42*   GFRAA >60 >60 51*   CALCIUM 8.5* 8.6 8.8   PROBNP  --  742*  --      No results for input(s): PROT, LABALBU, LABA1C, A4DYFLV, P7AXEFW, FT4, TSH, AST, ALT, LDH, GGT, ALKPHOS, LABGGT, BILITOT, BILIDIR, AMMONIA, AMYLASE, LIPASE, LACTATE, CHOL, HDL, LDLCHOLESTEROL, CHOLHDLRATIO, TRIG, VLDL, LRW26AK, PHENYTOIN, PHENYF, URICACID, POCGLU in the last 72 hours.     Lab Results   Component Value Date    INR 1.1 10/02/2019

## 2020-12-11 NOTE — PROGRESS NOTES
Home Oxygen Evaluation completed.        Resting SpO2 on room air = 91%  SpO2 on 2L NC at rest = 96%    SpO2 on room air with exercise = 84%  SpO2 on 2L NC with exercise = 93%

## 2020-12-11 NOTE — PROGRESS NOTES
Pulmonary Critical Care Progress Note  Jessica Nuñez, SERA-LUCIA/Kevin Batista MD     Patient seen for the follow up of acute hypoxic respiratory failure, bilateral groundglass infiltrates, pulmonary edema, acute on chronic diastolic heart failure, mild pulmonary hypertension, remote smoking history    Subjective:  No significant overnight events noted. She is resting comfortably in the bed, in no distress. She was just taken off of oxygen and denies shortness of breath at this time. She denies significant cough or any chest pain. Examination:  Vitals: BP (!) 151/89   Pulse 86   Temp 97.3 °F (36.3 °C) (Oral)   Resp 16   Ht 5' 4\" (1.626 m)   Wt 141 lb 6.4 oz (64.1 kg)   SpO2 95%   BMI 24.27 kg/m²   General appearance: alert and cooperative with exam  Neck: No JVD  Lungs:   Heart: regular rate and rhythm, S1, S2 normal, no gallop  Abdomen: Soft, non tender, + BS  Extremities: no cyanosis or clubbing.  No significant edema    LABs:  CBC:   Recent Labs     12/10/20  0544 12/11/20  0545   WBC 7.6 7.5   HGB 11.4* 11.3*   HCT 37.2 37.1    209     BMP:   Recent Labs     12/10/20  0544 12/11/20  0545    141   K 4.0 4.1   CO2 33* 32*   BUN 22 25*   CREATININE 1.04* 1.20*   LABGLOM 50* 42*   GLUCOSE 111* 108*     ABG:  Lab Results   Component Value Date    VSU8BDV 24 11/09/2016    FIO2 NOT REPORTED 10/02/2019       Lab Results   Component Value Date    POCPH 7.35 11/09/2016    POCPCO2 41 11/09/2016    POCPO2 85 11/09/2016    POCHCO3 22.4 11/09/2016    NBEA 3 11/09/2016    PBEA NOT REPORTED 11/09/2016    TSX2HZS 24 11/09/2016    IZCK4OEG 96 11/09/2016    FIO2 NOT REPORTED 10/02/2019     Radiology:  12/11/20      Impression:  · Acute hypoxic respiratory failure  · Bilateral groundglass infiltrates, most likely secondary to fluid overload versus atypical pneumonia as white count is not elevated, no fevers and procalcitonin is normal  · Pulmonary edema/acute on chronic diastolic heart failure  · Mild pulmonary hypertension, RVSP 37 mmHg  · Remote smoking history, less than 10 pack years  · Symptomatic bradycardia  · ELICEO  · A. fib, CAD, CABG, HLD, HTN    Recommendations:  · Patient has had 2 recent exposures to COVID-19, x-ray/CT showing bilateral round glass infiltrates. Place in droplet isolation at this time and repeat Covid swab. · Oxygen via nasal cannula, keep SPO2 greater than 92%  · Home oxygen evaluation prior to discharge  · Incentive spirometry every hour while awake  · D-dimer and procalcitonin within normal limits  · Diuresis with IV Lasix 40 mg twice daily per cardiology  · X-ray chest in am  · Labs: CBC and BMP in am  · DVT prophylaxis, on Eliquis  · PFTs as outpatient  · Discussed with RN  · Above assessment and plan will be reviewed with Dr. Tucker Bo will be finalized following review by Dr. hSena Christina.   · Will follow with you      SERA Rm-CNP   Pulmonary Critical Care and Sleep Medicine,  Patient seen under the supervision of Apoorva Martinez MD, [facility]

## 2020-12-11 NOTE — CARE COORDINATION
Testing for home oxygen shows need and qualifies and pt agrees to oxygen. Order and testing faxed to [facility]. Ciro Zuleta here and delivered oxygen.

## 2020-12-11 NOTE — DISCHARGE INSTR - COC
Continuity of Care Form    Patient Name: Mary Trivedi   :  3/26/1932  MRN:  [id number]    Admit date:  2020  Discharge date:  ***    Code Status Order: Full Code   Advance Directives:   Advance Care Flowsheet Documentation     Date/Time Healthcare Directive Type of Healthcare Directive Copy in [address] Agent's Name Healthcare Agent's Phone Number    20  Yes, patient has an advance directive for healthcare treatment  Durable power of  for health care; Health care treatment directive; Living will  No, copy requested from family  --  --  --          Admitting Physician:  Isiah Miller MD  PCP: Sophie Moffett MD    Discharging Nurse: [facility] Unit/Room#: 1003/1003-02  Discharging Unit Phone Number: ***    Emergency Contact:   Extended Emergency Contact Information  Primary Emergency Contact: Argentina Waldron Phone: [phone]  Work Phone: [phone]  Relation: Child  Secondary Emergency Contact: [address] [facility] Phone: [phone]  Work Phone: [phone]  Relation: Child    Past Surgical History:  Past Surgical History:   Procedure Laterality Date    APPENDECTOMY      CARDIAC SURGERY  2016    CABG x 3; LIMA-LAD, SVG-PDA, SVG-OM    CARDIOVERSION  2019    CHOLECYSTECTOMY      COLONOSCOPY      CORONARY ANGIOPLASTY WITH STENT PLACEMENT      CORONARY ARTERY BYPASS GRAFT  11/08/2016    X 3    HYSTERECTOMY         Immunization History: There is no immunization history on file for this patient.     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    Atherosclerotic heart disease of native coronary artery without angina pectoris I25.10    Heart failure, diastolic, acute on chronic (HCC) I50.33    Suspected COVID-19 virus infection Z20.828    Acute left-sided CHF (congestive heart failure) ([facility]) I50.1    Mild malnutrition ([facility]) E44.1       Isolation/Infection:   Isolation          No Isolation        Patient Infection Status     Infection Onset Added Last Indicated Last Indicated By Review Planned Expiration Resolved Resolved By    None active    Resolved    COVID-19 Rule Out 12/10/20 12/10/20 12/11/20 COVID-19 (Ordered)   12/11/20 Rule-Out Test Resulted    COVID-19 Rule Out 12/07/20 12/07/20 12/07/20 COVID-19 (Ordered)   12/07/20 Rule-Out Test Resulted    COVID-19 Rule Out 04/29/20 04/29/20 04/29/20 COVID-19 (Ordered)   04/30/20 Rule-Out Test Resulted          Nurse Assessment:  Last Vital Signs: BP (!) 96/43   Pulse (!) 43   Temp 98.1 °F (36.7 °C) (Oral)   Resp 16   Ht 5' 4\" (1.626 m)   Wt 141 lb 6.4 oz (64.1 kg)   SpO2 92%   BMI 24.27 kg/m²     Last documented pain score (0-10 scale): Pain Level: 0  Last Weight:   Wt Readings from Last 1 Encounters:   12/08/20 141 lb 6.4 oz (64.1 kg)     Mental Status:  {IP PT MENTAL STATUS:59680}    IV Access:  { ORIANA IV ACCESS:324223812}    Nursing Mobility/ADLs:  Walking   {[facility] DME AVJO:969124688}  Transfer  {[facility] DME JHWU:037432838}  Bathing  {[facility] DME OJPO:240726871}  Dressing  {[facility] DME KTTP:876633198}  Toileting  {[facility] DME YLFY:215306123}  Feeding  {[facility] DME WLMN:601558073}  Med Admin  {[facility] DME RZMC:447094619}  Med Delivery   {[facility] [place] Delivery:674439217}    Wound Care Documentation and Therapy:        Elimination:  Continence:   · Bowel: {YES / NN:04727}  · Bladder: {YES / PE:62382}  Urinary Catheter: {Urinary Catheter:337694439}   Colostomy/Ileostomy/Ileal Conduit: {YES / PH:67430}       Date of Last BM: ***    Intake/Output Summary (Last 24 hours) at 2020 1348  Last data filed at 2020 0641  Gross per 24 hour   Intake --   Output 1000 ml   Net -1000 ml     I/O last 3 completed shifts:  In: -   Out: 1400 [Urine:1400]    Safety Concerns:     [address] Safety Concerns:938180628}    Impairments/Disabilities:      [address] Impairments/Disabilities:477781572}    Nutrition Therapy:  Current Nutrition Therapy:   [address] Diet List:966760169}    Routes of Feeding: {CHP DME Other Feedings:418582405}  Liquids: {Slp liquid thickness:26749}  Daily Fluid Restriction: {CHP DME Yes amt example:950223404}  Last Modified Barium Swallow with Video (Video Swallowing Test): {Done Not Done MDHL:669168262}    Treatments at the Time of Hospital Discharge:   Respiratory Treatments: ***  Oxygen Therapy:  {Therapy; copd oxygen:34878}  Ventilator:    { CC Vent EAJR:723722301}    Rehab Therapies: {THERAPEUTIC INTERVENTION:6443642366}  Weight Bearing Status/Restrictions: [address]  Weight Bearin}  Other Medical Equipment (for information only, NOT a DME order):  {EQUIPMENT:990208447}  Other Treatments: ***    Patient's personal belongings (please select all that are sent with patient):  {CHP DME Belongings:899140830}    RN SIGNATURE:  {Esignature:611806756}    CASE MANAGEMENT/SOCIAL WORK SECTION    Inpatient Status Date: ***    Readmission Risk Assessment Score:  Readmission Risk              Risk of Unplanned Readmission:        16           Discharging to Facility/ Agency   · Name:   · Address:  · Phone:  · Fax:    Dialysis Facility (if applicable)   · Name:  · Address:  · Dialysis Schedule:  · Phone:  · Fax:    / signature: {Esignature:872990433}    PHYSICIAN SECTION    Prognosis: {Prognosis:4659980381}    Condition at Discharge: [address] Patient Condition:483268939}    Rehab Potential (if transferring to Rehab): {Prognosis:5153340302}    Recommended Labs or Other Treatments After Discharge: ***    Physician Certification: I certify the above information and transfer of Jamison Turner  is necessary for the continuing treatment of the diagnosis listed and that she requires {Admit to Appropriate Level of Care:18808} for {GREATER/LESS:393379811} 30 days.      Update Admission H&P: {CHP DME Changes in PPOMP:578435729}    PHYSICIAN SIGNATURE:  {Esignature:573627034}

## 2020-12-21 NOTE — DISCHARGE SUMMARY
[address] [place] [address].,    Adult Hospitalist      Patient ID: Fredi Gustafson  MRN: [id number]     Acct:  [de-identified]       Patient's PCP: Kalani Carpenter MD    Admit Date: 12/7/2020     Discharge Date: 12/11/2020      Admitting Physician: Cary Kaminski MD    Discharge Physician: Cary Kaminski MD     CONSULTANTS: Patient Care Team:  Kalani Carpenter MD as PCP - Marjan Huber MD as Referring Physician (Cardiology)  Ricky Hudson MD as Surgeon (Cardiothoracic Surgery)  Galindo Spann DPM as Surgeon (Podiatry)    PROCEDURES PERFORMED:     Active Discharge Diagnoses:  · Acute on chronic diastolic congestive heart failure, CHFpEF  · Hypoxia  · Paroxysmal atrial fibrillation  · Coronary artery disease, native  · Essential hypertension  · Mixed hyperlipidemia  · Major depressive disorder  · Mild intermittent asthma  · Former smoker  · Gastroesophageal reflux disease  · Urge continence  · Mild pulmonary hypertension   · Lung infiltrates      Primary Problem  Acute left-sided CHF (congestive heart failure) [facility])    Hospital Course: Pt admitted with acute on chronic diastolic CHFpEF. Pt placed on IV diuretics which were later switched to PO. Pt showed slow progress. Lung infiltrates noted and pt ruled out for Covid twice. The plan was discussed in detail with patient who agreed with the plan and verbalized understanding . The patient was seen and examined on day of discharge and this discharge summary is in conjunction with any daily progress note from day of discharge. Hospital Data:    Labs:    Hematology:No results for input(s): WBC, RBC, HGB, HCT, MCV, MCH, MCHC, RDW, PLT, MPV, SEDRATE, CRP, INR, DDIMER, LD4BHUNR, LABABSO in the last 72 hours.     Invalid input(s): PT  Chemistry:No results for input(s): NA, K, CL, CO2, GLUCOSE, BUN, CREATININE, MG, ANIONGAP, LABGLOM, GFRAA, CALCIUM, CAION, PHOS, PSA, PROBNP, TROPHS, CKTOTAL, CKMB, CKMBINDEX, MYOGLOBIN, DIGOXIN, LACTACIDWB in the last 72 hours. No results for input(s): PROT, LABALBU, LABA1C, X7SNAJB, D6VAVHT, FT4, TSH, AST, ALT, LDH, GGT, ALKPHOS, LABGGT, BILITOT, BILIDIR, AMMONIA, AMYLASE, LIPASE, LACTATE, CHOL, HDL, LDLCHOLESTEROL, CHOLHDLRATIO, TRIG, VLDL, OQN33VJ, PHENYTOIN, PHENYF, URICACID, POCGLU in the last 72 hours. Lab Results   Component Value Date    INR 1.1 10/02/2019    INR 1.2 11/11/2016    INR 1.2 11/10/2016    PROTIME 11.2 10/02/2019    PROTIME 12.3 (H) 11/11/2016    PROTIME 12.7 (H) 11/10/2016     Lab Results   Component Value Date/Time    SPECIAL NOT REPORTED 04/29/2020 07:44 PM     Lab Results   Component Value Date/Time    CULTURE NO GROWTH 04/29/2020 07:44 PM       Lab Results   Component Value Date    POCPH 7.35 11/09/2016    POCPCO2 41 11/09/2016    POCPO2 85 11/09/2016    POCHCO3 22.4 11/09/2016    NBEA 3 11/09/2016    PBEA NOT REPORTED 11/09/2016    KZC1FPU 24 11/09/2016    OKGJ5WDV 96 11/09/2016    FIO2 NOT REPORTED 10/02/2019       Radiology:    No results found. All radiological studies reviewed      Reviews of Symptoms:    A 10 point system is reviewed and  negative except described in hospital course    Physical Exam:    Vitals:  BP (!) 115/45   Pulse (!) 45   Temp 98.1 °F (36.7 °C) (Oral)   Resp 16   Ht 5' 4\" (1.626 m)   Wt 141 lb 6.4 oz (64.1 kg)   SpO2 97%   BMI 24.27 kg/m²   No data recorded.       General appearance - alert, well appearing, and in no acute distress  Mental status - oriented to person, place, and time with normal affect  Head - normocephalic and atraumatic  Eyes - pupils equal and reactive, extraocular eye movements intact, conjunctiva clear  Ears - hearing appears to be intact  Nose - no drainage noted  Mouth - mucous membranes moist  Neck - supple, no carotid bruits, thyroid not palpable  Chest - clear to auscultation, normal effort  Heart - normal rate, regular rhythm, no murmur  Abdomen - soft, nontender, nondistended, bowel sounds present all four quadrants, no masses, hepatomegaly or splenomegaly  Neurological - normal speech, no focal findings or movement disorder noted, cranial nerves II through XII grossly intact  Extremities - peripheral pulses palpable, no pedal edema or calf pain with palpation  Skin - no gross lesions, rashes, or induration noted      Consults:  IP CONSULT TO INTERNAL MEDICINE  IP CONSULT TO HEART FAILURE NURSE/COORDINATOR  IP CONSULT TO DIETITIAN  IP CONSULT TO CARDIOLOGY  IP CONSULT TO PULMONOLOGY    Disposition: Home    Discharged Condition: Stable    Follow Up: Zay Reed MD  [address] [facility] [address] [place] [address]    Schedule an appointment as soon as possible for a visit      Jad Kulkarni MD  [address] [facility]  [phone]    Schedule an appointment as soon as possible for a visit in 2 weeks      Rob Beck [address] [facility]  [phone]    Schedule an appointment as soon as possible for a visit in 1 week              Diet: No diet orders on file    Discharge Medications:    Lisabeth Daily   Home Medication Instructions J:[id number]    Printed on:12/20/20 7742   Medication Information                      amiodarone (CORDARONE) 200 MG tablet  Take 200 mg by mouth daily             apixaban (ELIQUIS) 5 MG TABS tablet  Take 1 tablet by mouth 2 times daily             furosemide (LASIX) 40 MG tablet  Take 1 tablet by mouth daily             isosorbide mononitrate (IMDUR) 30 MG extended release tablet  Take 30 mg by mouth every morning             lisinopril (PRINIVIL;ZESTRIL) 2.5 MG tablet  Take 1 tablet by mouth daily             metoprolol tartrate (LOPRESSOR) 25 MG tablet  Take 25 mg by mouth 2 times daily              oxybutynin (DITROPAN) 5 MG tablet  Take 5 mg by mouth every other day              pantoprazole (PROTONIX) 40 MG tablet  Take 40 mg by mouth every morning (before breakfast)              pravastatin (PRAVACHOL) 40 MG tablet  Take 40 mg by mouth daily sertraline (ZOLOFT) 100 MG tablet  Take 100 mg by mouth daily              travoprost, benzalkonium, (TRAVATAN) 0.004 % ophthalmic solution  Place 1 drop into the right eye nightly                 Code Status:  Prior    Time Spent on discharge is more than  30 mins in patient examination, evaluation, counseling as well as medication reconciliation, prescriptions for required medications, discharge plan and follow up. Electronically signed by Nikolas Pruitt MD on 12/20/2020 at 9:51 PM     Thank you Dr. Jairon Meeks MD for the opportunity to be involved in this patient's care. This note was created with the assistance of a speech-recognition program.  Although the intention is to generate a document that actually reflects the content of the visit, no guarantees can be provided that every mistake has been identified and corrected by editing. Note was updated later by me after  physical examination and  completion of the assessment.

## 2021-02-08 ENCOUNTER — HOSPITAL ENCOUNTER (INPATIENT)
Age: 86
LOS: 16 days | Discharge: HOSPICE | DRG: 291 | End: 2021-02-24
Attending: EMERGENCY MEDICINE | Admitting: FAMILY MEDICINE
Payer: MEDICARE

## 2021-02-08 ENCOUNTER — APPOINTMENT (OUTPATIENT)
Dept: GENERAL RADIOLOGY | Age: 86
DRG: 291 | End: 2021-02-08
Payer: MEDICARE

## 2021-02-08 DIAGNOSIS — I50.9 ACUTE CONGESTIVE HEART FAILURE, UNSPECIFIED HEART FAILURE TYPE (HCC): Primary | ICD-10-CM

## 2021-02-08 LAB
ABSOLUTE EOS #: 0.06 K/UL (ref 0–0.44)
ABSOLUTE IMMATURE GRANULOCYTE: 0.06 K/UL (ref 0–0.3)
ABSOLUTE LYMPH #: 0.89 K/UL (ref 1.1–3.7)
ABSOLUTE MONO #: 0.93 K/UL (ref 0.1–1.2)
ANION GAP SERPL CALCULATED.3IONS-SCNC: 9 MMOL/L (ref 9–17)
BASOPHILS # BLD: 0 % (ref 0–2)
BASOPHILS ABSOLUTE: 0.03 K/UL (ref 0–0.2)
BNP INTERPRETATION: ABNORMAL
BUN BLDV-MCNC: 12 MG/DL (ref 8–23)
BUN/CREAT BLD: 17 (ref 9–20)
CALCIUM SERPL-MCNC: 8.7 MG/DL (ref 8.6–10.4)
CHLORIDE BLD-SCNC: 102 MMOL/L (ref 98–107)
CO2: 30 MMOL/L (ref 20–31)
CREAT SERPL-MCNC: 0.69 MG/DL (ref 0.5–0.9)
DIFFERENTIAL TYPE: ABNORMAL
EOSINOPHILS RELATIVE PERCENT: 1 % (ref 1–4)
GFR AFRICAN AMERICAN: >60 ML/MIN
GFR NON-AFRICAN AMERICAN: >60 ML/MIN
GFR SERPL CREATININE-BSD FRML MDRD: ABNORMAL ML/MIN/{1.73_M2}
GFR SERPL CREATININE-BSD FRML MDRD: ABNORMAL ML/MIN/{1.73_M2}
GLUCOSE BLD-MCNC: 114 MG/DL (ref 70–99)
HCT VFR BLD CALC: 37.3 % (ref 36.3–47.1)
HEMOGLOBIN: 11.4 G/DL (ref 11.9–15.1)
IMMATURE GRANULOCYTES: 1 %
LACTIC ACID: 0.8 MMOL/L (ref 0.5–2.2)
LYMPHOCYTES # BLD: 8 % (ref 24–43)
MCH RBC QN AUTO: 27.1 PG (ref 25.2–33.5)
MCHC RBC AUTO-ENTMCNC: 30.6 G/DL (ref 28.4–34.8)
MCV RBC AUTO: 88.8 FL (ref 82.6–102.9)
MONOCYTES # BLD: 9 % (ref 3–12)
MYOGLOBIN: 48 NG/ML (ref 25–58)
NRBC AUTOMATED: 0 PER 100 WBC
PDW BLD-RTO: 15.9 % (ref 11.8–14.4)
PLATELET # BLD: 211 K/UL (ref 138–453)
PLATELET ESTIMATE: ABNORMAL
PMV BLD AUTO: 10.9 FL (ref 8.1–13.5)
POTASSIUM SERPL-SCNC: 3.2 MMOL/L (ref 3.7–5.3)
PRO-BNP: 5808 PG/ML
RBC # BLD: 4.2 M/UL (ref 3.95–5.11)
RBC # BLD: ABNORMAL 10*6/UL
SEG NEUTROPHILS: 81 % (ref 36–65)
SEGMENTED NEUTROPHILS ABSOLUTE COUNT: 8.98 K/UL (ref 1.5–8.1)
SODIUM BLD-SCNC: 141 MMOL/L (ref 135–144)
TROPONIN INTERP: ABNORMAL
TROPONIN T: ABNORMAL NG/ML
TROPONIN, HIGH SENSITIVITY: 17 NG/L (ref 0–14)
WBC # BLD: 11 K/UL (ref 3.5–11.3)
WBC # BLD: ABNORMAL 10*3/UL

## 2021-02-08 PROCEDURE — 96367 TX/PROPH/DG ADDL SEQ IV INF: CPT

## 2021-02-08 PROCEDURE — 96365 THER/PROPH/DIAG IV INF INIT: CPT

## 2021-02-08 PROCEDURE — 6360000002 HC RX W HCPCS: Performed by: NURSE PRACTITIONER

## 2021-02-08 PROCEDURE — 85025 COMPLETE CBC W/AUTO DIFF WBC: CPT

## 2021-02-08 PROCEDURE — 93005 ELECTROCARDIOGRAM TRACING: CPT | Performed by: NURSE PRACTITIONER

## 2021-02-08 PROCEDURE — 99283 EMERGENCY DEPT VISIT LOW MDM: CPT

## 2021-02-08 PROCEDURE — 87205 SMEAR GRAM STAIN: CPT

## 2021-02-08 PROCEDURE — 2060000000 HC ICU INTERMEDIATE R&B

## 2021-02-08 PROCEDURE — 80048 BASIC METABOLIC PNL TOTAL CA: CPT

## 2021-02-08 PROCEDURE — 83880 ASSAY OF NATRIURETIC PEPTIDE: CPT

## 2021-02-08 PROCEDURE — 2580000003 HC RX 258: Performed by: NURSE PRACTITIONER

## 2021-02-08 PROCEDURE — 6370000000 HC RX 637 (ALT 250 FOR IP): Performed by: NURSE PRACTITIONER

## 2021-02-08 PROCEDURE — 83874 ASSAY OF MYOGLOBIN: CPT

## 2021-02-08 PROCEDURE — 0202U NFCT DS 22 TRGT SARS-COV-2: CPT

## 2021-02-08 PROCEDURE — 71045 X-RAY EXAM CHEST 1 VIEW: CPT

## 2021-02-08 PROCEDURE — 87040 BLOOD CULTURE FOR BACTERIA: CPT

## 2021-02-08 PROCEDURE — 84484 ASSAY OF TROPONIN QUANT: CPT

## 2021-02-08 PROCEDURE — 87070 CULTURE OTHR SPECIMN AEROBIC: CPT

## 2021-02-08 PROCEDURE — 96375 TX/PRO/DX INJ NEW DRUG ADDON: CPT

## 2021-02-08 PROCEDURE — 6360000002 HC RX W HCPCS: Performed by: FAMILY MEDICINE

## 2021-02-08 PROCEDURE — 83605 ASSAY OF LACTIC ACID: CPT

## 2021-02-08 RX ORDER — OXYBUTYNIN CHLORIDE 5 MG/1
5 TABLET ORAL EVERY OTHER DAY
Status: DISCONTINUED | OUTPATIENT
Start: 2021-02-09 | End: 2021-02-24 | Stop reason: HOSPADM

## 2021-02-08 RX ORDER — HYDRALAZINE HYDROCHLORIDE 20 MG/ML
20 INJECTION INTRAMUSCULAR; INTRAVENOUS EVERY 6 HOURS PRN
Status: DISCONTINUED | OUTPATIENT
Start: 2021-02-08 | End: 2021-02-24 | Stop reason: HOSPADM

## 2021-02-08 RX ORDER — FUROSEMIDE 10 MG/ML
20 INJECTION INTRAMUSCULAR; INTRAVENOUS 2 TIMES DAILY
Status: DISCONTINUED | OUTPATIENT
Start: 2021-02-09 | End: 2021-02-10

## 2021-02-08 RX ORDER — PANTOPRAZOLE SODIUM 40 MG/1
40 TABLET, DELAYED RELEASE ORAL
Status: DISCONTINUED | OUTPATIENT
Start: 2021-02-09 | End: 2021-02-24 | Stop reason: HOSPADM

## 2021-02-08 RX ORDER — PRAVASTATIN SODIUM 40 MG
40 TABLET ORAL DAILY
Status: DISCONTINUED | OUTPATIENT
Start: 2021-02-09 | End: 2021-02-24 | Stop reason: HOSPADM

## 2021-02-08 RX ORDER — AMIODARONE HYDROCHLORIDE 200 MG/1
200 TABLET ORAL DAILY
Status: DISCONTINUED | OUTPATIENT
Start: 2021-02-09 | End: 2021-02-16

## 2021-02-08 RX ORDER — FUROSEMIDE 10 MG/ML
20 INJECTION INTRAMUSCULAR; INTRAVENOUS ONCE
Status: COMPLETED | OUTPATIENT
Start: 2021-02-08 | End: 2021-02-08

## 2021-02-08 RX ORDER — IPRATROPIUM BROMIDE AND ALBUTEROL SULFATE 2.5; .5 MG/3ML; MG/3ML
1 SOLUTION RESPIRATORY (INHALATION)
Status: DISCONTINUED | OUTPATIENT
Start: 2021-02-08 | End: 2021-02-09

## 2021-02-08 RX ORDER — ASPIRIN 81 MG/1
81 TABLET ORAL DAILY
COMMUNITY

## 2021-02-08 RX ORDER — POLYETHYLENE GLYCOL 3350 17 G/17G
17 POWDER, FOR SOLUTION ORAL DAILY PRN
Status: DISCONTINUED | OUTPATIENT
Start: 2021-02-08 | End: 2021-02-24 | Stop reason: HOSPADM

## 2021-02-08 RX ORDER — PROMETHAZINE HYDROCHLORIDE 12.5 MG/1
12.5 TABLET ORAL EVERY 6 HOURS PRN
Status: DISCONTINUED | OUTPATIENT
Start: 2021-02-08 | End: 2021-02-24 | Stop reason: HOSPADM

## 2021-02-08 RX ORDER — AZITHROMYCIN 250 MG/1
250 TABLET, FILM COATED ORAL DAILY
Status: COMPLETED | OUTPATIENT
Start: 2021-02-09 | End: 2021-02-12

## 2021-02-08 RX ORDER — ACETAMINOPHEN 650 MG/1
650 SUPPOSITORY RECTAL EVERY 6 HOURS PRN
Status: DISCONTINUED | OUTPATIENT
Start: 2021-02-08 | End: 2021-02-24 | Stop reason: HOSPADM

## 2021-02-08 RX ORDER — LISINOPRIL 2.5 MG/1
2.5 TABLET ORAL DAILY
Status: DISCONTINUED | OUTPATIENT
Start: 2021-02-09 | End: 2021-02-10

## 2021-02-08 RX ORDER — IPRATROPIUM BROMIDE AND ALBUTEROL SULFATE 2.5; .5 MG/3ML; MG/3ML
1 SOLUTION RESPIRATORY (INHALATION)
Status: DISCONTINUED | OUTPATIENT
Start: 2021-02-09 | End: 2021-02-24 | Stop reason: HOSPADM

## 2021-02-08 RX ORDER — LATANOPROST 50 UG/ML
1 SOLUTION/ DROPS OPHTHALMIC NIGHTLY
Status: DISCONTINUED | OUTPATIENT
Start: 2021-02-08 | End: 2021-02-24 | Stop reason: HOSPADM

## 2021-02-08 RX ORDER — SODIUM CHLORIDE 0.9 % (FLUSH) 0.9 %
10 SYRINGE (ML) INJECTION PRN
Status: DISCONTINUED | OUTPATIENT
Start: 2021-02-08 | End: 2021-02-24 | Stop reason: HOSPADM

## 2021-02-08 RX ORDER — HYDRALAZINE HYDROCHLORIDE 20 MG/ML
INJECTION INTRAMUSCULAR; INTRAVENOUS
Status: DISPENSED
Start: 2021-02-08 | End: 2021-02-09

## 2021-02-08 RX ORDER — NICOTINE 21 MG/24HR
1 PATCH, TRANSDERMAL 24 HOURS TRANSDERMAL DAILY PRN
Status: DISCONTINUED | OUTPATIENT
Start: 2021-02-08 | End: 2021-02-24 | Stop reason: HOSPADM

## 2021-02-08 RX ORDER — ALBUTEROL SULFATE 2.5 MG/3ML
2.5 SOLUTION RESPIRATORY (INHALATION)
Status: DISCONTINUED | OUTPATIENT
Start: 2021-02-08 | End: 2021-02-24 | Stop reason: HOSPADM

## 2021-02-08 RX ORDER — SODIUM CHLORIDE 0.9 % (FLUSH) 0.9 %
10 SYRINGE (ML) INJECTION EVERY 12 HOURS SCHEDULED
Status: DISCONTINUED | OUTPATIENT
Start: 2021-02-08 | End: 2021-02-24 | Stop reason: HOSPADM

## 2021-02-08 RX ORDER — ACETAMINOPHEN 325 MG/1
650 TABLET ORAL EVERY 6 HOURS PRN
Status: DISCONTINUED | OUTPATIENT
Start: 2021-02-08 | End: 2021-02-24 | Stop reason: HOSPADM

## 2021-02-08 RX ORDER — ISOSORBIDE MONONITRATE 30 MG/1
30 TABLET, EXTENDED RELEASE ORAL EVERY MORNING
Status: DISCONTINUED | OUTPATIENT
Start: 2021-02-09 | End: 2021-02-24 | Stop reason: HOSPADM

## 2021-02-08 RX ORDER — ONDANSETRON 2 MG/ML
4 INJECTION INTRAMUSCULAR; INTRAVENOUS EVERY 6 HOURS PRN
Status: DISCONTINUED | OUTPATIENT
Start: 2021-02-08 | End: 2021-02-24 | Stop reason: HOSPADM

## 2021-02-08 RX ADMIN — HYDRALAZINE HYDROCHLORIDE 20 MG: 20 INJECTION INTRAMUSCULAR; INTRAVENOUS at 23:19

## 2021-02-08 RX ADMIN — POTASSIUM BICARBONATE 40 MEQ: 782 TABLET, EFFERVESCENT ORAL at 19:43

## 2021-02-08 RX ADMIN — AZITHROMYCIN MONOHYDRATE 500 MG: 500 INJECTION, POWDER, LYOPHILIZED, FOR SOLUTION INTRAVENOUS at 20:46

## 2021-02-08 RX ADMIN — CEFTRIAXONE SODIUM 1000 MG: 1 INJECTION, POWDER, FOR SOLUTION INTRAMUSCULAR; INTRAVENOUS at 19:44

## 2021-02-08 RX ADMIN — FUROSEMIDE 20 MG: 10 INJECTION, SOLUTION INTRAMUSCULAR; INTRAVENOUS at 19:45

## 2021-02-08 ASSESSMENT — ENCOUNTER SYMPTOMS
CONSTIPATION: 0
WHEEZING: 0
SHORTNESS OF BREATH: 0
DIARRHEA: 0
COUGH: 1
SINUS PRESSURE: 0
VOMITING: 0
SORE THROAT: 0
RHINORRHEA: 0
COLOR CHANGE: 0
ABDOMINAL PAIN: 0
NAUSEA: 0

## 2021-02-09 ENCOUNTER — APPOINTMENT (OUTPATIENT)
Dept: GENERAL RADIOLOGY | Age: 86
DRG: 291 | End: 2021-02-09
Payer: MEDICARE

## 2021-02-09 LAB
ADENOVIRUS PCR: NOT DETECTED
ANION GAP SERPL CALCULATED.3IONS-SCNC: 10 MMOL/L (ref 9–17)
BORDETELLA PARAPERTUSSIS: NOT DETECTED
BORDETELLA PERTUSSIS PCR: NOT DETECTED
BUN BLDV-MCNC: 12 MG/DL (ref 8–23)
BUN/CREAT BLD: 19 (ref 9–20)
CALCIUM SERPL-MCNC: 8.4 MG/DL (ref 8.6–10.4)
CHLAMYDIA PNEUMONIAE BY PCR: NOT DETECTED
CHLORIDE BLD-SCNC: 100 MMOL/L (ref 98–107)
CO2: 30 MMOL/L (ref 20–31)
CORONAVIRUS 229E PCR: NOT DETECTED
CORONAVIRUS HKU1 PCR: NOT DETECTED
CORONAVIRUS NL63 PCR: NOT DETECTED
CORONAVIRUS OC43 PCR: NOT DETECTED
CREAT SERPL-MCNC: 0.63 MG/DL (ref 0.5–0.9)
EKG ATRIAL RATE: 53 BPM
EKG P AXIS: 53 DEGREES
EKG P-R INTERVAL: 194 MS
EKG Q-T INTERVAL: 478 MS
EKG QRS DURATION: 94 MS
EKG QTC CALCULATION (BAZETT): 448 MS
EKG R AXIS: 38 DEGREES
EKG T AXIS: 34 DEGREES
EKG VENTRICULAR RATE: 53 BPM
GFR AFRICAN AMERICAN: >60 ML/MIN
GFR NON-AFRICAN AMERICAN: >60 ML/MIN
GFR SERPL CREATININE-BSD FRML MDRD: ABNORMAL ML/MIN/{1.73_M2}
GFR SERPL CREATININE-BSD FRML MDRD: ABNORMAL ML/MIN/{1.73_M2}
GLUCOSE BLD-MCNC: 121 MG/DL (ref 70–99)
HCT VFR BLD CALC: 35.6 % (ref 36.3–47.1)
HEMOGLOBIN: 10.9 G/DL (ref 11.9–15.1)
HUMAN METAPNEUMOVIRUS PCR: NOT DETECTED
INFLUENZA A BY PCR: NOT DETECTED
INFLUENZA A H1 (2009) PCR: NORMAL
INFLUENZA A H1 PCR: NORMAL
INFLUENZA A H3 PCR: NORMAL
INFLUENZA B BY PCR: NOT DETECTED
MAGNESIUM: 1.7 MG/DL (ref 1.6–2.6)
MCH RBC QN AUTO: 26.7 PG (ref 25.2–33.5)
MCHC RBC AUTO-ENTMCNC: 30.6 G/DL (ref 28.4–34.8)
MCV RBC AUTO: 87.3 FL (ref 82.6–102.9)
MYCOPLASMA PNEUMONIAE PCR: NOT DETECTED
NRBC AUTOMATED: 0 PER 100 WBC
PARAINFLUENZA 1 PCR: NOT DETECTED
PARAINFLUENZA 2 PCR: NOT DETECTED
PARAINFLUENZA 3 PCR: NOT DETECTED
PARAINFLUENZA 4 PCR: NOT DETECTED
PDW BLD-RTO: 15.9 % (ref 11.8–14.4)
PLATELET # BLD: 200 K/UL (ref 138–453)
PMV BLD AUTO: 11 FL (ref 8.1–13.5)
POTASSIUM SERPL-SCNC: 3.3 MMOL/L (ref 3.7–5.3)
PROCALCITONIN: 0.3 NG/ML
RBC # BLD: 4.08 M/UL (ref 3.95–5.11)
RESP SYNCYTIAL VIRUS PCR: NOT DETECTED
RHINO/ENTEROVIRUS PCR: NOT DETECTED
SARS-COV-2, PCR: NOT DETECTED
SODIUM BLD-SCNC: 140 MMOL/L (ref 135–144)
SPECIMEN DESCRIPTION: NORMAL
WBC # BLD: 14.4 K/UL (ref 3.5–11.3)

## 2021-02-09 PROCEDURE — 83735 ASSAY OF MAGNESIUM: CPT

## 2021-02-09 PROCEDURE — 84145 PROCALCITONIN (PCT): CPT

## 2021-02-09 PROCEDURE — 97530 THERAPEUTIC ACTIVITIES: CPT

## 2021-02-09 PROCEDURE — 6360000002 HC RX W HCPCS: Performed by: FAMILY MEDICINE

## 2021-02-09 PROCEDURE — 36415 COLL VENOUS BLD VENIPUNCTURE: CPT

## 2021-02-09 PROCEDURE — 2700000000 HC OXYGEN THERAPY PER DAY

## 2021-02-09 PROCEDURE — 71045 X-RAY EXAM CHEST 1 VIEW: CPT

## 2021-02-09 PROCEDURE — 97166 OT EVAL MOD COMPLEX 45 MIN: CPT

## 2021-02-09 PROCEDURE — 94761 N-INVAS EAR/PLS OXIMETRY MLT: CPT

## 2021-02-09 PROCEDURE — 80048 BASIC METABOLIC PNL TOTAL CA: CPT

## 2021-02-09 PROCEDURE — 6370000000 HC RX 637 (ALT 250 FOR IP): Performed by: FAMILY MEDICINE

## 2021-02-09 PROCEDURE — 97116 GAIT TRAINING THERAPY: CPT

## 2021-02-09 PROCEDURE — 2060000000 HC ICU INTERMEDIATE R&B

## 2021-02-09 PROCEDURE — 93010 ELECTROCARDIOGRAM REPORT: CPT | Performed by: INTERNAL MEDICINE

## 2021-02-09 PROCEDURE — 85027 COMPLETE CBC AUTOMATED: CPT

## 2021-02-09 PROCEDURE — 97163 PT EVAL HIGH COMPLEX 45 MIN: CPT

## 2021-02-09 PROCEDURE — 2580000003 HC RX 258: Performed by: FAMILY MEDICINE

## 2021-02-09 PROCEDURE — 94640 AIRWAY INHALATION TREATMENT: CPT

## 2021-02-09 PROCEDURE — 97535 SELF CARE MNGMENT TRAINING: CPT

## 2021-02-09 RX ORDER — POTASSIUM CHLORIDE 20 MEQ/1
40 TABLET, EXTENDED RELEASE ORAL ONCE
Status: COMPLETED | OUTPATIENT
Start: 2021-02-09 | End: 2021-02-09

## 2021-02-09 RX ADMIN — ONDANSETRON 4 MG: 2 INJECTION INTRAMUSCULAR; INTRAVENOUS at 12:09

## 2021-02-09 RX ADMIN — APIXABAN 5 MG: 5 TABLET, FILM COATED ORAL at 00:35

## 2021-02-09 RX ADMIN — ISOSORBIDE MONONITRATE 30 MG: 30 TABLET ORAL at 09:23

## 2021-02-09 RX ADMIN — OXYBUTYNIN CHLORIDE 5 MG: 5 TABLET ORAL at 10:16

## 2021-02-09 RX ADMIN — APIXABAN 5 MG: 5 TABLET, FILM COATED ORAL at 09:23

## 2021-02-09 RX ADMIN — LISINOPRIL 2.5 MG: 2.5 TABLET ORAL at 09:24

## 2021-02-09 RX ADMIN — PRAVASTATIN SODIUM 40 MG: 40 TABLET ORAL at 09:23

## 2021-02-09 RX ADMIN — AZITHROMYCIN MONOHYDRATE 250 MG: 250 TABLET ORAL at 09:22

## 2021-02-09 RX ADMIN — POTASSIUM CHLORIDE 40 MEQ: 20 TABLET, EXTENDED RELEASE ORAL at 09:23

## 2021-02-09 RX ADMIN — SERTRALINE HYDROCHLORIDE 100 MG: 50 TABLET ORAL at 09:23

## 2021-02-09 RX ADMIN — IPRATROPIUM BROMIDE AND ALBUTEROL SULFATE 1 AMPULE: .5; 3 SOLUTION RESPIRATORY (INHALATION) at 15:33

## 2021-02-09 RX ADMIN — APIXABAN 5 MG: 5 TABLET, FILM COATED ORAL at 21:39

## 2021-02-09 RX ADMIN — FUROSEMIDE 20 MG: 10 INJECTION, SOLUTION INTRAMUSCULAR; INTRAVENOUS at 17:56

## 2021-02-09 RX ADMIN — PANTOPRAZOLE SODIUM 40 MG: 40 TABLET, DELAYED RELEASE ORAL at 09:23

## 2021-02-09 RX ADMIN — METOPROLOL TARTRATE 25 MG: 25 TABLET, FILM COATED ORAL at 21:39

## 2021-02-09 RX ADMIN — IPRATROPIUM BROMIDE AND ALBUTEROL SULFATE 1 AMPULE: .5; 3 SOLUTION RESPIRATORY (INHALATION) at 11:42

## 2021-02-09 RX ADMIN — IPRATROPIUM BROMIDE AND ALBUTEROL SULFATE 1 AMPULE: .5; 3 SOLUTION RESPIRATORY (INHALATION) at 20:03

## 2021-02-09 RX ADMIN — LATANOPROST 1 DROP: 50 SOLUTION OPHTHALMIC at 22:00

## 2021-02-09 RX ADMIN — IPRATROPIUM BROMIDE AND ALBUTEROL SULFATE 1 AMPULE: .5; 3 SOLUTION RESPIRATORY (INHALATION) at 08:06

## 2021-02-09 RX ADMIN — HYDRALAZINE HYDROCHLORIDE 20 MG: 20 INJECTION INTRAMUSCULAR; INTRAVENOUS at 12:10

## 2021-02-09 RX ADMIN — METOPROLOL TARTRATE 25 MG: 25 TABLET, FILM COATED ORAL at 00:34

## 2021-02-09 RX ADMIN — METOPROLOL TARTRATE 25 MG: 25 TABLET, FILM COATED ORAL at 09:23

## 2021-02-09 RX ADMIN — ACETAMINOPHEN 650 MG: 325 TABLET ORAL at 15:57

## 2021-02-09 RX ADMIN — FUROSEMIDE 20 MG: 10 INJECTION, SOLUTION INTRAMUSCULAR; INTRAVENOUS at 09:22

## 2021-02-09 RX ADMIN — CEFTRIAXONE SODIUM 1000 MG: 1 INJECTION, POWDER, FOR SOLUTION INTRAMUSCULAR; INTRAVENOUS at 21:40

## 2021-02-09 RX ADMIN — SODIUM CHLORIDE, PRESERVATIVE FREE 10 ML: 5 INJECTION INTRAVENOUS at 00:42

## 2021-02-09 RX ADMIN — AMIODARONE HYDROCHLORIDE 200 MG: 200 TABLET ORAL at 09:23

## 2021-02-09 RX ADMIN — SODIUM CHLORIDE, PRESERVATIVE FREE 10 ML: 5 INJECTION INTRAVENOUS at 09:45

## 2021-02-09 RX ADMIN — SODIUM CHLORIDE, PRESERVATIVE FREE 10 ML: 5 INJECTION INTRAVENOUS at 21:40

## 2021-02-09 ASSESSMENT — PAIN SCALES - GENERAL: PAINLEVEL_OUTOF10: 3

## 2021-02-09 NOTE — PROGRESS NOTES
Occupational Therapy   Occupational Therapy Initial Assessment  Date: 2021   Patient Name: January Green  MRN: [id number]     : 3/26/1932    RN Cheyanne Chong reports patient is medically stable for therapy treatment this date. Chart reviewed prior to treatment and patient is agreeable for therapy. All lines intact and patient positioned comfortably at end of treatment. All patient needs addressed prior to ending therapy session. Date of Service: 2021    Discharge Recommendations:  Subacute/Skilled Nursing Facility, Continue to assess pending progress  OT Equipment Recommendations  Equipment Needed: Yes  Mobility Devices: ADL Assistive Devices  ADL Assistive Devices: Toileting - 3-in-1 Commode;Reacher;Long-handled [place];Long-handled Sponge    Assessment   Performance deficits / Impairments: Decreased functional mobility ; Decreased safe awareness;Decreased balance;Decreased ADL status; Decreased strength;Decreased high-level IADLs;Decreased endurance;Decreased posture;Decreased vision/visual deficit; Decreased coordination  Assessment: Skilled OT indicated to improve I and safety in areas of self care and function to return home with assist as needed. Prognosis: Fair  Decision Making: Medium Complexity  OT Education: OT Role;Plan of Care;Energy Conservation;Transfer Training  Patient Education: pursed lip breathing, benefits of being up OOB as able, recommendations for continued therapy services, postural control and safety in function  REQUIRES OT FOLLOW UP: Yes  Activity Tolerance  Activity Tolerance: Patient limited by fatigue(limited by resp status/5 L O2 at this time)  Activity Tolerance: poor plus; pt fatigues easily and with decreased act josie  Safety Devices  Safety Devices in place: Yes  Type of devices: Call light within reach; Chair alarm in place; Left in chair;Patient at risk for falls;Gait belt;Nurse notified(BLE's elevated and pillow under to increase pt's overall comfort and reduce skin issues.)           Patient Diagnosis(es): The encounter diagnosis was Acute congestive heart failure, unspecified heart failure type ([facility] [address] has a past medical history of Acute on chronic diastolic CHF (congestive heart failure) ([facility] [address] Bradycardia, CAD (coronary artery disease), Cancer ([facility] [address] Depression, Gallstones, GERD (gastroesophageal reflux disease), Hiatal hernia, History of cardiac cath, Hyperlipidemia, Hypertension, Insomnia, MI (myocardial infarction) ([facility] [address] PNA (pneumonia), SOB (shortness of breath), and UTI (urinary tract infection). has a past surgical history that includes Appendectomy; Cholecystectomy; Hysterectomy; Coronary angioplasty with stent; Colonoscopy; Coronary artery bypass graft (11/08/2016); Cardiac surgery (11/08/2016); and Cardioversion (12/17/2019). PER ED notes: Choco Julio is a 80 y.o. female who presents to the emergency department by private vehicle for evaluation of shortness of breath patient's daughter states for the last couple of days she has been experiencing shortness of breath. She does have a history of congestive heart failure. She wears oxygen as needed at home but she is requiring oxygen basically 24/7. She states that she has shortness of breath with exertion. She denies any associated fevers or chills.   She has had a cough but has been primarily dry and nonproductive     Restrictions  Restrictions/Precautions  Restrictions/Precautions: General Precautions, Fall Risk  Position Activity Restriction  Other position/activity restrictions: telemetry, O2(5L NC), ICU monitoring, R hand IV    Subjective   General  Chart Reviewed: Yes  Patient assessed for rehabilitation services?: Yes  Family / Caregiver Present: No  Patient Currently in Pain: Denies    Social/Functional History  Social/Functional History  Lives With: Alone  Type of Home: Apartment([facility] living at [facility])  Home Layout: One level(laundry on same level)  Home Access: Level entry  Bathroom Shower/Tub: Walk-in shower  Bathroom Toilet: Standard(pull cord & GB near)  Bathroom Equipment: Grab bars in shower, Shower chair, Grab bars around toilet  Home Equipment: 4 wheeled walker, [address], [address], Alert [employer] Help From: Family(Pt states her dtr is supportive)  ADL Assistance: Independent  Homemaking Assistance: Needs assistance(pt states she eats meals at [employer], staff cleans room, does her own laundry & some easy meal prep)  Homemaking Responsibilities: Yes  Ambulation Assistance: Independent(uses [id number] always)  Transfer Assistance: Independent  Active : No  Patient's  Info: daughter  Occupation: Retired  Type of occupation: raised 5 children  [address]: [email or web address]  Additional Comments: Pt states her dtr is supportive daughter & works at [employer]; pt denies falls       Objective   Vision: Impaired  Vision Exceptions: Wears glasses at all times(Pt states her vision is getting worse)  Hearing: Exceptions to [facility]  Hearing Exceptions: Bilateral hearing aid(Pt states aids are at home & hasn't been wearing them)    Orientation  Overall Orientation Status: Within Functional Limits  Observation/Palpation  Posture: Fair(with RW)  Observation: Pt with mult lines. O2 sats decrease to 85% with activity and O2 on per NC and resumed to [facility] with increased time/seated rest break. Balance  Sitting Balance: Contact guard assistance  Standing Balance: Moderate assistance(x2 for safety/balance and with all line mgt with RW)  Standing Balance  Time: stand josie < 1 min with RW for self care tasks  Functional Mobility  Functional - Mobility Device: Rolling Walker  Activity: (bed and forward steps to window and back to bedside chair with increased time; pt fatigues easily)  Assist Level:  Moderate assistance(x2 for safety/balance and all line mgt)  Functional Mobility Comments: MOD cues/tactile assist needed for upright posture, scanning, pursed lip breathing, pacing, RW safety, awareness/assist with all lines to increase overall safety/reduce falls. Toilet Transfers  Toilet Transfers Comments: N/T and pt with no needs this session     ADL  Feeding: Setup  Grooming: Setup;Minimal assistance(seated)  UE Bathing: Setup;Minimal assistance  LE Bathing: Setup;DEP  UE Dressing: Setup; Moderate assistance donning garry over hosp gown as pt states she was cold  LE Dressing: Setup;DEP (Pt was able to suzy B socks seated EOB with set up/SBA crossing BLE's with increased time and pt extremely fatigued after completing task.)  Toileting: DEP and pt has BSC in room. *Pt is DEP at this time when up with RW and 2 staff assist for safety/balance support and all line mgt. Tone RUE  RUE Tone: Normotonic  Tone LUE  LUE Tone: Normotonic  Coordination  Movements Are Fluid And Coordinated: No  Coordination and Movement description: Fine motor impairments     Bed mobility  Supine to Sit: Unable to assess(Pt sitting EOB upon arrival.)  Sit to Supine: Unable to assess(Pt agreed to sit up in chair after edu on the benefits of being up OOB as able)  Transfers  Stand Step Transfers: Moderate assistance;2 Person assistance(with RW bed to chair with increased time and assist of 2 for mult lines mgt and for safety/balance support)  Sit to stand: Moderate assistance;2 Person assistance  Stand to sit: Moderate assistance;2 Person assistance  Transfer Comments: MOD cues/tactile assist needed for B hand placement, upright posture, RW safety, scanning, pacing, pursed lip breathing, controlled stand to sit and squaring self/AD up to surface as well as awareness/assist with al lines to increase overall safety/reduce fall risk. Cognition  Overall Cognitive Status: Exceptions  Arousal/Alertness: Appropriate responses to stimuli  Following Commands:  Follows all commands without difficulty  Attention Span: Attends with cues to redirect  Memory: Decreased short term memory  Safety Judgement: Decreased awareness of need ADL to CG with use of AE/AD as needed. Short term goal 4: toileting tasks with use of BSC/AD and grab bar as needed to CG. Short term goal 5: ADL transfers and functional mob with AD as needed to CG. Long term goals  Long term goal 1: Pt to stand with SBA and AD josie > 5 mins as able to reduce falls with functional tasks. Long term goal 2: Caregivers to be I with EC/WS and fall prevention tech, pressure relief, AE/DME recommendations with use of handouts. Patient Goals   Patient goals : I just want to be able to breath better and get home!        Therapy Time   Individual Concurrent Group Co-treatment   Time In 0950(plus 10 min chart review/RN communication)         Time Out 1025         Minutes [address] [place], [place]

## 2021-02-09 NOTE — PROGRESS NOTES
Drug interaction - QTc concern    Dear Provider(s):  A drug interaction exists between azithromycin and amiodarone. The interaction is a potential additive QT prolongation. Please continue to monitor your patient's cardiac rhythm as you see appropriate. General risk factors for torsades de pointes  Hospitalization  Advanced Age   Female gender (2-fold risk)  Heart disease  Long Qt interval syndrome  QTc > 500 ms (2-3-fold risk)  Renal or hepatic insufficiency  Hypokalemia  Hypomagnesemia  Hypocalcemia  Diuretic use  Bradycardia  Use of more than one QT-prolonging drug  Rapid IV administration of select medicatrions      If pharmacy can be of any assistance to review the patients medications and offer alternative suggestions, please do not hesitate to contact the pharmacy at [phone]. Thank you.   Josefina Andrews  Pharmacist, 11:28 PM

## 2021-02-09 NOTE — CARE COORDINATION
Case Management Initial Discharge Plan  [place],             Met with:patient to discuss discharge plans. Information verified: address, contacts, phone number, , insurance Yes    Emergency Contact/Next of Kin name & number: Shane    [phone]    PCP: Arnoldo Jacobson MD  Date of last visit: unknown    Insurance Provider: [facility] medicare    Discharge Planning    Living Arrangements:  [address] K[place] Aguero has 1 stories  0 stairs to climb to get into front door, 0stairs to climb to reach second floor  Location of bedroom/bathroom in home main    Patient able to perform ADL's:Assisted    Current Services (outpatient & in home) resides at [facility] assisted living  DME equipment: walker  DME provider: -    Receiving oral anticoagulation therapy? No    If indicated:   Physician managing anticoagulation treatment:   Where does patient obtain lab work for ATC treatment? Potential Assistance Needed:       Patient agreeable to home care: Yes  Freedom of choice provided:  yes    Prior SNF/Rehab Placement and Facility: [facility]/ part of the [facility]  Agreeable to SNF/Rehab: No  [place] of choice provided: n/a     Evaluation: no    Expected Discharge date:  21    Patient expects to be discharged to:  [facility]  Follow Up Appointment: Best Day/ Time: Tuesday AM    Transportation provider: daughter or [facility]  Transportation arrangements needed for discharge: possibly    Readmission Risk              Risk of Unplanned Readmission:        18             Does patient have a readmission risk score greater than 14?: Yes  If yes, follow-up appointment must be made within 7 days of discharge. Goals of Care:       Discharge Plan: Dg: Acute left sided CHF  Patient resides at the [facility] in the assisted living, called [facility]. Daughter works there  Patient has a walker  May need HC.  [facility] provides their own  Phone number to [facility] - [phone]  Continue to follow for any other needs and call [facility] when patient is medically cleared for discharge.           Electronically signed by Vinnie Epperson RN on 2/9/21 at 11:15 AM EST

## 2021-02-09 NOTE — PROGRESS NOTES
Comprehensive Nutrition Assessment    Type and Reason for Visit:  Initial, Positive Nutrition Screen(2-13# wt loss, poor appetite, MAL:2)    Nutrition Recommendations/Plan:   1. Continue with current cardiac diet  2. Consider adding Ensure supplement 2x/d  3. Monitor p.o intake, tolerance, weights    Nutrition Assessment:  Patient admitted with CHF and complaints of SOB. Pt currently on cardiac diet and consuming 1-25%. RN requested ensure supplements. Unable to assess malnutrition due to insuff. data. Will add Ensure 2x/d to meals, may increase to 3x/d if pt not placed on fluid restrictions. Malnutrition Assessment:  Malnutrition Status:  Insufficient data    Context:  Chronic Illness     Findings of the 6 clinical characteristics of malnutrition:  Energy Intake:  Mild decrease in energy intake (Comment)  Weight Loss:  Unable to assess     Body Fat Loss:  Unable to assess     Muscle Mass Loss:  Unable to assess    Fluid Accumulation:  No significant fluid accumulation     Strength:  Not Performed    Estimated Daily Nutrient Needs:  Energy (kcal):  1530-1650kcal (25-27 kcal/kg); Weight Used for Energy Requirements:  Admission     Protein (g):  61-80 gm (1.0-1.3 g/kg); Weight Used for Protein Requirements:  Admission        Fluid (ml/day):Method Used for Fluid Requirements:  1 ml/kcal      Nutrition Related Findings:  no edema, poor appetite/intake, N/V      Wounds:  None       Current Nutrition Therapies:    DIET CARDIAC;   Dietary Nutrition Supplements: Standard High Calorie Oral Supplement    Anthropometric Measures:  · Height: 5' 5\" (165.1 cm)  · Current Body Weight: 135 lb (61.2 kg)   · Admission Body Weight: 135 lb (61.2 kg)    · Usual Body Weight: 141 lb 6.4 oz (64.1 kg)(12/7/2020)     · Ideal Body Weight: 125 lbs;  · BMI: 22.5  · Adjusted Body Weight:  ; No Adjustment   · BMI Categories: Normal Weight (BMI 22.0 to 24.9) age over 72       Nutrition Diagnosis:   · Predicted inadequate energy intake related to cardiac dysfunction as evidenced by intake 0-25%, poor intake prior to admission, weight loss, nausea, vomiting      Nutrition Interventions:   Food and/or Nutrient Delivery:  Continue Current Diet, Start Oral Nutrition Supplement  Nutrition Education/Counseling:  Education not indicated   Coordination of Nutrition Care:  Continue to monitor while inpatient    Goals:  p.o intake >50% of estimated energy needs for all meals       Nutrition Monitoring and Evaluation:   Behavioral-Environmental Outcomes:  None Identified   Food/Nutrient Intake Outcomes:  Food and Nutrient Intake, Supplement Intake  Physical Signs/Symptoms Outcomes:  Biochemical Data, Nausea or Vomiting, Skin, Weight     Discharge Planning:     Too soon to determine     Melva Krueger, [address] [place] [address]  Office Number: [phone]

## 2021-02-09 NOTE — ED PROVIDER NOTES
[facility] ED  EMERGENCY DEPARTMENT ENCOUNTER   ATTENDING ATTESTATION     Pt Name: Sebastian Tabares  MRN: [id number]  [place] 3/26/1932  Date of evaluation: 2/8/21       Sebastian Tabares is a 80 y.o. female who presents with Shortness of Breath      MDM:     Patient's EKG shows sinus bradycardia with rate of 53, MS QRS QTC intervals unremarkable patient has normal axis, no ST elevations or depressions, no significant T wave changes. Nonspecific EKG. Vitals:   Vitals:    02/08/21 1823 02/08/21 1825 02/08/21 1834 02/08/21 1850   BP:  (!) 167/68 (!) 130/99 (!) 121/95   Pulse:  60 67 53   Resp:  18 17 22   Temp: 98 °F (36.7 °C)      SpO2:  (!) 81% 94% 95%   Weight:  135 lb (61.2 kg)     Height:  5' 5\" (1.651 m)           I personally evaluated and examined the patient in conjunction with the Midlevel provider and agree with the assessment, treatment plan, and disposition of the patient as recorded by the midlevel. I performed a history and physical examination of the patient and discussed management with the midlevel. I reviewed the midlevels note and agree with the documented findings and plan of care. Any areas of disagreement are noted on the chart. I was personally present for the key portions of any procedures. I have documented in the chart those procedures where I was not present during the key portions. I have personally reviewed all images and agree with the midlevel's interpretation. I have reviewed the emergency nurses triage note. I agree with the chief complaint, past medical history, past surgical history, allergies, medications, social and family history as documented unless otherwise noted.     Eli Gaxiola MD  Attending Emergency  Physician                 Geovani Douglas MD  02/08/21 [id number]

## 2021-02-09 NOTE — ED PROVIDER NOTES
[address] [place] [address] ED  eMERGENCY dEPARTMENT eNCOUnter      Pt Name: Ger Johnson  MRN: [id number]  [place] 3/26/1932  Date of evaluation: 2/8/2021  Provider: Brynn Guerrero NP, SERA Lindsay 4700       Chief Complaint   Patient presents with    Shortness of Breath         HISTORY OF PRESENT ILLNESS  (Location/Symptom, Timing/Onset, Context/Setting, Quality, Duration, Modifying Factors, Severity.)   Ger Johnson is a 80 y.o. female who presents to the emergency department by private vehicle for evaluation of shortness of breath patient's daughter states for the last couple of days she has been experiencing shortness of breath. She does have a history of congestive heart failure. She wears oxygen as needed at home but she is requiring oxygen basically 24/7. She states that she has shortness of breath with exertion. She denies any associated fevers or chills. She has had a cough but has been primarily dry and nonproductive. Nursing Notes were reviewed.     ALLERGIES     Seasonal    CURRENT MEDICATIONS       Previous Medications    AMIODARONE (CORDARONE) 200 MG TABLET    Take 200 mg by mouth daily    APIXABAN (ELIQUIS) 5 MG TABS TABLET    Take 1 tablet by mouth 2 times daily    FUROSEMIDE (LASIX) 40 MG TABLET    Take 1 tablet by mouth daily    ISOSORBIDE MONONITRATE (IMDUR) 30 MG EXTENDED RELEASE TABLET    Take 30 mg by mouth every morning    LISINOPRIL (PRINIVIL;ZESTRIL) 2.5 MG TABLET    Take 1 tablet by mouth daily    METOPROLOL TARTRATE (LOPRESSOR) 25 MG TABLET    Take 25 mg by mouth 2 times daily     OXYBUTYNIN (DITROPAN) 5 MG TABLET    Take 5 mg by mouth every other day     PANTOPRAZOLE (PROTONIX) 40 MG TABLET    Take 40 mg by mouth every morning (before breakfast)     PRAVASTATIN (PRAVACHOL) 40 MG TABLET    Take 40 mg by mouth daily     SERTRALINE (ZOLOFT) 100 MG TABLET    Take 100 mg by mouth daily     TRAVOPROST, BENZALKONIUM, (TRAVATAN) 0.004 % OPHTHALMIC SOLUTION    Place 1 drop into for color change and rash. Neurological: Negative for dizziness, weakness and headaches. Hematological: Negative for adenopathy. All other systems reviewed and are negative. Except as noted above the remainder of the review of systems was reviewed and negative. PHYSICAL EXAM    (up to 7 for level 4, 8 or more for level 5)     ED Triage Vitals   BP Temp Temp src Pulse Resp SpO2 Height Weight   02/08/21 1825 02/08/21 1823 -- 02/08/21 1825 02/08/21 1825 02/08/21 1825 02/08/21 1825 02/08/21 1825   (!) 167/68 98 °F (36.7 °C)  60 18 (!) 81 % 5' 5\" (1.651 m) 135 lb (61.2 kg)       Physical Exam  Vitals signs reviewed. Constitutional:       Appearance: She is well-developed. HENT:      Head: Normocephalic and atraumatic. Eyes:      Conjunctiva/sclera: Conjunctivae normal.      Pupils: Pupils are equal, round, and reactive to light. Neck:      Musculoskeletal: Normal range of motion and neck supple. Cardiovascular:      Rate and Rhythm: Normal rate and regular rhythm. Pulmonary:      Effort: Pulmonary effort is normal. No respiratory distress. Breath sounds: Normal breath sounds. No stridor. Abdominal:      General: Bowel sounds are normal.      Palpations: Abdomen is soft. Musculoskeletal: Normal range of motion. Lymphadenopathy:      Cervical: No cervical adenopathy. Skin:     General: Skin is warm and dry. Findings: No rash. Neurological:      Mental Status: She is alert and oriented to person, place, and time.              DIAGNOSTIC RESULTS     EKG: All EKG's are interpreted by the Emergency Department Physician who either signs or Co-signs this chart in the absence of a cardiologist.    See dr Odilon Gabriel:   Non-plain film images such as CT, Ultrasound and MRI are read by the radiologist. Plain radiographic images are visualized and preliminarily interpreted by the emergency physician with the below findings:    Xr Chest Portable    Result Date: 2/8/2021  EXAMINATION: ONE XRAY VIEW OF THE CHEST 2/8/2021 4:01 pm COMPARISON: 12/11/2020 HISTORY: ORDERING SYSTEM PROVIDED HISTORY: Chest Pain TECHNOLOGIST PROVIDED HISTORY: Chest Pain Reason for Exam: chest pain Acuity: Chronic Type of Exam: Ongoing Relevant Medical/Surgical History: chest pain and sob, hx of chf FINDINGS: Mild pulmonary hyperinflation. Cardiac size is enlarged. Progressive patchy bilateral interstitial and alveolar infiltrates greater on the left. .  The pulmonary vascularity remains hazy and indistinct. No pneumothorax. No pleural effusions identified . Postsurgical changes overlying the mediastinum. Progressive bilateral patchy interstitial and alveolar infiltrates greater on the left. Overall findings suggest progressive pulmonary vascular congestion with possible superimposed bilateral pneumonia. Interpretation per the Radiologist below, if available at the time of this note:    XR CHEST PORTABLE   Final Result   Progressive bilateral patchy interstitial and alveolar infiltrates greater on   the left. Overall findings suggest progressive pulmonary vascular congestion   with possible superimposed bilateral pneumonia.                  LABS:  Labs Reviewed   CBC WITH AUTO DIFFERENTIAL - Abnormal; Notable for the following components:       Result Value    Hemoglobin 11.4 (*)     RDW 15.9 (*)     Seg Neutrophils 81 (*)     Lymphocytes 8 (*)     Immature Granulocytes 1 (*)     Segs Absolute 8.98 (*)     Absolute Lymph # 0.89 (*)     All other components within normal limits   BASIC METABOLIC PANEL - Abnormal; Notable for the following components:    Glucose 114 (*)     Potassium 3.2 (*)     All other components within normal limits   TROP/MYOGLOBIN - Abnormal; Notable for the following components:    Troponin, High Sensitivity 17 (*)     All other components within normal limits   BRAIN NATRIURETIC PEPTIDE - Abnormal; Notable for the following components:    Pro-BNP 5,808 (*)     All other components within normal limits   CULTURE, BLOOD 1   CULTURE, BLOOD 1   RESPIRATORY PANEL, MOLECULAR, WITH COVID-19   LACTIC ACID       All other labs were within normal range or not returned as of this dictation. EMERGENCY DEPARTMENT COURSE and DIFFERENTIAL DIAGNOSIS/MDM:   Vitals:    Vitals:    02/08/21 1834 02/08/21 1850 02/08/21 1919 02/08/21 2013   BP: (!) 130/99 (!) 121/95 (!) 142/127 (!) 156/116   Pulse: 67 53 55    Resp: 17 22 18    Temp:       SpO2: 94% 95% 94%    Weight:       Height:           Medical Decision Making: Appears to have vascular congestive and a BNP of 5800 in addition to a possible superimposed pneumonia. She was given a dose of IV Lasix and Rocephin and Zithromax here in the emergency department. She will be admitted to the hospital for further evaluation and follow-up. Dr. Mckenzie Johnson would like a respiratory panel which was ordered. CONSULTS:  IP CONSULT TO INTERNAL MEDICINE    CRITICAL CARE TIME     Due to the high probability of sudden and clinically significant deterioration in the patient's condition she required highest level of my preparedness to intervene urgently. I provided critical care time including documentation time, medication orders and management, reevaluation, vital sign assessment, ordering and reviewing of of lab tests ordering and reviewing of x-ray studies, and admission orders. Aggregate critical care time is  31 minutes including only time during which I was engaged in work directly related to her care and did not include time spent treating other patients simultaneously. FINAL IMPRESSION      1. Acute congestive heart failure, unspecified heart failure type [facility])          DISPOSITION/PLAN   DISPOSITION Decision To Admit 02/08/2021 08:07:29 PM      PATIENT REFERRED TO:   No follow-up provider specified.     DISCHARGE MEDICATIONS:     New Prescriptions    No medications on file           (Please note that portions of this note were completed with a voice recognition program.  Efforts were made to edit the dictations but occasionally words are mis-transcribed.)    DELANEY Hicks NP, APRN - CNP  Certified Nurse Practitioner          SERA Nolasco CNP  02/08/21 5013

## 2021-02-09 NOTE — PROGRESS NOTES
Transitions of Care Pharmacy Service   Medication Review    The patient's list of current home medications has been reviewed. Source(s) of information: Patient home med list/ Surescripts    Based on information provided by the above source(s), no changes to the patient's home medication list were necessary. Please review the ACTION REQUESTED section of this note below for any discrepancies on current hospital orders. PROVIDER ACTION REQUESTED  Medications that need to be addressed by a physician/nurse practitioner:    Medication Action Requested        none         Please feel free to call me with any questions about this encounter. Thank you.     Sahara Osei [facility]   Transitions of Care Pharmacy Service  Phone:  [phone]  Fax: [phone]      Electronically signed by Sahara Osei [facility] on 2/9/2021 at 10:36 AM         Medications Prior to Admission: lisinopril (PRINIVIL;ZESTRIL) 2.5 MG tablet, Take 1 tablet by mouth daily  furosemide (LASIX) 40 MG tablet, Take 1 tablet by mouth daily  metoprolol tartrate (LOPRESSOR) 25 MG tablet, Take 25 mg by mouth 2 times daily   amiodarone (CORDARONE) 200 MG tablet, Take 200 mg by mouth daily  apixaban (ELIQUIS) 5 MG TABS tablet, Take 1 tablet by mouth 2 times daily  travoprost, benzalkonium, (TRAVATAN) 0.004 % ophthalmic solution, Place 1 drop into the right eye nightly  isosorbide mononitrate (IMDUR) 30 MG extended release tablet, Take 30 mg by mouth every morning  sertraline (ZOLOFT) 100 MG tablet, Take 100 mg by mouth daily   oxybutynin (DITROPAN) 5 MG tablet, Take 5 mg by mouth every other day   pravastatin (PRAVACHOL) 40 MG tablet, Take 40 mg by mouth daily   pantoprazole (PROTONIX) 40 MG tablet, Take 40 mg by mouth every morning (before breakfast)   aspirin 81 MG EC tablet, Take 81 mg by mouth daily

## 2021-02-09 NOTE — CONSULTS
Pulmonary Medicine and [address] MD      Patient - Sarah Moss   MRN -  [id number]   Acct # - [de-identified]   - 3/26/1932      Date of Admission -  2021  6:28 PM  Date of evaluation -  2021  Room - [address] [place] [address]   Hospital Day -  Farzana Ring MD Primary Care Physician - Elder Huerta MD     Reason for Consult    Acute on chronic hypoxic respiratory failure    Assessment   · Acute on chronic hypoxic respiratory failure  · Decompensated diastolic CHF (EF: 55 to 85% by echo by 2020)  · Bilateral pulmonary infiltrate, mostly secondary to pulmonary edema, doubt pneumonia  · History of CAD/HTN/HDL/HTN, s/p CABG in 2016  · Mild secondary pulmonary hypertension, RVSP 37, secondary to above    Recommendations   · Continue IV antibiotics, Rocephin/Zithromax. Obtain procalcitonin level  · IV diuresis  · Albuterol and Ipratropium Q 4 hours and prn  · X-ray chest in am  · Labs: CBC and BMP in am  · BiPAP, as needed  · 4 liters/min via nasal cannula, wean as tolerated  · DVT prophylaxis, on Eliquis  · ? Fluid restriction  · Cardiology input  · Will follow with you    HPI     Sarah Moss is 80 y.o.,  female, admitted because of decompensated CHF. Patient has history of CAD, hypertension, hyperlipidemia, MI, open heart surgery, depression who has been on oxygen at home recently started the increase shortness of breath for couple of days. She also had dry cough. She denies any fever or chills. She denies any chest pain.     PMHx   Past Medical History      Diagnosis Date    Acute on chronic diastolic CHF (congestive heart failure) (HCC) 10/2/2019    Bradycardia     CAD (coronary artery disease)     Cancer (HCC)     Skin CA on nose    Depression     Gallstones     GERD (gastroesophageal reflux disease)     Hiatal hernia     History of cardiac cath 10/2016    CAD    Hyperlipidemia     Hypertension     Insomnia     MI [facility] infarction) ([facility] [address]  PNA (pneumonia)     SOB (shortness of breath)     UTI (urinary tract infection)       Past Surgical History        Procedure Laterality Date    APPENDECTOMY      CARDIAC SURGERY  11/08/2016    CABG x 3; LIMA-LAD, SVG-PDA, SVG-OM    CARDIOVERSION  12/17/2019    CHOLECYSTECTOMY      COLONOSCOPY      CORONARY ANGIOPLASTY WITH STENT PLACEMENT      CORONARY ARTERY BYPASS GRAFT  11/08/2016    X 3    HYSTERECTOMY         Meds    Current Medications    amiodarone  200 mg Oral Daily    apixaban  5 mg Oral BID    isosorbide mononitrate  30 mg Oral QAM    lisinopril  2.5 mg Oral Daily    metoprolol tartrate  25 mg Oral BID    oxybutynin  5 mg Oral Every Other Day    pantoprazole  40 mg Oral QAM AC    pravastatin  40 mg Oral Daily    sertraline  100 mg Oral Daily    latanoprost  1 drop Both Eyes Nightly    sodium chloride flush  10 mL Intravenous 2 times per day    ipratropium-albuterol  1 ampule Inhalation Q4H WA    cefTRIAXone (ROCEPHIN) IV  1,000 mg Intravenous Q24H    azithromycin  250 mg Oral Daily    furosemide  20 mg Intravenous BID    hydrALAZINE         sodium chloride flush, promethazine **OR** ondansetron, nicotine, polyethylene glycol, acetaminophen **OR** acetaminophen, albuterol, hydrALAZINE  IV Drips/Infusions    Home Medications  Medications Prior to Admission: lisinopril (PRINIVIL;ZESTRIL) 2.5 MG tablet, Take 1 tablet by mouth daily  furosemide (LASIX) 40 MG tablet, Take 1 tablet by mouth daily  metoprolol tartrate (LOPRESSOR) 25 MG tablet, Take 25 mg by mouth 2 times daily   amiodarone (CORDARONE) 200 MG tablet, Take 200 mg by mouth daily  apixaban (ELIQUIS) 5 MG TABS tablet, Take 1 tablet by mouth 2 times daily  travoprost, benzalkonium, (TRAVATAN) 0.004 % ophthalmic solution, Place 1 drop into the right eye nightly  isosorbide mononitrate (IMDUR) 30 MG extended release tablet, Take 30 mg by mouth every morning  sertraline (ZOLOFT) 100 MG tablet, Take 100 mg by mouth daily oxybutynin (DITROPAN) 5 MG tablet, Take 5 mg by mouth every other day   pravastatin (PRAVACHOL) 40 MG tablet, Take 40 mg by mouth daily   pantoprazole (PROTONIX) 40 MG tablet, Take 40 mg by mouth every morning (before breakfast)   aspirin 81 MG EC tablet, Take 81 mg by mouth daily    Allergies    Seasonal  Social History     Social History     Tobacco Use    Smoking status: Former Smoker     Types: Cigarettes     Quit date: 1971     Years since quittin.2    Smokeless tobacco: Never Used    Tobacco comment: quit smoking yrs ago   Substance Use Topics    Alcohol use: Yes     Comment: Rarely     Family History          Problem Relation Age of Onset    Heart Disease Mother     Cancer Mother     Heart Disease Father      ROS - 11 systems   General Denies any fever or chills  HEENT Denies any diplopia, tinnitus or vertigo  Resp positive for shortness of breath and occasional cough. Also positive for nasal congestion off and on. No chest pain  Cardiac Denies any chest pain, palpitations, claudication or edema  GI Denies any melena, hematochezia, hematemesis or pyrosis   Denies any frequency, urgency, hesitancy or incontinence  Heme Denies bruising or bleeding easily  Endocrine Denies any history of diabetes or thyroid disease  Neuro Denies any focal motor or sensory deficits  Psychiatric Denies anxiety, depression, suicidal ideation  Skin Denies rashes, itching, open sores    Vitals     height is 5' 5\" (1.651 m) and weight is 135 lb (61.2 kg). Her oral temperature is 98.4 °F (36.9 °C). Her blood pressure is 137/86 and her pulse is 80. Her respiration is 20 and oxygen saturation is 94%. Body mass index is 22.47 kg/m².   I/O        Intake/Output Summary (Last 24 hours) at 2021 0958  Last data filed at 2021 0930  Gross per 24 hour   Intake 650 ml   Output 850 ml   Net -200 ml     I/O last 3 completed shifts:  In: -   Out: 650 [Urine:650]   Patient Vitals for the past 96 hrs (Last 3 readings):   Weight   02/08/21 1825 135 lb (61.2 kg)     Exam   General Appearance   Awake, alert, oriented, in no acute distress  HEENT - Head is normocephalic, atraumatic. Pupil reactive to light  Neck - Supple, symmetrical, trachea midline and Soft, trachea midline and straight  Lungs -moderate air exchange, bilateral basilar crackles  Cardiovascular - Heart sounds are normal.  Regular rhythm normal rate without murmur, gallop or rub. Abdomen - Soft, nontender, nondistended, no masses or organomegaly  Neurologic - CN II-XII are grossly intact.  There are no focal motor or sensory deficits  Skin - No bruising or bleeding  Extremities - No cyanosis, clubbing or edema    Labs  - Old records and notes have been reviewed in [facility]   CBC     Lab Results   Component Value Date    WBC 14.4 02/09/2021    RBC 4.08 02/09/2021    HGB 10.9 02/09/2021    HCT 35.6 02/09/2021     02/09/2021    MCV 87.3 02/09/2021    MCH 26.7 02/09/2021    MCHC 30.6 02/09/2021    RDW 15.9 02/09/2021    LYMPHOPCT 8 02/08/2021    MONOPCT 9 02/08/2021    BASOPCT 0 02/08/2021    MONOSABS 0.93 02/08/2021    LYMPHSABS 0.89 02/08/2021    EOSABS 0.06 02/08/2021    BASOSABS 0.03 02/08/2021    DIFFTYPE NOT REPORTED 02/08/2021     BMP   Lab Results   Component Value Date     02/09/2021    K 3.3 02/09/2021     02/09/2021    CO2 30 02/09/2021    BUN 12 02/09/2021    CREATININE 0.63 02/09/2021    GLUCOSE 121 02/09/2021    CALCIUM 8.4 02/09/2021    MG 1.7 02/09/2021     LFTS  Lab Results   Component Value Date    ALKPHOS 72 05/01/2020    ALT 8 05/01/2020    AST 16 05/01/2020    PROT 6.1 05/01/2020    BILITOT 0.71 05/01/2020    BILIDIR 0.19 05/01/2020    IBILI 0.52 05/01/2020    LABALBU 3.3 05/01/2020     ABG   Lab Results   Component Value Date    EGT6MOT 24 11/09/2016     PTT  Lab Results   Component Value Date    APTT 25.4 10/02/2019     INR   Lab Results   Component Value Date    INR 1.1 10/02/2019    INR 1.2 11/11/2016    INR 1.2 11/10/2016    PROTIME 11.2 10/02/2019    PROTIME 12.3 (H) 11/11/2016    PROTIME 12.7 (H) 11/10/2016       Radiology    CXR  2/9/2021    (See actual reports for details)    \"Thank you for asking us to see this patient\"    Case discussed with nurse and patient/family. Questions and concerns addressed.     Electronically signed by     Odalis Lozano MD on 2/9/2021 at 9:58 AM  Pulmonary Critical Care and Sleep Medicine,  [facility]  Cell: [phone]  Office: [phone]

## 2021-02-09 NOTE — PROGRESS NOTES
Physical Therapy    Facility/Department: [facility]  Initial Assessment    NAME: Fletcher Londono  : 3/26/1932  MRN: [id number]    Date of Service: 2021    Discharge Recommendations:  [address]     Pt presented to ED on 21 for evaluation of shortness of breath patient's daughter states for the last couple of days she has been experiencing shortness of breath. She does have a history of congestive heart failure. She wears oxygen as needed at home but she is requiring oxygen basically . She states that she has shortness of breath with exertion. She denies any associated fevers or chills. She has had a cough but has been primarily dry and nonproductive. RN reports patient is medically stable for therapy treatment this date. Chart reviewed prior to treatment and patient is agreeable for therapy. Assessment   Body structures, Functions, Activity limitations: Decreased functional mobility ; Decreased safe awareness;Decreased endurance;Decreased balance  Assessment: Pt with deficits of bed mobility, transfers, ambulation, balance,  safety awareness and demonstrates decreased aerobic capacity with minimal activity &  is quick to desaturate saO2 requiring multiple rest breaks. With current deficits & impaired activity tolerance recommend  D/C to [address]. Pt requires continued IP PT & is appropriate to D/C Home with assist & HH PT to maximize independence with functional mobility, balance, safety awareness & activity tolerance.   Prognosis: Good  Decision Making: High Complexity  Exam: ROM, MMT, functional mobility, activity tolerance, Balance, & [employer] AM-PAC 6 Clicks Basic Mobility  Clinical Presentation: unstable  PT Education: Goals;PT Role;Plan of Care;Transfer Training;Energy Conservation;General Safety;Gait Training;Functional Mobility Training  Patient Education: Ed pt on functional mobility, safety awareness, importance of being up & OOB to regain strength, & prevention of sedentary complications,  & optimal breathing techniques  REQUIRES PT FOLLOW UP: Yes  Activity Tolerance  Activity Tolerance: Patient limited by endurance       Patient Diagnosis(es): The encounter diagnosis was Acute congestive heart failure, unspecified heart failure type ([facility] has a past medical history of Acute on chronic diastolic CHF (congestive heart failure) ([facility] Bradycardia, CAD (coronary artery disease), Cancer ([facility] Depression, Gallstones, GERD (gastroesophageal reflux disease), Hiatal hernia, History of cardiac cath, Hyperlipidemia, Hypertension, Insomnia, MI (myocardial infarction) ([facility] PNA (pneumonia), SOB (shortness of breath), and UTI (urinary tract infection). has a past surgical history that includes Appendectomy; Cholecystectomy; Hysterectomy; Coronary angioplasty with stent; Colonoscopy; Coronary artery bypass graft (11/08/2016); Cardiac surgery (11/08/2016); and Cardioversion (12/17/2019).     Restrictions  Restrictions/Precautions  Restrictions/Precautions: General Precautions, Fall Risk  Position Activity Restriction  Other position/activity restrictions: telemetry, O2(5L NC), ICU monitoring, R hand IV  Vision/Hearing  Vision: Impaired  Vision Exceptions: Wears glasses at all times(vision is getting worse)  Hearing: Exceptions to [facility]  Hearing Exceptions: Bilateral hearing aid(at home & hasn't been wearing them)     Subjective  General  Chart Reviewed: Yes  Patient assessed for rehabilitation services?: Yes  Additional Pertinent Hx: CHF, CAD, HLPD, HTN, UTI, SOB  Response To Previous Treatment: Not applicable  General Comment  Comments: RN okays PT  Subjective  Subjective: Pt agreeable to PT  Pain Screening  Patient Currently in Pain: Denies          Orientation  Orientation  Overall Orientation Status: Within Functional Limits  Social/Functional History  Social/Functional History  Lives With: Alone  Type of Home: Apartment([facility] living at [address]: One level(laundry on same level)  Home Access: Level entry  Bathroom Shower/Tub: Walk-in shower  Bathroom Toilet: Standard(pull cord & GB near)  [employer]: Grab bars in shower, Shower chair, Grab bars around toilet  Home Equipment: 4 wheeled walker, Cane, Alert [employer] Help From: Family(Pt states her dtr is supportive)  ADL Assistance: Independent  Homemaking Assistance: Needs assistance(pt states she eats meals at [employer], staff cleans room, does her own laundry & some easy meal prep)  Homemaking Responsibilities: Yes  Ambulation Assistance: Independent(uses [id number] always)  Transfer Assistance: Independent  Active : No  Patient's  Info: daughter  Occupation: Retired  Type of occupation: raised 5 children  [address]: [place]  Additional Comments: Pt states she is supportive daughter & works at "[employer] pt denies falls  Cognition   Cognition  Overall Cognitive Status: Exceptions  Arousal/Alertness: Appropriate responses to stimuli  Following Commands: Follows all commands without difficulty  Attention Span: Attends with cues to redirect  Memory: Decreased short term memory  Safety Judgement: Decreased awareness of need for assistance;Decreased awareness of need for safety  Problem Solving: Assistance required to correct errors made;Assistance required to identify errors made;Decreased awareness of errors  Insights: Decreased awareness of deficits  Initiation: Requires cues for some  Sequencing: Requires cues for some    Objective     Observation/Palpation  Posture: Fair(with RW)  Observation: Pt with mult lines. O2 sats decrease to 85% with activity and O2 on per NC and resumed to [facility] with increased time/seated rest break.     AROM RLE (degrees)  RLE AROM: WFL  AROM LLE (degrees)  LLE AROM : WFL  AROM RUE (degrees)  RUE General AROM: See OT assessment  AROM LUE (degrees)  LUE General AROM: See OT assessment  Strength RLE  Strength RLE: Strengthening, Balance Training, Functional Mobility Training, Transfer Training, Gait Training, Endurance Training, Home Exercise Program, Safety Education & Training, Patient/Caregiver Education & Training  Safety Devices  Type of devices: Call light within reach, Gait belt, Patient at risk for falls    G-Code       OutComes Score                                                  AM-PAC Score  AM-PAC Inpatient Mobility Raw Score : 13 (02/09/21 1022)  AM-PAC Inpatient T-Scale Score : 36.74 (02/09/21 1022)  Mobility Inpatient CMS 0-100% Score: 64.91 (02/09/21 1022)  Mobility Inpatient CMS G-Code Modifier : CL (02/09/21 1022)          Goals  Short term goals  Time Frame for Short term goals: 12 visits  Short term goal 1: Inc bed-mobility & transfers to independent to enable pt to safely get in/OOB  Short term goal 2: Inc gait to amb 200 ft with R/walker indep; Short term goal 3: Pt able to tolerate 30-40 min of activity to include 15-20 reps of ex & functional mobility including 5 minutes of standing to facilitate better activity tolerance  Short term goal 5: Ed pt on home ex's, optimal breathing techniques, safety & energy principles, fall prevention & issue written pt education       Therapy Time   Individual Concurrent Group Co-treatment   Time In 0951         Time Out 1024         Minutes 33+10=43              Additional 10 minutes for chart review      Co-treatment with OT warranted secondary to decreased safety and independence requiring 2 skilled therapy professionals to address individual discipline's goals. PT addressing pre gait trunk strengthening, weight shifting prior to transfers, transfer training and postural control in sitting.       [address], PT

## 2021-02-10 ENCOUNTER — APPOINTMENT (OUTPATIENT)
Dept: GENERAL RADIOLOGY | Age: 86
DRG: 291 | End: 2021-02-10
Payer: MEDICARE

## 2021-02-10 LAB
-: ABNORMAL
ABSOLUTE EOS #: 0 K/UL (ref 0–0.44)
ABSOLUTE IMMATURE GRANULOCYTE: 0.12 K/UL (ref 0–0.3)
ABSOLUTE LYMPH #: 0.6 K/UL (ref 1.1–3.7)
ABSOLUTE MONO #: 0.84 K/UL (ref 0.1–1.2)
AMORPHOUS: ABNORMAL
ANION GAP SERPL CALCULATED.3IONS-SCNC: 7 MMOL/L (ref 9–17)
BACTERIA: ABNORMAL
BASOPHILS # BLD: 0 % (ref 0–2)
BASOPHILS ABSOLUTE: 0 K/UL (ref 0–0.2)
BILIRUBIN URINE: NEGATIVE
BUN BLDV-MCNC: 23 MG/DL (ref 8–23)
BUN/CREAT BLD: 16 (ref 9–20)
CALCIUM SERPL-MCNC: 8.5 MG/DL (ref 8.6–10.4)
CASTS UA: ABNORMAL /LPF
CASTS UA: ABNORMAL /LPF
CHLORIDE BLD-SCNC: 99 MMOL/L (ref 98–107)
CO2: 31 MMOL/L (ref 20–31)
COLOR: YELLOW
COMMENT UA: ABNORMAL
CREAT SERPL-MCNC: 1.46 MG/DL (ref 0.5–0.9)
CREATININE URINE: 124.4 MG/DL (ref 28–217)
CRYSTALS, UA: ABNORMAL /HPF
DIFFERENTIAL TYPE: ABNORMAL
EOSINOPHILS RELATIVE PERCENT: 0 % (ref 1–4)
EPITHELIAL CELLS UA: ABNORMAL /HPF (ref 0–5)
GFR AFRICAN AMERICAN: 41 ML/MIN
GFR NON-AFRICAN AMERICAN: 34 ML/MIN
GFR SERPL CREATININE-BSD FRML MDRD: ABNORMAL ML/MIN/{1.73_M2}
GFR SERPL CREATININE-BSD FRML MDRD: ABNORMAL ML/MIN/{1.73_M2}
GLUCOSE BLD-MCNC: 124 MG/DL (ref 70–99)
GLUCOSE URINE: NEGATIVE
HCT VFR BLD CALC: 32.3 % (ref 36.3–47.1)
HEMOGLOBIN: 9.6 G/DL (ref 11.9–15.1)
IMMATURE GRANULOCYTES: 1 %
KETONES, URINE: NEGATIVE
LEUKOCYTE ESTERASE, URINE: ABNORMAL
LYMPHOCYTES # BLD: 5 % (ref 24–43)
MCH RBC QN AUTO: 26.5 PG (ref 25.2–33.5)
MCHC RBC AUTO-ENTMCNC: 29.7 G/DL (ref 28.4–34.8)
MCV RBC AUTO: 89.2 FL (ref 82.6–102.9)
MONOCYTES # BLD: 7 % (ref 3–12)
MUCUS: ABNORMAL
NITRITE, URINE: NEGATIVE
NRBC AUTOMATED: 0 PER 100 WBC
OTHER OBSERVATIONS UA: ABNORMAL
PDW BLD-RTO: 16.3 % (ref 11.8–14.4)
PH UA: 6 (ref 5–8)
PLATELET # BLD: 183 K/UL (ref 138–453)
PLATELET ESTIMATE: ABNORMAL
PMV BLD AUTO: 11.3 FL (ref 8.1–13.5)
POTASSIUM SERPL-SCNC: 3.8 MMOL/L (ref 3.7–5.3)
PROTEIN UA: NEGATIVE
RBC # BLD: 3.62 M/UL (ref 3.95–5.11)
RBC # BLD: ABNORMAL 10*6/UL
RBC UA: ABNORMAL /HPF (ref 0–2)
RENAL EPITHELIAL, UA: ABNORMAL /HPF
SEG NEUTROPHILS: 87 % (ref 36–65)
SEGMENTED NEUTROPHILS ABSOLUTE COUNT: 10.44 K/UL (ref 1.5–8.1)
SODIUM BLD-SCNC: 137 MMOL/L (ref 135–144)
SODIUM,UR: 44 MMOL/L
SPECIFIC GRAVITY UA: 1.02 (ref 1–1.03)
TRICHOMONAS: ABNORMAL
TURBIDITY: ABNORMAL
URINE HGB: NEGATIVE
UROBILINOGEN, URINE: NORMAL
WBC # BLD: 12 K/UL (ref 3.5–11.3)
WBC # BLD: ABNORMAL 10*3/UL
WBC UA: ABNORMAL /HPF (ref 0–5)
YEAST: ABNORMAL

## 2021-02-10 PROCEDURE — 84300 ASSAY OF URINE SODIUM: CPT

## 2021-02-10 PROCEDURE — 6370000000 HC RX 637 (ALT 250 FOR IP): Performed by: FAMILY MEDICINE

## 2021-02-10 PROCEDURE — 36415 COLL VENOUS BLD VENIPUNCTURE: CPT

## 2021-02-10 PROCEDURE — 6360000002 HC RX W HCPCS: Performed by: FAMILY MEDICINE

## 2021-02-10 PROCEDURE — 2700000000 HC OXYGEN THERAPY PER DAY

## 2021-02-10 PROCEDURE — 71045 X-RAY EXAM CHEST 1 VIEW: CPT

## 2021-02-10 PROCEDURE — 97535 SELF CARE MNGMENT TRAINING: CPT

## 2021-02-10 PROCEDURE — 81001 URINALYSIS AUTO W/SCOPE: CPT

## 2021-02-10 PROCEDURE — 94640 AIRWAY INHALATION TREATMENT: CPT

## 2021-02-10 PROCEDURE — 2060000000 HC ICU INTERMEDIATE R&B

## 2021-02-10 PROCEDURE — 2580000003 HC RX 258: Performed by: FAMILY MEDICINE

## 2021-02-10 PROCEDURE — 97116 GAIT TRAINING THERAPY: CPT

## 2021-02-10 PROCEDURE — 82570 ASSAY OF URINE CREATININE: CPT

## 2021-02-10 PROCEDURE — 94761 N-INVAS EAR/PLS OXIMETRY MLT: CPT

## 2021-02-10 PROCEDURE — 97530 THERAPEUTIC ACTIVITIES: CPT

## 2021-02-10 PROCEDURE — 85025 COMPLETE CBC W/AUTO DIFF WBC: CPT

## 2021-02-10 PROCEDURE — 80048 BASIC METABOLIC PNL TOTAL CA: CPT

## 2021-02-10 PROCEDURE — 97112 NEUROMUSCULAR REEDUCATION: CPT

## 2021-02-10 RX ADMIN — SODIUM CHLORIDE, PRESERVATIVE FREE 10 ML: 5 INJECTION INTRAVENOUS at 21:24

## 2021-02-10 RX ADMIN — SODIUM CHLORIDE, PRESERVATIVE FREE 10 ML: 5 INJECTION INTRAVENOUS at 08:54

## 2021-02-10 RX ADMIN — CEFTRIAXONE SODIUM 1000 MG: 1 INJECTION, POWDER, FOR SOLUTION INTRAMUSCULAR; INTRAVENOUS at 21:23

## 2021-02-10 RX ADMIN — PANTOPRAZOLE SODIUM 40 MG: 40 TABLET, DELAYED RELEASE ORAL at 08:56

## 2021-02-10 RX ADMIN — METOPROLOL TARTRATE 25 MG: 25 TABLET, FILM COATED ORAL at 21:24

## 2021-02-10 RX ADMIN — ISOSORBIDE MONONITRATE 30 MG: 30 TABLET ORAL at 08:53

## 2021-02-10 RX ADMIN — LISINOPRIL 2.5 MG: 2.5 TABLET ORAL at 08:53

## 2021-02-10 RX ADMIN — AZITHROMYCIN MONOHYDRATE 250 MG: 250 TABLET ORAL at 08:53

## 2021-02-10 RX ADMIN — AMIODARONE HYDROCHLORIDE 200 MG: 200 TABLET ORAL at 08:53

## 2021-02-10 RX ADMIN — LATANOPROST 1 DROP: 50 SOLUTION OPHTHALMIC at 22:17

## 2021-02-10 RX ADMIN — FUROSEMIDE 20 MG: 10 INJECTION, SOLUTION INTRAMUSCULAR; INTRAVENOUS at 08:53

## 2021-02-10 RX ADMIN — IPRATROPIUM BROMIDE AND ALBUTEROL SULFATE 1 AMPULE: .5; 3 SOLUTION RESPIRATORY (INHALATION) at 14:50

## 2021-02-10 RX ADMIN — APIXABAN 5 MG: 5 TABLET, FILM COATED ORAL at 21:24

## 2021-02-10 RX ADMIN — PRAVASTATIN SODIUM 40 MG: 40 TABLET ORAL at 08:53

## 2021-02-10 RX ADMIN — IPRATROPIUM BROMIDE AND ALBUTEROL SULFATE 1 AMPULE: .5; 3 SOLUTION RESPIRATORY (INHALATION) at 11:39

## 2021-02-10 RX ADMIN — IPRATROPIUM BROMIDE AND ALBUTEROL SULFATE 1 AMPULE: .5; 3 SOLUTION RESPIRATORY (INHALATION) at 19:35

## 2021-02-10 RX ADMIN — METOPROLOL TARTRATE 25 MG: 25 TABLET, FILM COATED ORAL at 08:53

## 2021-02-10 RX ADMIN — APIXABAN 5 MG: 5 TABLET, FILM COATED ORAL at 08:53

## 2021-02-10 RX ADMIN — ACETAMINOPHEN 650 MG: 325 TABLET ORAL at 14:50

## 2021-02-10 RX ADMIN — SERTRALINE HYDROCHLORIDE 100 MG: 50 TABLET ORAL at 08:53

## 2021-02-10 NOTE — CONSULTS
Consult Note    Reason for Consult: ELICEO    Requesting Physician:  Aruna Bridges MD    HISTORY OF PRESENT ILLNESS:    The patient is a 80 y.o. female who presents with worsening shortness of breath. Initial chest x-ray showed progressive pulmonary vascular congestion with possible superimposed bilateral pneumonia. Chest x-ray today showed bilateral airspace disease slightly increased at the right apex compared to prior. Creatinine baseline 0.6 which she was at on admission and yesterday. Creatinine elevated to 1.46 today. Hypokalemia on admission and yesterday. Potassium was 3.8 today. Recent hemoglobin 9.6. WBC was 14.4 today and today 12.0. SBP was originally trending as high as 214/75 on day of admission. She became hypotensive at 99/78 about 8 hours later. SBP trending 100s to 130s today. She originally was requiring nasal cannula and is now on high flow. Home medications include lisinopril, Lasix, metoprolol, amiodarone. She is currently on Lasix 20 mg IV twice daily, lisinopril, metoprolol, amiodarone. She has been up to the bedside commode voiding. No urinary retention on bladder scan    Review Of Systems:   Constitutional: No fever, chills, lethargy, weakness or wt loss  HEENT:  No headache, nasal discharge or sore throat. Cardiac:  No chest pain, +dyspnea, orthopnea or PND. Chest:  No cough, phlegm or wheezing. Abdomen:  No abdominal pain, nausea, vomiting or diarrhea. Neuro:  No gross focal weakness, numbness, abnormal movements or seizure like activity. Skin:   No rashes or itching. :   No hematuria, pyuria, dysuria or flank pain. Extremities:  No swelling or joint pains. Endocrine: No polyuria, polydypsia, or thyroid problems. Hematology:    No bleeding disorders, bruising or anemia. All other ROS is negative.     Past Medical History:   Diagnosis Date    Acute on chronic diastolic CHF (congestive heart failure) (HCC) 10/2/2019    Bradycardia     CAD (coronary artery mouth every morning (before breakfast)    Yes Historical Provider, MD   aspirin 81 MG EC tablet Take 81 mg by mouth daily    Historical Provider, MD       Scheduled Meds:   amiodarone  200 mg Oral Daily    apixaban  5 mg Oral BID    isosorbide mononitrate  30 mg Oral QAM    lisinopril  2.5 mg Oral Daily    metoprolol tartrate  25 mg Oral BID    oxybutynin  5 mg Oral Every Other Day    pantoprazole  40 mg Oral QAM AC    pravastatin  40 mg Oral Daily    sertraline  100 mg Oral Daily    latanoprost  1 drop Both Eyes Nightly    sodium chloride flush  10 mL Intravenous 2 times per day    ipratropium-albuterol  1 ampule Inhalation Q4H WA    cefTRIAXone (ROCEPHIN) IV  1,000 mg Intravenous Q24H    azithromycin  250 mg Oral Daily    furosemide  20 mg Intravenous BID     Continuous Infusions:  PRN Meds:sodium chloride flush, promethazine **OR** ondansetron, nicotine, polyethylene glycol, acetaminophen **OR** acetaminophen, albuterol, hydrALAZINE    Allergies   Allergen Reactions    Seasonal        Social History     Socioeconomic History    Marital status:       Spouse name: Not on file    Number of children: Not on file    Years of education: Not on file    Highest education level: Not on file   Occupational History    Not on file   Social Needs    Financial resource strain: Not on file    Food insecurity     Worry: Not on file     Inability: Not on file    Transportation needs     Medical: Not on file     Non-medical: Not on file   Tobacco Use    Smoking status: Former Smoker     Types: Cigarettes     Quit date: 1971     Years since quittin.2    Smokeless tobacco: Never Used    Tobacco comment: quit smoking yrs ago   Substance and Sexual Activity    Alcohol use: Yes     Comment: Rarely    Drug use: No    Sexual activity: Not on file   Lifestyle    Physical activity     Days per week: Not on file     Minutes per session: Not on file    Stress: Not on file   Relationships    Social connections     Talks on phone: Not on file     Gets together: Not on file     Attends Restoration service: Not on file     Active member of club or organization: Not on file     Attends meetings of clubs or organizations: Not on file     Relationship status: Not on file    Intimate partner violence     Fear of current or ex partner: Not on file     Emotionally abused: Not on file     Physically abused: Not on file     Forced sexual activity: Not on file   Other Topics Concern    Not on file   Social History Narrative    Not on file       Family History   Problem Relation Age of Onset    Heart Disease Mother     Cancer Mother     Heart Disease Father          Physical Exam:  Vitals:    02/10/21 1230 02/10/21 1300 02/10/21 1357 02/10/21 1400   BP:  (!) 111/42     Pulse: 69 71     Resp: 17 18 25 16   Temp:       TempSrc:       SpO2: (!) 88% (!) 87% 93% 100%   Weight:       Height:         I/O last 3 completed shifts: In: 1060 [P.O.:1010; IV Piggyback:50]  Out: 200 [Urine:200]      General:  Awake, alert, not in distress, and comfortably on high flow. Appears to be stated age. HEENT: Atraumatic, normocephalic. Anicteric sclera. Pink and moist oral mucosa. Neck supple. No JVD. Chest: Bilateral air entry, clear to auscultation, no wheezing, rhonchi or rales. Cardiovascular: RRR, S1S2, no murmur, rub or gallop. No lower extremity edema. Abdomen: Soft, non tender to palpation. Active bowel sounds x 4 quadrants. Musculoskeletal: Active ROM x 4 extremities. No cyanosis or clubbing. Integumentary: Pink, warm and dry. Free from rash or lesions. Skin turgor normal.  CNS: Oriented to person, place and time. Cranial nerves grossly intact. Speech clear. Face symmetrical. No tremor.      Data:    CBC:   Lab Results   Component Value Date    WBC 12.0 (H) 02/10/2021    HGB 9.6 (L) 02/10/2021    HCT 32.3 (L) 02/10/2021    MCV 89.2 02/10/2021     02/10/2021     BMP:    Lab Results   Component Value Date  02/10/2021     02/09/2021     02/08/2021    K 3.8 02/10/2021    K 3.3 (L) 02/09/2021    K 3.2 (L) 02/08/2021    CL 99 02/10/2021     02/09/2021     02/08/2021    CO2 31 02/10/2021    CO2 30 02/09/2021    CO2 30 02/08/2021    BUN 23 02/10/2021    BUN 12 02/09/2021    BUN 12 02/08/2021    CREATININE 1.46 (H) 02/10/2021    CREATININE 0.63 02/09/2021    CREATININE 0.69 02/08/2021    GLUCOSE 124 (H) 02/10/2021    GLUCOSE 121 (H) 02/09/2021    GLUCOSE 114 (H) 02/08/2021     CMP:   Lab Results   Component Value Date     02/10/2021    K 3.8 02/10/2021    CL 99 02/10/2021    CO2 31 02/10/2021    BUN 23 02/10/2021    CREATININE 1.46 02/10/2021    GLUCOSE 124 02/10/2021    CALCIUM 8.5 02/10/2021    PROT 6.1 05/01/2020    LABALBU 3.3 05/01/2020    BILITOT 0.71 05/01/2020    ALKPHOS 72 05/01/2020    AST 16 05/01/2020    ALT 8 05/01/2020      Hepatic:   Lab Results   Component Value Date    AST 16 05/01/2020    AST 67 (H) 10/02/2019    AST 18 11/07/2016    ALT 8 05/01/2020    ALT 63 (H) 10/02/2019    ALT 12 11/07/2016    BILITOT 0.71 05/01/2020    BILITOT 0.82 10/02/2019    BILITOT 0.39 11/07/2016    ALKPHOS 72 05/01/2020    ALKPHOS 103 10/02/2019    ALKPHOS 70 11/07/2016     BNP: No results found for: BNP  Lipids:   Lab Results   Component Value Date    CHOL 109 12/08/2020    HDL 38 (L) 12/08/2020     INR:   Lab Results   Component Value Date    INR 1.1 10/02/2019    INR 1.2 11/11/2016    INR 1.2 11/10/2016     PTH: No results found for: PTH  Phosphorus:  No results found for: PHOS  Ionized Calcium: No results found for: IONCA  Magnesium:   Lab Results   Component Value Date    MG 1.7 02/09/2021     Albumin:   Lab Results   Component Value Date    LABALBU 3.3 05/01/2020     Last 3 CK, CKMB, Troponin: @LABRCNT(CKTOTAL:3,CKMB:3,TROPONINI:3)       URINE:)No results found for: Sakina Gonzalez    Radiology:   Reviewed. Assessment:  1. Acute Kidney Injury likely hemodynamically related  2. Creatinine 0.6  3. Hypertension with recent hypotension  4. CHF exacerbation  5. Hypoxic respiratory failure  6. Hypokalemia    Plan:  1.  DC lisinopril. Discontinue IV Lasix. 2.  Monitor blood pressure and heart rate. 3. No need to check Renal Ultrasound to r/o element of obstruction and to assess the kidney size/echotexture, unless GFR not improving. Strict I&O.  4. Comprehensive urine testing including Urinalysis, Urine sodium, Creatinine (FENa) and Eosinophils. 5. Antibiotics per primary and pulmonary. 6.  Cardiac diet with 1000mL fluid restriction. Avoid nephrotoxic drugs and IV contrast exposure. Thank you for the consultation. Please do not hesitate to contact us for any further questions/concerns. We will continue to follow along with you. Pt seen in collaboration with Dr. Vang. Electronically signed by SERA Hilton CNP  on 2/10/2021 at 2:13 PM       Physician Addendum  I have seen and examined pt at bed side.    I reviewed and agree with CNP's note. I performed all key and critical portions of this evaluation.  I agree with the plan of care as noted above.     Electronically signed by Mera Iqbal MD on 02/10/21 8:18 PM

## 2021-02-10 NOTE — PLAN OF CARE
Problem: Skin Integrity:  Goal: Will show no infection signs and symptoms  Description: Will show no infection signs and symptoms  Outcome: Ongoing  Goal: Absence of new skin breakdown  Description: Absence of new skin breakdown  Outcome: Ongoing     Problem: Nutrition  Goal: Optimal nutrition therapy  Outcome: Ongoing     Problem: Falls - Risk of:  Goal: Will remain free from falls  Description: Will remain free from falls  Outcome: Ongoing  Goal: Absence of physical injury  Description: Absence of physical injury  Outcome: Ongoing

## 2021-02-10 NOTE — PROGRESS NOTES
Physical Therapy  Facility/Department: [facility] ICU  Daily Treatment Note  NAME: Mahogany Ferrera  : 3/26/1932  MRN: [id number]    Date of Service: 2/10/2021    Discharge Recommendations:  Subacute/Skilled Nursing Facility     Assessment   Body structures, Functions, Activity limitations: Decreased functional mobility ; Decreased safe awareness;Decreased endurance;Decreased balance  Assessment: Pt with deficits of bed mobility, transfers, ambulation, balance,  safety awareness and demonstrates decreased aerobic capacity with minimal activity &  is quick to desaturate spO2 requiring multiple rest breaks. With current deficits & impaired activity tolerance recommend  D/C to [address] Pt requires continued IP PT & is appropriate to D/C Home with assist & HH PT to maximize independence with functional mobility, balance, safety awareness & activity tolerance. Prognosis: Good  Decision Making: High Complexity  Exam: ROM, MMT, functional mobility, activity tolerance, Balance, & [employer] AM-PAC 6 Clicks Basic Mobility  PT Education: Energy Conservation;General Safety;Gait Training;Functional Mobility Training  Patient Education: Ed pt on functional mobility, safety awareness, importance of being up & OOB to regain strength, & prevention of sedentary complications,  & optimal breathing techniques  REQUIRES PT FOLLOW UP: Yes  Activity Tolerance  Activity Tolerance: Patient limited by endurance     Patient Diagnosis(es): The encounter diagnosis was Acute congestive heart failure, unspecified heart failure type ([address]      has a past medical history of Acute on chronic diastolic CHF (congestive heart failure) ([address] Bradycardia, CAD (coronary artery disease), Cancer ([address] Depression, Gallstones, GERD (gastroesophageal reflux disease), Hiatal hernia, History of cardiac cath, Hyperlipidemia, Hypertension, Insomnia, MI (myocardial infarction) ([address] PNA (pneumonia), SOB (shortness of breath), and UTI (urinary tract infection). has a past surgical history that includes Appendectomy; Cholecystectomy; Hysterectomy; Coronary angioplasty with stent; Colonoscopy; Coronary artery bypass graft (11/08/2016); Cardiac surgery (11/08/2016); and Cardioversion (12/17/2019). Restrictions  Restrictions/Precautions  Restrictions/Precautions: General Precautions, Fall Risk, Up as Tolerated  Required Braces or Orthoses?: No  Position Activity Restriction  Other position/activity restrictions: telemetry, O2(6L NC), ICU monitoring, R hand IV  Subjective   General  Chart Reviewed: Yes  Additional Pertinent Hx: CHF, CAD, HLPD, HTN, UTI, SOB  Family / Caregiver Present: No  Subjective  Subjective: Pt agreeable to PT  General Comment  Comments: RN Linda Pryor reports patient appropriate for treatment. Orientation  Orientation  Overall Orientation Status: Within Functional Limits  Cognition      Objective   Bed mobility  Rolling to Left: Unable to assess  Rolling to Right: Unable to assess  Supine to Sit: Unable to assess  Sit to Supine: Unable to assess  Comment: Upon arrival. pt. was sitting in chair, up in chair at completion of treatment. Transfers  Sit to Stand: Contact guard assistance  Stand to sit: Contact guard assistance  Bed to Chair: Unable to assess  Stand Pivot Transfers: Minimal Assistance  Comment: Ed + tactile assist on correct use of upper body for safe sit/stand. Ambulation  Ambulation?: Yes  More Ambulation?: No  Ambulation 1  Surface: level tile  Device: Rolling Walker  Other Apparatus: O2  Assistance: Contact guard assistance  Quality of Gait: Improved to step thru gait pattern, req. VCs for pursed lip breathing t/o amb. and exercises. Gait Deviations: Decreased step height;Decreased step length  Distance: 15' x 2  Comments: After amb. pt req. 1-2 min rest break d/t SpO2 level reading 82%, pt. SpO2 increased to WNL after break.   Neuromuscular Education  NDT Treatment: Standing  Neuromuscular Comments: VCs & tactile cues provided for proper balance vince. as well as postural control & alignment during standing exercises with RW. Balance  Posture: Fair  Sitting - Static: Good  Sitting - Dynamic: Good  Standing - Static: Fair;+  Standing - Dynamic: Fair;+  Comments: Standing with RW  Exercises  Comments: Pt. performed standing Lobo LE exercises: LAQ x 10 reps, marches x 10 reps, pt. felt SOB & req. 1-2 minute rest break as SpO2 level was 83%. Pt SpO2 returned WNL. Pt. performed seated Lobo LE exercises: AP, marches, LAQ x 10reps. VCs were given for pursed lip breathing t/o treatment session. SpO2 levels remained WNL. AM-PAC Score  AM-PAC Inpatient Mobility Raw Score : 16 (02/10/21 1315)  AM-PAC Inpatient T-Scale Score : 40.78 (02/10/21 1315)  Mobility Inpatient CMS 0-100% Score: 54.16 (02/10/21 1315)  Mobility Inpatient CMS G-Code Modifier : CK (02/10/21 1315)    Goals  Short term goals  Time Frame for Short term goals: 12 visits  Short term goal 1: Inc bed-mobility & transfers to independent to enable pt to safely get in/OOB  Short term goal 2: Inc gait to amb 200 ft with R/walker indep; Short term goal 3: Pt able to tolerate 30-40 min of activity to include 15-20 reps of ex & functional mobility including 5 minutes of standing to facilitate better activity tolerance  Short term goal 5: Ed pt on home ex's, optimal breathing techniques, safety & energy principles, fall prevention & issue written pt education  Patient Goals   Patient goals :  To go and be with grandkids    Plan    Plan  Times per week: 1-2x/D, 5-6D/week  Current Treatment Recommendations: Strengthening, Balance Training, Functional Mobility Training, Transfer Training, Gait Training, Endurance Training, Home Exercise Program, Safety Education & Training, Patient/Caregiver Education & Training  Safety Devices  Type of devices: Call light within reach, Gait belt, Patient at risk for falls, Left in chair     Therapy Time   Individual Concurrent Group Co-treatment   Time In 1041         Time Out 1109         Minutes 28                 Elise Egan, Student Physical Therapist Assistant

## 2021-02-10 NOTE — CARE COORDINATION
Social work: Noted PT/OT recommendation of SNF. Dtr is agreeable, [facility] SNF can accept and will start precert. Hens done.

## 2021-02-10 NOTE — PROGRESS NOTES
No  Position Activity Restriction  Other position/activity restrictions: telemetry, O2(6L NC), ICU monitoring, R hand IV  Subjective   General  Chart Reviewed: Yes  Patient assessed for rehabilitation services?: Yes  Response to previous treatment: Patient with no complaints from previous session  Family / Caregiver Present: No      Orientation  Orientation  Overall Orientation Status: Within Functional Limits  Objective    ADL  Toileting: Contact guard assistance;Minimal assistance  Additional Comments: Pt on 6L O2, SPO2 dropped to mid-high 80s, verbal cues for pursed lip breathing tech. Pt stated she was tired and wanted to return to bed after using BSC. Once pt was in bed SPO2 dropped into 70s. RN entered room and stated pt needed time to recover. After mod cues for pursed lip breathing tech and extended time pt recovered to 90%. Once pt stops using pursed lip breathing tech SPO2 dropped to 84%. Pt req'd cont cues for proper breathing tech to keep SPO2 to 90-91%. Balance  Sitting Balance: Supervision  Standing Balance: Contact guard assistance(SBA-CGA)  Functional Mobility  Functional - Mobility Device: Rolling Walker  Activity: Other  Assist Level: Contact guard assistance  Functional Mobility Comments: Min cues for safety and total assist with line mgmt. Toilet Transfers  Toilet - Technique: Stand step  Equipment Used: Standard bedside commode  Toilet Transfer: Stand by assistance;Contact guard assistance  Toilet Transfers Comments: Min cues for safety and total assist with line mgmt. Bed mobility  Sit to Supine: Stand by assistance  Transfers  Stand Step Transfers: Stand by assistance;Contact guard assistance  Sit to stand: Stand by assistance  Stand to sit: Stand by assistance  Transfer Comments: Min cues for safety and total assist with line mgmt. Cognition  Overall Cognitive Status: Exceptions  Arousal/Alertness: Appropriate responses to stimuli  Following Commands:  Follows all commands without difficulty  Attention Span: Appears intact  Memory: Decreased short term memory  Safety Judgement: Decreased awareness of need for assistance;Decreased awareness of need for safety  Problem Solving: Assistance required to correct errors made;Assistance required to identify errors made;Decreased awareness of errors  Insights: Decreased awareness of deficits  Initiation: Requires cues for some  Sequencing: Requires cues for some     Perception  Overall Perceptual Status: [facility]                                   Plan   Plan  Times per week: 4-5x/week 1x/day as josie  Current Treatment Recommendations: Strengthening, Balance Training, Functional Mobility Training, Safety Education & Training, Positioning, Endurance Training, Neuromuscular Re-education, Patient/Caregiver Education & Training, Equipment Evaluation, Education, & procurement, Self-Care / ADL, Home Management Training, Cognitive/Perceptual Training             Goals  Short term goals  Time Frame for Short term goals: by discharge, pt to demo  Short term goal 1: bed mob tech with use of rail as needed to CG. Short term goal 2: increase in BUE strength by a 1/2 grade to assist with ADL tasks and be I with simple BUE HEP with use of handouts. Short term goal 3: UB ADL to set up and LB ADL to CG with use of AE/AD as needed. Short term goal 4: toileting tasks with use of BSC/AD and grab bar as needed to CG. Short term goal 5: ADL transfers and functional mob with AD as needed to CG. Long term goals  Long term goal 1: Pt to stand with SBA and AD josie > 5 mins as able to reduce falls with functional tasks. Long term goal 2: Caregivers to be I with EC/WS and fall prevention tech, pressure relief, AE/DME recommendations with use of handouts. Patient Goals   Patient goals : I just want to be able to breath better and get home!        Therapy Time   Individual Concurrent Group Co-treatment   Time In 1300         Time Out 1328         Minutes 28 Ericka Abad, UCLLEN

## 2021-02-10 NOTE — DISCHARGE INSTR - COC
I25.10    Heart failure, diastolic, acute on chronic (HCC) I50.33    Suspected COVID-19 virus infection Z20.822    Acute left-sided CHF (congestive heart failure) ([facility]) I50.1    Mild malnutrition ([facility]) E44.1       Isolation/Infection:   Isolation          No Isolation        Patient Infection Status     Infection Onset Added Last Indicated Last Indicated By Review Planned Expiration Resolved Resolved By    None active    Resolved    COVID-19 Rule Out 21 Respiratory Panel, Molecular, with COVID-19 (Restricted: peds pts or suitable admitted adults) (Ordered)   21 Rule-Out Test Resulted    COVID-19 Rule Out 12/10/20 12/10/20 12/11/20 COVID-19 (Ordered)   20 Rule-Out Test Resulted    COVID-19 Rule Out 20 COVID-19 (Ordered)   20 Rule-Out Test Resulted    COVID-19 Rule Out 20 COVID-19 (Ordered)   20 Rule-Out Test Resulted          Nurse Assessment:  Last Vital Signs: BP (!) 111/42   Pulse 71   Temp 98.3 °F (36.8 °C) (Oral)   Resp 18   Ht 5' 5\" (1.651 m)   Wt 135 lb (61.2 kg)   SpO2 (!) 87%   BMI 22.47 kg/m²     Last documented pain score (0-10 scale): Pain Level: 0  Last Weight:   Wt Readings from Last 1 Encounters:   21 135 lb (61.2 kg)     Mental Status:  {IP PT MENTAL STATUS:}    IV Access:  { ORIANA IV ACCESS:888098528}    Nursing Mobility/ADLs:  Walking   {[facility] DME UVGH:872540139}  Transfer  {[facility] DME SWKC:879400953}  Bathing  {[facility] DME KRNE:395420934}  Dressing  {[facility] DME KCWQ:771646866}  Toileting  {[facility] DME JAAC:950034608}  Feeding  {[facility] DME XJP}  Med Admin  {[facility] DME VDDS:714330481}  Med Delivery   { ORIANA MED Delivery:248317457}    Wound Care Documentation and Therapy:        Elimination:  Continence:   · Bowel: {YES / ME:70326}  · Bladder: {YES / VL:73997}  Urinary Catheter: {Urinary Catheter:093305491}   Colostomy/Ileostomy/Ileal Conduit: {YES / FV:35728}       Date of Last BM: Treatments After Discharge: ***    Physician Certification: I certify the above information and transfer of Anice Oar  is necessary for the continuing treatment of the diagnosis listed and that she requires {Admit to Appropriate Level of Care:54168} for {GREATER/LESS:768882618} 30 days.      Update Admission H&P: {CHP DME Changes in IDQKA:676794563}    PHYSICIAN SIGNATURE:  {Esignature:295435719}

## 2021-02-10 NOTE — CONSULTS
Section of Cardiology   Consult Note      Reason for Consult: Congestive heart failure  Requesting Physician: Jia Workman MD    CHIEF COMPLAINT: Shortness of breath    History Obtained From:  patient, electronic medical record, patient nurses    HISTORY OF PRESENT ILLNESS:      The patient is a 80 y.o. female with significant past medical history of coronary artery disease, CHF who presents with shortness of breath. The patient was admitted to the intensive care unit her oxygen need progressively got worse and now she is on high flow oxygen. The patient does not appear to be edematous. She denies fever or chills or cough. Denies any bleeding. She has some degree of orthopnea. No PND. She tells me that she is on oxygen use at home? Julia Salomon At the time I saw her she appeared to be comfortable. She denies chest pain. No dizziness or syncope or falls. She claims to be compliant with her medications. The patient is well-known to our practice with a history of congestive heart failure and coronary artery disease. Previous diagnostic testing for coronary artery disease includes: cardiac catheterization, echocardiogram.   Previous history of cardiac disease includes: atrial fibrillation, CHF, coronary artery disease and CABG. Coronary artery disease risk factors include: advanced age (older than 54 for men, 72 for women), dyslipidemia, hypertension and sedentary lifestyle.     Past Medical History:    Past Medical History:   Diagnosis Date    Acute on chronic diastolic CHF (congestive heart failure) ([address] 10/2/2019    Bradycardia     CAD (coronary artery disease)     Cancer (HCC)     Skin CA on nose    Depression     Gallstones     GERD (gastroesophageal reflux disease)     Hiatal hernia     History of cardiac cath 10/2016    CAD    Hyperlipidemia     Hypertension     Insomnia     MI (myocardial infarction) ([address]     PNA (pneumonia)     SOB (shortness of breath)     UTI (urinary tract infection) Past Surgical History:    Past Surgical History:   Procedure Laterality Date    APPENDECTOMY      CARDIAC SURGERY  11/08/2016    CABG x 3; LIMA-LAD, SVG-PDA, SVG-OM    CARDIOVERSION  12/17/2019    CHOLECYSTECTOMY      COLONOSCOPY      CORONARY ANGIOPLASTY WITH STENT PLACEMENT      CORONARY ARTERY BYPASS GRAFT  11/08/2016    X 3    HYSTERECTOMY       Home Medications:  Prior to Admission medications    Medication Sig Start Date End Date Taking?  Authorizing Provider   lisinopril (PRINIVIL;ZESTRIL) 2.5 MG tablet Take 1 tablet by mouth daily 12/12/20  Yes Carolyn Cunningham MD   furosemide (LASIX) 40 MG tablet Take 1 tablet by mouth daily 12/11/20  Yes SERA Romo - LUCIA   metoprolol tartrate (LOPRESSOR) 25 MG tablet Take 25 mg by mouth 2 times daily  11/4/19  Yes Historical Provider, MD   amiodarone (CORDARONE) 200 MG tablet Take 200 mg by mouth daily   Yes Historical Provider, MD   apixaban (ELIQUIS) 5 MG TABS tablet Take 1 tablet by mouth 2 times daily 10/5/19  Yes Eris Villavicencio MD   travoprost, benzalkonium, (TRAVATAN) 0.004 % ophthalmic solution Place 1 drop into the right eye nightly   Yes Historical Provider, MD   isosorbide mononitrate (IMDUR) 30 MG extended release tablet Take 30 mg by mouth every morning   Yes Historical Provider, MD   sertraline (ZOLOFT) 100 MG tablet Take 100 mg by mouth daily    Yes Historical Provider, MD   oxybutynin (DITROPAN) 5 MG tablet Take 5 mg by mouth every other day    Yes Historical Provider, MD   pravastatin (PRAVACHOL) 40 MG tablet Take 40 mg by mouth daily    Yes Historical Provider, MD   pantoprazole (PROTONIX) 40 MG tablet Take 40 mg by mouth every morning (before breakfast)    Yes Historical Provider, MD   aspirin 81 MG EC tablet Take 81 mg by mouth daily    Historical Provider, MD     Current Medications:    Current Facility-Administered Medications   Medication Dose Route Frequency Provider Last Rate Last Admin    amiodarone (CORDARONE) tablet 200 mg  200 mg Oral Daily Tucker Arvizu MD   200 mg at 02/10/21 0853    apixaban (ELIQUIS) tablet 5 mg  5 mg Oral BID Tucker Arvizu MD   5 mg at 02/10/21 0853    isosorbide mononitrate (IMDUR) extended release tablet 30 mg  30 mg Oral QAM Tucker Arvizu MD   30 mg at 02/10/21 0853    metoprolol tartrate (LOPRESSOR) tablet 25 mg  25 mg Oral BID Rosa Bailey MD   25 mg at 02/10/21 0853    oxybutynin (DITROPAN) tablet 5 mg  5 mg Oral Every Other Day Tucker Arvizu MD   5 mg at 02/09/21 1016    pantoprazole (PROTONIX) tablet 40 mg  40 mg Oral QAM AC Tucker Arvizu MD   40 mg at 02/10/21 0856    pravastatin (PRAVACHOL) tablet 40 mg  40 mg Oral Daily Tucker Arvizu MD   40 mg at 02/10/21 0853    sertraline (ZOLOFT) tablet 100 mg  100 mg Oral Daily Rosa Bailey MD   100 mg at 02/10/21 0853    latanoprost (XALATAN) 0.005 % ophthalmic solution 1 drop  1 drop Both Eyes Nightly Rosa Bailey MD   1 drop at 02/09/21 2200    sodium chloride flush 0.9 % injection 10 mL  10 mL Intravenous 2 times per day Rosa Bailey MD   10 mL at 02/10/21 0854    sodium chloride flush 0.9 % injection 10 mL  10 mL Intravenous PRN Tucker Arvizu MD        promethazine (PHENERGAN) tablet 12.5 mg  12.5 mg Oral Q6H PRN Tucker Arvizu MD        Or    ondansetron (ZOFRAN) injection 4 mg  4 mg Intravenous Q6H PRN Tucker Arvizu MD   4 mg at 02/09/21 1209    nicotine (NICODERM CQ) 21 MG/24HR 1 patch  1 patch Transdermal Daily PRN Tucker Arvizu MD        polyethylene glycol (GLYCOLAX) packet 17 g  17 g Oral Daily PRN Tucker Arvizu MD        acetaminophen (TYLENOL) tablet 650 mg  650 mg Oral Q6H PRN Tucker Arvizu MD   650 mg at 02/10/21 1450    Or    acetaminophen (TYLENOL) suppository 650 mg  650 mg Rectal Q6H PRN Tucker Arvizu MD        albuterol (PROVENTIL) nebulizer solution 2.5 mg  2.5 mg Nebulization Q2H PRN Tucker Arvizu MD        ipratropium-albuterol (DUONEB) nebulizer solution 1 ampule  1 ampule Inhalation Q4H WA Sommer Melissa MD   1 ampule at 02/10/21 1450    cefTRIAXone (ROCEPHIN) 1000 mg IVPB in 50 mL D5W minibag  1,000 mg Intravenous Q24H Sommer Melissa MD   Stopped at 21 2210    azithromycin (ZITHROMAX) tablet 250 mg  250 mg Oral Daily Tucker Arvizu MD   250 mg at 02/10/21 0853    furosemide (LASIX) injection 20 mg  20 mg Intravenous BID Sommer Melissa MD   20 mg at 02/10/21 6366    hydrALAZINE (APRESOLINE) injection 20 mg  20 mg Intravenous Q6H PRN Tucker Arvizu MD   20 mg at 21 1210     Allergies:  Seasonal    Social History:    Social History     Socioeconomic History    Marital status:       Spouse name: Not on file    Number of children: Not on file    Years of education: Not on file    Highest education level: Not on file   Occupational History    Not on file   Social Needs    Financial resource strain: Not on file    Food insecurity     Worry: Not on file     Inability: Not on file    Transportation needs     Medical: Not on file     Non-medical: Not on file   Tobacco Use    Smoking status: Former Smoker     Types: Cigarettes     Quit date: 1971     Years since quittin.2    Smokeless tobacco: Never Used    Tobacco comment: quit smoking yrs ago   Substance and Sexual Activity    Alcohol use: Yes     Comment: Rarely    Drug use: No    Sexual activity: Not on file   Lifestyle    Physical activity     Days per week: Not on file     Minutes per session: Not on file    Stress: Not on file   Relationships    Social connections     Talks on phone: Not on file     Gets together: Not on file     Attends Orthodox service: Not on file     Active member of club or organization: Not on file     Attends meetings of clubs or organizations: Not on file     Relationship status: Not on file    Intimate partner violence     Fear of current or ex partner: Not on file     Emotionally abused: Not on file     Physically abused: Not on file     Forced sexual activity: Not on file Other Topics Concern    Not on file   Social History Narrative    Not on file     Family History:   Family History   Problem Relation Age of Onset    Heart Disease Mother     Cancer Mother     Heart Disease Father        · REVIEW OF SYSTEMS   As above    PHYSICAL EXAM:    Vitals:    VITALS:  BP (!) 149/59   Pulse 75   Temp 101.4 °F (38.6 °C) (Oral)   Resp 25   Ht 5' 5\" (1.651 m)   Wt 135 lb (61.2 kg)   SpO2 99%   BMI 22.47 kg/m²   24HR INTAKE/OUTPUT:      Intake/Output Summary (Last 24 hours) at 2/10/2021 1609  Last data filed at 2/10/2021 1230  Gross per 24 hour   Intake 1010 ml   Output --   Net 1010 ml       CONSTITUTIONAL:  awake, alert, cooperative, no apparent distress, and appears stated age  EYES: Pupils equal, round and reactive to light, extra ocular muscles intact, sclera clear, conjunctiva normal  ENT:  normocepalic, without obvious abnormality  NECK:  supple, symmetrical, trachea midline, no carotid bruit ,   No  JVD  BACK:  symmetric  LUNGS: She has bibasal crackles and coarse breath sounds  CARDIOVASCULAR:  Normal apical impulse, regular rate and rhythm, normal S1 and S2, no S3 or S4, with systolic ejection murmur at aortic area graded 2-3 over 6 radial and bilateralpresent 2+  ABDOMEN:  No scars, normal bowel sounds, soft, non-distended, non-tender, no masses palpated, no hepatosplenomegally, no bruit. MUSCULOSKELETAL:  there is no redness, warmth, or swelling of the joints   No leg edema. NEUROLOGIC:  Awake, alert, oriented to name, place and time.   SKIN:  no bruising or bleeding, normal skin color, texture, turgor and no jaundice    DATA:   ECG: Admission EKG showed  sinus bradycardia, normal QT interval  ECHO: Date:  From last admission showed normal LV systolic function without significant valvular disease  Stress Test:  Not performed to date  Angiography:  Not performed to date  Chest x-ray reported to have bilateral airspace disease slightly increased in the right apex.    Cardiology Labs:  Recent Labs     02/08/21  1835   MYOGLOBIN 48   TROPONINT NOT REPORTED     Warfarin PT/INR:  Lab Results   Component Value Date    PROTIME 11.2 10/02/2019    INR 1.1 10/02/2019     CBC:  Lab Results   Component Value Date    WBC 12.0 02/10/2021    RBC 3.62 02/10/2021    HGB 9.6 02/10/2021    HCT 32.3 02/10/2021    MCV 89.2 02/10/2021    MCH 26.5 02/10/2021    MCHC 29.7 02/10/2021    RDW 16.3 02/10/2021     02/10/2021    MPV 11.3 02/10/2021     CMP:  Lab Results   Component Value Date     02/10/2021    K 3.8 02/10/2021    CL 99 02/10/2021    CO2 31 02/10/2021    BUN 23 02/10/2021    CREATININE 1.46 02/10/2021    GFRAA 41 02/10/2021    LABGLOM 34 02/10/2021    GLUCOSE 124 02/10/2021    CALCIUM 8.5 02/10/2021     Magnesium:    Lab Results   Component Value Date    MG 1.7 02/09/2021     PTT:    Lab Results   Component Value Date    APTT 25.4 10/02/2019     TSH:    Lab Results   Component Value Date    TSH 4.67 12/08/2020     BMP:  Lab Results   Component Value Date     02/10/2021    K 3.8 02/10/2021    CL 99 02/10/2021    CO2 31 02/10/2021    BUN 23 02/10/2021    LABALBU 3.3 05/01/2020    CREATININE 1.46 02/10/2021    CALCIUM 8.5 02/10/2021    GFRAA 41 02/10/2021    LABGLOM 34 02/10/2021    GLUCOSE 124 02/10/2021     LIVER PROFILE:No results for input(s): AST, ALT, LABALBU, ALKPHOS, BILITOT, BILIDIR, IBILI, PROT, GLOB, ALBUMIN in the last 72 hours. FLP:    Lab Results   Component Value Date    CHOL 109 12/08/2020    TRIG 78 12/08/2020    HDL 38 12/08/2020    LDLCHOLESTEROL 55 12/08/2020         IMPRESSION  1. Acute on chronic diastolic CHF, however I do not think it is the main reason for her severe hypoxemia and oxygen need  2. History of coronary artery disease  3. History of CABG in 2016  4. History of systemic hypertension  5. History of hyperlipidemia  6. History of paroxysmal atrial fibrillation currently in sinus rhythm.   History of cardioversion  Patient Active Problem List   Diagnosis    Symptomatic bradycardia    Suspected sleep apnea    Unexplained night sweats    Pneumonia    GERD (gastroesophageal reflux disease)    Hyperlipidemia    Hypertension    Chronic atrial fibrillation (HCC)    Acute on chronic diastolic CHF (congestive heart failure) (HCC)    Generalized anxiety disorder    Depressive disorder    Nonrheumatic tricuspid valve disorder    Atherosclerotic heart disease of native coronary artery without angina pectoris    Heart failure, diastolic, acute on chronic (HCC)    Suspected COVID-19 virus infection    Acute left-sided CHF (congestive heart failure) (HCC)    Mild malnutrition (HCC)           RECOMMENDATIONS:     Continue current medications  Management plan was discussed with patient     Patient wants to be DNR CCA. I signed the consent for that. Her renal dysfunction is worsened by over diuresing in my opinion. I discussed the case with the nephrologist and he is in agreement. Continue long-acting nitrate and beta-blocker therapy. The patient chronically on anticoagulation. Will monitor renal function. Diuretic and fluid management as per nephrology service. Prognosis is guarded  Thank you for consultation.         Electronically signed by Natalie Bolanos MD on 2/10/2021 at 4:09 PM     CC: Mindy Rose MD

## 2021-02-10 NOTE — H&P
History & Physical  [facility].,    Adult Hospitalist      Name: Jaycob Carranza  MRN: [id number]     Acct: [de-identified]  Room: [address] [place], [place] [address]    Admit Date: 2/8/2021  6:28 PM  PCP: Ricky Garcia MD    Primary Problem  Active Problems:    Acute left-sided CHF (congestive heart failure) ([address]  Resolved Problems:    * No resolved hospital problems.  *        Assesment:   Acute on chronic hypoxemic respiratory failure  Acute on chronic diastolic congestive heart failure  Pulmonary edema/chronic lung infiltrate  Acute kidney injury  Chronic coronary artery disease status post CABG in 2016  Chronic atrial fibrillation  Chronic anticoagulation with Eliquis  Essential hypertension  Mixed hyperlipidemia  Mild pulmonary hypertension  Depression with anxiety  Former smoker  GERD    Plan:   Admit patient to progressive unit  Oxygen to keep SPO2 more than 90%  Telemetry  Check vital signs closely  CBC BMP daily  Blood culture  BiPAP as needed  IV Rocephin for now  Oral azithromycin  IV Lasix  DuoNeb breathing treatment  Continue Eliquis  Continue lisinopril, metoprolol, Imdur, amiodarone  Continue pravastatin  Continue Zoloft  Pulmonology on consult  Consult cardiology  Patient creatinine is elevated to 1.46 from 0.63 yesterday, I will consult Nephrology    Scheduled Meds:   amiodarone  200 mg Oral Daily    apixaban  5 mg Oral BID    isosorbide mononitrate  30 mg Oral QAM    lisinopril  2.5 mg Oral Daily    metoprolol tartrate  25 mg Oral BID    oxybutynin  5 mg Oral Every Other Day    pantoprazole  40 mg Oral QAM AC    pravastatin  40 mg Oral Daily    sertraline  100 mg Oral Daily    latanoprost  1 drop Both Eyes Nightly    sodium chloride flush  10 mL Intravenous 2 times per day    ipratropium-albuterol  1 ampule Inhalation Q4H WA    cefTRIAXone (ROCEPHIN) IV  1,000 mg Intravenous Q24H    azithromycin  250 mg Oral Daily    furosemide  20 mg Intravenous BID     Continuous Infusions:    PRN Meds:      sodium  Gallstones     GERD (gastroesophageal reflux disease)     Hiatal hernia     History of cardiac cath 10/2016    CAD    Hyperlipidemia     Hypertension     Insomnia     MI [facility]my[facility] infarction) [facility]     PNA (pneumonia)     SOB (shortness of breath)     UTI (urinary tract infection)         Past Surgical History:     Past Surgical History:   Procedure Laterality Date    APPENDECTOMY      CARDIAC SURGERY  11/08/2016    CABG x 3; LIMA-LAD, SVG-PDA, SVG-OM    CARDIOVERSION  12/17/2019    CHOLECYSTECTOMY      COLONOSCOPY      CORONARY ANGIOPLASTY WITH STENT PLACEMENT      CORONARY ARTERY BYPASS GRAFT  11/08/2016    X 3    HYSTERECTOMY          Medications Prior to Admission:       Prior to Admission medications    Medication Sig Start Date End Date Taking?  Authorizing Provider   lisinopril (PRINIVIL;ZESTRIL) 2.5 MG tablet Take 1 tablet by mouth daily 12/12/20  Yes Eli Talley MD   furosemide (LASIX) 40 MG tablet Take 1 tablet by mouth daily 12/11/20  Yes Levell Cooks, APRN - CNP   metoprolol tartrate (LOPRESSOR) 25 MG tablet Take 25 mg by mouth 2 times daily  11/4/19  Yes Historical Provider, MD   amiodarone (CORDARONE) 200 MG tablet Take 200 mg by mouth daily   Yes Historical Provider, MD   apixaban (ELIQUIS) 5 MG TABS tablet Take 1 tablet by mouth 2 times daily 10/5/19  Yes Renea Taylor MD   travoprost, benzalkonium, (TRAVATAN) 0.004 % ophthalmic solution Place 1 drop into the right eye nightly   Yes Historical Provider, MD   isosorbide mononitrate (IMDUR) 30 MG extended release tablet Take 30 mg by mouth every morning   Yes Historical Provider, MD   sertraline (ZOLOFT) 100 MG tablet Take 100 mg by mouth daily    Yes Historical Provider, MD   oxybutynin (DITROPAN) 5 MG tablet Take 5 mg by mouth every other day    Yes Historical Provider, MD   pravastatin (PRAVACHOL) 40 MG tablet Take 40 mg by mouth daily    Yes Historical Provider, MD   pantoprazole (PROTONIX) 40 MG tablet Take 40 mg by mouth every morning (before breakfast)    Yes Historical Provider, MD   aspirin 81 MG EC tablet Take 81 mg by mouth daily    Historical Provider, MD        Allergies:       Seasonal    Social History:     Tobacco:    reports that she quit smoking about 49 years ago. Her smoking use included cigarettes. She has never used smokeless tobacco.  Alcohol:      reports current alcohol use. Drug Use:  reports no history of drug use.     Family History:     Family History   Problem Relation Age of Onset    Heart Disease Mother     Cancer Mother     Heart Disease Father          Physical Exam:     Vitals:  BP (!) 111/42   Pulse 71   Temp 98.3 °F (36.8 °C) (Oral)   Resp 18   Ht 5' 5\" (1.651 m)   Wt 135 lb (61.2 kg)   SpO2 (!) 87%   BMI 22.47 kg/m²   Temp (24hrs), Av.9 °F (37.2 °C), Min:98.3 °F (36.8 °C), Max:99.5 °F (37.5 °C)          General appearance - alert, well appearing, and in no acute distress  Mental status - oriented to person, place, and time with normal affect  Head - normocephalic and atraumatic  Eyes - pupils equal and reactive, extraocular eye movements intact, conjunctiva clear  Ears - hearing appears to be intact  Nose - no drainage noted  Mouth - mucous membranes moist  Neck - supple, no carotid bruits, thyroid not palpable  Chest - clear to auscultation, normal effort  Heart - normal rate, regular rhythm, no murmur  Abdomen - soft, nontender, nondistended, bowel sounds present all four quadrants, no masses, hepatomegaly or splenomegaly  Neurological - normal speech, no focal findings or movement disorder noted, cranial nerves II through XII grossly intact  Extremities - peripheral pulses palpable, no pedal edema or calf pain with palpation  Skin - no gross lesions, rashes, or induration noted        Data:     Labs:    Hematology:  Recent Labs     21  1835 21  0439 02/10/21  0458   WBC 11.0 14.4* 12.0*   RBC 4.20 4.08 3.62*   HGB 11.4* 10.9* 9.6*   HCT 37.3 35.6* 32.3*   MCV 88.8 87.3 89.2   MCH 27.1 26.7 26.5   MCHC 30.6 30.6 29.7   RDW 15.9* 15.9* 16.3*    200 183   MPV 10.9 11.0 11.3     Chemistry:  Recent Labs     02/08/21  1835 02/09/21  0439 02/10/21  0458    140 137   K 3.2* 3.3* 3.8    100 99   CO2 30 30 31   GLUCOSE 114* 121* 124*   BUN 12 12 23   CREATININE 0.69 0.63 1.46*   MG  --  1.7  --    ANIONGAP 9 10 7*   LABGLOM >60 >60 34*   GFRAA >60 >60 41*   CALCIUM 8.7 8.4* 8.5*   PROBNP 5,808*  --   --    TROPHS 17*  --   --    MYOGLOBIN 48  --   --      No results for input(s): PROT, LABALBU, LABA1C, M4QCAYR, D4MXIWJ, FT4, TSH, AST, ALT, LDH, GGT, ALKPHOS, LABGGT, BILITOT, BILIDIR, AMMONIA, AMYLASE, LIPASE, LACTATE, CHOL, HDL, LDLCHOLESTEROL, CHOLHDLRATIO, TRIG, VLDL, [id number], PHENYTOIN, PHENYF, URICACID, POCGLU in the last 72 hours. Lab Results   Component Value Date    INR 1.1 10/02/2019    INR 1.2 11/11/2016    INR 1.2 11/10/2016    PROTIME 11.2 10/02/2019    PROTIME 12.3 (H) 11/11/2016    PROTIME 12.7 (H) 11/10/2016       Lab Results   Component Value Date/Time    SPECIAL NOT REPORTED 02/08/2021 07:45 PM    SPECIAL NOT REPORTED 02/08/2021 07:45 PM     Lab Results   Component Value Date/Time    CULTURE NO GROWTH 2 DAYS 02/08/2021 07:45 PM    CULTURE NO GROWTH 2 DAYS 02/08/2021 07:45 PM       Lab Results   Component Value Date    POCPH 7.35 11/09/2016    POCPCO2 41 11/09/2016    POCPO2 85 11/09/2016    POCHCO3 22.4 11/09/2016    NBEA 3 11/09/2016    PBEA NOT REPORTED 11/09/2016    SIO9PUQ 24 11/09/2016    JAQO1WWJ 96 11/09/2016    FIO2 NOT REPORTED 10/02/2019       Radiology:    Xr Chest Portable    Result Date: 2/9/2021  Stable interstitial opacities with superimposed patchy airspace disease probably combination of factors including chronic interstitial disease, vascular congestion and pneumonia. Xr Chest Portable    Result Date: 2/8/2021  Progressive bilateral patchy interstitial and alveolar infiltrates greater on the left.   Overall findings suggest progressive pulmonary vascular congestion with possible superimposed bilateral pneumonia. All radiological studies reviewed                Code Status:  Full Code    Electronically signed by Holli Reza MD on 2/10/2021 at 1:35 PM     Copy sent to Dr. Bonnie Israel MD    This note was created with the assistance of a speech-recognition program.  Although the intention is to generate a document that actually reflects the content of the visit, no guarantees can be provided that every mistake has been identified and corrected by editing. Note was updated later by me after  physical examination and  completion of the assessment.

## 2021-02-10 NOTE — H&P
History & Physical  [facility]    Adult Hospitalist      Name: Valarie Chong  MRN: [id number]     Acct: [de-identified]  Room: [facility]-    Admit Date: 2/8/2021  6:28 PM  PCP: Fransico Meza MD    Primary Problem  Active Problems:    Acute left-sided CHF (congestive heart failure) ([facility]  Resolved Problems:    * No resolved hospital problems.  *        Assesment:   Acute on chronic hypoxemic respiratory failure  Acute on chronic diastolic congestive heart failure  Pulmonary edema/chronic lung infiltrate  Chronic coronary artery disease status post CABG in 2016  Chronic atrial fibrillation  Chronic anticoagulation with Eliquis  Essential hypertension  Mixed hyperlipidemia  Mild pulmonary hypertension  Depression with anxiety  Former smoker  GERD    Plan:   Admit patient to progressive unit  Oxygen to keep SPO2 more than 90%  Telemetry  Check vital signs closely  CBC BMP daily  Blood culture  BiPAP as needed  IV Rocephin for now  Oral azithromycin  IV Lasix  DuoNeb breathing treatment  Continue Eliquis  Continue lisinopril, metoprolol, Imdur, amiodarone  Continue pravastatin  Continue Zoloft  Pulmonology on consult  Consult cardiology    Scheduled Meds:   amiodarone  200 mg Oral Daily    apixaban  5 mg Oral BID    isosorbide mononitrate  30 mg Oral QAM    lisinopril  2.5 mg Oral Daily    metoprolol tartrate  25 mg Oral BID    oxybutynin  5 mg Oral Every Other Day    pantoprazole  40 mg Oral QAM AC    pravastatin  40 mg Oral Daily    sertraline  100 mg Oral Daily    latanoprost  1 drop Both Eyes Nightly    sodium chloride flush  10 mL Intravenous 2 times per day    ipratropium-albuterol  1 ampule Inhalation Q4H WA    cefTRIAXone (ROCEPHIN) IV  1,000 mg Intravenous Q24H    azithromycin  250 mg Oral Daily    furosemide  20 mg Intravenous BID     Continuous Infusions:    PRN Meds:      sodium chloride flush, 10 mL, PRN      promethazine, 12.5 mg, Q6H PRN    Or      ondansetron, 4 mg, Q6H PRN     nicotine, 1 patch, Daily PRN      polyethylene glycol, 17 g, Daily PRN      acetaminophen, 650 mg, Q6H PRN    Or      acetaminophen, 650 mg, Q6H PRN      albuterol, 2.5 mg, Q2H PRN      hydrALAZINE, 20 mg, Q6H PRN        Chief Complaint:     Chief Complaint   Patient presents with    Shortness of Breath         History of Present Illness: Jaycob Carranza is a 80 y.o.  female who presents with Shortness of Breath  Disease 40-year-old female is been admitted to emergency room, patient came to the ER with a complaint of having shortness of breath, further patient is been having some shortness of breath for past couple of days, patient does have history of congestive heart failure in view of oxygen at home, also history of coronary disease, hypertension hyperlipidemia and CABG in the past, patient initial testing in the emergency room is consistent with congestive heart failure, admitted for further management    I have personally reviewed the past medical history, past surgical history, medications, social history, and family history, and summarized in the note. Review of Systems:     All 10 point system is reviewed and negative otherwise mentioned in HPI.       Past Medical History:     Past Medical History:   Diagnosis Date    Acute on chronic diastolic [facility] (congestive heart failure) ([facility] 10/2/2019    Bradycardia     CAD (coronary artery disease)     Cancer (HCC)     Skin CA on nose    Depression     Gallstones     GERD (gastroesophageal reflux disease)     Hiatal hernia     History of cardiac cath 10/2016    CAD    Hyperlipidemia     Hypertension     Insomnia     MI (my[facility] infarction) ([facility]     PNA (pneumonia)     SOB (shortness of breath)     UTI (urinary tract infection)         Past Surgical History:     Past Surgical History:   Procedure Laterality Date    APPENDECTOMY      CARDIAC SURGERY  11/08/2016    CABG x 3; LIMA-LAD, SVG-PDA, SVG-OM    CARDIOVERSION  12/17/2019    CHOLECYSTECTOMY      COLONOSCOPY      CORONARY ANGIOPLASTY WITH STENT PLACEMENT      CORONARY ARTERY BYPASS GRAFT  11/08/2016    X 3    HYSTERECTOMY          Medications Prior to Admission:       Prior to Admission medications    Medication Sig Start Date End Date Taking? Authorizing Provider   lisinopril (PRINIVIL;ZESTRIL) 2.5 MG tablet Take 1 tablet by mouth daily 12/12/20  Yes Al Reveles MD   furosemide (LASIX) 40 MG tablet Take 1 tablet by mouth daily 12/11/20  Yes SERA Gay CNP   metoprolol tartrate (LOPRESSOR) 25 MG tablet Take 25 mg by mouth 2 times daily  11/4/19  Yes Historical Provider, MD   amiodarone (CORDARONE) 200 MG tablet Take 200 mg by mouth daily   Yes Historical Provider, MD   apixaban (ELIQUIS) 5 MG TABS tablet Take 1 tablet by mouth 2 times daily 10/5/19  Yes Maninder Singh MD   travoprost, benzalkonium, (TRAVATAN) 0.004 % ophthalmic solution Place 1 drop into the right eye nightly   Yes Historical Provider, MD   isosorbide mononitrate (IMDUR) 30 MG extended release tablet Take 30 mg by mouth every morning   Yes Historical Provider, MD   sertraline (ZOLOFT) 100 MG tablet Take 100 mg by mouth daily    Yes Historical Provider, MD   oxybutynin (DITROPAN) 5 MG tablet Take 5 mg by mouth every other day    Yes Historical Provider, MD   pravastatin (PRAVACHOL) 40 MG tablet Take 40 mg by mouth daily    Yes Historical Provider, MD   pantoprazole (PROTONIX) 40 MG tablet Take 40 mg by mouth every morning (before breakfast)    Yes Historical Provider, MD   aspirin 81 MG EC tablet Take 81 mg by mouth daily    Historical Provider, MD        Allergies:       Seasonal    Social History:     Tobacco:    reports that she quit smoking about 49 years ago. Her smoking use included cigarettes. She has never used smokeless tobacco.  Alcohol:      reports current alcohol use. Drug Use:  reports no history of drug use.     Family History:     Family History   Problem Relation Age of Onset    Heart Disease Mother     Cancer Mother     Heart Disease Father          Physical Exam:     Vitals:  BP 91/61   Pulse 74   Temp 98.4 °F (36.9 °C) (Oral)   Resp 24   Ht 5' 5\" (1.651 m)   Wt 135 lb (61.2 kg)   SpO2 96%   BMI 22.47 kg/m²   Temp (24hrs), Av.5 °F (36.9 °C), Min:98 °F (36.7 °C), Max:99.5 °F (37.5 °C)          General appearance - alert, well appearing, and in no acute distress  Mental status - oriented to person, place, and time with normal affect  Head - normocephalic and atraumatic  Eyes - pupils equal and reactive, extraocular eye movements intact, conjunctiva clear  Ears - hearing appears to be intact  Nose - no drainage noted  Mouth - mucous membranes moist  Neck - supple, no carotid bruits, thyroid not palpable  Chest - clear to auscultation, normal effort  Heart - normal rate, regular rhythm, no murmur  Abdomen - soft, nontender, nondistended, bowel sounds present all four quadrants, no masses, hepatomegaly or splenomegaly  Neurological - normal speech, no focal findings or movement disorder noted, cranial nerves II through XII grossly intact  Extremities - peripheral pulses palpable, no pedal edema or calf pain with palpation  Skin - no gross lesions, rashes, or induration noted        Data:     Labs:    Hematology:  Recent Labs     219   WBC 11.0 14.4*   RBC 4.20 4.08   HGB 11.4* 10.9*   HCT 37.3 35.6*   MCV 88.8 87.3   MCH 27.1 26.7   MCHC 30.6 30.6   RDW 15.9* 15.9*    200   MPV 10.9 11.0     Chemistry:  Recent Labs     21  0439    140   K 3.2* 3.3*    100   CO2 30 30   GLUCOSE 114* 121*   BUN 12 12   CREATININE 0.69 0.63   MG  --  1.7   ANIONGAP 9 10   LABGLOM >60 >60   GFRAA >60 >60   CALCIUM 8.7 8.4*   PROBNP 5,808*  --    TROPHS 17*  --    MYOGLOBIN 48  --      No results for input(s): PROT, LABALBU, LABA1C, T0TAPLR, K2FPVKE, FT4, TSH, AST, ALT, LDH, GGT, ALKPHOS, LABGGT, BILITOT, BILIDIR, AMMONIA, AMYLASE, LIPASE, LACTATE, CHOL, HDL, LDLCHOLESTEROL, CHOLHDLRATIO, TRIG, VLDL, [id number], PHENYTOIN, PHENYF, URICACID, POCGLU in the last 72 hours. Lab Results   Component Value Date    INR 1.1 10/02/2019    INR 1.2 11/11/2016    INR 1.2 11/10/2016    PROTIME 11.2 10/02/2019    PROTIME 12.3 (H) 11/11/2016    PROTIME 12.7 (H) 11/10/2016       Lab Results   Component Value Date/Time    SPECIAL NOT REPORTED 02/08/2021 07:45 PM    SPECIAL NOT REPORTED 02/08/2021 07:45 PM     Lab Results   Component Value Date/Time    CULTURE NO GROWTH 17 HOURS 02/08/2021 07:45 PM    CULTURE NO GROWTH 17 HOURS 02/08/2021 07:45 PM       Lab Results   Component Value Date    POCPH 7.35 11/09/2016    POCPCO2 41 11/09/2016    POCPO2 85 11/09/2016    POCHCO3 22.4 11/09/2016    NBEA 3 11/09/2016    PBEA NOT REPORTED 11/09/2016    CPR2AUZ 24 11/09/2016    QSYS6CNM 96 11/09/2016    FIO2 NOT REPORTED 10/02/2019       Radiology:    Xr Chest Portable    Result Date: 2/9/2021  Stable interstitial opacities with superimposed patchy airspace disease probably combination of factors including chronic interstitial disease, vascular congestion and pneumonia. Xr Chest Portable    Result Date: 2/8/2021  Progressive bilateral patchy interstitial and alveolar infiltrates greater on the left. Overall findings suggest progressive pulmonary vascular congestion with possible superimposed bilateral pneumonia. All radiological studies reviewed                Code Status:  Full Code    Electronically signed by Brittanie Washington MD on 2/9/2021 at 8:35 PM     Copy sent to Dr. Lauren Rodriguez MD    This note was created with the assistance of a speech-recognition program.  Although the intention is to generate a document that actually reflects the content of the visit, no guarantees can be provided that every mistake has been identified and corrected by editing. Note was updated later by me after  physical examination and  completion of the assessment.

## 2021-02-11 ENCOUNTER — APPOINTMENT (OUTPATIENT)
Dept: GENERAL RADIOLOGY | Age: 86
DRG: 291 | End: 2021-02-11
Payer: MEDICARE

## 2021-02-11 LAB
ANION GAP SERPL CALCULATED.3IONS-SCNC: 11 MMOL/L (ref 9–17)
BUN BLDV-MCNC: 33 MG/DL (ref 8–23)
BUN/CREAT BLD: 23 (ref 9–20)
CALCIUM SERPL-MCNC: 8.6 MG/DL (ref 8.6–10.4)
CHLORIDE BLD-SCNC: 99 MMOL/L (ref 98–107)
CO2: 30 MMOL/L (ref 20–31)
CREAT SERPL-MCNC: 1.46 MG/DL (ref 0.5–0.9)
GFR AFRICAN AMERICAN: 41 ML/MIN
GFR NON-AFRICAN AMERICAN: 34 ML/MIN
GFR SERPL CREATININE-BSD FRML MDRD: ABNORMAL ML/MIN/{1.73_M2}
GFR SERPL CREATININE-BSD FRML MDRD: ABNORMAL ML/MIN/{1.73_M2}
GLUCOSE BLD-MCNC: 119 MG/DL (ref 70–99)
HCT VFR BLD CALC: 33.4 % (ref 36.3–47.1)
HEMOGLOBIN: 10.1 G/DL (ref 11.9–15.1)
MAGNESIUM: 2 MG/DL (ref 1.6–2.6)
MCH RBC QN AUTO: 26.9 PG (ref 25.2–33.5)
MCHC RBC AUTO-ENTMCNC: 30.2 G/DL (ref 28.4–34.8)
MCV RBC AUTO: 89.1 FL (ref 82.6–102.9)
NRBC AUTOMATED: 0 PER 100 WBC
PDW BLD-RTO: 16.2 % (ref 11.8–14.4)
PHOSPHORUS: 4.2 MG/DL (ref 2.6–4.5)
PLATELET # BLD: 193 K/UL (ref 138–453)
PMV BLD AUTO: 11 FL (ref 8.1–13.5)
POTASSIUM SERPL-SCNC: 3.7 MMOL/L (ref 3.7–5.3)
RBC # BLD: 3.75 M/UL (ref 3.95–5.11)
SODIUM BLD-SCNC: 140 MMOL/L (ref 135–144)
WBC # BLD: 10.2 K/UL (ref 3.5–11.3)

## 2021-02-11 PROCEDURE — 97110 THERAPEUTIC EXERCISES: CPT

## 2021-02-11 PROCEDURE — 97530 THERAPEUTIC ACTIVITIES: CPT

## 2021-02-11 PROCEDURE — 97112 NEUROMUSCULAR REEDUCATION: CPT

## 2021-02-11 PROCEDURE — 6370000000 HC RX 637 (ALT 250 FOR IP): Performed by: NURSE PRACTITIONER

## 2021-02-11 PROCEDURE — 6370000000 HC RX 637 (ALT 250 FOR IP): Performed by: FAMILY MEDICINE

## 2021-02-11 PROCEDURE — 2060000000 HC ICU INTERMEDIATE R&B

## 2021-02-11 PROCEDURE — 94761 N-INVAS EAR/PLS OXIMETRY MLT: CPT

## 2021-02-11 PROCEDURE — 80048 BASIC METABOLIC PNL TOTAL CA: CPT

## 2021-02-11 PROCEDURE — 84100 ASSAY OF PHOSPHORUS: CPT

## 2021-02-11 PROCEDURE — 6360000002 HC RX W HCPCS: Performed by: FAMILY MEDICINE

## 2021-02-11 PROCEDURE — 94640 AIRWAY INHALATION TREATMENT: CPT

## 2021-02-11 PROCEDURE — 2700000000 HC OXYGEN THERAPY PER DAY

## 2021-02-11 PROCEDURE — 83735 ASSAY OF MAGNESIUM: CPT

## 2021-02-11 PROCEDURE — 71045 X-RAY EXAM CHEST 1 VIEW: CPT

## 2021-02-11 PROCEDURE — 97535 SELF CARE MNGMENT TRAINING: CPT

## 2021-02-11 PROCEDURE — 85027 COMPLETE CBC AUTOMATED: CPT

## 2021-02-11 PROCEDURE — 2580000003 HC RX 258: Performed by: FAMILY MEDICINE

## 2021-02-11 PROCEDURE — 36415 COLL VENOUS BLD VENIPUNCTURE: CPT

## 2021-02-11 RX ORDER — POTASSIUM CHLORIDE 20 MEQ/1
20 TABLET, EXTENDED RELEASE ORAL ONCE
Status: COMPLETED | OUTPATIENT
Start: 2021-02-11 | End: 2021-02-11

## 2021-02-11 RX ADMIN — SERTRALINE HYDROCHLORIDE 100 MG: 50 TABLET ORAL at 08:48

## 2021-02-11 RX ADMIN — IPRATROPIUM BROMIDE AND ALBUTEROL SULFATE 1 AMPULE: .5; 3 SOLUTION RESPIRATORY (INHALATION) at 20:58

## 2021-02-11 RX ADMIN — ISOSORBIDE MONONITRATE 30 MG: 30 TABLET ORAL at 08:48

## 2021-02-11 RX ADMIN — METOPROLOL TARTRATE 25 MG: 25 TABLET, FILM COATED ORAL at 08:48

## 2021-02-11 RX ADMIN — IPRATROPIUM BROMIDE AND ALBUTEROL SULFATE 1 AMPULE: .5; 3 SOLUTION RESPIRATORY (INHALATION) at 13:59

## 2021-02-11 RX ADMIN — AMIODARONE HYDROCHLORIDE 200 MG: 200 TABLET ORAL at 08:48

## 2021-02-11 RX ADMIN — OXYBUTYNIN CHLORIDE 5 MG: 5 TABLET ORAL at 08:56

## 2021-02-11 RX ADMIN — IPRATROPIUM BROMIDE AND ALBUTEROL SULFATE 1 AMPULE: .5; 3 SOLUTION RESPIRATORY (INHALATION) at 07:27

## 2021-02-11 RX ADMIN — SODIUM CHLORIDE, PRESERVATIVE FREE 10 ML: 5 INJECTION INTRAVENOUS at 08:50

## 2021-02-11 RX ADMIN — APIXABAN 5 MG: 5 TABLET, FILM COATED ORAL at 08:49

## 2021-02-11 RX ADMIN — METOPROLOL TARTRATE 25 MG: 25 TABLET, FILM COATED ORAL at 19:42

## 2021-02-11 RX ADMIN — SODIUM CHLORIDE, PRESERVATIVE FREE 10 ML: 5 INJECTION INTRAVENOUS at 19:41

## 2021-02-11 RX ADMIN — PANTOPRAZOLE SODIUM 40 MG: 40 TABLET, DELAYED RELEASE ORAL at 06:19

## 2021-02-11 RX ADMIN — PRAVASTATIN SODIUM 40 MG: 40 TABLET ORAL at 08:48

## 2021-02-11 RX ADMIN — LATANOPROST 1 DROP: 50 SOLUTION OPHTHALMIC at 19:41

## 2021-02-11 RX ADMIN — APIXABAN 5 MG: 5 TABLET, FILM COATED ORAL at 19:41

## 2021-02-11 RX ADMIN — IPRATROPIUM BROMIDE AND ALBUTEROL SULFATE 1 AMPULE: .5; 3 SOLUTION RESPIRATORY (INHALATION) at 10:47

## 2021-02-11 RX ADMIN — POTASSIUM CHLORIDE 20 MEQ: 20 TABLET, EXTENDED RELEASE ORAL at 16:55

## 2021-02-11 RX ADMIN — CEFTRIAXONE SODIUM 1000 MG: 1 INJECTION, POWDER, FOR SOLUTION INTRAMUSCULAR; INTRAVENOUS at 19:41

## 2021-02-11 RX ADMIN — AZITHROMYCIN MONOHYDRATE 250 MG: 250 TABLET ORAL at 08:49

## 2021-02-11 ASSESSMENT — PAIN SCALES - GENERAL: PAINLEVEL_OUTOF10: 0

## 2021-02-11 NOTE — PROGRESS NOTES
Section of Cardiology  Progress Note      Date:  2/11/2021  Patient: Jaycob Sahu  Admission:  2/8/2021  6:28 PM  Admit DX: Acute left-sided [facility]congest[facility] [I50.1]  Age:  80 y.o., 3/26/1932     LOS: 3 days     Reason for evaluation:   CHF and coronary artery disease      SUBJECTIVE:     The patient was seen and examined. Notes and labs reviewed. There were not complications over night. Patient seen and examined in her room with her son at bedside. There is no meaningful improvement in her respiratory status overnight. Oxygenation supply was decreased temporarily but the patient became significantly dyspneic. Her urine output is not adequate. She was seen by nephrology service and ACE inhibitor and diuretic are on hold. Patient's cardiac review of systems: positive for dyspnea, negative for chest pain. The patient is generally feeling unchanged. OBJECTIVE:    Telemetry: Sinus  BP (!) 165/54   Pulse 70   Temp 98.5 °F (36.9 °C) (Oral)   Resp 22   Ht 5' 5\" (1.651 m)   Wt 123 lb 8 oz (56 kg)   SpO2 91%   BMI 20.55 kg/m²     Intake/Output Summary (Last 24 hours) at 2/11/2021 1509  Last data filed at 2/10/2021 1730  Gross per 24 hour   Intake 240 ml   Output 250 ml   Net -10 ml       EXAM:   CONSTITUTIONAL:  awake, alert, cooperative, no apparent distress, and appears stated age. HEENT: Normal jugular venous pulsations, no carotid bruits. Head is atraumatic, normocephalic. Eyes and oral mucosa are normal.  LUNGS: Good respiratory effort. Yes for increased work of breathing. On auscultation: diminished breath sounds bilaterally  CARDIOVASCULAR:  Normal apical impulse, regular rate and rhythm, normal S1 and S2, no S3 or S4,   ABDOMEN: Soft, nontender, nondistended. Bowel sounds present. SKIN: Warm and dry. EXTREMITIES: No lower extremity edema. Motor movement grossly intact. No cyanosis or clubbing.     Current Inpatient Medications:   potassium chloride  20 mEq Oral Once  amiodarone  200 mg Oral Daily    apixaban  5 mg Oral BID    isosorbide mononitrate  30 mg Oral QAM    metoprolol tartrate  25 mg Oral BID    oxybutynin  5 mg Oral Every Other Day    pantoprazole  40 mg Oral QAM AC    pravastatin  40 mg Oral Daily    sertraline  100 mg Oral Daily    latanoprost  1 drop Both Eyes Nightly    sodium chloride flush  10 mL Intravenous 2 times per day    ipratropium-albuterol  1 ampule Inhalation Q4H WA    cefTRIAXone (ROCEPHIN) IV  1,000 mg Intravenous Q24H    azithromycin  250 mg Oral Daily       IV Infusions (if any):      Diagnostics:   EKG: . ECHO: .  Normal LV systolic function by echo done 2 months ago. Ejection fraction: %  Stress Test: .  Cardiac Angiography: .    Labs:   CBC:   Recent Labs     02/10/21  0458 02/11/21  0549   WBC 12.0* 10.2   HGB 9.6* 10.1*   HCT 32.3* 33.4*    193     BMP:   Recent Labs     02/10/21  0458 02/11/21  0549    140   K 3.8 3.7   CO2 31 30   BUN 23 33*   CREATININE 1.46* 1.46*   LABGLOM 34* 34*   GLUCOSE 124* 119*     No results found for: BNP  PT/INR: No results for input(s): PROTIME, INR in the last 72 hours. APTT:No results for input(s): APTT in the last 72 hours. CARDIAC ENZYMES:  Recent Labs     02/08/21  1835   TROPONINT NOT REPORTED     FASTING LIPID PANEL:  Lab Results   Component Value Date    HDL 38 12/08/2020    TRIG 78 12/08/2020     LIVER PROFILE:No results for input(s): AST, ALT, LABALBU in the last 72 hours. ASSESSMENT:  1. Acute on chronic diastolic CHF, however I do not think it is the main reason for her severe hypoxemia and oxygen need  2. History of coronary artery disease  3. History of CABG in 2016  4. History of systemic hypertension  5. History of hyperlipidemia  6. History of paroxysmal atrial fibrillation currently in sinus rhythm.   History of cardioversion  Patient Active Problem List   Diagnosis    Symptomatic bradycardia    Suspected sleep apnea    Unexplained night sweats    Pneumonia    GERD (gastroesophageal reflux disease)    Hyperlipidemia    Hypertension    Chronic atrial fibrillation (HCC)    Acute on chronic diastolic CHF (congestive heart failure) (HCC)    Generalized anxiety disorder    Depressive disorder    Nonrheumatic tricuspid valve disorder    Atherosclerotic heart disease of native coronary artery without angina pectoris    Heart failure, diastolic, acute on chronic (HCC)    Suspected COVID-19 virus infection    Acute left-sided CHF (congestive heart failure) (HCC)    Mild malnutrition (HCC)       PLAN:    1. I do not think patient CHF is the reason about her severe hypoxemia. 2. Pulmonary service is on board and managing this issue. 3. Her chest x-ray looked with severe bilateral diffuse infiltrates. 4. Patient is DNR CCA status and that agreed by her son as well. Please see orders. Discussed with patient and adult child and nursing.     Eris Bianchi MD

## 2021-02-11 NOTE — PROGRESS NOTES
Pulmonary Critical Care Progress Note  Luis Daniel Moore MD     Patient seen for the follow up of acute on chronic hypoxic respiratory failure, decompensated CHF, bilateral pulmonary infiltrates, most likely secondary to pulmonary edema, mild secondary pulmonary hypertension    Subjective:  She is up in the chair, still on oxygen via high flow nasal cannula, 30% / 30 L, SPO2 is currently 99 to 100%. Shortness of breath is about the same as it was yesterday. She has occasional nonproductive cough. No chest pain. Examination:  Vitals: BP (!) 147/63   Pulse 67   Temp 98.8 °F (37.1 °C) (Oral)   Resp 22   Ht 5' 5\" (1.651 m)   Wt 123 lb 8 oz (56 kg)   SpO2 98%   BMI 20.55 kg/m²   General appearance: alert and cooperative with exam, in the chair  Neck: No JVD  Lungs: Moderate air exchange, positive crackles  Heart: regular rate and rhythm, S1, S2 normal, no gallop  Abdomen: Soft, non tender, + BS  Extremities: no cyanosis or clubbing. No significant edema    LABs:  CBC:   Recent Labs     02/10/21  0458 02/11/21  0549   WBC 12.0* 10.2   HGB 9.6* 10.1*   HCT 32.3* 33.4*    193     BMP:   Recent Labs     02/10/21  0458 02/11/21  0549    140   K 3.8 3.7   CO2 31 30   BUN 23 33*   CREATININE 1.46* 1.46*   LABGLOM 34* 34*   GLUCOSE 124* 119*      Ref. Range 2/9/2021 04:39   Procalcitonin Latest Ref Range: <0.09 ng/mL 0.30 (H)     Radiology:  2/11/2021      Impression:  · Acute on chronic hypoxic respiratory failure  · Decompensated diastolic CHF (EF: 55 to 95% by echo by 12/8/2020)  · Bilateral pulmonary infiltrate, mostly likely secondary to pulmonary edema, doubt pneumonia  · History of CAD/HTN/HDL/HTN, s/p CABG in 2016  · Mild secondary pulmonary hypertension, RVSP 37, secondary to above    Recommendations:  · Oxygen via high flow nasal cannula. Wean to nasal cannula as tolerated, keep SPO2 90% or greater. Discussed with RT. · BiPAP as needed  · Continue IV antibiotics, Rocephin/Zithromax. · Albuterol and Ipratropium Q 4 hours and prn  · Incentive spirometry every hour while awake  · X-ray chest in am  · Labs: CBC and BMP in am  · Cardiology following  · Nephrology following, off diuresis at this time.   1000 mL fluid restriction  · DVT prophylaxis, on Eliquis  · Will follow with you    Electronically signed by     Sebas Maier MD on 2/11/2021 at 12:30 PM  Pulmonary Critical Care and Sleep Medicine,  [facility]  Cell: [phone]  Office: [phone]

## 2021-02-11 NOTE — PROGRESS NOTES
Occupational Therapy  Facility/Department: [facility] PROGRESSIVE CARE  Daily Treatment Note  NAME: Jaycob Carranza  : 3/26/1932  MRN: [id number]    Date of Service: 2021    Discharge Recommendations:  Subacute/Skilled Nursing Facility       Assessment   Performance deficits / Impairments: Decreased functional mobility ; Decreased safe awareness;Decreased balance;Decreased ADL status; Decreased strength;Decreased high-level IADLs;Decreased endurance;Decreased posture;Decreased vision/visual deficit; Decreased coordination  Assessment: Skilled OT indicated to improve I and safety in areas of self care and function to return home with assist as needed. Prognosis: Fair  REQUIRES OT FOLLOW UP: Yes  Activity Tolerance  Activity Tolerance: Patient Tolerated treatment well;Patient limited by fatigue  Activity Tolerance: poor plus; pt fatigues easily and with decreased act josie  Safety Devices  Safety Devices in place: Yes  Type of devices: All fall risk precautions in place; Left in chair;Call light within reach;Nurse notified; Chair alarm in place;Gait belt;Patient at risk for falls         Patient Diagnosis(es): The encounter diagnosis was Acute congestive heart failure, unspecified heart failure type ([facility] [address] has a past medical history of Acute on chronic diastolic CHF (congestive heart failure) ([address] Bradycardia, CAD (coronary artery disease), Cancer ([address] Depression, Gallstones, GERD (gastroesophageal reflux disease), Hiatal hernia, History of cardiac cath, Hyperlipidemia, Hypertension, Insomnia, MI (myocardial infarction) ([address] PNA (pneumonia), SOB (shortness of breath), and UTI (urinary tract infection). has a past surgical history that includes Appendectomy; Cholecystectomy; Hysterectomy; Coronary angioplasty with stent; Colonoscopy; Coronary artery bypass graft (2016); Cardiac surgery (2016); and Cardioversion (2019).     Restrictions  Restrictions/Precautions  Restrictions/Precautions: General Precautions, Fall Risk, Up as Tolerated  Required Braces or Orthoses?: No  Position Activity Restriction  Other position/activity restrictions: telemetry, O2(6L NC), cont SPO2 monitor, IV site  Subjective   General  Chart Reviewed: Yes  Patient assessed for rehabilitation services?: Yes  Response to previous treatment: Patient with no complaints from previous session  Family / Caregiver Present: No      Orientation  Orientation  Overall Orientation Status: Within Functional Limits  Objective    ADL  Grooming: Contact guard assistance(to wash hands at sink)  Toileting: Contact guard assistance;Minimal assistance        Balance  Sitting Balance: Supervision  Standing Balance: Contact guard assistance  Functional Mobility  Functional - Mobility Device: Rolling Walker  Activity: To/from bathroom; Other  Assist Level: Contact guard assistance  Functional Mobility Comments: Min cues for safety and total assist with line mgmt. SPO2 dropped to low-mid 80s with activity, freq seated rest breaks req'd with max cues for pursed lip breathing tech. Pt recovers to above 90% after rest. Pt had slight LOB but was able to self correct. Toilet Transfers  Toilet - Technique: Ambulating  Equipment Used: Standard toilet  Toilet Transfer: Stand by assistance;Contact guard assistance  Toilet Transfers Comments: Min cues for safety and total assist with line mgmt. Transfers  Stand Step Transfers: Stand by assistance;Contact guard assistance  Sit to stand: Stand by assistance  Stand to sit: Stand by assistance  Transfer Comments: Min cues for safety and total assist with line mgmt. Cognition  Overall Cognitive Status: Exceptions  Arousal/Alertness: Appropriate responses to stimuli  Following Commands:  Follows one step commands consistently  Attention Span: Appears intact  Memory: Decreased short term memory  Safety Judgement: Decreased awareness of need for assistance;Decreased awareness of need for safety  Problem Solving: Assistance required to correct errors made;Assistance required to identify errors made;Decreased awareness of errors  Insights: Decreased awareness of deficits  Initiation: Requires cues for some  Sequencing: Requires cues for some     Perception  Overall Perceptual Status: [facility]                                   Plan   Plan  Times per week: 4-5x/week 1x/day as josie  Current Treatment Recommendations: Strengthening, Balance Training, Functional Mobility Training, Safety Education & Training, Positioning, Endurance Training, Neuromuscular Re-education, Patient/Caregiver Education & Training, Equipment Evaluation, Education, & procurement, Self-Care / ADL, Home Management Training, Cognitive/Perceptual Training             Goals  Short term goals  Time Frame for Short term goals: by discharge, pt to demo  Short term goal 1: bed mob tech with use of rail as needed to CG. Short term goal 2: increase in BUE strength by a 1/2 grade to assist with ADL tasks and be I with simple BUE HEP with use of handouts. Short term goal 3: UB ADL to set up and LB ADL to CG with use of AE/AD as needed. Short term goal 4: toileting tasks with use of BSC/AD and grab bar as needed to CG. Short term goal 5: ADL transfers and functional mob with AD as needed to CG. Long term goals  Long term goal 1: Pt to stand with SBA and AD josie > 5 mins as able to reduce falls with functional tasks. Long term goal 2: Caregivers to be I with EC/WS and fall prevention tech, pressure relief, AE/DME recommendations with use of handouts. Patient Goals   Patient goals : I just want to be able to breath better and get home!        Therapy Time   Individual Concurrent Group Co-treatment   Time In 1144         Time Out 1213         Minutes 104 CULLEN Bower

## 2021-02-11 NOTE — PLAN OF CARE
Problem: Nutrition  Goal: Optimal nutrition therapy  Outcome: Ongoing     Problem: Falls - Risk of:  Goal: Will remain free from falls  Description: Will remain free from falls  Outcome: Met This Shift

## 2021-02-11 NOTE — PROGRESS NOTES
Consult Note    Reason for Consult: ELICEO    Requesting Physician:  Jalil Stuart MD    INTERVAL HISTORY: CXR today showed cardiomegaly with pulmonary vascular congestion. Multifocal superimposed groundglass opacities. Small bilateral pleural effusions. Findings stable compared to previous study. Creatinine stable at 1.46. Hypotensive last night with SBP 60-90s. SBP trending 100-160s today. She states she is feeling better today. She did not tolerate being off high flow nasal cannula. HISTORY OF PRESENT ILLNESS:    The patient is a 80 y.o. female who presents with worsening shortness of breath. Initial chest x-ray showed progressive pulmonary vascular congestion with possible superimposed bilateral pneumonia. Chest x-ray today showed bilateral airspace disease slightly increased at the right apex compared to prior. Creatinine baseline 0.6 which she was at on admission and yesterday. Creatinine elevated to 1.46 today. Hypokalemia on admission and yesterday. Potassium was 3.8 today. Recent hemoglobin 9.6. WBC was 14.4 today and today 12.0. SBP was originally trending as high as 214/75 on day of admission. She became hypotensive at 99/78 about 8 hours later. SBP trending 100s to 130s today. She originally was requiring nasal cannula and is now on high flow. Home medications include lisinopril, Lasix, metoprolol, amiodarone. She is currently on Lasix 20 mg IV twice daily, lisinopril, metoprolol, amiodarone. She has been up to the bedside commode voiding. No urinary retention on bladder scan    Review Of Systems:   Constitutional: No fever, chills, lethargy, weakness or wt loss  HEENT:  No headache, nasal discharge or sore throat. Cardiac:  No chest pain, +dyspnea, orthopnea or PND. Chest:  No cough, phlegm or wheezing. Abdomen:  No abdominal pain, nausea, vomiting or diarrhea. Neuro:  No gross focal weakness, numbness, abnormal movements or seizure like activity.   Skin:   No rashes or tablet Take 30 mg by mouth every morning   Yes Historical Provider, MD   sertraline (ZOLOFT) 100 MG tablet Take 100 mg by mouth daily    Yes Historical Provider, MD   oxybutynin (DITROPAN) 5 MG tablet Take 5 mg by mouth every other day    Yes Historical Provider, MD   pravastatin (PRAVACHOL) 40 MG tablet Take 40 mg by mouth daily    Yes Historical Provider, MD   pantoprazole (PROTONIX) 40 MG tablet Take 40 mg by mouth every morning (before breakfast)    Yes Historical Provider, MD   aspirin 81 MG EC tablet Take 81 mg by mouth daily    Historical Provider, MD       Scheduled Meds:   amiodarone  200 mg Oral Daily    apixaban  5 mg Oral BID    isosorbide mononitrate  30 mg Oral QAM    metoprolol tartrate  25 mg Oral BID    oxybutynin  5 mg Oral Every Other Day    pantoprazole  40 mg Oral QAM AC    pravastatin  40 mg Oral Daily    sertraline  100 mg Oral Daily    latanoprost  1 drop Both Eyes Nightly    sodium chloride flush  10 mL Intravenous 2 times per day    ipratropium-albuterol  1 ampule Inhalation Q4H WA    cefTRIAXone (ROCEPHIN) IV  1,000 mg Intravenous Q24H    azithromycin  250 mg Oral Daily     Continuous Infusions:  PRN Meds:sodium chloride flush, promethazine **OR** ondansetron, nicotine, polyethylene glycol, acetaminophen **OR** acetaminophen, albuterol, hydrALAZINE    Allergies   Allergen Reactions    Seasonal        Social History     Socioeconomic History    Marital status:       Spouse name: Not on file    Number of children: Not on file    Years of education: Not on file    Highest education level: Not on file   Occupational History    Not on file   Social Needs    Financial resource strain: Not on file    Food insecurity     Worry: Not on file     Inability: Not on file    Transportation needs     Medical: Not on file     Non-medical: Not on file   Tobacco Use    Smoking status: Former Smoker     Types: Cigarettes     Quit date: 1971     Years since quittin.2  Smokeless tobacco: Never Used    Tobacco comment: quit smoking yrs ago   Substance and Sexual Activity    Alcohol use: Yes     Comment: Rarely    Drug use: No    Sexual activity: Not on file   Lifestyle    Physical activity     Days per week: Not on file     Minutes per session: Not on file    Stress: Not on file   Relationships    Social connections     Talks on phone: Not on file     Gets together: Not on file     Attends Muslim service: Not on file     Active member of club or organization: Not on file     Attends meetings of clubs or organizations: Not on file     Relationship status: Not on file    Intimate partner violence     Fear of current or ex partner: Not on file     Emotionally abused: Not on file     Physically abused: Not on file     Forced sexual activity: Not on file   Other Topics Concern    Not on file   Social History Narrative    Not on file       Family History   Problem Relation Age of Onset    Heart Disease Mother     Cancer Mother     Heart Disease Father          Physical Exam:  Vitals:    02/11/21 1049 02/11/21 1244 02/11/21 1400 02/11/21 1449   BP:  (!) 165/54     Pulse:  70     Resp: 18 20 19 22   Temp:  98.5 °F (36.9 °C)     TempSrc:  Oral     SpO2: 91% 92% 92% 91%   Weight:       Height:         I/O last 3 completed shifts: In: 960 [P.O.:960]  Out: 250 [Urine:250]      General:  Awake, alert, not in distress, and comfortable on high flow. Appears to be stated age. HEENT: Atraumatic, normocephalic. Anicteric sclera. Pink and moist oral mucosa. Neck supple. No JVD. Chest: Bilateral air entry, clear to auscultation, no wheezing, rhonchi or rales. Cardiovascular: RRR, S1S2, no murmur, rub or gallop. No lower extremity edema. Abdomen: Soft, non tender to palpation. Active bowel sounds x 4 quadrants. Musculoskeletal: Active ROM x 4 extremities. No cyanosis or clubbing. Integumentary: Pink, warm and dry. Free from rash or lesions.  Skin turgor normal.  CNS: Component Value Date    MG 1.7 02/09/2021     Albumin:   Lab Results   Component Value Date    LABALBU 3.3 05/01/2020     Last 3 CK, CKMB, Troponin: @LABRCNT(CKTOTAL:3,CKMB:3,TROPONINI:3)       URINE:)No results found for: Ann-Marie Valdovinos    Radiology:   Reviewed. Assessment:  1. Acute Kidney Injury likely hemodynamically related  2. Creatinine 0.6  3. Hypertension with recent hypotension  4. CHF exacerbation  5. Hypoxic respiratory failure  6. Hypokalemia    Plan:  1.  DC'd lisinopril and IV Lasix (2/10). No IVF or diuretics today. Will re-evaluate tomorrow. 2.  Monitor blood pressure and heart rate. On amiodarone and isosorbide. 3. No need to check Renal Ultrasound to r/o element of obstruction and to assess the kidney size/echotexture, unless GFR not improving. Strict I&O.   4. Urinalysis showed specific gravity 1.020, 5-10 WBCs, urine sodium 44, FeNa 0.26%. Follow up urine Eosinophils. 5. Antibiotics per primary and pulmonary. 6.  Cardiac diet with 1000mL fluid restriction. 7.  K is 3.7 today. Replaced K today. Mg level ok. Avoid nephrotoxic drugs and IV contrast exposure. Please do not hesitate to contact us for any further questions/concerns. We will continue to follow along with you. Pt seen in collaboration with Dr. Carlos Loving. Electronically signed by SERA Malone CNP  on 2/11/2021 at 3:00 PM           Physician Addendum  I have seen and examined pt at bed side.    I reviewed and agree with CNP's note. I performed all key and critical portions of this evaluation.  I agree with the plan of care as noted above.     Electronically signed by Adele Vaughn MD on 02/11/21 5:14 PM

## 2021-02-11 NOTE — PROGRESS NOTES
Progress note  [facility].,    Adult Hospitalist      Name: Danielle Ko  MRN: [id number]     Acct: [de-identified]  Room: [address] [place] [address]    Admit Date: 2/8/2021  6:28 PM  PCP: Casie Faustin MD    Primary Problem  Active Problems:    Acute left-sided CHF (congestive heart failure) ([address]  Resolved Problems:    * No resolved hospital problems. *        Assesment:   Acute on chronic hypoxemic respiratory failure  Acute on chronic diastolic congestive heart failure  Pulmonary edema/chronic lung infiltrate  Acute kidney injury  Chronic coronary artery disease status post CABG in 2016  Chronic atrial fibrillation  Chronic anticoagulation with Eliquis  Essential hypertension  Mixed hyperlipidemia  Mild pulmonary hypertension  Depression with anxiety  Former smoker  GERD    Plan:   Admit patient to progressive unit  Oxygen to keep SPO2 more than 90%, previously was on high flow just to wean down to 6 L  BiPAP as needed  Patient noticed to have increased respiratory distress. Repeat chest x-ray in a.m.   Blood culture  BiPAP as needed  IV Rocephin for now  Oral azithromycin  Off diuresis  DuoNeb breathing treatment  Continue Eliquis  Continue lisinopril, metoprolol, Imdur, amiodarone  Continue pravastatin  Continue Zoloft  Pulmonology on consult  Consult cardiology  Patient creatinine is elevated to 1.46 from 0.63 yesterday, I will consult Nephrology    Scheduled Meds:   amiodarone  200 mg Oral Daily    apixaban  5 mg Oral BID    isosorbide mononitrate  30 mg Oral QAM    metoprolol tartrate  25 mg Oral BID    oxybutynin  5 mg Oral Every Other Day    pantoprazole  40 mg Oral QAM AC    pravastatin  40 mg Oral Daily    sertraline  100 mg Oral Daily    latanoprost  1 drop Both Eyes Nightly    sodium chloride flush  10 mL Intravenous 2 times per day    ipratropium-albuterol  1 ampule Inhalation Q4H WA    cefTRIAXone (ROCEPHIN) IV  1,000 mg Intravenous Q24H    azithromycin  250 mg Oral Daily     Continuous Infusions:    PRN Meds:      sodium chloride flush, 10 mL, PRN      promethazine, 12.5 mg, Q6H PRN    Or      ondansetron, 4 mg, Q6H PRN      nicotine, 1 patch, Daily PRN      polyethylene glycol, 17 g, Daily PRN      acetaminophen, 650 mg, Q6H PRN    Or      acetaminophen, 650 mg, Q6H PRN      albuterol, 2.5 mg, Q2H PRN      hydrALAZINE, 20 mg, Q6H PRN        Chief Complaint:     Chief Complaint   Patient presents with    Shortness of Breath         History of Present Illness: January Green is a 80 y.o.  female who presents with Shortness of Breath  Patient seen and examined at bedside and reviewed  last 24 hrs events with nursing staff. Son was present at the bedside  Patient was weaned down to nasal cannula but she feels quite short of breath and distressed. No acute events overnight. Afebrile  Patient denies any chest pain, palpitation, headache, dizziness, cough, nausea, vomiting, abdominal pain, pain, changes in urination or bowel habit or rash. Peripheral edema is improved           HPI  This is a80-year-old female is been admitted to emergency room, patient came to the ER with a complaint of having shortness of breath, further patient is been having some shortness of breath for past couple of days, patient does have history of congestive heart failure in view of oxygen at home, also history of coronary disease, hypertension hyperlipidemia and CABG in the past, patient initial testing in the emergency room is consistent with congestive heart failure, admitted for further management    I have personally reviewed the past medical history, past surgical history, medications, social history, and family history, and summarized in the note. Review of Systems:     All 10 point system is reviewed and negative otherwise mentioned in HPI.       Past Medical History:     Past Medical History:   Diagnosis Date    Acute on chronic diastolic CHF (congestive heart failure) ([facility] 10/2/2019    40 mg by mouth daily    Yes Historical Provider, MD   pantoprazole (PROTONIX) 40 MG tablet Take 40 mg by mouth every morning (before breakfast)    Yes Historical Provider, MD   aspirin 81 MG EC tablet Take 81 mg by mouth daily    Historical Provider, MD        Allergies:       Seasonal    Social History:     Tobacco:    reports that she quit smoking about 49 years ago. Her smoking use included cigarettes. She has never used smokeless tobacco.  Alcohol:      reports current alcohol use. Drug Use:  reports no history of drug use.     Family History:     Family History   Problem Relation Age of Onset    Heart Disease Mother     Cancer Mother     Heart Disease Father          Physical Exam:     Vitals:  BP (!) 165/54   Pulse 70   Temp 98.5 °F (36.9 °C) (Oral)   Resp 20   Ht 5' 5\" (1.651 m)   Wt 123 lb 8 oz (56 kg)   SpO2 92%   BMI 20.55 kg/m²   Temp (24hrs), Av.4 °F (37.4 °C), Min:98 °F (36.7 °C), Max:101.4 °F (38.6 °C)          General appearance - alert, well appearing, and in no acute distress  Mental status - oriented to person, place, and time with normal affect  Head - normocephalic and atraumatic  Eyes - pupils equal and reactive, extraocular eye movements intact, conjunctiva clear  Ears - hearing appears to be intact  Nose - no drainage noted  Mouth - mucous membranes moist  Neck - supple, no carotid bruits, thyroid not palpable  Chest - clear to auscultation, normal effort  Heart - normal rate, regular rhythm, no murmur  Abdomen - soft, nontender, nondistended, bowel sounds present all four quadrants, no masses, hepatomegaly or splenomegaly  Neurological - normal speech, no focal findings or movement disorder noted, cranial nerves II through XII grossly intact  Extremities - peripheral pulses palpable, no pedal edema or calf pain with palpation  Skin - no gross lesions, rashes, or induration noted        Data:     Labs:    Hematology:  Recent Labs     21  0439 02/10/21  0458 21  0551 WBC 14.4* 12.0* 10.2   RBC 4.08 3.62* 3.75*   HGB 10.9* 9.6* 10.1*   HCT 35.6* 32.3* 33.4*   MCV 87.3 89.2 89.1   MCH 26.7 26.5 26.9   MCHC 30.6 29.7 30.2   RDW 15.9* 16.3* 16.2*    183 193   MPV 11.0 11.3 11.0     Chemistry:  Recent Labs     02/08/21  1835 02/09/21  0439 02/10/21  0458 02/11/21  0549    140 137 140   K 3.2* 3.3* 3.8 3.7    100 99 99   CO2 30 30 31 30   GLUCOSE 114* 121* 124* 119*   BUN 12 12 23 33*   CREATININE 0.69 0.63 1.46* 1.46*   MG  --  1.7  --   --    ANIONGAP 9 10 7* 11   LABGLOM >60 >60 34* 34*   GFRAA >60 >60 41* 41*   CALCIUM 8.7 8.4* 8.5* 8.6   PHOS  --   --   --  4.2   PROBNP 5,808*  --   --   --    TROPHS 17*  --   --   --    MYOGLOBIN 48  --   --   --      No results for input(s): PROT, LABALBU, LABA1C, O0SPHXJ, I3VBLOF, FT4, TSH, AST, ALT, LDH, GGT, ALKPHOS, LABGGT, BILITOT, BILIDIR, AMMONIA, AMYLASE, LIPASE, LACTATE, CHOL, HDL, LDLCHOLESTEROL, CHOLHDLRATIO, TRIG, VLDL, JVE27MW, PHENYTOIN, PHENYF, URICACID, POCGLU in the last 72 hours.     Lab Results   Component Value Date    INR 1.1 10/02/2019    INR 1.2 11/11/2016    INR 1.2 11/10/2016    PROTIME 11.2 10/02/2019    PROTIME 12.3 (H) 11/11/2016    PROTIME 12.7 (H) 11/10/2016       Lab Results   Component Value Date/Time    SPECIAL NOT REPORTED 02/08/2021 07:45 PM    SPECIAL NOT REPORTED 02/08/2021 07:45 PM     Lab Results   Component Value Date/Time    CULTURE NO GROWTH 3 DAYS 02/08/2021 07:45 PM    CULTURE NO GROWTH 3 DAYS 02/08/2021 07:45 PM       Lab Results   Component Value Date    POCPH 7.35 11/09/2016    POCPCO2 41 11/09/2016    POCPO2 85 11/09/2016    POCHCO3 22.4 11/09/2016    NBEA 3 11/09/2016    PBEA NOT REPORTED 11/09/2016    OVJ3PNK 24 11/09/2016    DRZS5INX 96 11/09/2016    FIO2 NOT REPORTED 10/02/2019       Radiology:    Xr Chest Portable    Result Date: 2/9/2021  Stable interstitial opacities with superimposed patchy airspace disease probably combination of factors including chronic interstitial disease, vascular congestion and pneumonia. Xr Chest Portable    Result Date: 2/8/2021  Progressive bilateral patchy interstitial and alveolar infiltrates greater on the left. Overall findings suggest progressive pulmonary vascular congestion with possible superimposed bilateral pneumonia. All radiological studies reviewed                Code Status:  DNR-CCA    Electronically signed by Tracey Wong MD on 2/11/2021 at 1:18 PM     Copy sent to Dr. Ray Tadeo MD    This note was created with the assistance of a speech-recognition program.  Although the intention is to generate a document that actually reflects the content of the visit, no guarantees can be provided that every mistake has been identified and corrected by editing. Note was updated later by me after  physical examination and  completion of the assessment.

## 2021-02-11 NOTE — PROGRESS NOTES
Physical Therapy  Facility/Department: [facility] PROGRESSIVE CARE  Daily Treatment Note  NAME: Jaycob Carranza  : 3/26/1932  MRN: [id number]    Date of Service: 2021    Discharge Recommendations:  Subacute/Skilled Nursing Facility        Assessment   Body structures, Functions, Activity limitations: Decreased functional mobility ; Decreased safe awareness;Decreased endurance;Decreased balance  Assessment: Limited tolerance today due to change in O2 requirement yesterday to high-flow, able to come off at some point but back on due to difficulties breathing. Patient appropriate for discharge to SNF to maximize independence and return to PLOF. Prognosis: Good  PT Education: Energy Conservation;General Safety;Gait Training;Functional Mobility Training  REQUIRES PT FOLLOW UP: Yes  Activity Tolerance  Activity Tolerance: Patient limited by endurance     Patient Diagnosis(es): The encounter diagnosis was Acute congestive heart failure, unspecified heart failure type ([address] has a past medical history of Acute on chronic diastolic CHF (congestive heart failure) ([address] Bradycardia, CAD (coronary artery disease), Cancer ([address] Depression, Gallstones, GERD (gastroesophageal reflux disease), Hiatal hernia, History of cardiac cath, Hyperlipidemia, Hypertension, Insomnia, MI (myocardial infarction) ([address] PNA (pneumonia), SOB (shortness of breath), and UTI (urinary tract infection). has a past surgical history that includes Appendectomy; Cholecystectomy; Hysterectomy; Coronary angioplasty with stent; Colonoscopy; Coronary artery bypass graft (2016); Cardiac surgery (2016); and Cardioversion (2019).     Restrictions  Restrictions/Precautions  Restrictions/Precautions: General Precautions, Fall Risk, Up as Tolerated  Required Braces or Orthoses?: No  Position Activity Restriction  Other position/activity restrictions: telemetry, HI Flow, cont SPO2 monitor, IV site  Subjective   General  Chart Reviewed: Yes  Additional Pertinent Hx: CHF, CAD, HLPD, HTN, UTI, SOB  Response To Previous Treatment: Patient reporting fatigue but able to participate. Family / Caregiver Present: No  Subjective  Subjective: Pt agreeable to PT  General Comment  Comments: RN reports patient appropriate for treatment. Pain Screening  Patient Currently in Pain: No  Vital Signs  Patient Currently in Pain: No       Orientation  Orientation  Overall Orientation Status: Within Functional Limits  Cognition   Cognition  Overall Cognitive Status: Exceptions  Arousal/Alertness: Appropriate responses to stimuli  Following Commands: Follows one step commands consistently  Attention Span: Appears intact  Memory: Decreased short term memory  Safety Judgement: Decreased awareness of need for assistance;Decreased awareness of need for safety  Problem Solving: Assistance required to correct errors made;Assistance required to identify errors made;Decreased awareness of errors  Insights: Decreased awareness of deficits  Initiation: Requires cues for some  Sequencing: Requires cues for some  Objective   Bed mobility  Rolling to Left: Unable to assess  Rolling to Right: Unable to assess  Supine to Sit: Unable to assess  Sit to Supine: Unable to assess  Scooting: Unable to assess  Comment: Patient supine in bed and agreeable for supine exercise no bed mob. Transfers  Sit to Stand: Unable to assess  Stand to sit: Unable to assess  Bed to Chair: Unable to assess  Stand Pivot Transfers: Unable to assess  Squat Pivot Transfers: Unable to assess  Lateral Transfers: Unable to assess  Car Transfer: Unable to assess  Ambulation  Ambulation?: No        Exercises  Comments: Supine tello LE AROM x 10 rep with several rest breaks between with continues VCs for pursed lip breathing to maintain SpO2 WNL. Patient seems to forget and return to breathing through her mouth. No further mobility or exercises perfromed d/t patient recently getting put back on hi-flow.  Given extensive education on pacing and breathing techniques to increase safety and aerobic capacity. Goals  Short term goals  Time Frame for Short term goals: 12 visits  Short term goal 1: Inc bed-mobility & transfers to independent to enable pt to safely get in/OOB  Short term goal 2: Inc gait to amb 200 ft with R/walker indep; Short term goal 3: Pt able to tolerate 30-40 min of activity to include 15-20 reps of ex & functional mobility including 5 minutes of standing to facilitate better activity tolerance  Short term goal 5: Ed pt on home ex's, optimal breathing techniques, safety & energy principles, fall prevention & issue written pt education  Patient Goals   Patient goals :  To go and be with manetch    Plan    Plan  Times per week: 1-2x/D, 5-6D/week  Current Treatment Recommendations: Strengthening, Balance Training, Functional Mobility Training, Transfer Training, Gait Training, Endurance Training, Home Exercise Program, Safety Education & Training, Patient/Caregiver Education & Training  Safety Devices  Type of devices: Call light within reach, Gait belt, Patient at risk for falls, Left in chair     Therapy Time   Individual Concurrent Group Co-treatment   Time In 1603         Time Out 1647         Minutes [address] [place], [place]

## 2021-02-11 NOTE — CARE COORDINATION
Social Work: Writer updated [facility] that patient is on high flow O2 30L/30% FiO2. [facility] can accommodate up to 10L. Dtr works at [employer] so it would be ideal for patient to there as opposed to [facility], if she is able to wean high flow.

## 2021-02-12 ENCOUNTER — APPOINTMENT (OUTPATIENT)
Dept: GENERAL RADIOLOGY | Age: 86
DRG: 291 | End: 2021-02-12
Payer: MEDICARE

## 2021-02-12 LAB
ANION GAP SERPL CALCULATED.3IONS-SCNC: 8 MMOL/L (ref 9–17)
BUN BLDV-MCNC: 29 MG/DL (ref 8–23)
BUN/CREAT BLD: 33 (ref 9–20)
CALCIUM SERPL-MCNC: 8.8 MG/DL (ref 8.6–10.4)
CHLORIDE BLD-SCNC: 101 MMOL/L (ref 98–107)
CO2: 31 MMOL/L (ref 20–31)
CREAT SERPL-MCNC: 0.89 MG/DL (ref 0.5–0.9)
GFR AFRICAN AMERICAN: >60 ML/MIN
GFR NON-AFRICAN AMERICAN: 60 ML/MIN
GFR SERPL CREATININE-BSD FRML MDRD: ABNORMAL ML/MIN/{1.73_M2}
GFR SERPL CREATININE-BSD FRML MDRD: ABNORMAL ML/MIN/{1.73_M2}
GLUCOSE BLD-MCNC: 122 MG/DL (ref 70–99)
HCT VFR BLD CALC: 32.3 % (ref 36.3–47.1)
HEMOGLOBIN: 9.7 G/DL (ref 11.9–15.1)
MAGNESIUM: 2 MG/DL (ref 1.6–2.6)
MCH RBC QN AUTO: 26.9 PG (ref 25.2–33.5)
MCHC RBC AUTO-ENTMCNC: 30 G/DL (ref 28.4–34.8)
MCV RBC AUTO: 89.5 FL (ref 82.6–102.9)
NRBC AUTOMATED: 0 PER 100 WBC
PDW BLD-RTO: 16.2 % (ref 11.8–14.4)
PHOSPHORUS: 2.9 MG/DL (ref 2.6–4.5)
PLATELET # BLD: 194 K/UL (ref 138–453)
PMV BLD AUTO: 11.4 FL (ref 8.1–13.5)
POTASSIUM SERPL-SCNC: 4.3 MMOL/L (ref 3.7–5.3)
RBC # BLD: 3.61 M/UL (ref 3.95–5.11)
SARS-COV-2, RAPID: NORMAL
SARS-COV-2: NORMAL
SARS-COV-2: NOT DETECTED
SODIUM BLD-SCNC: 140 MMOL/L (ref 135–144)
SOURCE: NORMAL
WBC # BLD: 10.8 K/UL (ref 3.5–11.3)

## 2021-02-12 PROCEDURE — 71045 X-RAY EXAM CHEST 1 VIEW: CPT

## 2021-02-12 PROCEDURE — 83735 ASSAY OF MAGNESIUM: CPT

## 2021-02-12 PROCEDURE — 2060000000 HC ICU INTERMEDIATE R&B

## 2021-02-12 PROCEDURE — 6360000002 HC RX W HCPCS: Performed by: FAMILY MEDICINE

## 2021-02-12 PROCEDURE — 80048 BASIC METABOLIC PNL TOTAL CA: CPT

## 2021-02-12 PROCEDURE — 6360000002 HC RX W HCPCS: Performed by: NURSE PRACTITIONER

## 2021-02-12 PROCEDURE — 94640 AIRWAY INHALATION TREATMENT: CPT

## 2021-02-12 PROCEDURE — U0005 INFEC AGEN DETEC AMPLI PROBE: HCPCS

## 2021-02-12 PROCEDURE — 85027 COMPLETE CBC AUTOMATED: CPT

## 2021-02-12 PROCEDURE — 6370000000 HC RX 637 (ALT 250 FOR IP): Performed by: FAMILY MEDICINE

## 2021-02-12 PROCEDURE — 94761 N-INVAS EAR/PLS OXIMETRY MLT: CPT

## 2021-02-12 PROCEDURE — 2580000003 HC RX 258: Performed by: FAMILY MEDICINE

## 2021-02-12 PROCEDURE — U0003 INFECTIOUS AGENT DETECTION BY NUCLEIC ACID (DNA OR RNA); SEVERE ACUTE RESPIRATORY SYNDROME CORONAVIRUS 2 (SARS-COV-2) (CORONAVIRUS DISEASE [COVID-19]), AMPLIFIED PROBE TECHNIQUE, MAKING USE OF HIGH THROUGHPUT TECHNOLOGIES AS DESCRIBED BY CMS-2020-01-R: HCPCS

## 2021-02-12 PROCEDURE — 36415 COLL VENOUS BLD VENIPUNCTURE: CPT

## 2021-02-12 PROCEDURE — 2700000000 HC OXYGEN THERAPY PER DAY

## 2021-02-12 PROCEDURE — 84100 ASSAY OF PHOSPHORUS: CPT

## 2021-02-12 RX ORDER — FUROSEMIDE 10 MG/ML
20 INJECTION INTRAMUSCULAR; INTRAVENOUS ONCE
Status: COMPLETED | OUTPATIENT
Start: 2021-02-12 | End: 2021-02-12

## 2021-02-12 RX ADMIN — IPRATROPIUM BROMIDE AND ALBUTEROL SULFATE 1 AMPULE: .5; 3 SOLUTION RESPIRATORY (INHALATION) at 18:30

## 2021-02-12 RX ADMIN — LATANOPROST 1 DROP: 50 SOLUTION OPHTHALMIC at 20:34

## 2021-02-12 RX ADMIN — SERTRALINE HYDROCHLORIDE 100 MG: 50 TABLET ORAL at 09:24

## 2021-02-12 RX ADMIN — SODIUM CHLORIDE, PRESERVATIVE FREE 10 ML: 5 INJECTION INTRAVENOUS at 20:34

## 2021-02-12 RX ADMIN — APIXABAN 5 MG: 5 TABLET, FILM COATED ORAL at 09:24

## 2021-02-12 RX ADMIN — AZITHROMYCIN MONOHYDRATE 250 MG: 250 TABLET ORAL at 09:24

## 2021-02-12 RX ADMIN — PRAVASTATIN SODIUM 40 MG: 40 TABLET ORAL at 09:24

## 2021-02-12 RX ADMIN — ACETAMINOPHEN 650 MG: 325 TABLET ORAL at 12:19

## 2021-02-12 RX ADMIN — IPRATROPIUM BROMIDE AND ALBUTEROL SULFATE 1 AMPULE: .5; 3 SOLUTION RESPIRATORY (INHALATION) at 07:41

## 2021-02-12 RX ADMIN — SODIUM CHLORIDE, PRESERVATIVE FREE 10 ML: 5 INJECTION INTRAVENOUS at 09:24

## 2021-02-12 RX ADMIN — APIXABAN 5 MG: 5 TABLET, FILM COATED ORAL at 20:33

## 2021-02-12 RX ADMIN — AMIODARONE HYDROCHLORIDE 200 MG: 200 TABLET ORAL at 09:24

## 2021-02-12 RX ADMIN — IPRATROPIUM BROMIDE AND ALBUTEROL SULFATE 1 AMPULE: .5; 3 SOLUTION RESPIRATORY (INHALATION) at 15:15

## 2021-02-12 RX ADMIN — ISOSORBIDE MONONITRATE 30 MG: 30 TABLET ORAL at 09:24

## 2021-02-12 RX ADMIN — METOPROLOL TARTRATE 25 MG: 25 TABLET, FILM COATED ORAL at 20:33

## 2021-02-12 RX ADMIN — IPRATROPIUM BROMIDE AND ALBUTEROL SULFATE 1 AMPULE: .5; 3 SOLUTION RESPIRATORY (INHALATION) at 11:19

## 2021-02-12 RX ADMIN — METOPROLOL TARTRATE 25 MG: 25 TABLET, FILM COATED ORAL at 09:24

## 2021-02-12 RX ADMIN — PANTOPRAZOLE SODIUM 40 MG: 40 TABLET, DELAYED RELEASE ORAL at 05:34

## 2021-02-12 RX ADMIN — FUROSEMIDE 20 MG: 10 INJECTION, SOLUTION INTRAMUSCULAR; INTRAVENOUS at 10:29

## 2021-02-12 RX ADMIN — CEFTRIAXONE SODIUM 1000 MG: 1 INJECTION, POWDER, FOR SOLUTION INTRAMUSCULAR; INTRAVENOUS at 20:34

## 2021-02-12 ASSESSMENT — PAIN SCALES - GENERAL: PAINLEVEL_OUTOF10: 7

## 2021-02-12 ASSESSMENT — PAIN DESCRIPTION - PAIN TYPE: TYPE: ACUTE PAIN

## 2021-02-12 NOTE — PLAN OF CARE
Problem: Skin Integrity:  Goal: Will show no infection signs and symptoms  Description: Will show no infection signs and symptoms  2/11/2021 2015 by Chun Jo RN  Outcome: Ongoing     Problem: Falls - Risk of:  Goal: Will remain free from falls  Description: Will remain free from falls  2/11/2021 2015 by Chun Jo RN  Outcome: Ongoing     Problem: Nutrition  Goal: Optimal nutrition therapy  2/11/2021 2015 by Chun Jo RN  Outcome: Ongoing

## 2021-02-12 NOTE — PLAN OF CARE
Problem: Skin Integrity:  Goal: Will show no infection signs and symptoms  Description: Will show no infection signs and symptoms  Outcome: Ongoing  Goal: Absence of new skin breakdown  Description: Absence of new skin breakdown  Outcome: Ongoing     Problem: Falls - Risk of:  Goal: Will remain free from falls  Description: Will remain free from falls  Outcome: Ongoing

## 2021-02-12 NOTE — PROGRESS NOTES
Section of Cardiology  Progress Note      Date:  2/12/2021  Patient: César Martinez  Admission:  2/8/2021  6:28 PM  Admit DX: Acute left-sided CHF (congestive heart failure) ([facility] [I50.1]  Age:  80 y.o., 3/26/1932     LOS: 4 days     Reason for evaluation:   ischemic heart disease and CHF      SUBJECTIVE:     The patient was seen and examined. Notes and labs reviewed. There were not complications over night. Patient seen and examined in her room with her son at bedside. Patient is resting comfortably. She is still on high flow oxygen need. She is placed currently on COVID-19 rule out isolation status. Per nursing the patient does not interact much but no specific complaints      OBJECTIVE:    Telemetry: Sinus  BP (!) 124/56   Pulse 61   Temp 98.2 °F (36.8 °C) (Oral)   Resp 20   Ht 5' 5\" (1.651 m)   Wt 123 lb 8 oz (56 kg)   SpO2 93%   BMI 20.55 kg/m²     Intake/Output Summary (Last 24 hours) at 2/12/2021 1356  Last data filed at 2/12/2021 1029  Gross per 24 hour   Intake 120 ml   Output 400 ml   Net -280 ml       EXAM:   CONSTITUTIONAL: Sleepy but arousable, no apparent distress, and appears stated age. HEENT: Normal jugular venous pulsations, no carotid bruits. Head is atraumatic, normocephalic. Eyes and oral mucosa are normal.  LUNGS: Good respiratory effort. No for increased work of breathing. On auscultation: clear to auscultation bilaterally  CARDIOVASCULAR:  Normal apical impulse, regular rate and rhythm, normal S1 and S2, no S3 or S4,   ABDOMEN: Soft, nontender, nondistended. Bowel sounds present. SKIN: Warm and dry. EXTREMITIES: No lower extremity edema. Motor movement grossly intact. No cyanosis or clubbing.     Current Inpatient Medications:   amiodarone  200 mg Oral Daily    apixaban  5 mg Oral BID    isosorbide mononitrate  30 mg Oral QAM    metoprolol tartrate  25 mg Oral BID    oxybutynin  5 mg Oral Every Other Day    pantoprazole  40 mg Oral QAM AC    pravastatin  40 mg Oral disorder    Atherosclerotic heart disease of native coronary artery without angina pectoris    Heart failure, diastolic, acute on chronic (HCC)    Suspected COVID-19 virus infection    Acute left[facility]congest[facility])    Mild malnutrition ([facility]       PLAN:    1. Lasix was given by an pulmonary attending. 2. Awaiting the COVID-19 swab result  3. Hemodynamically stable  4. Renal function is back to normal.      Please see orders. Discussed with adult child and other pulmonary service and nursing.     Jhonatan Escalona MD

## 2021-02-12 NOTE — PROGRESS NOTES
Progress note  [facility]    Adult Hospitalist      Name: Ruddy Alvarez  MRN: [id number]     Acct: [de-identified]  Room: [address] [place] [address]    Admit Date: 2/8/2021  6:28 PM  PCP: Ramirez Hernandez MD    Primary Problem  Active Problems:    Acute left-sided CHF (congestive heart failure) ([facility] [address]  Resolved Problems:    * No resolved hospital problems. *        Assesment:   Acute on chronic hypoxemic respiratory failure  Acute on chronic diastolic congestive heart failure  Pulmonary edema/chronic lung infiltrate  Acute kidney injury  Chronic coronary artery disease status post CABG in 2016  Chronic atrial fibrillation  Chronic anticoagulation with Eliquis  Essential hypertension  Mixed hyperlipidemia  Mild pulmonary hypertension  Depression with anxiety  Former smoker  GERD    Plan:   Admit patient to progressive unit  Oxygen keep SPO2 more than 90%  BiPAP as needed  Patient noticed to have increased respiratory distress.   Blood culture  BiPAP as needed  IV Rocephin for now  Oral azithromycin, completed  Off diuresis, to receive 1 dose of IV Lasix, nephrology managing diuresis likely will benefit from aggressive diuresis due to continuous hypoxia requiring high flow nasal cannula oxygen  DuoNeb breathing treatment  Continue Eliquis  Continue lisinopril, metoprolol, Imdur, amiodarone  Continue pravastatin  Continue Zoloft  Pulmonology on consult, appreciated  Consult cardiology, appreciated  Patient nephrology assistance    Scheduled Meds:   amiodarone  200 mg Oral Daily    apixaban  5 mg Oral BID    isosorbide mononitrate  30 mg Oral QAM    metoprolol tartrate  25 mg Oral BID    oxybutynin  5 mg Oral Every Other Day    pantoprazole  40 mg Oral QAM AC    pravastatin  40 mg Oral Daily    sertraline  100 mg Oral Daily    latanoprost  1 drop Both Eyes Nightly    sodium chloride flush  10 mL Intravenous 2 times per day    ipratropium-albuterol  1 ampule Inhalation Q4H WA    cefTRIAXone (ROCEPHIN) IV  1,000 mg Intravenous Q24H     Continuous Infusions:    PRN Meds:      sodium chloride flush, 10 mL, PRN      promethazine, 12.5 mg, Q6H PRN    Or      ondansetron, 4 mg, Q6H PRN      nicotine, 1 patch, Daily PRN      polyethylene glycol, 17 g, Daily PRN      acetaminophen, 650 mg, Q6H PRN    Or      acetaminophen, 650 mg, Q6H PRN      albuterol, 2.5 mg, Q2H PRN      hydrALAZINE, 20 mg, Q6H PRN        Chief Complaint:     Chief Complaint   Patient presents with    Shortness of Breath         History of Present Illness: Lakeshia Antonio is a 80 y.o.  female who presents with Shortness of Breath  I have seen and examined patient today, patient last 24 hours events were reviewed also discussed with nursing staff  No new complaints  Overnight patient did not had any acute issues  No nausea vomiting diarrhea  Afebrile  Pt. Denies any CP, palpitation, HA, dizziness, chills, cough, cold, changes in urination, BM or skin changes  Patient is continued high flow nasal cannula oxygen         HPI  This is a80year-old female is been admitted to emergency room, patient came to the ER with a complaint of having shortness of breath, further patient is been having some shortness of breath for past couple of days, patient does have history of congestive heart failure in view of oxygen at home, also history of coronary disease, hypertension hyperlipidemia and CABG in the past, patient initial testing in the emergency room is consistent with congestive heart failure, admitted for further management    I have personally reviewed the past medical history, past surgical history, medications, social history, and family history, and summarized in the note. Review of Systems:     All 10 point system is reviewed and negative otherwise mentioned in HPI.       Past Medical History:     Past Medical History:   Diagnosis Date    Acute on chronic diastolic CHF (congestive heart failure) ([facility] [address] 10/2/2019    Bradycardia     CAD (coronary artery disease)     [facility]     Skin CA on nose    Depression     Gallstones     GERD (gastroesophageal reflux disease)     Hiatal hernia     History of cardiac cath 10/2016    CAD    Hyperlipidemia     Hypertension     Insomnia     [facility]     PNA (pneumonia)     SOB (shortness of breath)     UTI (urinary tract infection)         Past Surgical History:     Past Surgical History:   Procedure Laterality Date    APPENDECTOMY      CARDIAC SURGERY  11/08/2016    CABG x 3; LIMA-LAD, SVG-PDA, SVG-OM    CARDIOVERSION  12/17/2019    CHOLECYSTECTOMY      COLONOSCOPY      CORONARY ANGIOPLASTY WITH STENT PLACEMENT      CORONARY ARTERY BYPASS GRAFT  11/08/2016    X 3    HYSTERECTOMY          Medications Prior to Admission:       Prior to Admission medications    Medication Sig Start Date End Date Taking?  Authorizing Provider   lisinopril (PRINIVIL;ZESTRIL) 2.5 MG tablet Take 1 tablet by mouth daily 12/12/20  Yes Zach Polanco MD   furosemide (LASIX) 40 MG tablet Take 1 tablet by mouth daily 12/11/20  Yes SERA Clark CNP   metoprolol tartrate (LOPRESSOR) 25 MG tablet Take 25 mg by mouth 2 times daily  11/4/19  Yes Historical Provider, MD   amiodarone (CORDARONE) 200 MG tablet Take 200 mg by mouth daily   Yes Historical Provider, MD   apixaban (ELIQUIS) 5 MG TABS tablet Take 1 tablet by mouth 2 times daily 10/5/19  Yes Beryle Aas, MD   travoprost, benzalkonium, (TRAVATAN) 0.004 % ophthalmic solution Place 1 drop into the right eye nightly   Yes Historical Provider, MD   isosorbide mononitrate (IMDUR) 30 MG extended release tablet Take 30 mg by mouth every morning   Yes Historical Provider, MD   sertraline (ZOLOFT) 100 MG tablet Take 100 mg by mouth daily    Yes Historical Provider, MD   oxybutynin (DITROPAN) 5 MG tablet Take 5 mg by mouth every other day    Yes Historical Provider, MD   pravastatin (PRAVACHOL) 40 MG tablet Take 40 mg by mouth daily    Yes Historical Provider, MD   pantoprazole (PROTONIX) 40 MG tablet Take 40 mg by mouth every morning (before breakfast)    Yes Historical Provider, MD   aspirin 81 MG EC tablet Take 81 mg by mouth daily    Historical Provider, MD        Allergies:       Seasonal    Social History:     Tobacco:    reports that she quit smoking about 49 years ago. Her smoking use included cigarettes. She has never used smokeless tobacco.  Alcohol:      reports current alcohol use. Drug Use:  reports no history of drug use.     Family History:     Family History   Problem Relation Age of Onset    Heart Disease Mother     Cancer Mother     Heart Disease Father          Physical Exam:     Vitals:  BP (!) 117/59   Pulse 55   Temp 98.1 °F (36.7 °C) (Oral)   Resp 21   Ht 5' 5\" (1.651 m)   Wt 123 lb 8 oz (56 kg)   SpO2 100%   BMI 20.55 kg/m²   Temp (24hrs), Av.3 °F (36.8 °C), Min:98 °F (36.7 °C), Max:99.1 °F (37.3 °C)          General appearance - alert, well appearing, and in no acute distress  Mental status - oriented to person, place, and time with normal affect  Head - normocephalic and atraumatic  Eyes - pupils equal and reactive, extraocular eye movements intact, conjunctiva clear  Ears - hearing appears to be intact  Nose - no drainage noted  Mouth - mucous membranes moist  Neck - supple, no carotid bruits, thyroid not palpable  Chest - clear to auscultation, normal effort  Heart - normal rate, regular rhythm, no murmur  Abdomen - soft, nontender, nondistended, bowel sounds present all four quadrants, no masses, hepatomegaly or splenomegaly  Neurological - normal speech, no focal findings or movement disorder noted, cranial nerves II through XII grossly intact  Extremities - peripheral pulses palpable, no pedal edema or calf pain with palpation  Skin - no gross lesions, rashes, or induration noted        Data:     Labs:    Hematology:  Recent Labs     02/10/21  0458 21  0549 21  0502   WBC 12.0* 10.2 10.8   RBC 3.62* 3.75* 3.61*   HGB 9.6* 10.1* 9.7*   HCT 32.3* 33.4* 32.3*   MCV 89.2 89.1 89.5   MCH 26.5 26.9 26.9   MCHC 29.7 30.2 30.0   RDW 16.3* 16.2* 16.2*    193 194   MPV 11.3 11.0 11.4     Chemistry:  Recent Labs     02/10/21  0458 02/11/21  0549 02/12/21  0502    140 140   K 3.8 3.7 4.3   CL 99 99 101   CO2 31 30 31   GLUCOSE 124* 119* 122*   BUN 23 33* 29*   CREATININE 1.46* 1.46* 0.89   MG  --  2.0 2.0   ANIONGAP 7* 11 8*   LABGLOM 34* 34* 60*   GFRAA 41* 41* >60   CALCIUM 8.5* 8.6 8.8   PHOS  --  4.2 2.9     No results for input(s): PROT, LABALBU, LABA1C, E1NYTTE, B4FJZQJ, FT4, TSH, AST, ALT, LDH, GGT, ALKPHOS, LABGGT, BILITOT, BILIDIR, AMMONIA, AMYLASE, LIPASE, LACTATE, CHOL, HDL, LDLCHOLESTEROL, CHOLHDLRATIO, TRIG, VLDL, LTY95FQ, PHENYTOIN, PHENYF, URICACID, POCGLU in the last 72 hours. Lab Results   Component Value Date    INR 1.1 10/02/2019    INR 1.2 11/11/2016    INR 1.2 11/10/2016    PROTIME 11.2 10/02/2019    PROTIME 12.3 (H) 11/11/2016    PROTIME 12.7 (H) 11/10/2016       Lab Results   Component Value Date/Time    SPECIAL NOT REPORTED 02/08/2021 07:45 PM    SPECIAL NOT REPORTED 02/08/2021 07:45 PM     Lab Results   Component Value Date/Time    CULTURE NO GROWTH 4 DAYS 02/08/2021 07:45 PM    CULTURE NO GROWTH 4 DAYS 02/08/2021 07:45 PM       Lab Results   Component Value Date    POCPH 7.35 11/09/2016    POCPCO2 41 11/09/2016    POCPO2 85 11/09/2016    POCHCO3 22.4 11/09/2016    NBEA 3 11/09/2016    PBEA NOT REPORTED 11/09/2016    HQE4QFM 24 11/09/2016    ZYPN2ZAQ 96 11/09/2016    FIO2 NOT REPORTED 10/02/2019       Radiology:    Xr Chest Portable    Result Date: 2/9/2021  Stable interstitial opacities with superimposed patchy airspace disease probably combination of factors including chronic interstitial disease, vascular congestion and pneumonia. Xr Chest Portable    Result Date: 2/8/2021  Progressive bilateral patchy interstitial and alveolar infiltrates greater on the left.   Overall findings suggest progressive pulmonary vascular congestion with possible superimposed bilateral pneumonia. All radiological studies reviewed                Code Status:  DNR-CCA    Electronically signed by Barbara Lawler MD on 2/12/2021 at 5:05 PM     Copy sent to Dr. Josefa Clark MD    This note was created with the assistance of a speech-recognition program.  Although the intention is to generate a document that actually reflects the content of the visit, no guarantees can be provided that every mistake has been identified and corrected by editing. Note was updated later by me after  physical examination and  completion of the assessment.

## 2021-02-12 NOTE — PROGRESS NOTES
Occupational Therapy    DATE: 2021    NAME: Mahogany Ferrera  MRN: [id number]   : 3/26/1932      [facility]  Occupational Therapy Not Seen Note    Patient not available for Occupational Therapy due to:    [] Testing:    [] Hemodialysis    [x] Cancelled by RN: (Patient currently on Hi-Flow and not appropriate d/t difficulties breathing at rest, RN requested to have her checked on later if possible.)    []Refusal by Patient:    [] Surgery:     [] Intubation:     [] Pain Medication:    [] Sedation:     [] Spine Precautions :    [] Medical Instability:    [] Other:    MORGAN Silva/INDIRA

## 2021-02-12 NOTE — PROGRESS NOTES
Consult Note    Reason for Consult: ELICEO    Requesting Physician:  Eli Talley MD    INTERVAL HISTORY:   CXR today showed cardiomegaly with pulmonary vascular congestion and pneumonia. Small bilateral pleural effusions. Creatinine better at 0.89. SBP trending 110-160s today. She states she is feeling better today. She did not tolerate being off high flow nasal cannula. HISTORY OF PRESENT ILLNESS:    The patient is a 80 y.o. female who presents with worsening shortness of breath. Initial chest x-ray showed progressive pulmonary vascular congestion with possible superimposed bilateral pneumonia. Chest x-ray today showed bilateral airspace disease slightly increased at the right apex compared to prior. Creatinine baseline 0.6 which she was at on admission and yesterday. Creatinine elevated to 1.46 today. Hypokalemia on admission and yesterday. Potassium was 3.8 today. Recent hemoglobin 9.6. WBC was 14.4 today and today 12.0. SBP was originally trending as high as 214/75 on day of admission. She became hypotensive at 99/78 about 8 hours later. SBP trending 100s to 130s today. She originally was requiring nasal cannula and is now on high flow. Home medications include lisinopril, Lasix, metoprolol, amiodarone. She is currently on Lasix 20 mg IV twice daily, lisinopril, metoprolol, amiodarone. She has been up to the bedside commode voiding. No urinary retention on bladder scan    Review Of Systems:   Constitutional: No fever, chills, lethargy, weakness or wt loss  Cardiac:  No chest pain, +dyspnea. Chest:   No cough, phlegm or wheezing. Abdomen:  No abdominal pain, nausea, vomiting or diarrhea. :   No hematuria, pyuria, dysuria or flank pain.       Past Medical History:   Diagnosis Date    Acute on chronic diastolic CHF (congestive heart failure) (HCC) 10/2/2019    Bradycardia     CAD (coronary artery disease)     Cancer (HCC)     Skin CA on nose    Depression     Gallstones     GERD (gastroesophageal reflux disease)     Hiatal hernia     History of cardiac cath 10/2016    CAD    Hyperlipidemia     Hypertension     Insomnia     MI (myocardial infarction) ([facility] [address]     PNA (pneumonia)     SOB (shortness of breath)     UTI (urinary tract infection)        Past Surgical History:   Procedure Laterality Date    APPENDECTOMY      CARDIAC SURGERY  11/08/2016    CABG x 3; LIMA-LAD, SVG-PDA, SVG-OM    CARDIOVERSION  12/17/2019    CHOLECYSTECTOMY      COLONOSCOPY      CORONARY ANGIOPLASTY WITH STENT PLACEMENT      CORONARY ARTERY BYPASS GRAFT  11/08/2016    X 3    HYSTERECTOMY         Prior to Admission medications    Medication Sig Start Date End Date Taking?  Authorizing Provider   lisinopril (PRINIVIL;ZESTRIL) 2.5 MG tablet Take 1 tablet by mouth daily 12/12/20  Yes Emmy Bryant MD   furosemide (LASIX) 40 MG tablet Take 1 tablet by mouth daily 12/11/20  Yes SERA Benjamin CNP   metoprolol tartrate (LOPRESSOR) 25 MG tablet Take 25 mg by mouth 2 times daily  11/4/19  Yes Historical Provider, MD   amiodarone (CORDARONE) 200 MG tablet Take 200 mg by mouth daily   Yes Historical Provider, MD   apixaban (ELIQUIS) 5 MG TABS tablet Take 1 tablet by mouth 2 times daily 10/5/19  Yes Ismael Arizmendi MD   travoprost, benzalkonium, (TRAVATAN) 0.004 % ophthalmic solution Place 1 drop into the right eye nightly   Yes Historical Provider, MD   isosorbide mononitrate (IMDUR) 30 MG extended release tablet Take 30 mg by mouth every morning   Yes Historical Provider, MD   sertraline (ZOLOFT) 100 MG tablet Take 100 mg by mouth daily    Yes Historical Provider, MD   oxybutynin (DITROPAN) 5 MG tablet Take 5 mg by mouth every other day    Yes Historical Provider, MD   pravastatin (PRAVACHOL) 40 MG tablet Take 40 mg by mouth daily    Yes Historical Provider, MD   pantoprazole (PROTONIX) 40 MG tablet Take 40 mg by mouth every morning (before breakfast)    Yes Historical Provider, MD   aspirin 81 MG EC tablet Take 81 mg by mouth daily    Historical Provider, MD       Scheduled Meds:   amiodarone  200 mg Oral Daily    apixaban  5 mg Oral BID    isosorbide mononitrate  30 mg Oral QAM    metoprolol tartrate  25 mg Oral BID    oxybutynin  5 mg Oral Every Other Day    pantoprazole  40 mg Oral QAM AC    pravastatin  40 mg Oral Daily    sertraline  100 mg Oral Daily    latanoprost  1 drop Both Eyes Nightly    sodium chloride flush  10 mL Intravenous 2 times per day    ipratropium-albuterol  1 ampule Inhalation Q4H WA    cefTRIAXone (ROCEPHIN) IV  1,000 mg Intravenous Q24H     Continuous Infusions:  PRN Meds:sodium chloride flush, promethazine **OR** ondansetron, nicotine, polyethylene glycol, acetaminophen **OR** acetaminophen, albuterol, hydrALAZINE    Allergies   Allergen Reactions    Seasonal        Social History     Socioeconomic History    Marital status:       Spouse name: Not on file    Number of children: Not on file    Years of education: Not on file    Highest education level: Not on file   Occupational History    Not on file   Social Needs    Financial resource strain: Not on file    Food insecurity     Worry: Not on file     Inability: Not on file    Transportation needs     Medical: Not on file     Non-medical: Not on file   Tobacco Use    Smoking status: Former Smoker     Types: Cigarettes     Quit date: 1971     Years since quittin.2    Smokeless tobacco: Never Used    Tobacco comment: quit smoking yrs ago   Substance and Sexual Activity    Alcohol use: Yes     Comment: Rarely    Drug use: No    Sexual activity: Not on file   Lifestyle    Physical activity     Days per week: Not on file     Minutes per session: Not on file    Stress: Not on file   Relationships    Social connections     Talks on phone: Not on file     Gets together: Not on file     Attends Oriental orthodox service: Not on file     Active member of club or organization: Not on file     Attends meetings of clubs or organizations: Not on file     Relationship status: Not on file    Intimate partner violence     Fear of current or ex partner: Not on file     Emotionally abused: Not on file     Physically abused: Not on file     Forced sexual activity: Not on file   Other Topics Concern    Not on file   Social History Narrative    Not on file       Family History   Problem Relation Age of Onset    Heart Disease Mother     Cancer Mother     Heart Disease Father          Physical Exam:  Vitals:    02/12/21 0741 02/12/21 0809 02/12/21 0831 02/12/21 0836   BP:  (!) 175/64     Pulse:  78     Resp: 19 21     Temp:  98 °F (36.7 °C)     TempSrc:  Oral     SpO2: 97% 98% (!) 89% 93%   Weight:       Height:         I/O last 3 completed shifts: In: 120 [P.O.:120]  Out: 200 [Urine:200]    General:  Awake, alert, not in distress, and comfortable on high flow. Appears to be stated age. HEENT: Atraumatic, normocephalic. Anicteric sclera. Pink and moist oral mucosa. Neck supple. No JVD. Chest: Bilateral air entry, clear to auscultation, no wheezing, rhonchi or rales. Cardiovascular: RRR, S1S2, no murmur, rub or gallop. No lower extremity edema. Abdomen: Soft, non tender to palpation. Active bowel sounds x 4 quadrants. Musculoskeletal: Active ROM x 4 extremities. No cyanosis or clubbing. Integumentary: Pink, warm and dry. Free from rash or lesions. Skin turgor normal.  CNS: Oriented to person, place and time. Speech clear. Face symmetrical. No tremor.      Data:    CBC:   Lab Results   Component Value Date    WBC 10.8 02/12/2021    HGB 9.7 (L) 02/12/2021    HCT 32.3 (L) 02/12/2021    MCV 89.5 02/12/2021     02/12/2021     BMP:    Lab Results   Component Value Date     02/12/2021     02/11/2021     02/10/2021    K 4.3 02/12/2021    K 3.7 02/11/2021    K 3.8 02/10/2021     02/12/2021    CL 99 02/11/2021    CL 99 02/10/2021    CO2 31 02/12/2021    CO2 30 02/11/2021    CO2 31 02/10/2021    BUN 29 (H) 02/12/2021    BUN 33 (H) 02/11/2021    BUN 23 02/10/2021    CREATININE 0.89 02/12/2021    CREATININE 1.46 (H) 02/11/2021    CREATININE 1.46 (H) 02/10/2021    GLUCOSE 122 (H) 02/12/2021    GLUCOSE 119 (H) 02/11/2021    GLUCOSE 124 (H) 02/10/2021     CMP:   Lab Results   Component Value Date     02/12/2021    K 4.3 02/12/2021     02/12/2021    CO2 31 02/12/2021    BUN 29 02/12/2021    CREATININE 0.89 02/12/2021    GLUCOSE 122 02/12/2021    CALCIUM 8.8 02/12/2021    PROT 6.1 05/01/2020    LABALBU 3.3 05/01/2020    BILITOT 0.71 05/01/2020    ALKPHOS 72 05/01/2020    AST 16 05/01/2020    ALT 8 05/01/2020      Hepatic:   Lab Results   Component Value Date    AST 16 05/01/2020    AST 67 (H) 10/02/2019    AST 18 11/07/2016    ALT 8 05/01/2020    ALT 63 (H) 10/02/2019    ALT 12 11/07/2016    BILITOT 0.71 05/01/2020    BILITOT 0.82 10/02/2019    BILITOT 0.39 11/07/2016    ALKPHOS 72 05/01/2020    ALKPHOS 103 10/02/2019    ALKPHOS 70 11/07/2016     BNP: No results found for: BNP  Lipids:   Lab Results   Component Value Date    CHOL 109 12/08/2020    HDL 38 (L) 12/08/2020     INR:   Lab Results   Component Value Date    INR 1.1 10/02/2019    INR 1.2 11/11/2016    INR 1.2 11/10/2016     PTH: No results found for: PTH  Phosphorus:    Lab Results   Component Value Date    PHOS 2.9 02/12/2021     Ionized Calcium: No results found for: IONCA  Magnesium:   Lab Results   Component Value Date    MG 2.0 02/12/2021     Albumin:   Lab Results   Component Value Date    LABALBU 3.3 05/01/2020     Last 3 CK, CKMB, Troponin: @LABRCNT(CKTOTAL:3,CKMB:3,TROPONINI:3)       URINE:)No results found for: Valeria Hernandez    Radiology:   Reviewed. Assessment:  Acute Kidney Injury, likely hemodynamically related. FeNa 0.26%. Cr is better. Baseline Creatinine 0.6. Hypokalemia  Hypertension with recent hypotension. CHF exacerbation. Pneumonia. Hypoxic respiratory failure    Plan:  Off lisinopril.   Received Lasix 20 mg iv today. Monitor blood pressure and heart rate. On amiodarone and isosorbide. Strict I&O. Antibiotics per primary and pulmonary. Cardiac diet with 1000mL fluid restriction. Follow up urine eosinophils. Avoid nephrotoxic drugs and IV contrast exposure. Follow up chemistries. Please do not hesitate to contact us for any further questions/concerns. We will continue to follow along with you.        Electronically signed by Dontrell Fernández MD  on 2/12/2021 at 10:41 AM

## 2021-02-13 ENCOUNTER — APPOINTMENT (OUTPATIENT)
Dept: GENERAL RADIOLOGY | Age: 86
DRG: 291 | End: 2021-02-13
Payer: MEDICARE

## 2021-02-13 LAB
ANION GAP SERPL CALCULATED.3IONS-SCNC: 9 MMOL/L (ref 9–17)
BUN BLDV-MCNC: 27 MG/DL (ref 8–23)
BUN/CREAT BLD: 36 (ref 9–20)
CALCIUM SERPL-MCNC: 8.7 MG/DL (ref 8.6–10.4)
CHLORIDE BLD-SCNC: 101 MMOL/L (ref 98–107)
CO2: 32 MMOL/L (ref 20–31)
CREAT SERPL-MCNC: 0.74 MG/DL (ref 0.5–0.9)
GFR AFRICAN AMERICAN: >60 ML/MIN
GFR NON-AFRICAN AMERICAN: >60 ML/MIN
GFR SERPL CREATININE-BSD FRML MDRD: ABNORMAL ML/MIN/{1.73_M2}
GFR SERPL CREATININE-BSD FRML MDRD: ABNORMAL ML/MIN/{1.73_M2}
GLUCOSE BLD-MCNC: 119 MG/DL (ref 70–99)
HCT VFR BLD CALC: 33.6 % (ref 36.3–47.1)
HEMOGLOBIN: 10 G/DL (ref 11.9–15.1)
MAGNESIUM: 1.9 MG/DL (ref 1.6–2.6)
MCH RBC QN AUTO: 27 PG (ref 25.2–33.5)
MCHC RBC AUTO-ENTMCNC: 29.8 G/DL (ref 28.4–34.8)
MCV RBC AUTO: 90.6 FL (ref 82.6–102.9)
NRBC AUTOMATED: 0 PER 100 WBC
PDW BLD-RTO: 16.2 % (ref 11.8–14.4)
PHOSPHORUS: 2.9 MG/DL (ref 2.6–4.5)
PLATELET # BLD: 229 K/UL (ref 138–453)
PMV BLD AUTO: 11 FL (ref 8.1–13.5)
POTASSIUM SERPL-SCNC: 4.5 MMOL/L (ref 3.7–5.3)
PROCALCITONIN: 0.2 NG/ML
RBC # BLD: 3.71 M/UL (ref 3.95–5.11)
SODIUM BLD-SCNC: 142 MMOL/L (ref 135–144)
WBC # BLD: 11.8 K/UL (ref 3.5–11.3)

## 2021-02-13 PROCEDURE — 2580000003 HC RX 258: Performed by: FAMILY MEDICINE

## 2021-02-13 PROCEDURE — 6370000000 HC RX 637 (ALT 250 FOR IP): Performed by: FAMILY MEDICINE

## 2021-02-13 PROCEDURE — 36415 COLL VENOUS BLD VENIPUNCTURE: CPT

## 2021-02-13 PROCEDURE — 94640 AIRWAY INHALATION TREATMENT: CPT

## 2021-02-13 PROCEDURE — 80048 BASIC METABOLIC PNL TOTAL CA: CPT

## 2021-02-13 PROCEDURE — 2700000000 HC OXYGEN THERAPY PER DAY

## 2021-02-13 PROCEDURE — 6360000002 HC RX W HCPCS: Performed by: FAMILY MEDICINE

## 2021-02-13 PROCEDURE — 83735 ASSAY OF MAGNESIUM: CPT

## 2021-02-13 PROCEDURE — 97110 THERAPEUTIC EXERCISES: CPT

## 2021-02-13 PROCEDURE — 2060000000 HC ICU INTERMEDIATE R&B

## 2021-02-13 PROCEDURE — 85027 COMPLETE CBC AUTOMATED: CPT

## 2021-02-13 PROCEDURE — 94760 N-INVAS EAR/PLS OXIMETRY 1: CPT

## 2021-02-13 PROCEDURE — 6360000002 HC RX W HCPCS: Performed by: INTERNAL MEDICINE

## 2021-02-13 PROCEDURE — 71045 X-RAY EXAM CHEST 1 VIEW: CPT

## 2021-02-13 PROCEDURE — 84145 PROCALCITONIN (PCT): CPT

## 2021-02-13 PROCEDURE — 84100 ASSAY OF PHOSPHORUS: CPT

## 2021-02-13 PROCEDURE — 97116 GAIT TRAINING THERAPY: CPT

## 2021-02-13 RX ORDER — FUROSEMIDE 10 MG/ML
20 INJECTION INTRAMUSCULAR; INTRAVENOUS DAILY
Status: DISCONTINUED | OUTPATIENT
Start: 2021-02-13 | End: 2021-02-14

## 2021-02-13 RX ADMIN — APIXABAN 5 MG: 5 TABLET, FILM COATED ORAL at 20:13

## 2021-02-13 RX ADMIN — IPRATROPIUM BROMIDE AND ALBUTEROL SULFATE 1 AMPULE: .5; 3 SOLUTION RESPIRATORY (INHALATION) at 08:10

## 2021-02-13 RX ADMIN — PRAVASTATIN SODIUM 40 MG: 40 TABLET ORAL at 09:35

## 2021-02-13 RX ADMIN — FUROSEMIDE 20 MG: 10 INJECTION, SOLUTION INTRAMUSCULAR; INTRAVENOUS at 19:16

## 2021-02-13 RX ADMIN — PANTOPRAZOLE SODIUM 40 MG: 40 TABLET, DELAYED RELEASE ORAL at 06:24

## 2021-02-13 RX ADMIN — SODIUM CHLORIDE, PRESERVATIVE FREE 10 ML: 5 INJECTION INTRAVENOUS at 09:35

## 2021-02-13 RX ADMIN — APIXABAN 5 MG: 5 TABLET, FILM COATED ORAL at 09:35

## 2021-02-13 RX ADMIN — IPRATROPIUM BROMIDE AND ALBUTEROL SULFATE 1 AMPULE: .5; 3 SOLUTION RESPIRATORY (INHALATION) at 20:16

## 2021-02-13 RX ADMIN — AMIODARONE HYDROCHLORIDE 200 MG: 200 TABLET ORAL at 09:36

## 2021-02-13 RX ADMIN — OXYBUTYNIN CHLORIDE 5 MG: 5 TABLET ORAL at 09:36

## 2021-02-13 RX ADMIN — CEFTRIAXONE SODIUM 1000 MG: 1 INJECTION, POWDER, FOR SOLUTION INTRAMUSCULAR; INTRAVENOUS at 20:09

## 2021-02-13 RX ADMIN — ISOSORBIDE MONONITRATE 30 MG: 30 TABLET ORAL at 09:35

## 2021-02-13 RX ADMIN — IPRATROPIUM BROMIDE AND ALBUTEROL SULFATE 1 AMPULE: .5; 3 SOLUTION RESPIRATORY (INHALATION) at 15:57

## 2021-02-13 RX ADMIN — METOPROLOL TARTRATE 25 MG: 25 TABLET, FILM COATED ORAL at 20:13

## 2021-02-13 RX ADMIN — SODIUM CHLORIDE, PRESERVATIVE FREE 10 ML: 5 INJECTION INTRAVENOUS at 20:10

## 2021-02-13 RX ADMIN — IPRATROPIUM BROMIDE AND ALBUTEROL SULFATE 1 AMPULE: .5; 3 SOLUTION RESPIRATORY (INHALATION) at 11:40

## 2021-02-13 RX ADMIN — LATANOPROST 1 DROP: 50 SOLUTION OPHTHALMIC at 20:15

## 2021-02-13 RX ADMIN — METOPROLOL TARTRATE 25 MG: 25 TABLET, FILM COATED ORAL at 09:36

## 2021-02-13 RX ADMIN — SERTRALINE HYDROCHLORIDE 100 MG: 50 TABLET ORAL at 09:36

## 2021-02-13 NOTE — PROGRESS NOTES
assistance  Stand to sit: Contact guard assistance  Ambulation  Ambulation?: Yes  More Ambulation?: Yes  Ambulation 1  Surface: level tile  Device: Rolling Walker  Other Apparatus: O2  Assistance: Contact guard assistance  Quality of Gait: Short step length with cues for upright posture and safety in turns. Gait Deviations: Decreased step height;Decreased step length  Distance: 8 feet  Comments: Ambulated bed to commode and back, then to chair after second walk. Ambulation 2  Surface - 2: level tile  Device 2: Rolling Walker  Other Apparatus 2: O2  Assistance 2: Contact guard assistance  Quality of Gait 2: Same  Gait Deviations: Decreased step length;Decreased step height  Distance: 15 feet        Exercises  Comments: Seated active lower extremity exercises x 10 - 20 reps each. G-Code     OutComes Score                                                     AM-PAC Score     AM-PAC Inpatient Mobility without Stair Climbing Raw Score : 15 (02/13/21 1155)  AM-PAC Inpatient without Stair Climbing T-Scale Score : 43.03 (02/13/21 1155)  Mobility Inpatient CMS 0-100% Score: 47.43 (02/13/21 1155)  Mobility Inpatient without Stair CMS G-Code Modifier : CK (02/13/21 1155)       Goals  Short term goals  Time Frame for Short term goals: 12 visits  Short term goal 1: Inc bed-mobility & transfers to independent to enable pt to safely get in/OOB  Short term goal 2: Inc gait to amb 200 ft with R/walker indep; Short term goal 3: Pt able to tolerate 30-40 min of activity to include 15-20 reps of ex & functional mobility including 5 minutes of standing to facilitate better activity tolerance  Short term goal 5: Ed pt on home ex's, optimal breathing techniques, safety & energy principles, fall prevention & issue written pt education  Patient Goals   Patient goals :  To go and be with [employer]    Plan    Plan  Times per week: 1-2x/D, 5-6D/week  Current Treatment Recommendations: Strengthening, Balance Training,

## 2021-02-13 NOTE — PROGRESS NOTES
Progress note  [facility]    Adult Hospitalist      Name: Ana Shaw  MRN: [id number]     Acct: [de-identified]  Room: [address] [place] [address]    Admit Date: 2/8/2021  6:28 PM  PCP: Josefa Clark MD    Primary Problem  Active Problems:    Acute left-sided CHF (congestive heart failure) ([facility] [address]  Resolved Problems:    * No resolved hospital problems. *        Assesment:   Acute on chronic hypoxemic respiratory failure  Acute on chronic diastolic congestive heart failure  Pulmonary edema  Multifocal pneumonia  Acute kidney injury  Chronic coronary artery disease status post CABG in 2016  Chronic atrial fibrillation  Chronic anticoagulation with Eliquis  Essential hypertension  Mixed hyperlipidemia  Mild pulmonary hypertension  Depression with anxiety  Former smoker  GERD    Plan:   Admit patient to progressive unit  Oxygen keep SPO2 more than 90%  BiPAP as needed  Patient noticed to have increased respiratory distress.   Blood culture  BiPAP as needed  IV Rocephin for now  Oral azithromycin, completed  Off diuresis, to receive 1 dose of IV Lasix, nephrology managing diuresis likely will benefit from aggressive diuresis due to continuous hypoxia requiring high flow nasal cannula oxygen   DuoNeb breathing treatment  Continue Eliquis  Continue lisinopril, metoprolol, Imdur, amiodarone  Continue pravastatin  Continue Zoloft  Pulmonology on consult, appreciated  Consult cardiology, appreciated  Appreciate nephrology assistance  Chest x-ray showed multifocal pneumonia, continue IV Rocephin for now patient is on high flow nasal cannula oxygen  Is not better can escalate antibiotics check procalcitonin level    Scheduled Meds:   amiodarone  200 mg Oral Daily    apixaban  5 mg Oral BID    isosorbide mononitrate  30 mg Oral QAM    metoprolol tartrate  25 mg Oral BID    oxybutynin  5 mg Oral Every Other Day    pantoprazole  40 mg Oral QAM AC    pravastatin  40 mg Oral Daily    sertraline  100 mg Oral Daily    latanoprost 1 drop Both Eyes Nightly    sodium chloride flush  10 mL Intravenous 2 times per day    ipratropium-albuterol  1 ampule Inhalation Q4H WA    cefTRIAXone (ROCEPHIN) IV  1,000 mg Intravenous Q24H     Continuous Infusions:    PRN Meds:      sodium chloride flush, 10 mL, PRN      promethazine, 12.5 mg, Q6H PRN    Or      ondansetron, 4 mg, Q6H PRN      nicotine, 1 patch, Daily PRN      polyethylene glycol, 17 g, Daily PRN      acetaminophen, 650 mg, Q6H PRN    Or      acetaminophen, 650 mg, Q6H PRN      albuterol, 2.5 mg, Q2H PRN      hydrALAZINE, 20 mg, Q6H PRN        Chief Complaint:     Chief Complaint   Patient presents with    Shortness of Breath         History of Present Illness: Ruddy Alvarez is a 80 y.o.  female who presents with Shortness of Breath  Patient seen and examined at bedside and reviewed  last 24 hrs events with nursing staff  No acute events overnight. Patient denies any acute complaints. Afebrile  Patient denies any chest pain, palpitation, headache, dizziness, cough, nausea, vomiting, abdominal pain, pain, changes in urination or bowel habit or rash. Patient continues to have shortness of breath continues to require high flow nasal cannula oxygen           HPI  This is a80year-old female is been admitted to emergency room, patient came to the ER with a complaint of having shortness of breath, further patient is been having some shortness of breath for past couple of days, patient does have history of congestive heart failure in view of oxygen at home, also history of coronary disease, hypertension hyperlipidemia and CABG in the past, patient initial testing in the emergency room is consistent with congestive heart failure, admitted for further management    I have personally reviewed the past medical history, past surgical history, medications, social history, and family history, and summarized in the note.     Review of Systems:     All 10 point system is reviewed and negative otherwise mentioned in HPI. Past Medical History:     Past Medical History:   Diagnosis Date    Acute on chronic diastolic CHF (congestive heart failure) (HCC) 10/2/2019    Bradycardia     CAD (coronary artery disease)     Cancer (HCC)     Skin CA on nose    Depression     Gallstones     GERD (gastroesophageal reflux disease)     Hiatal hernia     History of cardiac cath 10/2016    CAD    Hyperlipidemia     Hypertension     Insomnia     MI (myocardial infarction) ([address]     PNA (pneumonia)     SOB (shortness of breath)     UTI (urinary tract infection)         Past Surgical History:     Past Surgical History:   Procedure Laterality Date    APPENDECTOMY      CARDIAC SURGERY  11/08/2016    CABG x 3; LIMA-LAD, SVG-PDA, SVG-OM    CARDIOVERSION  12/17/2019    CHOLECYSTECTOMY      COLONOSCOPY      CORONARY ANGIOPLASTY WITH STENT PLACEMENT      CORONARY ARTERY BYPASS GRAFT  11/08/2016    X 3    HYSTERECTOMY          Medications Prior to Admission:       Prior to Admission medications    Medication Sig Start Date End Date Taking?  Authorizing Provider   lisinopril (PRINIVIL;ZESTRIL) 2.5 MG tablet Take 1 tablet by mouth daily 12/12/20  Yes Zach Polanco MD   furosemide (LASIX) 40 MG tablet Take 1 tablet by mouth daily 12/11/20  Yes SERA Arenas - LUCIA   metoprolol tartrate (LOPRESSOR) 25 MG tablet Take 25 mg by mouth 2 times daily  11/4/19  Yes Historical Provider, MD   amiodarone (CORDARONE) 200 MG tablet Take 200 mg by mouth daily   Yes Historical Provider, MD   apixaban (ELIQUIS) 5 MG TABS tablet Take 1 tablet by mouth 2 times daily 10/5/19  Yes Beryle Aas, MD   travoprost, benzalkonium, (TRAVATAN) 0.004 % ophthalmic solution Place 1 drop into the right eye nightly   Yes Historical Provider, MD   isosorbide mononitrate (IMDUR) 30 MG extended release tablet Take 30 mg by mouth every morning   Yes Historical Provider, MD   sertraline (ZOLOFT) 100 MG tablet Take 100 mg by mouth daily    Yes Historical Provider, MD   oxybutynin (DITROPAN) 5 MG tablet Take 5 mg by mouth every other day    Yes Historical Provider, MD   pravastatin (PRAVACHOL) 40 MG tablet Take 40 mg by mouth daily    Yes Historical Provider, MD   pantoprazole (PROTONIX) 40 MG tablet Take 40 mg by mouth every morning (before breakfast)    Yes Historical Provider, MD   aspirin 81 MG EC tablet Take 81 mg by mouth daily    Historical Provider, MD        Allergies:       Seasonal    Social History:     Tobacco:    reports that she quit smoking about 49 years ago. Her smoking use included cigarettes. She has never used smokeless tobacco.  Alcohol:      reports current alcohol use. Drug Use:  reports no history of drug use.     Family History:     Family History   Problem Relation Age of Onset    Heart Disease Mother     Cancer Mother     Heart Disease Father          Physical Exam:     Vitals:  BP 99/60   Pulse 57   Temp 98.3 °F (36.8 °C) (Oral)   Resp 18   Ht 5' 5\" (1.651 m)   Wt 123 lb 3 oz (55.9 kg)   SpO2 92%   BMI 20.50 kg/m²   Temp (24hrs), Av.5 °F (36.9 °C), Min:98.1 °F (36.7 °C), Max:99.1 °F (37.3 °C)          General appearance - alert, well appearing, and in no acute distress  Mental status - oriented to person, place, and time with normal affect  Head - normocephalic and atraumatic  Eyes - pupils equal and reactive, extraocular eye movements intact, conjunctiva clear  Ears - hearing appears to be intact  Nose - no drainage noted  Mouth - mucous membranes moist  Neck - supple, no carotid bruits, thyroid not palpable  Chest - clear to auscultation, normal effort  Heart - normal rate, regular rhythm, no murmur  Abdomen - soft, nontender, nondistended, bowel sounds present all four quadrants, no masses, hepatomegaly or splenomegaly  Neurological - normal speech, no focal findings or movement disorder noted, cranial nerves II through XII grossly intact  Extremities - peripheral pulses palpable, no pedal edema or calf pain with palpation  Skin - no gross lesions, rashes, or induration noted        Data:     Labs:    Hematology:  Recent Labs     02/11/21  0549 02/12/21  0502 02/13/21  0600   WBC 10.2 10.8 11.8*   RBC 3.75* 3.61* 3.71*   HGB 10.1* 9.7* 10.0*   HCT 33.4* 32.3* 33.6*   MCV 89.1 89.5 90.6   MCH 26.9 26.9 27.0   MCHC 30.2 30.0 29.8   RDW 16.2* 16.2* 16.2*    194 229   MPV 11.0 11.4 11.0     Chemistry:  Recent Labs     02/11/21  0549 02/12/21  0502 02/13/21  0600    140 142   K 3.7 4.3 4.5   CL 99 101 101   CO2 30 31 32*   GLUCOSE 119* 122* 119*   BUN 33* 29* 27*   CREATININE 1.46* 0.89 0.74   MG 2.0 2.0 1.9   ANIONGAP 11 8* 9   LABGLOM 34* 60* >60   GFRAA 41* >60 >60   CALCIUM 8.6 8.8 8.7   PHOS 4.2 2.9 2.9     No results for input(s): PROT, LABALBU, LABA1C, R0JQTCD, W1MIZOD, FT4, TSH, AST, ALT, LDH, GGT, ALKPHOS, LABGGT, BILITOT, BILIDIR, AMMONIA, AMYLASE, LIPASE, LACTATE, CHOL, HDL, LDLCHOLESTEROL, CHOLHDLRATIO, TRIG, VLDL, UUA18VT, PHENYTOIN, PHENYF, URICACID, POCGLU in the last 72 hours.     Lab Results   Component Value Date    INR 1.1 10/02/2019    INR 1.2 11/11/2016    INR 1.2 11/10/2016    PROTIME 11.2 10/02/2019    PROTIME 12.3 (H) 11/11/2016    PROTIME 12.7 (H) 11/10/2016       Lab Results   Component Value Date/Time    SPECIAL NOT REPORTED 02/08/2021 07:45 PM    SPECIAL NOT REPORTED 02/08/2021 07:45 PM     Lab Results   Component Value Date/Time    CULTURE NO GROWTH 5 DAYS 02/08/2021 07:45 PM    CULTURE NO GROWTH 5 DAYS 02/08/2021 07:45 PM       Lab Results   Component Value Date    POCPH 7.35 11/09/2016    POCPCO2 41 11/09/2016    POCPO2 85 11/09/2016    POCHCO3 22.4 11/09/2016    NBEA 3 11/09/2016    PBEA NOT REPORTED 11/09/2016    QXC8HVJ 24 11/09/2016    GMQX3TQB 96 11/09/2016    FIO2 NOT REPORTED 10/02/2019       Radiology:    Xr Chest Portable    Result Date: 2/9/2021  Stable interstitial opacities with superimposed patchy airspace disease probably combination of factors including chronic interstitial disease, vascular congestion and pneumonia. Xr Chest Portable    Result Date: 2/8/2021  Progressive bilateral patchy interstitial and alveolar infiltrates greater on the left. Overall findings suggest progressive pulmonary vascular congestion with possible superimposed bilateral pneumonia. All radiological studies reviewed                Code Status:  DNR-CCA    Electronically signed by Radha Watkins MD on 2/13/2021 at 3:38 PM     Copy sent to Dr. Madeleine Damon MD    This note was created with the assistance of a speech-recognition program.  Although the intention is to generate a document that actually reflects the content of the visit, no guarantees can be provided that every mistake has been identified and corrected by editing. Note was updated later by me after  physical examination and  completion of the assessment.

## 2021-02-13 NOTE — PLAN OF CARE
Nutrition Problem #1: Inadequate energy intake  Intervention: Food and/or Nutrient Delivery: Continue Current Diet, Continue Oral Nutrition Supplement  Nutritional Goals: p.o intake >50% of estimated energy needs for all meals

## 2021-02-13 NOTE — PROGRESS NOTES
Consult Note    Reason for Consult: ELICEO    Requesting Physician:  Sommer Melissa MD    INTERVAL HISTORY:   Creatinine better at 0.74. SBP trending 110-160s today. She states she is feeling better today. She did not tolerate being off high flow nasal cannula. HISTORY OF PRESENT ILLNESS:    The patient is a 80 y.o. female who presents with worsening shortness of breath. Initial chest x-ray showed progressive pulmonary vascular congestion with possible superimposed bilateral pneumonia. Chest x-ray today showed bilateral airspace disease slightly increased at the right apex compared to prior. Creatinine baseline 0.6 which she was at on admission and yesterday. Creatinine elevated to 1.46 today. Hypokalemia on admission and yesterday. Potassium was 3.8 today. Recent hemoglobin 9.6. WBC was 14.4 today and today 12.0. SBP was originally trending as high as 214/75 on day of admission. She became hypotensive at 99/78 about 8 hours later. SBP trending 100s to 130s today. She originally was requiring nasal cannula and is now on high flow. Home medications include lisinopril, Lasix, metoprolol, amiodarone. She is currently on Lasix 20 mg IV twice daily, lisinopril, metoprolol, amiodarone. She has been up to the bedside commode voiding. No urinary retention on bladder scan    Review Of Systems:   Constitutional: No fever, chills, lethargy, weakness or wt loss  Cardiac:  No chest pain, +dyspnea. Chest:   No cough, phlegm or wheezing. Abdomen:  No abdominal pain, nausea, vomiting or diarrhea. :   No hematuria, pyuria, dysuria or flank pain.       Past Medical History:   Diagnosis Date    Acute on chronic diastolic CHF (congestive heart failure) (HCC) 10/2/2019    Bradycardia     CAD (coronary artery disease)     Cancer (HCC)     Skin CA on nose    Depression     Gallstones     GERD (gastroesophageal reflux disease)     Hiatal hernia     History of cardiac cath 10/2016    CAD    Hyperlipidemia  Hypertension     Insomnia     MI [facility] infarction) ([facility] [address]     PNA (pneumonia)     SOB (shortness of breath)     UTI (urinary tract infection)        Past Surgical History:   Procedure Laterality Date    APPENDECTOMY      CARDIAC SURGERY  11/08/2016    CABG x 3; LIMA-LAD, SVG-PDA, SVG-OM    CARDIOVERSION  12/17/2019    CHOLECYSTECTOMY      COLONOSCOPY      CORONARY ANGIOPLASTY WITH STENT PLACEMENT      CORONARY ARTERY BYPASS GRAFT  11/08/2016    X 3    HYSTERECTOMY         Prior to Admission medications    Medication Sig Start Date End Date Taking?  Authorizing Provider   lisinopril (PRINIVIL;ZESTRIL) 2.5 MG tablet Take 1 tablet by mouth daily 12/12/20  Yes Howard Mobley MD   furosemide (LASIX) 40 MG tablet Take 1 tablet by mouth daily 12/11/20  Yes SERA Gomez CNP   metoprolol tartrate (LOPRESSOR) 25 MG tablet Take 25 mg by mouth 2 times daily  11/4/19  Yes Historical Provider, MD   amiodarone (CORDARONE) 200 MG tablet Take 200 mg by mouth daily   Yes Historical Provider, MD   apixaban (ELIQUIS) 5 MG TABS tablet Take 1 tablet by mouth 2 times daily 10/5/19  Yes Ibeth Mayer MD   travoprost, benzalkonium, (TRAVATAN) 0.004 % ophthalmic solution Place 1 drop into the right eye nightly   Yes Historical Provider, MD   isosorbide mononitrate (IMDUR) 30 MG extended release tablet Take 30 mg by mouth every morning   Yes Historical Provider, MD   sertraline (ZOLOFT) 100 MG tablet Take 100 mg by mouth daily    Yes Historical Provider, MD   oxybutynin (DITROPAN) 5 MG tablet Take 5 mg by mouth every other day    Yes Historical Provider, MD   pravastatin (PRAVACHOL) 40 MG tablet Take 40 mg by mouth daily    Yes Historical Provider, MD   pantoprazole (PROTONIX) 40 MG tablet Take 40 mg by mouth every morning (before breakfast)    Yes Historical Provider, MD   aspirin 81 MG EC tablet Take 81 mg by mouth daily    Historical Provider, MD       Scheduled Meds:   amiodarone  200 mg Oral Daily  apixaban  5 mg Oral BID    isosorbide mononitrate  30 mg Oral QAM    metoprolol tartrate  25 mg Oral BID    oxybutynin  5 mg Oral Every Other Day    pantoprazole  40 mg Oral QAM AC    pravastatin  40 mg Oral Daily    sertraline  100 mg Oral Daily    latanoprost  1 drop Both Eyes Nightly    sodium chloride flush  10 mL Intravenous 2 times per day    ipratropium-albuterol  1 ampule Inhalation Q4H WA    cefTRIAXone (ROCEPHIN) IV  1,000 mg Intravenous Q24H     Continuous Infusions:  PRN Meds:sodium chloride flush, promethazine **OR** ondansetron, nicotine, polyethylene glycol, acetaminophen **OR** acetaminophen, albuterol, hydrALAZINE    Allergies   Allergen Reactions    Seasonal        Social History     Socioeconomic History    Marital status:       Spouse name: Not on file    Number of children: Not on file    Years of education: Not on file    Highest education level: Not on file   Occupational History    Not on file   Social Needs    Financial resource strain: Not on file    Food insecurity     Worry: Not on file     Inability: Not on file    Transportation needs     Medical: Not on file     Non-medical: Not on file   Tobacco Use    Smoking status: Former Smoker     Types: Cigarettes     Quit date: 1971     Years since quittin.2    Smokeless tobacco: Never Used    Tobacco comment: quit smoking yrs ago   Substance and Sexual Activity    Alcohol use: Yes     Comment: Rarely    Drug use: No    Sexual activity: Not on file   Lifestyle    Physical activity     Days per week: Not on file     Minutes per session: Not on file    Stress: Not on file   Relationships    Social connections     Talks on phone: Not on file     Gets together: Not on file     Attends Anabaptism service: Not on file     Active member of club or organization: Not on file     Attends meetings of clubs or organizations: Not on file     Relationship status: Not on file    Intimate partner violence Fear of current or ex partner: Not on file     Emotionally abused: Not on file     Physically abused: Not on file     Forced sexual activity: Not on file   Other Topics Concern    Not on file   Social History Narrative    Not on file       Family History   Problem Relation Age of Onset    Heart Disease Mother     Cancer Mother     Heart Disease Father          Physical Exam:  Vitals:    02/13/21 1224 02/13/21 1225 02/13/21 1610 02/13/21 1657   BP:  99/60  (!) 117/49   Pulse:  57  63   Resp:  18 20 18   Temp:  98.3 °F (36.8 °C)  98.3 °F (36.8 °C)   TempSrc: Oral Oral  Axillary   SpO2:  92% 92% 95%   Weight:       Height:         I/O last 3 completed shifts:  In: -   Out: 150 [Urine:150]    General:  Awake, alert, not in distress, and comfortable on high flow. Appears to be stated age. HEENT: Atraumatic, normocephalic. Anicteric sclera. Pink and moist oral mucosa. Neck supple. No JVD. Chest: Bilateral air entry, clear to auscultation, no wheezing, rhonchi or rales. Cardiovascular: RRR, S1S2, no murmur, rub or gallop. No lower extremity edema. Abdomen: Soft, non tender to palpation. Active bowel sounds x 4 quadrants. Musculoskeletal: Active ROM x 4 extremities. No cyanosis or clubbing. Integumentary: Pink, warm and dry. Free from rash or lesions. Skin turgor normal.  CNS: Oriented to person, place and time. Speech clear. Face symmetrical. No tremor.      Data:    CBC:   Lab Results   Component Value Date    WBC 11.8 (H) 02/13/2021    HGB 10.0 (L) 02/13/2021    HCT 33.6 (L) 02/13/2021    MCV 90.6 02/13/2021     02/13/2021     BMP:    Lab Results   Component Value Date     02/13/2021     02/12/2021     02/11/2021    K 4.5 02/13/2021    K 4.3 02/12/2021    K 3.7 02/11/2021     02/13/2021     02/12/2021    CL 99 02/11/2021    CO2 32 (H) 02/13/2021    CO2 31 02/12/2021    CO2 30 02/11/2021    BUN 27 (H) 02/13/2021    BUN 29 (H) 02/12/2021    BUN 33 (H) 02/11/2021 CREATININE 0.74 02/13/2021    CREATININE 0.89 02/12/2021    CREATININE 1.46 (H) 02/11/2021    GLUCOSE 119 (H) 02/13/2021    GLUCOSE 122 (H) 02/12/2021    GLUCOSE 119 (H) 02/11/2021     CMP:   Lab Results   Component Value Date     02/13/2021    K 4.5 02/13/2021     02/13/2021    CO2 32 02/13/2021    BUN 27 02/13/2021    CREATININE 0.74 02/13/2021    GLUCOSE 119 02/13/2021    CALCIUM 8.7 02/13/2021    PROT 6.1 05/01/2020    LABALBU 3.3 05/01/2020    BILITOT 0.71 05/01/2020    ALKPHOS 72 05/01/2020    AST 16 05/01/2020    ALT 8 05/01/2020      Hepatic:   Lab Results   Component Value Date    AST 16 05/01/2020    AST 67 (H) 10/02/2019    AST 18 11/07/2016    ALT 8 05/01/2020    ALT 63 (H) 10/02/2019    ALT 12 11/07/2016    BILITOT 0.71 05/01/2020    BILITOT 0.82 10/02/2019    BILITOT 0.39 11/07/2016    ALKPHOS 72 05/01/2020    ALKPHOS 103 10/02/2019    ALKPHOS 70 11/07/2016     BNP: No results found for: BNP  Lipids:   Lab Results   Component Value Date    CHOL 109 12/08/2020    HDL 38 (L) 12/08/2020     INR:   Lab Results   Component Value Date    INR 1.1 10/02/2019    INR 1.2 11/11/2016    INR 1.2 11/10/2016     PTH: No results found for: PTH  Phosphorus:    Lab Results   Component Value Date    PHOS 2.9 02/13/2021     Ionized Calcium: No results found for: IONCA  Magnesium:   Lab Results   Component Value Date    MG 1.9 02/13/2021     Albumin:   Lab Results   Component Value Date    LABALBU 3.3 05/01/2020     Last 3 CK, CKMB, Troponin: @LABRCNT(CKTOTAL:3,CKMB:3,TROPONINI:3)       URINE:)No results found for: Bhavya Ng    Radiology:   Reviewed. Assessment:  Acute Kidney Injury, likely hemodynamically related. FeNa 0.26%. Cr is better. Baseline Creatinine 0.6. Hypokalemia  Hypertension with recent hypotension. CHF exacerbation. Multifocal pneumonia. Hypoxic respiratory failure. Plan:  Off lisinopril. Will start Lasix 20 mg iv daily. Monitor blood pressure and heart rate.   On amiodarone and isosorbide. Strict I&O. Antibiotics per primary and pulmonary. Cardiac diet with 1000 mL fluid restriction. Follow up urine eosinophils. Avoid nephrotoxic drugs and IV contrast exposure. Follow up chemistries. Please do not hesitate to contact us for any further questions/concerns. We will continue to follow along with you.        Electronically signed by Prachi Cox MD  on 2/13/2021 at 6:01 PM

## 2021-02-13 NOTE — PROGRESS NOTES
Section of Cardiology  Progress Note      Date:  2/13/2021  Patient: Swapnil Bateman  Admission:  2/8/2021  6:28 PM  Admit DX: Acute left-sided CHF (congestive heart failure) ([facility] [I50.1]  Age:  80 y.o., 3/26/1932     LOS: 5 days     Reason for evaluation:   ischemic heart disease and CHF      SUBJECTIVE:     The patient was seen and examined. Notes and labs reviewed. There were not complications over night. Patient seen and examined in her room with her son at bedside. She is still on high flow oxygen. Incidentally while I was with the patient her oxygen apparatus dislodged and her O2 saturation went down to 63%. The patient does not report chest pain or shortness of breath? The patient is generally feeling gradually improving. OBJECTIVE:    Telemetry: Sinus  BP 99/60   Pulse 57   Temp 98.3 °F (36.8 °C) (Oral)   Resp 18   Ht 5' 5\" (1.651 m)   Wt 123 lb 3 oz (55.9 kg)   SpO2 92%   BMI 20.50 kg/m²     Intake/Output Summary (Last 24 hours) at 2/13/2021 1457  Last data filed at 2/12/2021 1958  Gross per 24 hour   Intake --   Output 150 ml   Net -150 ml       EXAM:   CONSTITUTIONAL:  awake, alert, cooperative, no apparent distress, and appears stated age. HEENT: Normal jugular venous pulsations, no carotid bruits. Head is atraumatic, normocephalic. Eyes and oral mucosa are normal.  LUNGS: Good respiratory effort. No for increased work of breathing. On auscultation: Bilateral crackles and dry rales  CARDIOVASCULAR:  Normal apical impulse, regular rate and rhythm, normal S1 and S2, no S3 or S4,   ABDOMEN: Soft, nontender, nondistended. Bowel sounds present. SKIN: Warm and dry. EXTREMITIES: No lower extremity edema. Motor movement grossly intact. No cyanosis or clubbing.     Current Inpatient Medications:   amiodarone  200 mg Oral Daily    apixaban  5 mg Oral BID    isosorbide mononitrate  30 mg Oral QAM    metoprolol tartrate  25 mg Oral BID    oxybutynin  5 mg Oral Every Other Day    pantoprazole  40 mg Oral QAM AC    pravastatin  40 mg Oral Daily    sertraline  100 mg Oral Daily    latanoprost  1 drop Both Eyes Nightly    sodium chloride flush  10 mL Intravenous 2 times per day    ipratropium-albuterol  1 ampule Inhalation Q4H WA    cefTRIAXone (ROCEPHIN) IV  1,000 mg Intravenous Q24H       IV Infusions (if any):      Diagnostics:   EKG: . ECHO: reviewed. Ejection fraction: %  Stress Test: not obtained. Cardiac Angiography: not obtained. Labs:   CBC:   Recent Labs     02/12/21  0502 02/13/21  0600   WBC 10.8 11.8*   HGB 9.7* 10.0*   HCT 32.3* 33.6*    229     BMP:   Recent Labs     02/12/21  0502 02/13/21  0600    142   K 4.3 4.5   CO2 31 32*   BUN 29* 27*   CREATININE 0.89 0.74   LABGLOM 60* >60   GLUCOSE 122* 119*     No results found for: BNP  PT/INR: No results for input(s): PROTIME, INR in the last 72 hours. APTT:No results for input(s): APTT in the last 72 hours. CARDIAC ENZYMES:No results for input(s): CKTOTAL, CKMB, CKMBINDEX, TROPONINT in the last 72 hours. FASTING LIPID PANEL:  Lab Results   Component Value Date    HDL 38 12/08/2020    TRIG 78 12/08/2020     LIVER PROFILE:No results for input(s): AST, ALT, LABALBU in the last 72 hours.     ASSESSMENT:  1.  Acute on chronic diastolic CHF, however I do not think it is the main reason for her severe hypoxemia and oxygen need  2.  History of coronary artery disease  3.  History of CABG in 2016  4.  History of systemic hypertension  5.  History of hyperlipidemia  6.  History of paroxysmal atrial fibrillation currently in sinus rhythm.  History of cardioversion    Patient Active Problem List   Diagnosis    Symptomatic bradycardia    Suspected sleep apnea    Unexplained night sweats    Pneumonia    GERD (gastroesophageal reflux disease)    Hyperlipidemia    Hypertension    Chronic atrial fibrillation (HCC)    Acute on chronic diastolic CHF (congestive heart failure) (HCC)    Generalized anxiety disorder  Depressive disorder    Nonrheumatic tricuspid valve disorder    Atherosclerotic heart disease of native coronary artery without angina pectoris    Heart failure, diastolic, acute on chronic (HCC)    Suspected COVID-19 virus infection    Acute left-sided CHF (congestive heart failure) (HCC)    Mild malnutrition ([facility]       PLAN:    1. Remains severely hypoxic despite good diuresing. 2. Renal function is back to normal.  3. She is on amiodarone and beta-blocker and long acting nitrate. I discussed the case with the pulmonologist who he will look into noncardiac etiology for her hypoxemia. Please see orders. Discussed with patient and adult child and nursing.     Josias Jordan MD

## 2021-02-13 NOTE — PROGRESS NOTES
Nutrition Assessment     Type and Reason for Visit: Initial, Positive Nutrition Screen(2-13# wt loss, poor appetite, MAL:2)    Nutrition Recommendations/Plan:   1. Continue DIET CARDIAC; Daily Fluid Restriction: 1000 ml  2. Continue Ensure Enlive 3x/day  3. Monitor p.o intakes and labs    Nutrition Assessment:  Patient p.o intakes are improving and patient is consuming oral nutrition supplements. Weight has been updated and patient has lost 4.5% in 2 months which is not significant. Continue current diet and supplements. Monitor p.o intakes and labs    Malnutrition Assessment:  Malnutrition Status: At risk for malnutrition (Comment)    Estimated Daily Nutrient Needs:  Energy (kcal): 1200 kcal based on Shackelford-St. Jeor (1.2 factor); Weight Used for Energy Requirements:  Admission     Protein (g): 67-73 gm based on 1.2-1.3 gm/kg;  Weight Used for Protein Requirements:  Admission          Nutrition Related Findings: no edema, poor appetite/intake, N/V      Current Nutrition Therapies:    Dietary Nutrition Supplements: Standard High Calorie Oral Supplement  DIET CARDIAC; Daily Fluid Restriction: 1000 ml    Anthropometric Measures:  · Height: 5' 5\" (165.1 cm)  · Current Body Wt: 123 lb (55.8 kg)   · BMI: 20.5    Nutrition Diagnosis:   · Inadequate energy intake related to cardiac dysfunction as evidenced by intake 0-25%, poor intake prior to admission, weight loss, nausea, vomiting      Nutrition Interventions:   Food and/or Nutrient Delivery:  Continue Current Diet, Continue Oral Nutrition Supplement  Nutrition Education/Counseling:  Education not indicated   Coordination of Nutrition Care:  Continue to monitor while inpatient    Goals:  p.o intake >50% of estimated energy needs for all meals       Nutrition Monitoring and Evaluation:   Behavioral-Environmental Outcomes:  None Identified   Food/Nutrient Intake Outcomes:  Food and Nutrient Intake, Supplement Intake  Physical Signs/Symptoms Outcomes:  Biochemical Data, Nausea or Vomiting, Skin, Weight     Discharge Planning:    Continue current diet, Continue Oral Nutrition Supplement         Kym NUÑEZN, RDN, LDN  Lead Clinical Dietitian  RD Office Phone [phone]

## 2021-02-13 NOTE — PROGRESS NOTES
Pulmonary Critical Care Progress Note       Patient seen for the follow up of acute on chronic hypoxic respiratory failure, decompensated CHF, bilateral pulmonary infiltrates, most likely secondary to pulmonary edema, mild secondary pulmonary hypertension    Subjective:  She has been weaned off high-flow oxygen down 6 L nasal cannula. She has improved shortness of breath. She has occasional dry cough. She denies chest pain  . She has fair appetite. Son is at bedside    Examination:  Vitals: BP (!) 117/49   Pulse 63   Temp 98.3 °F (36.8 °C) (Axillary)   Resp 18   Ht 5' 5\" (1.651 m)   Wt 123 lb 3 oz (55.9 kg)   SpO2 95%   BMI 20.50 kg/m²   General appearance: alert and cooperative with exam, resting in bed  Neck: No JVD  Lungs:  Decreased breath sounds no crackles or wheezing  Heart: regular rate and rhythm, S1, S2 normal, no gallop  Abdomen: Soft, non tender, + BS  Extremities: no cyanosis or clubbing. No significant edema    LABs:  CBC:   Recent Labs     02/12/21  0502 02/13/21  0600   WBC 10.8 11.8*   HGB 9.7* 10.0*   HCT 32.3* 33.6*    229     BMP:   Recent Labs     02/12/21  0502 02/13/21  0600    142   K 4.3 4.5   CO2 31 32*   BUN 29* 27*   CREATININE 0.89 0.74   LABGLOM 60* >60   GLUCOSE 122* 119*      Ref. Range 2/9/2021 04:39   Procalcitonin Latest Ref Range: <0.09 ng/mL 0.30 (H)   Results for Burton Martines (MRN [id number]) as of 2/13/2021 18:05   Ref. Range 2/8/2021 18:35   Pro-BNP Latest Ref Range: <300 pg/mL 5,808 (H)     Radiology:  2/12/2021    1. Moderate diffuse bilateral airspace disease likely representing multifocal   pneumonia.  Trace bilateral pleural effusions.  Follow-up is recommended to   document resolution. 2. Stable moderate cardiomegaly.              Impression:  · Acute on chronic hypoxic respiratory failure  · Decompensated diastolic CHF (EF: 55 to 08% by echo by 12/8/2020)  ·  pulmonary edema, doubt pneumonia  · History of CAD/HTN/HDL/HTN, s/p CABG in 2016  · Mild secondary pulmonary hypertension, RVSP 37, secondary to above    Recommendations:  · Oxygen via high flow nasal cannula. Wean as able, keep SPO2 90% or greater  · BiPAP as needed  · Continue IV antibiotics, Rocephin/5-day course of Zithromax completed  · Albuterol and Ipratropium Q 4 hours and prn  · Incentive spirometry every hour while awake  ·  on Rocephin  ·  speech swallow evaluation  ·  check procalcitonin in a.m.   · Nephrology  On consult consider diuresis  ·  Cardiology on consult /amiodarone  ·   physical therapy  ·  discussed with RT  ·  discussed with son  · DVT prophylaxis, on Eliquis per Cardiology      Electronically signed by     Marley Kocher, MD on 2/13/2021 at 6:02 PM  Pulmonary Critical Care and Sleep Medicine,  [facility] AT [place]: [phone]

## 2021-02-14 ENCOUNTER — APPOINTMENT (OUTPATIENT)
Dept: GENERAL RADIOLOGY | Age: 86
DRG: 291 | End: 2021-02-14
Payer: MEDICARE

## 2021-02-14 LAB
ANION GAP SERPL CALCULATED.3IONS-SCNC: 6 MMOL/L (ref 9–17)
BUN BLDV-MCNC: 26 MG/DL (ref 8–23)
BUN/CREAT BLD: 39 (ref 9–20)
CALCIUM SERPL-MCNC: 8.5 MG/DL (ref 8.6–10.4)
CHLORIDE BLD-SCNC: 102 MMOL/L (ref 98–107)
CO2: 35 MMOL/L (ref 20–31)
CREAT SERPL-MCNC: 0.66 MG/DL (ref 0.5–0.9)
CREATININE URINE: 39.2 MG/DL (ref 28–217)
CULTURE: NORMAL
CULTURE: NORMAL
EOSINOPHIL,URINE: NORMAL
GFR AFRICAN AMERICAN: >60 ML/MIN
GFR NON-AFRICAN AMERICAN: >60 ML/MIN
GFR SERPL CREATININE-BSD FRML MDRD: ABNORMAL ML/MIN/{1.73_M2}
GFR SERPL CREATININE-BSD FRML MDRD: ABNORMAL ML/MIN/{1.73_M2}
GLUCOSE BLD-MCNC: 125 MG/DL (ref 70–99)
HCT VFR BLD CALC: 31.8 % (ref 36.3–47.1)
HEMOGLOBIN: 9.3 G/DL (ref 11.9–15.1)
Lab: NORMAL
Lab: NORMAL
MAGNESIUM: 1.8 MG/DL (ref 1.6–2.6)
MCH RBC QN AUTO: 26.5 PG (ref 25.2–33.5)
MCHC RBC AUTO-ENTMCNC: 29.2 G/DL (ref 28.4–34.8)
MCV RBC AUTO: 90.6 FL (ref 82.6–102.9)
MICROALBUMIN/CREAT 24H UR: <12 MG/L
MICROALBUMIN/CREAT UR-RTO: NORMAL MCG/MG CREAT
NRBC AUTOMATED: 0 PER 100 WBC
PDW BLD-RTO: 16.1 % (ref 11.8–14.4)
PHOSPHORUS: 3.1 MG/DL (ref 2.6–4.5)
PLATELET # BLD: 219 K/UL (ref 138–453)
PMV BLD AUTO: 11.3 FL (ref 8.1–13.5)
POTASSIUM SERPL-SCNC: 4.3 MMOL/L (ref 3.7–5.3)
PROCALCITONIN: 0.16 NG/ML
RBC # BLD: 3.51 M/UL (ref 3.95–5.11)
SODIUM BLD-SCNC: 143 MMOL/L (ref 135–144)
SPECIMEN DESCRIPTION: NORMAL
SPECIMEN DESCRIPTION: NORMAL
WBC # BLD: 9.2 K/UL (ref 3.5–11.3)

## 2021-02-14 PROCEDURE — 82043 UR ALBUMIN QUANTITATIVE: CPT

## 2021-02-14 PROCEDURE — 87205 SMEAR GRAM STAIN: CPT

## 2021-02-14 PROCEDURE — 85027 COMPLETE CBC AUTOMATED: CPT

## 2021-02-14 PROCEDURE — 83735 ASSAY OF MAGNESIUM: CPT

## 2021-02-14 PROCEDURE — 6360000002 HC RX W HCPCS: Performed by: INTERNAL MEDICINE

## 2021-02-14 PROCEDURE — 82570 ASSAY OF URINE CREATININE: CPT

## 2021-02-14 PROCEDURE — 2060000000 HC ICU INTERMEDIATE R&B

## 2021-02-14 PROCEDURE — 2580000003 HC RX 258: Performed by: FAMILY MEDICINE

## 2021-02-14 PROCEDURE — 94640 AIRWAY INHALATION TREATMENT: CPT

## 2021-02-14 PROCEDURE — 84100 ASSAY OF PHOSPHORUS: CPT

## 2021-02-14 PROCEDURE — 80048 BASIC METABOLIC PNL TOTAL CA: CPT

## 2021-02-14 PROCEDURE — 6370000000 HC RX 637 (ALT 250 FOR IP): Performed by: FAMILY MEDICINE

## 2021-02-14 PROCEDURE — 84145 PROCALCITONIN (PCT): CPT

## 2021-02-14 PROCEDURE — 36415 COLL VENOUS BLD VENIPUNCTURE: CPT

## 2021-02-14 PROCEDURE — 71045 X-RAY EXAM CHEST 1 VIEW: CPT

## 2021-02-14 PROCEDURE — 2700000000 HC OXYGEN THERAPY PER DAY

## 2021-02-14 PROCEDURE — 94761 N-INVAS EAR/PLS OXIMETRY MLT: CPT

## 2021-02-14 PROCEDURE — 6360000002 HC RX W HCPCS: Performed by: FAMILY MEDICINE

## 2021-02-14 RX ORDER — FUROSEMIDE 10 MG/ML
20 INJECTION INTRAMUSCULAR; INTRAVENOUS 2 TIMES DAILY
Status: DISCONTINUED | OUTPATIENT
Start: 2021-02-14 | End: 2021-02-16

## 2021-02-14 RX ORDER — METHYLPREDNISOLONE SODIUM SUCCINATE 40 MG/ML
40 INJECTION, POWDER, LYOPHILIZED, FOR SOLUTION INTRAMUSCULAR; INTRAVENOUS DAILY
Status: DISCONTINUED | OUTPATIENT
Start: 2021-02-14 | End: 2021-02-15

## 2021-02-14 RX ADMIN — FUROSEMIDE 20 MG: 10 INJECTION, SOLUTION INTRAMUSCULAR; INTRAVENOUS at 10:00

## 2021-02-14 RX ADMIN — IPRATROPIUM BROMIDE AND ALBUTEROL SULFATE 1 AMPULE: .5; 3 SOLUTION RESPIRATORY (INHALATION) at 16:14

## 2021-02-14 RX ADMIN — SODIUM CHLORIDE, PRESERVATIVE FREE 10 ML: 5 INJECTION INTRAVENOUS at 19:53

## 2021-02-14 RX ADMIN — ISOSORBIDE MONONITRATE 30 MG: 30 TABLET ORAL at 10:00

## 2021-02-14 RX ADMIN — CEFEPIME HYDROCHLORIDE 1000 MG: 1 INJECTION, POWDER, FOR SOLUTION INTRAMUSCULAR; INTRAVENOUS at 13:51

## 2021-02-14 RX ADMIN — APIXABAN 5 MG: 5 TABLET, FILM COATED ORAL at 19:52

## 2021-02-14 RX ADMIN — IPRATROPIUM BROMIDE AND ALBUTEROL SULFATE 1 AMPULE: .5; 3 SOLUTION RESPIRATORY (INHALATION) at 19:26

## 2021-02-14 RX ADMIN — IPRATROPIUM BROMIDE AND ALBUTEROL SULFATE 1 AMPULE: .5; 3 SOLUTION RESPIRATORY (INHALATION) at 07:53

## 2021-02-14 RX ADMIN — METHYLPREDNISOLONE SODIUM SUCCINATE 40 MG: 40 INJECTION, POWDER, FOR SOLUTION INTRAMUSCULAR; INTRAVENOUS at 17:02

## 2021-02-14 RX ADMIN — PRAVASTATIN SODIUM 40 MG: 40 TABLET ORAL at 10:00

## 2021-02-14 RX ADMIN — APIXABAN 5 MG: 5 TABLET, FILM COATED ORAL at 10:00

## 2021-02-14 RX ADMIN — IPRATROPIUM BROMIDE AND ALBUTEROL SULFATE 1 AMPULE: .5; 3 SOLUTION RESPIRATORY (INHALATION) at 12:04

## 2021-02-14 RX ADMIN — LATANOPROST 1 DROP: 50 SOLUTION OPHTHALMIC at 19:53

## 2021-02-14 RX ADMIN — FUROSEMIDE 20 MG: 10 INJECTION, SOLUTION INTRAMUSCULAR; INTRAVENOUS at 17:02

## 2021-02-14 RX ADMIN — SERTRALINE HYDROCHLORIDE 100 MG: 50 TABLET ORAL at 10:00

## 2021-02-14 RX ADMIN — PANTOPRAZOLE SODIUM 40 MG: 40 TABLET, DELAYED RELEASE ORAL at 06:12

## 2021-02-14 RX ADMIN — AMIODARONE HYDROCHLORIDE 200 MG: 200 TABLET ORAL at 10:00

## 2021-02-14 RX ADMIN — SODIUM CHLORIDE, PRESERVATIVE FREE 10 ML: 5 INJECTION INTRAVENOUS at 09:59

## 2021-02-14 RX ADMIN — METOPROLOL TARTRATE 25 MG: 25 TABLET, FILM COATED ORAL at 10:00

## 2021-02-14 RX ADMIN — METOPROLOL TARTRATE 25 MG: 25 TABLET, FILM COATED ORAL at 19:52

## 2021-02-14 NOTE — PROGRESS NOTES
Progress note  Formerly [facility].,    Adult Hospitalist      Name: Jake Juarez  MRN: [id number]     Acct: [de-identified]  Room: [address] [place] [address]    Admit Date: 2/8/2021  6:28 PM  PCP: Nia Miles MD    Primary Problem  Active Problems:    Acute left-sided CHF (congestive heart failure) ([address]  Resolved Problems:    * No resolved hospital problems. *        Assesment:   Acute on chronic hypoxemic respiratory failure  Acute on chronic diastolic congestive heart failure  Pulmonary edema  Multifocal pneumonia  Acute kidney injury  Chronic coronary artery disease status post CABG in 2016  Chronic atrial fibrillation  Chronic anticoagulation with Eliquis  Essential hypertension  Mixed hyperlipidemia  Mild pulmonary hypertension  Depression with anxiety  Former smoker  GERD    Plan:   Admit patient to progressive unit  Oxygen keep SPO2 more than 90%  BiPAP as needed  Patient noticed to have increased respiratory distress.   Blood culture  BiPAP as needed  Oral azithromycin, completed  Appreciate Nephrology assistance, IV diuretics have been increased to Lasix 20 IV b.i.d.   DuoNeb breathing treatment  Continue Eliquis  Continue lisinopril, metoprolol, Imdur, amiodarone  Continue pravastatin  Continue Zoloft  Pulmonology on consult, appreciated  Consult cardiology, appreciated  [address] nephrology assistance  Patient continues on high-flow nasal cannula oxygen  Chest x-ray which does show multifocal pneumonia  IV antibiotics escalated to IV cefepime, discontinue Rocephin    Scheduled Meds:   furosemide  20 mg Intravenous BID    cefepime  1,000 mg Intravenous Q12H    amiodarone  200 mg Oral Daily    apixaban  5 mg Oral BID    isosorbide mononitrate  30 mg Oral QAM    metoprolol tartrate  25 mg Oral BID    oxybutynin  5 mg Oral Every Other Day    pantoprazole  40 mg Oral QAM AC    pravastatin  40 mg Oral Daily    sertraline  100 mg Oral Daily    latanoprost  1 drop Both Eyes Nightly    sodium chloride flush  10 mL Intravenous 2 times per day    ipratropium-albuterol  1 ampule Inhalation Q4H WA     Continuous Infusions:    PRN Meds:      sodium chloride flush, 10 mL, PRN      promethazine, 12.5 mg, Q6H PRN    Or      ondansetron, 4 mg, Q6H PRN      nicotine, 1 patch, Daily PRN      polyethylene glycol, 17 g, Daily PRN      acetaminophen, 650 mg, Q6H PRN    Or      acetaminophen, 650 mg, Q6H PRN      albuterol, 2.5 mg, Q2H PRN      hydrALAZINE, 20 mg, Q6H PRN        Chief Complaint:     Chief Complaint   Patient presents with    Shortness of Breath         History of Present Illness: Nicol Rich is a 80 y.o.  female who presents with Shortness of Breath  I have seen and examined patient today, patient last 24 hours events were reviewed also discussed with nursing staff  No new complaints  Overnight patient did not had any acute issues  No nausea vomiting diarrhea  Afebrile  Pt. Denies any CP, palpitation, HA, dizziness, chills, cough, cold, changes in urination, BM or skin changes    patient continues to have some shortness of breath continues on high-flow nasal cannula oxygen       HPI  This is a80year-old female is been admitted to emergency room, patient came to the ER with a complaint of having shortness of breath, further patient is been having some shortness of breath for past couple of days, patient does have history of congestive heart failure in view of oxygen at home, also history of coronary disease, hypertension hyperlipidemia and CABG in the past, patient initial testing in the emergency room is consistent with congestive heart failure, admitted for further management    I have personally reviewed the past medical history, past surgical history, medications, social history, and family history, and summarized in the note. Review of Systems:     All 10 point system is reviewed and negative otherwise mentioned in HPI.       Past Medical History:     Past Medical History:   Diagnosis Date    Acute on chronic diastolic CHF (congestive heart failure) ([facility] 10/2/2019    Bradycardia     CAD (coronary artery disease)     Cancer (HCC)     Skin CA on nose    Depression     Gallstones     GERD (gastroesophageal reflux disease)     Hiatal hernia     History of cardiac cath 10/2016    CAD    Hyperlipidemia     Hypertension     Insomnia     MI (myocardial infarction) ([facility]     PNA (pneumonia)     SOB (shortness of breath)     UTI (urinary tract infection)         Past Surgical History:     Past Surgical History:   Procedure Laterality Date    APPENDECTOMY      CARDIAC SURGERY  11/08/2016    CABG x 3; LIMA-LAD, SVG-PDA, SVG-OM    CARDIOVERSION  12/17/2019    CHOLECYSTECTOMY      COLONOSCOPY      CORONARY ANGIOPLASTY WITH STENT PLACEMENT      CORONARY ARTERY BYPASS GRAFT  11/08/2016    X 3    HYSTERECTOMY          Medications Prior to Admission:       Prior to Admission medications    Medication Sig Start Date End Date Taking?  Authorizing Provider   lisinopril (PRINIVIL;ZESTRIL) 2.5 MG tablet Take 1 tablet by mouth daily 12/12/20  Yes Sommer Melissa MD   furosemide (LASIX) 40 MG tablet Take 1 tablet by mouth daily 12/11/20  Yes Murtis Castleman, APRN - CNP   metoprolol tartrate (LOPRESSOR) 25 MG tablet Take 25 mg by mouth 2 times daily  11/4/19  Yes Historical Provider, MD   amiodarone (CORDARONE) 200 MG tablet Take 200 mg by mouth daily   Yes Historical Provider, MD   apixaban (ELIQUIS) 5 MG TABS tablet Take 1 tablet by mouth 2 times daily 10/5/19  Yes Breanne Shirley MD   travoprost, benzalkonium, (TRAVATAN) 0.004 % ophthalmic solution Place 1 drop into the right eye nightly   Yes Historical Provider, MD   isosorbide mononitrate (IMDUR) 30 MG extended release tablet Take 30 mg by mouth every morning   Yes Historical Provider, MD   sertraline (ZOLOFT) 100 MG tablet Take 100 mg by mouth daily    Yes Historical Provider, MD   oxybutynin (DITROPAN) 5 MG tablet Take 5 mg by mouth every other day    Yes Historical Provider, MD   pravastatin (PRAVACHOL) 40 MG tablet Take 40 mg by mouth daily    Yes Historical Provider, MD   pantoprazole (PROTONIX) 40 MG tablet Take 40 mg by mouth every morning (before breakfast)    Yes Historical Provider, MD   aspirin 81 MG EC tablet Take 81 mg by mouth daily    Historical Provider, MD        Allergies:       Seasonal    Social History:     Tobacco:    reports that she quit smoking about 49 years ago. Her smoking use included cigarettes. She has never used smokeless tobacco.  Alcohol:      reports current alcohol use. Drug Use:  reports no history of drug use.     Family History:     Family History   Problem Relation Age of Onset    Heart Disease Mother     Cancer Mother     Heart Disease Father          Physical Exam:     Vitals:  BP (!) 114/43   Pulse 60   Temp 97.9 °F (36.6 °C) (Oral)   Resp 18   Ht 5' 5\" (1.651 m)   Wt 123 lb 9 oz (56 kg)   SpO2 99%   BMI 20.56 kg/m²   Temp (24hrs), Av.1 °F (36.7 °C), Min:97.8 °F (36.6 °C), Max:98.3 °F (36.8 °C)          General appearance - alert, well appearing, and in no acute distress  Mental status - oriented to person, place, and time with normal affect  Head - normocephalic and atraumatic  Eyes - pupils equal and reactive, extraocular eye movements intact, conjunctiva clear  Ears - hearing appears to be intact  Nose - no drainage noted  Mouth - mucous membranes moist  Neck - supple, no carotid bruits, thyroid not palpable  Chest - clear to auscultation, normal effort  Heart - normal rate, regular rhythm, no murmur  Abdomen - soft, nontender, nondistended, bowel sounds present all four quadrants, no masses, hepatomegaly or splenomegaly  Neurological - normal speech, no focal findings or movement disorder noted, cranial nerves II through XII grossly intact  Extremities - peripheral pulses palpable, no pedal edema or calf pain with palpation  Skin - no gross lesions, rashes, or induration noted        Data: Labs:    Hematology:  Recent Labs     02/12/21  0502 02/13/21  0600 02/14/21  0526   WBC 10.8 11.8* 9.2   RBC 3.61* 3.71* 3.51*   HGB 9.7* 10.0* 9.3*   HCT 32.3* 33.6* 31.8*   MCV 89.5 90.6 90.6   MCH 26.9 27.0 26.5   MCHC 30.0 29.8 29.2   RDW 16.2* 16.2* 16.1*    229 219   MPV 11.4 11.0 11.3     Chemistry:  Recent Labs     02/12/21  0502 02/13/21  0600 02/14/21  0526    142 143   K 4.3 4.5 4.3    101 102   CO2 31 32* 35*   GLUCOSE 122* 119* 125*   BUN 29* 27* 26*   CREATININE 0.89 0.74 0.66   MG 2.0 1.9 1.8   ANIONGAP 8* 9 6*   LABGLOM 60* >60 >60   GFRAA >60 >60 >60   CALCIUM 8.8 8.7 8.5*   PHOS 2.9 2.9 3.1     No results for input(s): PROT, LABALBU, LABA1C, X3GVPFG, N6GTQCY, FT4, TSH, AST, ALT, LDH, GGT, ALKPHOS, LABGGT, BILITOT, BILIDIR, AMMONIA, AMYLASE, LIPASE, LACTATE, CHOL, HDL, LDLCHOLESTEROL, CHOLHDLRATIO, TRIG, VLDL, WGE17SU, PHENYTOIN, PHENYF, URICACID, POCGLU in the last 72 hours. Lab Results   Component Value Date    INR 1.1 10/02/2019    INR 1.2 11/11/2016    INR 1.2 11/10/2016    PROTIME 11.2 10/02/2019    PROTIME 12.3 (H) 11/11/2016    PROTIME 12.7 (H) 11/10/2016       Lab Results   Component Value Date/Time    SPECIAL NOT REPORTED 02/08/2021 07:45 PM    SPECIAL NOT REPORTED 02/08/2021 07:45 PM     Lab Results   Component Value Date/Time    CULTURE NO GROWTH 6 DAYS 02/08/2021 07:45 PM    CULTURE NO GROWTH 6 DAYS 02/08/2021 07:45 PM       Lab Results   Component Value Date    POCPH 7.35 11/09/2016    POCPCO2 41 11/09/2016    POCPO2 85 11/09/2016    POCHCO3 22.4 11/09/2016    NBEA 3 11/09/2016    PBEA NOT REPORTED 11/09/2016    XHP3RUW 24 11/09/2016    ALMU2RCL 96 11/09/2016    FIO2 NOT REPORTED 10/02/2019       Radiology:    Xr Chest Portable    Result Date: 2/9/2021  Stable interstitial opacities with superimposed patchy airspace disease probably combination of factors including chronic interstitial disease, vascular congestion and pneumonia.      Xr Chest Portable    Result Date: 2/8/2021  Progressive bilateral patchy interstitial and alveolar infiltrates greater on the left. Overall findings suggest progressive pulmonary vascular congestion with possible superimposed bilateral pneumonia. All radiological studies reviewed                Code Status:  DNR-CCA    Electronically signed by Johnathan Cameron MD on 2/14/2021 at 2:41 PM     Copy sent to Dr. Bakari Robles MD    This note was created with the assistance of a speech-recognition program.  Although the intention is to generate a document that actually reflects the content of the visit, no guarantees can be provided that every mistake has been identified and corrected by editing. Note was updated later by me after  physical examination and  completion of the assessment.

## 2021-02-14 NOTE — PLAN OF CARE
Problem: Skin Integrity:  Goal: Absence of new skin breakdown  Description: Absence of new skin breakdown  2/13/2021 1944 by Liam Mao RN  Outcome: Ongoing  Note: Skin assessment completed. Patient instructed to turn self when applicable. Problem: Falls - Risk of:  Goal: Will remain free from falls  Description: Will remain free from falls  2/13/2021 1944 by Liam Mao RN  Outcome: Ongoing  Note: Fall risk assessment completed. Patient instructed to use call light. Bed locked and in lowest position, side rails up 2/4, call light and bedside table within reach, clutter removed, and non-skid footwear on when pt out of bed. Hourly rounds will continue.

## 2021-02-14 NOTE — PROGRESS NOTES
Pulmonary Critical Care Progress Note       Patient seen for the follow up of acute on chronic hypoxic respiratory failure, decompensated CHF, bilateral pulmonary infiltrates, most likely secondary to pulmonary edema, mild secondary pulmonary hypertension    Subjective:  She had desaturation on O2 nasal cannula is back on high-flow at 40% O2. She has no evidence of choking on food  Intake or drinks. She has improved shortness of breath. She has occasional dry cough. She denies chest pain  . She has fair appetite. daughter is at bedside    Examination:  Vitals: BP (!) 114/43   Pulse 60   Temp 97.9 °F (36.6 °C) (Oral)   Resp 18   Ht 5' 5\" (1.651 m)   Wt 123 lb 9 oz (56 kg)   SpO2 93%   BMI 20.56 kg/m²   General appearance: alert and cooperative with exam, resting in bed  Neck: No JVD  Lungs:  Decreased breath sounds no crackles or wheezing  Heart: regular rate and rhythm, S1, S2 normal, no gallop  Abdomen: Soft, non tender, + BS  Extremities: no cyanosis or clubbing. No significant edema    LABs:  CBC:   Recent Labs     02/13/21  0600 02/14/21  0526   WBC 11.8* 9.2   HGB 10.0* 9.3*   HCT 33.6* 31.8*    219     BMP:   Recent Labs     02/13/21  0600 02/14/21  0526    143   K 4.5 4.3   CO2 32* 35*   BUN 27* 26*   CREATININE 0.74 0.66   LABGLOM >60 >60   GLUCOSE 119* 125*      Ref. Range 2/9/2021 04:39   Procalcitonin Latest Ref Range: <0.09 ng/mL 0.30 (H)   Results for Lonnie Gardner (MRN [id number]) as of 2/13/2021 18:05   Ref. Range 2/8/2021 18:35   Pro-BNP Latest Ref Range: <300 pg/mL 5,808 (H)   Results for Lonnie Gardner (MRN [id number]) as of 2/14/2021 15:39   Ref. Range 2/13/2021 16:09 2/14/2021 05:26   Procalcitonin Latest Ref Range: <0.09 ng/mL 0.20 (H) 0.16 (H)     Radiology:   chest x-ray 2/14  1.  Overall, relatively stable diffuse bilateral airspace disease throughout   both lungs with small bilateral pleural effusions which can be seen with   multifocal pneumonia and/or CHF related changes. 2. Stable moderate cardiomegaly. 3. Prior median sternotomy and CABG. Impression:  · Acute on chronic hypoxic respiratory failure  · Decompensated diastolic CHF (EF: 55 to 27% by echo by 12/8/2020)  ·  pulmonary edema, doubt pneumonia  · History of CAD/HTN/HDL/HTN, s/p CABG in 2016  · Mild secondary pulmonary hypertension, RVSP 37, secondary to above    Recommendations:  · Oxygen via high flow nasal cannula.   Wean as able, keep SPO2 90% or greater  ·  incentive spirometer every hour awake  · BiPAP as needed  · Albuterol and Ipratropium Q 4 hours and prn  ·  Solu-Medrol 40 IV x1  ·   On cefepime off Rocephin/finished Zithromax  ·  speech swallow evaluation  · Nephrology  On consult consider diuresis  ·  Cardiology on consult /amiodarone  ·   physical therapy  ·  discussed with  RN  ·  discussed with  daughter  · DVT prophylaxis, on Eliquis per Cardiology      Electronically signed by     Derrek Parker MD on 2/14/2021 at 3:38 PM  Pulmonary Critical Care and Sleep Medicine,  [facility] AT [place]: [phone]

## 2021-02-14 NOTE — PROGRESS NOTES
Progress Note    Reason for Consult: ELICEO    Requesting Physician:  Ramy Sigala MD    INTERVAL HISTORY:   Creatinine better at 0.66. SBP trending 110-150s today. She states she is SOB. She did not tolerate being off high flow nasal cannula. HISTORY OF PRESENT ILLNESS:    The patient is a 80 y.o. female who presents with worsening shortness of breath. Initial chest x-ray showed progressive pulmonary vascular congestion with possible superimposed bilateral pneumonia. Chest x-ray today showed bilateral airspace disease slightly increased at the right apex compared to prior. Creatinine baseline 0.6 which she was at on admission and yesterday. Creatinine elevated to 1.46 today. Hypokalemia on admission and yesterday. Potassium was 3.8 today. Recent hemoglobin 9.6. WBC was 14.4 today and today 12.0. SBP was originally trending as high as 214/75 on day of admission. She became hypotensive at 99/78 about 8 hours later. SBP trending 100s to 130s today. She originally was requiring nasal cannula and is now on high flow. Home medications include lisinopril, Lasix, metoprolol, amiodarone. She is currently on Lasix 20 mg IV twice daily, lisinopril, metoprolol, amiodarone. She has been up to the bedside commode voiding. No urinary retention on bladder scan    Review Of Systems:   Constitutional: No fever, chills, lethargy, weakness or wt loss  Cardiac:  No chest pain, +dyspnea. Chest:   No cough, phlegm or wheezing. Abdomen:  No abdominal pain, nausea, vomiting or diarrhea. :   No hematuria, pyuria, dysuria or flank pain.       Past Medical History:   Diagnosis Date    Acute on chronic diastolic CHF (congestive heart failure) (HCC) 10/2/2019    Bradycardia     CAD (coronary artery disease)     Cancer (HCC)     Skin CA on nose    Depression     Gallstones     GERD (gastroesophageal reflux disease)     Hiatal hernia     History of cardiac cath 10/2016    CAD    Hyperlipidemia     Hypertension  Insomnia     MI (myocardial infarction) ([facility] [address]     PNA (pneumonia)     SOB (shortness of breath)     UTI (urinary tract infection)        Past Surgical History:   Procedure Laterality Date    APPENDECTOMY      CARDIAC SURGERY  11/08/2016    CABG x 3; LIMA-LAD, SVG-PDA, SVG-OM    CARDIOVERSION  12/17/2019    CHOLECYSTECTOMY      COLONOSCOPY      CORONARY ANGIOPLASTY WITH STENT PLACEMENT      CORONARY ARTERY BYPASS GRAFT  11/08/2016    X 3    HYSTERECTOMY         Prior to Admission medications    Medication Sig Start Date End Date Taking?  Authorizing Provider   lisinopril (PRINIVIL;ZESTRIL) 2.5 MG tablet Take 1 tablet by mouth daily 12/12/20  Yes Ana Luisa Valle MD   furosemide (LASIX) 40 MG tablet Take 1 tablet by mouth daily 12/11/20  Yes SERA Valle CNP   metoprolol tartrate (LOPRESSOR) 25 MG tablet Take 25 mg by mouth 2 times daily  11/4/19  Yes Historical Provider, MD   amiodarone (CORDARONE) 200 MG tablet Take 200 mg by mouth daily   Yes Historical Provider, MD   apixaban (ELIQUIS) 5 MG TABS tablet Take 1 tablet by mouth 2 times daily 10/5/19  Yes Joanne aBiley MD   travoprost, benzalkonium, (TRAVATAN) 0.004 % ophthalmic solution Place 1 drop into the right eye nightly   Yes Historical Provider, MD   isosorbide mononitrate (IMDUR) 30 MG extended release tablet Take 30 mg by mouth every morning   Yes Historical Provider, MD   sertraline (ZOLOFT) 100 MG tablet Take 100 mg by mouth daily    Yes Historical Provider, MD   oxybutynin (DITROPAN) 5 MG tablet Take 5 mg by mouth every other day    Yes Historical Provider, MD   pravastatin (PRAVACHOL) 40 MG tablet Take 40 mg by mouth daily    Yes Historical Provider, MD   pantoprazole (PROTONIX) 40 MG tablet Take 40 mg by mouth every morning (before breakfast)    Yes Historical Provider, MD   aspirin 81 MG EC tablet Take 81 mg by mouth daily    Historical Provider, MD       Scheduled Meds:   furosemide  20 mg Intravenous Daily    amiodarone  200 mg Oral Daily    apixaban  5 mg Oral BID    isosorbide mononitrate  30 mg Oral QAM    metoprolol tartrate  25 mg Oral BID    oxybutynin  5 mg Oral Every Other Day    pantoprazole  40 mg Oral QAM AC    pravastatin  40 mg Oral Daily    sertraline  100 mg Oral Daily    latanoprost  1 drop Both Eyes Nightly    sodium chloride flush  10 mL Intravenous 2 times per day    ipratropium-albuterol  1 ampule Inhalation Q4H WA    cefTRIAXone (ROCEPHIN) IV  1,000 mg Intravenous Q24H     Continuous Infusions:  PRN Meds:sodium chloride flush, promethazine **OR** ondansetron, nicotine, polyethylene glycol, acetaminophen **OR** acetaminophen, albuterol, hydrALAZINE    Allergies   Allergen Reactions    Seasonal        Social History     Socioeconomic History    Marital status:       Spouse name: Not on file    Number of children: Not on file    Years of education: Not on file    Highest education level: Not on file   Occupational History    Not on file   Social Needs    Financial resource strain: Not on file    Food insecurity     Worry: Not on file     Inability: Not on file    Transportation needs     Medical: Not on file     Non-medical: Not on file   Tobacco Use    Smoking status: Former Smoker     Types: Cigarettes     Quit date: 1971     Years since quittin.2    Smokeless tobacco: Never Used    Tobacco comment: quit smoking yrs ago   Substance and Sexual Activity    Alcohol use: Yes     Comment: Rarely    Drug use: No    Sexual activity: Not on file   Lifestyle    Physical activity     Days per week: Not on file     Minutes per session: Not on file    Stress: Not on file   Relationships    Social connections     Talks on phone: Not on file     Gets together: Not on file     Attends [employer] service: Not on file     Active member of club or organization: Not on file     Attends meetings of clubs or organizations: Not on file     Relationship status: Not on file   Jenny Guzman 02/12/2021    CREATININE 0.66 02/14/2021    CREATININE 0.74 02/13/2021    CREATININE 0.89 02/12/2021    GLUCOSE 125 (H) 02/14/2021    GLUCOSE 119 (H) 02/13/2021    GLUCOSE 122 (H) 02/12/2021     CMP:   Lab Results   Component Value Date     02/14/2021    K 4.3 02/14/2021     02/14/2021    CO2 35 02/14/2021    BUN 26 02/14/2021    CREATININE 0.66 02/14/2021    GLUCOSE 125 02/14/2021    CALCIUM 8.5 02/14/2021    PROT 6.1 05/01/2020    LABALBU 3.3 05/01/2020    BILITOT 0.71 05/01/2020    ALKPHOS 72 05/01/2020    AST 16 05/01/2020    ALT 8 05/01/2020      Hepatic:   Lab Results   Component Value Date    AST 16 05/01/2020    AST 67 (H) 10/02/2019    AST 18 11/07/2016    ALT 8 05/01/2020    ALT 63 (H) 10/02/2019    ALT 12 11/07/2016    BILITOT 0.71 05/01/2020    BILITOT 0.82 10/02/2019    BILITOT 0.39 11/07/2016    ALKPHOS 72 05/01/2020    ALKPHOS 103 10/02/2019    ALKPHOS 70 11/07/2016     BNP: No results found for: BNP  Lipids:   Lab Results   Component Value Date    CHOL 109 12/08/2020    HDL 38 (L) 12/08/2020     INR:   Lab Results   Component Value Date    INR 1.1 10/02/2019    INR 1.2 11/11/2016    INR 1.2 11/10/2016     PTH: No results found for: PTH  Phosphorus:    Lab Results   Component Value Date    PHOS 3.1 02/14/2021     Ionized Calcium: No results found for: IONCA  Magnesium:   Lab Results   Component Value Date    MG 1.8 02/14/2021     Albumin:   Lab Results   Component Value Date    LABALBU 3.3 05/01/2020     Last 3 CK, CKMB, Troponin: @LABRCNT(CKTOTAL:3,CKMB:3,TROPONINI:3)       URINE:)No results found for: Fatuma Foss    Radiology:   Reviewed. Assessment:  Acute Kidney Injury, likely hemodynamically related. FeNa 0.26%. Cr is better. Baseline Creatinine 0.6. Hypokalemia  Hypertension with recent hypotension. CHF exacerbation. Multifocal pneumonia. Hypoxic respiratory failure. Plan:  Off lisinopril. Will increase Lasix to 20 mg iv BID. Monitor blood pressure and heart rate.   On amiodarone and isosorbide. Strict I&O. Antibiotics per primary and pulmonary. Cardiac diet with 1000 mL fluid restriction. Follow up urine eosinophils. Avoid nephrotoxic drugs and IV contrast exposure. Follow up chemistries. Please do not hesitate to contact us for any further questions/concerns. We will continue to follow along with you.        Electronically signed by Elysia Pedroza MD  on 2/14/2021 at 11:12 AM

## 2021-02-14 NOTE — PLAN OF CARE
Problem: Falls - Risk of:  Goal: Will remain free from falls  Description: Will remain free from falls  Outcome: Ongoing  Note: Fall risk assessment completed. Patient instructed to use call light. Bed locked and in lowest position, side rails up 2/4, call light and bedside table within reach, clutter removed, and non-skid footwear on when pt out of bed. Hourly rounds will continue.

## 2021-02-14 NOTE — PLAN OF CARE
Problem: Skin Integrity:  Goal: Will show no infection signs and symptoms  Description: Will show no infection signs and symptoms  Outcome: Ongoing  Note: Monitor for signs & symptoms of infection. Utilize standard precautions & proper handwashing. Communicate referral to infection control. Goal: Absence of new skin breakdown  Description: Absence of new skin breakdown  Outcome: Ongoing  Note: Continuing to monitor for skin integrity risks. Patient independent with turning/repositioning. Turning/repositioning encouraged at least once every 2 hrs, and prn basis. Hygiene care being completed independently per patient; assistance provided when deemed necessary. Problem: Nutrition  Goal: Optimal nutrition therapy  Outcome: Ongoing  Note: Review diet daily and as needed with pt. Provide supplement nutrition as ordered by physician & educate pt. Review nutritional guidelines with pt. Problem: Falls - Risk of:  Goal: Will remain free from falls  Description: Will remain free from falls  Outcome: Ongoing  Note: Pt fall risk, fall band present, falling star, safety alarm activated and in use as needed. Hourly rounding performed. Pt encouraged to use call light. See Rebecca Dire fall risk assessment. Goal: Absence of physical injury  Description: Absence of physical injury  Outcome: Ongoing  Note: Non-skid socks in place, up with assistance, bed in lowest position, bed exit & alarm as needed, provide toileting every 2 hours an d as needed.

## 2021-02-14 NOTE — PROGRESS NOTES
Section of Cardiology  Progress Note      Date:  2/14/2021  Patient: Edvin Spence  Admission:  2/8/2021  6:28 PM  Admit DX: Acute left-sided CHF (congestive heart failure) ([facility] [address] [I50.1]  Age:  80 y.o., 3/26/1932     LOS: 6 days     Reason for evaluation:   ischemic heart disease and CHF      SUBJECTIVE:     The patient was seen and examined. Notes and labs reviewed. There were not complications over night. Patient seen and examined in her room with her nurse and the respiratory therapist at bedside. Patient is very upset and agitated due to the visiting hours restriction. Her son apparently came late and could not get in the hospital.  Her oxygenation is worsening and the respiratory therapist increase the amount of oxygen and flow in her high flow oxygen supply. Initially her O2 saturation was 88% now it is 93%. OBJECTIVE:    Telemetry: Sinus  BP (!) 154/60   Pulse 72   Temp 97.9 °F (36.6 °C) (Axillary)   Resp 20   Ht 5' 5\" (1.651 m)   Wt 123 lb 9 oz (56 kg)   SpO2 92%   BMI 20.56 kg/m²     Intake/Output Summary (Last 24 hours) at 2/14/2021 1602  Last data filed at 2/14/2021 1520  Gross per 24 hour   Intake 610 ml   Output --   Net 610 ml       EXAM:   CONSTITUTIONAL:  awake, alert, cooperative, no apparent distress, and appears stated age. HEENT: Normal jugular venous pulsations, no carotid bruits. Head is atraumatic, normocephalic. Eyes and oral mucosa are normal.  LUNGS: Good respiratory effort. No for increased work of breathing. On auscultation: Diminished breath sounds with minimal crackles in the bases. CARDIOVASCULAR:  Normal apical impulse, regular rate and rhythm, normal S1 and S2, no S3 or S4,   ABDOMEN: Soft, nontender, nondistended. Bowel sounds present. SKIN: Warm and dry. EXTREMITIES: No lower extremity edema. Motor movement grossly intact. No cyanosis or clubbing.     Current Inpatient Medications:   furosemide  20 mg Intravenous BID    cefepime  1,000 mg Intravenous Q12H    methylPREDNISolone  40 mg Intravenous Daily    amiodarone  200 mg Oral Daily    apixaban  5 mg Oral BID    isosorbide mononitrate  30 mg Oral QAM    metoprolol tartrate  25 mg Oral BID    oxybutynin  5 mg Oral Every Other Day    pantoprazole  40 mg Oral QAM AC    pravastatin  40 mg Oral Daily    sertraline  100 mg Oral Daily    latanoprost  1 drop Both Eyes Nightly    sodium chloride flush  10 mL Intravenous 2 times per day    ipratropium-albuterol  1 ampule Inhalation Q4H WA       IV Infusions (if any):      Diagnostics:   EKG: . ECHO: Normal LV systolic function. Ejection fraction: %  Stress Test: not obtained. Cardiac Angiography: not obtained. Labs:   CBC:   Recent Labs     02/13/21  0600 02/14/21  0526   WBC 11.8* 9.2   HGB 10.0* 9.3*   HCT 33.6* 31.8*    219     BMP:   Recent Labs     02/13/21  0600 02/14/21  0526    143   K 4.5 4.3   CO2 32* 35*   BUN 27* 26*   CREATININE 0.74 0.66   LABGLOM >60 >60   GLUCOSE 119* 125*     No results found for: BNP  PT/INR: No results for input(s): PROTIME, INR in the last 72 hours. APTT:No results for input(s): APTT in the last 72 hours. CARDIAC ENZYMES:No results for input(s): CKTOTAL, CKMB, CKMBINDEX, TROPONINT in the last 72 hours. FASTING LIPID PANEL:  Lab Results   Component Value Date    HDL 38 12/08/2020    TRIG 78 12/08/2020     LIVER PROFILE:No results for input(s): AST, ALT, LABALBU in the last 72 hours.     ASSESSMENT:  1.  Acute on chronic diastolic CHF, however I do not think it is the main reason for her severe hypoxemia and oxygen need  2.  History of coronary artery disease  3.  History of CABG in 2016  4.  History of systemic hypertension  5.  History of hyperlipidemia  6.  History of paroxysmal atrial fibrillation currently in sinus rhythm.  History of cardioversion    Patient Active Problem List   Diagnosis    Symptomatic bradycardia    Suspected sleep apnea    Unexplained night sweats    Pneumonia    GERD (gastroesophageal reflux disease)    Hyperlipidemia    Hypertension    Chronic atrial fibrillation (HCC)    Acute on chronic diastolic CHF (congestive heart failure) (HCC)    Generalized anxiety disorder    Depressive disorder    Nonrheumatic tricuspid valve disorder    Atherosclerotic heart disease of native coronary artery without angina pectoris    Heart failure, diastolic, acute on chronic (HCC)    Suspected COVID-19 virus infection    Acute left-sided CHF (congestive heart failure) (HCC)    Mild malnutrition ([address]       PLAN:    1. Back on IV Lasix per nephrology  2. Urine output was not recorded but according to her nurse she made 450 mL today but despite that her oxygenation level is worsening  3. The respiratory therapist is increasing the supply to 60 L at 60%  4. Pulmonary is following  5. I think the patient has primary pulmonary interstitial disease in addition to CHF      Please see orders. Discussed with other I spoke to her son Dana Parker via phone and nursing.     Mark Kovacs MD

## 2021-02-15 ENCOUNTER — APPOINTMENT (OUTPATIENT)
Dept: GENERAL RADIOLOGY | Age: 86
DRG: 291 | End: 2021-02-15
Payer: MEDICARE

## 2021-02-15 LAB
ANION GAP SERPL CALCULATED.3IONS-SCNC: 7 MMOL/L (ref 9–17)
BUN BLDV-MCNC: 32 MG/DL (ref 8–23)
BUN/CREAT BLD: 48 (ref 9–20)
CALCIUM SERPL-MCNC: 8.5 MG/DL (ref 8.6–10.4)
CHLORIDE BLD-SCNC: 102 MMOL/L (ref 98–107)
CO2: 36 MMOL/L (ref 20–31)
CREAT SERPL-MCNC: 0.66 MG/DL (ref 0.5–0.9)
GFR AFRICAN AMERICAN: >60 ML/MIN
GFR NON-AFRICAN AMERICAN: >60 ML/MIN
GFR SERPL CREATININE-BSD FRML MDRD: ABNORMAL ML/MIN/{1.73_M2}
GFR SERPL CREATININE-BSD FRML MDRD: ABNORMAL ML/MIN/{1.73_M2}
GLUCOSE BLD-MCNC: 178 MG/DL (ref 70–99)
HCT VFR BLD CALC: 33.4 % (ref 36.3–47.1)
HEMOGLOBIN: 9.7 G/DL (ref 11.9–15.1)
MAGNESIUM: 1.9 MG/DL (ref 1.6–2.6)
MCH RBC QN AUTO: 25.9 PG (ref 25.2–33.5)
MCHC RBC AUTO-ENTMCNC: 29 G/DL (ref 28.4–34.8)
MCV RBC AUTO: 89.3 FL (ref 82.6–102.9)
NRBC AUTOMATED: 0 PER 100 WBC
PDW BLD-RTO: 15.9 % (ref 11.8–14.4)
PHOSPHORUS: 3.5 MG/DL (ref 2.6–4.5)
PLATELET # BLD: 246 K/UL (ref 138–453)
PMV BLD AUTO: 10.9 FL (ref 8.1–13.5)
POTASSIUM SERPL-SCNC: 4.4 MMOL/L (ref 3.7–5.3)
RBC # BLD: 3.74 M/UL (ref 3.95–5.11)
SODIUM BLD-SCNC: 145 MMOL/L (ref 135–144)
WBC # BLD: 9.2 K/UL (ref 3.5–11.3)

## 2021-02-15 PROCEDURE — 97110 THERAPEUTIC EXERCISES: CPT

## 2021-02-15 PROCEDURE — 94761 N-INVAS EAR/PLS OXIMETRY MLT: CPT

## 2021-02-15 PROCEDURE — 94640 AIRWAY INHALATION TREATMENT: CPT

## 2021-02-15 PROCEDURE — 2700000000 HC OXYGEN THERAPY PER DAY

## 2021-02-15 PROCEDURE — 80048 BASIC METABOLIC PNL TOTAL CA: CPT

## 2021-02-15 PROCEDURE — 2060000000 HC ICU INTERMEDIATE R&B

## 2021-02-15 PROCEDURE — 6360000002 HC RX W HCPCS: Performed by: FAMILY MEDICINE

## 2021-02-15 PROCEDURE — 6370000000 HC RX 637 (ALT 250 FOR IP): Performed by: FAMILY MEDICINE

## 2021-02-15 PROCEDURE — 2580000003 HC RX 258: Performed by: FAMILY MEDICINE

## 2021-02-15 PROCEDURE — 97530 THERAPEUTIC ACTIVITIES: CPT

## 2021-02-15 PROCEDURE — 6360000002 HC RX W HCPCS: Performed by: INTERNAL MEDICINE

## 2021-02-15 PROCEDURE — 36415 COLL VENOUS BLD VENIPUNCTURE: CPT

## 2021-02-15 PROCEDURE — 84100 ASSAY OF PHOSPHORUS: CPT

## 2021-02-15 PROCEDURE — 97535 SELF CARE MNGMENT TRAINING: CPT

## 2021-02-15 PROCEDURE — 97112 NEUROMUSCULAR REEDUCATION: CPT

## 2021-02-15 PROCEDURE — 85027 COMPLETE CBC AUTOMATED: CPT

## 2021-02-15 PROCEDURE — 71045 X-RAY EXAM CHEST 1 VIEW: CPT

## 2021-02-15 PROCEDURE — 83735 ASSAY OF MAGNESIUM: CPT

## 2021-02-15 RX ORDER — METHYLPREDNISOLONE SODIUM SUCCINATE 40 MG/ML
40 INJECTION, POWDER, LYOPHILIZED, FOR SOLUTION INTRAMUSCULAR; INTRAVENOUS EVERY 12 HOURS
Status: DISCONTINUED | OUTPATIENT
Start: 2021-02-15 | End: 2021-02-19

## 2021-02-15 RX ADMIN — OXYBUTYNIN CHLORIDE 5 MG: 5 TABLET ORAL at 09:15

## 2021-02-15 RX ADMIN — APIXABAN 5 MG: 5 TABLET, FILM COATED ORAL at 09:15

## 2021-02-15 RX ADMIN — FUROSEMIDE 20 MG: 10 INJECTION, SOLUTION INTRAMUSCULAR; INTRAVENOUS at 09:21

## 2021-02-15 RX ADMIN — CEFEPIME HYDROCHLORIDE 1000 MG: 1 INJECTION, POWDER, FOR SOLUTION INTRAMUSCULAR; INTRAVENOUS at 03:21

## 2021-02-15 RX ADMIN — ISOSORBIDE MONONITRATE 30 MG: 30 TABLET ORAL at 09:15

## 2021-02-15 RX ADMIN — IPRATROPIUM BROMIDE AND ALBUTEROL SULFATE 1 AMPULE: .5; 3 SOLUTION RESPIRATORY (INHALATION) at 07:19

## 2021-02-15 RX ADMIN — METOPROLOL TARTRATE 25 MG: 25 TABLET, FILM COATED ORAL at 09:15

## 2021-02-15 RX ADMIN — METHYLPREDNISOLONE SODIUM SUCCINATE 40 MG: 40 INJECTION, POWDER, FOR SOLUTION INTRAMUSCULAR; INTRAVENOUS at 23:09

## 2021-02-15 RX ADMIN — IPRATROPIUM BROMIDE AND ALBUTEROL SULFATE 1 AMPULE: .5; 3 SOLUTION RESPIRATORY (INHALATION) at 11:22

## 2021-02-15 RX ADMIN — LATANOPROST 1 DROP: 50 SOLUTION OPHTHALMIC at 23:12

## 2021-02-15 RX ADMIN — SODIUM CHLORIDE, PRESERVATIVE FREE 10 ML: 5 INJECTION INTRAVENOUS at 09:21

## 2021-02-15 RX ADMIN — CEFEPIME HYDROCHLORIDE 1000 MG: 1 INJECTION, POWDER, FOR SOLUTION INTRAMUSCULAR; INTRAVENOUS at 15:39

## 2021-02-15 RX ADMIN — IPRATROPIUM BROMIDE AND ALBUTEROL SULFATE 1 AMPULE: .5; 3 SOLUTION RESPIRATORY (INHALATION) at 21:32

## 2021-02-15 RX ADMIN — METOPROLOL TARTRATE 25 MG: 25 TABLET, FILM COATED ORAL at 23:09

## 2021-02-15 RX ADMIN — APIXABAN 5 MG: 5 TABLET, FILM COATED ORAL at 23:09

## 2021-02-15 RX ADMIN — PANTOPRAZOLE SODIUM 40 MG: 40 TABLET, DELAYED RELEASE ORAL at 06:34

## 2021-02-15 RX ADMIN — SODIUM CHLORIDE, PRESERVATIVE FREE 10 ML: 5 INJECTION INTRAVENOUS at 23:09

## 2021-02-15 RX ADMIN — AMIODARONE HYDROCHLORIDE 200 MG: 200 TABLET ORAL at 09:15

## 2021-02-15 RX ADMIN — PRAVASTATIN SODIUM 40 MG: 40 TABLET ORAL at 09:15

## 2021-02-15 RX ADMIN — IPRATROPIUM BROMIDE AND ALBUTEROL SULFATE 1 AMPULE: .5; 3 SOLUTION RESPIRATORY (INHALATION) at 15:09

## 2021-02-15 RX ADMIN — METHYLPREDNISOLONE SODIUM SUCCINATE 40 MG: 40 INJECTION, POWDER, FOR SOLUTION INTRAMUSCULAR; INTRAVENOUS at 09:21

## 2021-02-15 RX ADMIN — SERTRALINE HYDROCHLORIDE 100 MG: 50 TABLET ORAL at 09:15

## 2021-02-15 RX ADMIN — FUROSEMIDE 20 MG: 10 INJECTION, SOLUTION INTRAMUSCULAR; INTRAVENOUS at 17:29

## 2021-02-15 NOTE — PROGRESS NOTES
Pulmonary Critical Care Progress Note       Patient seen for the follow up of acute on chronic hypoxic respiratory failure, decompensated CHF, bilateral pulmonary infiltrates, most likely secondary to pulmonary edema, mild secondary pulmonary hypertension    Subjective:  She had desaturation on O2 nasal cannula is back on high-flow at 40% O2. She has no evidence of choking on food  Intake or drinks. She has improved shortness of breath after Solu-Medrol yesterday. She has occasional dry cough. She denies chest pain  . She has fair appetite. daughter is at bedside    Examination:  Vitals: BP (!) 122/54   Pulse 56   Temp 98.2 °F (36.8 °C) (Oral)   Resp 22   Ht 5' 5\" (1.651 m)   Wt 135 lb 3.2 oz (61.3 kg)   SpO2 97%   BMI 22.50 kg/m²   General appearance: alert and cooperative with exam, resting in bed  Neck: No JVD  Lungs:  Decreased breath sounds no crackles or wheezing  Heart: regular rate and rhythm, S1, S2 normal, no gallop  Abdomen: Soft, non tender, + BS  Extremities: no cyanosis or clubbing. No significant edema    LABs:  CBC:   Recent Labs     02/14/21  0526 02/15/21  0518   WBC 9.2 9.2   HGB 9.3* 9.7*   HCT 31.8* 33.4*    246     BMP:   Recent Labs     02/14/21  0526 02/15/21  0518    145*   K 4.3 4.4   CO2 35* 36*   BUN 26* 32*   CREATININE 0.66 0.66   LABGLOM >60 >60   GLUCOSE 125* 178*      Ref. Range 2/9/2021 04:39   Procalcitonin Latest Ref Range: <0.09 ng/mL 0.30 (H)   Results for Duc Ratel (MRN [id number]) as of 2/13/2021 18:05   Ref. Range 2/8/2021 18:35   Pro-BNP Latest Ref Range: <300 pg/mL 5,808 (H)   Results for Duc Ratel (MRN [id number]) as of 2/14/2021 15:39   Ref. Range 2/13/2021 16:09 2/14/2021 05:26   Procalcitonin Latest Ref Range: <0.09 ng/mL 0.20 (H) 0.16 (H)     Radiology:   chest x-ray 2/15  No significant change in appearance of diffuse multifocal pneumonia and small   bilateral pleural effusions.                CT chest 4/29/2020  Diffuse prominence of the interstitium with some vague ground-glass most   likely from volume overload.       No evidence of pulmonary embolism.       Enlarged pulmonary artery can be seen the setting of pulmonary hypertension         Impression:  · Acute on chronic hypoxic respiratory failure  · Decompensated diastolic CHF (EF: 55 to 38% by echo by 12/8/2020)  ·  pulmonary edema, doubt pneumonia  · History of CAD/HTN/HDL/HTN, s/p CABG in 2016  · Mild secondary pulmonary hypertension, RVSP 37, secondary to above    Recommendations:  · Oxygen via high flow nasal cannula.   Wean as able, keep SPO2 90% or greater  ·  incentive spirometer every hour awake  · BiPAP as needed  · Albuterol and Ipratropium Q 4 hours and prn  · Start Solu-Medrol 40 IV every 12 hours  ·   On cefepime off Rocephin/finished Zithromax  ·  speech swallow evaluation  · Nephrology  On consult consider diuresis  ·  Cardiology on consult   · We will order CT chest without contrast to assess for any evidence of amiodarone induced pneumonitis/fibrosis  ·   physical therapy  · Rehab discharge evaluation  ·  discussed with  RN  ·  discussed with  daughter  · DVT prophylaxis, on Eliquis per Cardiology      Electronically signed by     Billy Jean MD on 2/15/2021 at 5:30 PM  Pulmonary Critical Care and Sleep Medicine,  [facility] AT [place]: [phone]

## 2021-02-15 NOTE — PROGRESS NOTES
Occupational Therapy  Facility/Department: [facility] PROGRESSIVE CARE  Daily Treatment Note  NAME: Edvin Spence  : 3/26/1932  MRN: [id number]    Date of Service: 2/15/2021    Discharge Recommendations:  [facility], [facility]  OT Equipment Recommendations  Equipment Needed: Yes  Mobility Devices: ADL Assistive Devices  ADL Assistive Devices: Toileting - 3-in-1 Commode;Reacher;Long-handled [place];Long-handled Sponge    Assessment   Performance deficits / Impairments: Decreased functional mobility ; Decreased safe awareness;Decreased balance;Decreased ADL status; Decreased strength;Decreased high-level IADLs;Decreased endurance;Decreased posture;Decreased vision/visual deficit; Decreased coordination  Assessment: Skilled OT indicated to improve I and safety in areas of self care and function to return home with assist as needed. Prognosis: Fair  OT Education: OT Role;Plan of Care;Energy Conservation;Transfer Training;ADL Adaptive Strategies  REQUIRES OT FOLLOW UP: Yes  Activity Tolerance  Activity Tolerance: Patient Tolerated treatment well  Safety Devices  Safety Devices in place: Yes  Type of devices: All fall risk precautions in place; Left in chair;Call light within reach;Nurse notified; Chair alarm in place;Gait belt;Patient at risk for falls         Patient Diagnosis(es): The encounter diagnosis was Acute congestive heart failure, unspecified heart failure type ([address] has a past medical history of Acute on chronic diastolic CHF (congestive heart failure) ([address] Bradycardia, CAD (coronary artery disease), Cancer ([address] Depression, Gallstones, GERD (gastroesophageal reflux disease), Hiatal hernia, History of cardiac cath, Hyperlipidemia, Hypertension, Insomnia, MI (myocardial infarction) ([address] PNA (pneumonia), SOB (shortness of breath), and UTI (urinary tract infection). has a past surgical history that includes Appendectomy; Cholecystectomy; Hysterectomy; Coronary angioplasty with stent;  Colonoscopy; Coronary artery bypass graft (11/08/2016); Cardiac surgery (11/08/2016); and Cardioversion (12/17/2019). Restrictions  Restrictions/Precautions  Restrictions/Precautions: General Precautions, Fall Risk, Up as Tolerated  Required Braces or Orthoses?: No  Position Activity Restriction  Other position/activity restrictions: telemetry, HI Flow, cont SPO2 monitor, IV site  Subjective   General  Chart Reviewed: Yes  Patient assessed for rehabilitation services?: Yes  Response to previous treatment: Patient with no complaints from previous session  Family / Caregiver Present: No      Orientation  Orientation  Overall Orientation Status: Within Functional Limits  Objective    ADL  Grooming: Contact guard assistance(to complete oral hygiene seated)  Toileting: Contact guard assistance(CGA during nasreen hygiene)        Balance  Sitting Balance: Supervision    Standing Balance: Minimal assistance(slight posterior lean)  Standing Balance  Time: standing tolerance ~2 minutes  Activity: weight shifting, marching in place, leg extentions, and functional reaching  Functional Mobility  Functional Mobility Comments: limited d/t HFNC    Toilet Transfers  Toilet - Technique: Stand pivot  Equipment Used: Standard bedside commode  Toilet Transfer: Minimal assistance  Toilet Transfers Comments: Patient with posterior lean, but patient aware and able to self correct    Bed mobility  Comment: Patient sitting up in chair    Transfers  Sit to stand: Contact guard assistance  Stand to sit: Contact guard assistance  Transfer Comments: Total assist for line mgmt, verbal cues for hand placements and controlled sit<>stands      Cognition  Overall Cognitive Status: Exceptions  Arousal/Alertness: Appropriate responses to stimuli  Following Commands:  Follows one step commands consistently  Attention Span: Appears intact  Memory: Decreased short term memory  Safety Judgement: Decreased awareness of need for assistance;Decreased awareness of need for safety  Problem Solving: Assistance required to correct errors made;Assistance required to identify errors made;Decreased awareness of errors  Insights: Decreased awareness of deficits  Initiation: Requires cues for some  Sequencing: Requires cues for some      Plan   Plan  Times per week: 4-5x/week 1x/day as josie  Current Treatment Recommendations: Strengthening, Balance Training, Functional Mobility Training, Safety Education & Training, Positioning, Endurance Training, Neuromuscular Re-education, Patient/Caregiver Education & Training, Equipment Evaluation, Education, & procurement, Self-Care / ADL, Home Management Training, Cognitive/Perceptual Training  Goals  Short term goals  Time Frame for Short term goals: by discharge, pt to demo  Short term goal 1: bed mob tech with use of rail as needed to CG. Short term goal 2: increase in BUE strength by a 1/2 grade to assist with ADL tasks and be I with simple BUE HEP with use of handouts. Short term goal 3: UB ADL to set up and LB ADL to CG with use of AE/AD as needed. Short term goal 4: toileting tasks with use of BSC/AD and grab bar as needed to CG. Short term goal 5: ADL transfers and functional mob with AD as needed to CG. Long term goals  Long term goal 1: Pt to stand with SBA and AD josie > 5 mins as able to reduce falls with functional tasks. Long term goal 2: Caregivers to be I with EC/WS and fall prevention tech, pressure relief, AE/DME recommendations with use of handouts. Patient Goals   Patient goals : I just want to be able to breath better and get home! Therapy Time   Individual Concurrent Group Co-treatment   Time In 8396         Time Out 6838         Minutes 40           Upon writer exit, call light within reach, pt retired to chair. All lines intact and patient positioned comfortably. Chair alarm in place. All patient needs addressed prior to ending therapy session. Chart reviewed prior to treatment and patient is agreeable for therapy.   RN reports patient is medically stable for therapy treatment this date.       Ya Olmedo, MORA/L

## 2021-02-15 NOTE — PROGRESS NOTES
 sertraline  100 mg Oral Daily    latanoprost  1 drop Both Eyes Nightly    sodium chloride flush  10 mL Intravenous 2 times per day    ipratropium-albuterol  1 ampule Inhalation Q4H WA       IV Infusions (if any):      Diagnostics:   Telemetry: Mild sinus bradycardia    Labs:   CBC:  Recent Labs     02/14/21  0526 02/15/21  0518   WBC 9.2 9.2   HGB 9.3* 9.7*   HCT 31.8* 33.4*    246     Magnesium:  Recent Labs     02/14/21  0526 02/15/21  0518   MG 1.8 1.9     BMP:  Recent Labs     02/14/21  0526 02/15/21  0518    145*   K 4.3 4.4   CALCIUM 8.5* 8.5*   CO2 35* 36*   BUN 26* 32*   CREATININE 0.66 0.66   LABGLOM >60 >60   GLUCOSE 125* 178*     BNP:No results for input(s): BNP, PROBNP in the last 72 hours. PT/INR:No results for input(s): PROTIME, INR in the last 72 hours. APTT:No results for input(s): APTT in the last 72 hours. CARDIAC ENZYMES:No results for input(s): MYOGLOBIN, CKTOTAL, CKMB, CKMBINDEX, TROPHS, TROPONINT in the last 72 hours. FASTING LIPID PANEL:  Lab Results   Component Value Date    HDL 38 12/08/2020    TRIG 78 12/08/2020     LIVER PROFILE:No results for input(s): AST, ALT, LABALBU, ALKPHOS, BILITOT, BILIDIR, IBILI, PROT, GLOB, ALBUMIN in the last 72 hours. ASSESSMENT:    · Acute on chronic diastolic heart failure, on IV diuretics  · Respiratory failure/possible pneumonia on high flow nasal cannula oxygen, managed by others  · CAD with prior CABG in 2016-no clinical angina  · Paroxysmal atrial fibrillation, maintaining sinus rhythm with history of cardioversion  · Hypertension-well-controlled  · Dyslipidemia-treated    PLAN:  1. Continue current cardiac medications. 2. Diuretics are currently managed by nephrology. Doubt the accuracy of her recorded weights. Strict intake and output and daily weight. Fluid restriction and low-salt diet.     3. Patient has recurrent respiratory failure with dry bibasilar rales on exam and probable underlying chronic lung disease on CT scan done in April 2020, CHF appears to be mild. She remains on amiodarone and in sinus rhythm but would appreciate pulmonary input regarding if amiodarone is safe to continue from a pulmonary standpoint. Discussed with patient and nursing.     SERA Perez - CNP

## 2021-02-15 NOTE — PROGRESS NOTES
[address] [employer]  Speech Language Pathology    Date: 2/15/2021  Patient Name: Mahogany Ferrera  YOB: 1932   AGE: 80 y.o. MRN: [id number]        Patient Not Available for Speech Therapy     Due to:  [] Testing  [] Hemodialysis  [] Cancelled by RN  [] Surgery   [] Intubation/Sedation/Pain Medication  [] Medical instability  [x] Other:  Spoke with RN. Per RN, pt appears to be tolerating current diet with no overt s/s of aspiration. Should physician with to r/o silent aspiration recommend completion of MBSS as silent aspiration cannot be r/o at bedside. Next scheduled treatment: await order clarification    Completed by:  Gabriela Peres M.S. [id number] [facility]

## 2021-02-15 NOTE — PROGRESS NOTES
Progress note  [facility]    Adult Hospitalist      Name: Jake Juarez  MRN: [id number]     Acct: [de-identified]  Room: [address] [place] [address]    Admit Date: 2/8/2021  6:28 PM  PCP: Nia Miles MD    Primary Problem  Active Problems:    Acute left-sided CHF (congestive heart failure) ([address]  Resolved Problems:    * No resolved hospital problems.  *        Assesment:   Acute on chronic hypoxemic respiratory failure  Acute on chronic diastolic congestive heart failure  Pulmonary edema  Multifocal pneumonia  Acute kidney injury  Chronic coronary artery disease status post CABG in 2016  Chronic atrial fibrillation  Chronic anticoagulation with Eliquis  Essential hypertension  Mixed hyperlipidemia  Mild pulmonary hypertension  Depression with anxiety  Former smoker  GERD    Plan:   Admit patient to progressive unit  Oxygen keep SPO2 more than 90%  BiPAP as needed  Blood culture no growth  BiPAP as needed  Oral azithromycin, completed  Appreciate Nephrology assistance, IV diuretics have been increased to Lasix 20 IV b.i.d.   DuoNeb breathing treatment  Continue Eliquis  Continue lisinopril, metoprolol, Imdur, amiodarone  Continue pravastatin  Continue Zoloft  Pulmonology on consult, appreciated  Consult cardiology, appreciated  [address] nephrology assistance  Patient continues on high-flow nasal cannula oxygen, 30 L with 50% FiO2, requirement has come down since yesterday  IV cefepime  Continue other medication as below      Scheduled Meds:   furosemide  20 mg Intravenous BID    cefepime  1,000 mg Intravenous Q12H    methylPREDNISolone  40 mg Intravenous Daily    amiodarone  200 mg Oral Daily    apixaban  5 mg Oral BID    isosorbide mononitrate  30 mg Oral QAM    metoprolol tartrate  25 mg Oral BID    oxybutynin  5 mg Oral Every Other Day    pantoprazole  40 mg Oral QAM AC    pravastatin  40 mg Oral Daily    sertraline  100 mg Oral Daily    latanoprost  1 drop Both Eyes Nightly    sodium chloride flush  10 mL Intravenous 2 times per day    ipratropium-albuterol  1 ampule Inhalation Q4H WA     Continuous Infusions:    PRN Meds:      sodium chloride flush, 10 mL, PRN      promethazine, 12.5 mg, Q6H PRN    Or      ondansetron, 4 mg, Q6H PRN      nicotine, 1 patch, Daily PRN      polyethylene glycol, 17 g, Daily PRN      acetaminophen, 650 mg, Q6H PRN    Or      acetaminophen, 650 mg, Q6H PRN      albuterol, 2.5 mg, Q2H PRN      hydrALAZINE, 20 mg, Q6H PRN        Chief Complaint:     Chief Complaint   Patient presents with    Shortness of Breath         History of Present Illness: Ruddy Alvarez is a 80 y.o.  female who presents with Shortness of Breath  Patient seen and examined at bedside and reviewed  last 24 hrs events with nursing staff  No acute events overnight. Patient denies any acute complaints. Afebrile  Patient denies any chest pain, shortness of Breath, palpitation, headache, dizziness, cough, nausea, vomiting, abdominal pain, pain, changes in urination or bowel habit or rash. Patient continues on high flow nasal cannula oxygen requiring 50% FiO2 30 L         HPI  This is a80year-old female is been admitted to emergency room, patient came to the ER with a complaint of having shortness of breath, further patient is been having some shortness of breath for past couple of days, patient does have history of congestive heart failure in view of oxygen at home, also history of coronary disease, hypertension hyperlipidemia and CABG in the past, patient initial testing in the emergency room is consistent with congestive heart failure, admitted for further management    I have personally reviewed the past medical history, past surgical history, medications, social history, and family history, and summarized in the note. Review of Systems:     All 10 point system is reviewed and negative otherwise mentioned in HPI.       Past Medical History:     Past Medical History:   Diagnosis Date    Acute on chronic diastolic CHF (congestive heart failure) ([facility] 10/2/2019    Bradycardia     CAD (coronary artery disease)     Cancer (HCC)     Skin CA on nose    Depression     Gallstones     GERD (gastroesophageal reflux disease)     Hiatal hernia     History of cardiac cath 10/2016    CAD    Hyperlipidemia     Hypertension     Insomnia     MI (myocardial infarction) ([facility]     PNA (pneumonia)     SOB (shortness of breath)     UTI (urinary tract infection)         Past Surgical History:     Past Surgical History:   Procedure Laterality Date    APPENDECTOMY      CARDIAC SURGERY  11/08/2016    CABG x 3; LIMA-LAD, SVG-PDA, SVG-OM    CARDIOVERSION  12/17/2019    CHOLECYSTECTOMY      COLONOSCOPY      CORONARY ANGIOPLASTY WITH STENT PLACEMENT      CORONARY ARTERY BYPASS GRAFT  11/08/2016    X 3    HYSTERECTOMY          Medications Prior to Admission:       Prior to Admission medications    Medication Sig Start Date End Date Taking?  Authorizing Provider   lisinopril (PRINIVIL;ZESTRIL) 2.5 MG tablet Take 1 tablet by mouth daily 12/12/20  Yes Jia Workman MD   furosemide (LASIX) 40 MG tablet Take 1 tablet by mouth daily 12/11/20  Yes SERA Clarke CNP   metoprolol tartrate (LOPRESSOR) 25 MG tablet Take 25 mg by mouth 2 times daily  11/4/19  Yes Historical Provider, MD   amiodarone (CORDARONE) 200 MG tablet Take 200 mg by mouth daily   Yes Historical Provider, MD   apixaban (ELIQUIS) 5 MG TABS tablet Take 1 tablet by mouth 2 times daily 10/5/19  Yes Dorie Hendrickson MD   travoprost, benzalkonium, (TRAVATAN) 0.004 % ophthalmic solution Place 1 drop into the right eye nightly   Yes Historical Provider, MD   isosorbide mononitrate (IMDUR) 30 MG extended release tablet Take 30 mg by mouth every morning   Yes Historical Provider, MD   sertraline (ZOLOFT) 100 MG tablet Take 100 mg by mouth daily    Yes Historical Provider, MD   oxybutynin (DITROPAN) 5 MG tablet Take 5 mg by mouth every other day    Yes Labs:    Hematology:  Recent Labs     02/13/21  0600 02/14/21  0526 02/15/21  0518   WBC 11.8* 9.2 9.2   RBC 3.71* 3.51* 3.74*   HGB 10.0* 9.3* 9.7*   HCT 33.6* 31.8* 33.4*   MCV 90.6 90.6 89.3   MCH 27.0 26.5 25.9   MCHC 29.8 29.2 29.0   RDW 16.2* 16.1* 15.9*    219 246   MPV 11.0 11.3 10.9     Chemistry:  Recent Labs     02/13/21  0600 02/14/21  0526 02/15/21  0518    143 145*   K 4.5 4.3 4.4    102 102   CO2 32* 35* 36*   GLUCOSE 119* 125* 178*   BUN 27* 26* 32*   CREATININE 0.74 0.66 0.66   MG 1.9 1.8 1.9   ANIONGAP 9 6* 7*   LABGLOM >60 >60 >60   GFRAA >60 >60 >60   CALCIUM 8.7 8.5* 8.5*   PHOS 2.9 3.1 3.5     No results for input(s): PROT, LABALBU, LABA1C, G1XFLVS, W0YSWXF, FT4, TSH, AST, ALT, LDH, GGT, ALKPHOS, LABGGT, BILITOT, BILIDIR, AMMONIA, AMYLASE, LIPASE, LACTATE, CHOL, HDL, LDLCHOLESTEROL, CHOLHDLRATIO, TRIG, VLDL, DLS31VV, PHENYTOIN, PHENYF, URICACID, POCGLU in the last 72 hours. Lab Results   Component Value Date    INR 1.1 10/02/2019    INR 1.2 11/11/2016    INR 1.2 11/10/2016    PROTIME 11.2 10/02/2019    PROTIME 12.3 (H) 11/11/2016    PROTIME 12.7 (H) 11/10/2016       Lab Results   Component Value Date/Time    SPECIAL NOT REPORTED 02/08/2021 07:45 PM    SPECIAL NOT REPORTED 02/08/2021 07:45 PM     Lab Results   Component Value Date/Time    CULTURE NO GROWTH 6 DAYS 02/08/2021 07:45 PM    CULTURE NO GROWTH 6 DAYS 02/08/2021 07:45 PM       Lab Results   Component Value Date    POCPH 7.35 11/09/2016    POCPCO2 41 11/09/2016    POCPO2 85 11/09/2016    POCHCO3 22.4 11/09/2016    NBEA 3 11/09/2016    PBEA NOT REPORTED 11/09/2016    KKF6YMM 24 11/09/2016    RIHL3TSF 96 11/09/2016    FIO2 NOT REPORTED 10/02/2019       Radiology:    Xr Chest Portable    Result Date: 2/9/2021  Stable interstitial opacities with superimposed patchy airspace disease probably combination of factors including chronic interstitial disease, vascular congestion and pneumonia.      Xr Chest Portable    Result Date: 2/8/2021  Progressive bilateral patchy interstitial and alveolar infiltrates greater on the left. Overall findings suggest progressive pulmonary vascular congestion with possible superimposed bilateral pneumonia. All radiological studies reviewed                Code Status:  DNR-CCA    Electronically signed by Maura Fox MD on 2/15/2021 at 12:52 PM     Copy sent to Dr. Sterling Fong MD    This note was created with the assistance of a speech-recognition program.  Although the intention is to generate a document that actually reflects the content of the visit, no guarantees can be provided that every mistake has been identified and corrected by editing. Note was updated later by me after  physical examination and  completion of the assessment.

## 2021-02-15 NOTE — PROGRESS NOTES
Progress Note    Reason for Consult: ELICEO    Requesting Physician:  Angy Schmitt MD    INTERVAL HISTORY:   Creatinine stable at 0.66. SBP trending 100-140s today. Her SOB is better. She did not tolerate being off high flow nasal cannula. HISTORY OF PRESENT ILLNESS:    The patient is a 80 y.o. female who presents with worsening shortness of breath. Initial chest x-ray showed progressive pulmonary vascular congestion with possible superimposed bilateral pneumonia. Chest x-ray today showed bilateral airspace disease slightly increased at the right apex compared to prior. Creatinine baseline 0.6 which she was at on admission and yesterday. Creatinine elevated to 1.46 today. Hypokalemia on admission and yesterday. Potassium was 3.8 today. Recent hemoglobin 9.6. WBC was 14.4 today and today 12.0. SBP was originally trending as high as 214/75 on day of admission. She became hypotensive at 99/78 about 8 hours later. SBP trending 100s to 130s today. She originally was requiring nasal cannula and is now on high flow. Home medications include lisinopril, Lasix, metoprolol, amiodarone. She is currently on Lasix 20 mg IV twice daily, lisinopril, metoprolol, amiodarone. She has been up to the bedside commode voiding. No urinary retention on bladder scan    Review Of Systems:   Constitutional: No fever, chills, lethargy, weakness or wt loss  Cardiac:  No chest pain, +dyspnea. Chest:   No cough, phlegm or wheezing. Abdomen:  No abdominal pain, nausea, vomiting or diarrhea. :   No hematuria, pyuria, dysuria or flank pain.       Past Medical History:   Diagnosis Date    Acute on chronic diastolic CHF (congestive heart failure) (HCC) 10/2/2019    Bradycardia     CAD (coronary artery disease)     Cancer (HCC)     Skin CA on nose    Depression     Gallstones     GERD (gastroesophageal reflux disease)     Hiatal hernia     History of cardiac cath 10/2016    CAD    Hyperlipidemia     Hypertension  Insomnia     MI (myocardial infarction) ([address]     PNA (pneumonia)     SOB (shortness of breath)     UTI (urinary tract infection)        Past Surgical History:   Procedure Laterality Date    APPENDECTOMY      CARDIAC SURGERY  11/08/2016    CABG x 3; LIMA-LAD, SVG-PDA, SVG-OM    CARDIOVERSION  12/17/2019    CHOLECYSTECTOMY      COLONOSCOPY      CORONARY ANGIOPLASTY WITH STENT PLACEMENT      CORONARY ARTERY BYPASS GRAFT  11/08/2016    X 3    HYSTERECTOMY         Prior to Admission medications    Medication Sig Start Date End Date Taking?  Authorizing Provider   lisinopril (PRINIVIL;ZESTRIL) 2.5 MG tablet Take 1 tablet by mouth daily 12/12/20  Yes Sonya Benitez MD   furosemide (LASIX) 40 MG tablet Take 1 tablet by mouth daily 12/11/20  Yes SERA Benson CNP   metoprolol tartrate (LOPRESSOR) 25 MG tablet Take 25 mg by mouth 2 times daily  11/4/19  Yes Historical Provider, MD   amiodarone (CORDARONE) 200 MG tablet Take 200 mg by mouth daily   Yes Historical Provider, MD   apixaban (ELIQUIS) 5 MG TABS tablet Take 1 tablet by mouth 2 times daily 10/5/19  Yes Niru Saldana MD   travoprost, benzalkonium, (TRAVATAN) 0.004 % ophthalmic solution Place 1 drop into the right eye nightly   Yes Historical Provider, MD   isosorbide mononitrate (IMDUR) 30 MG extended release tablet Take 30 mg by mouth every morning   Yes Historical Provider, MD   sertraline (ZOLOFT) 100 MG tablet Take 100 mg by mouth daily    Yes Historical Provider, MD   oxybutynin (DITROPAN) 5 MG tablet Take 5 mg by mouth every other day    Yes Historical Provider, MD   pravastatin (PRAVACHOL) 40 MG tablet Take 40 mg by mouth daily    Yes Historical Provider, MD   pantoprazole (PROTONIX) 40 MG tablet Take 40 mg by mouth every morning (before breakfast)    Yes Historical Provider, MD   aspirin 81 MG EC tablet Take 81 mg by mouth daily    Historical Provider, MD       Scheduled Meds:   furosemide  20 mg Intravenous BID    cefepime 1,000 mg Intravenous Q12H    methylPREDNISolone  40 mg Intravenous Daily    amiodarone  200 mg Oral Daily    apixaban  5 mg Oral BID    isosorbide mononitrate  30 mg Oral QAM    metoprolol tartrate  25 mg Oral BID    oxybutynin  5 mg Oral Every Other Day    pantoprazole  40 mg Oral QAM AC    pravastatin  40 mg Oral Daily    sertraline  100 mg Oral Daily    latanoprost  1 drop Both Eyes Nightly    sodium chloride flush  10 mL Intravenous 2 times per day    ipratropium-albuterol  1 ampule Inhalation Q4H WA     Continuous Infusions:  PRN Meds:sodium chloride flush, promethazine **OR** ondansetron, nicotine, polyethylene glycol, acetaminophen **OR** acetaminophen, albuterol, hydrALAZINE    Allergies   Allergen Reactions    Seasonal        Social History     Socioeconomic History    Marital status:       Spouse name: Not on file    Number of children: Not on file    Years of education: Not on file    Highest education level: Not on file   Occupational History    Not on file   Social Needs    Financial resource strain: Not on file    Food insecurity     Worry: Not on file     Inability: Not on file    Transportation needs     Medical: Not on file     Non-medical: Not on file   Tobacco Use    Smoking status: Former Smoker     Types: Cigarettes     Quit date: 1971     Years since quittin.2    Smokeless tobacco: Never Used    Tobacco comment: quit smoking yrs ago   Substance and Sexual Activity    Alcohol use: Yes     Comment: Rarely    Drug use: No    Sexual activity: Not on file   Lifestyle    Physical activity     Days per week: Not on file     Minutes per session: Not on file    Stress: Not on file   Relationships    Social connections     Talks on phone: Not on file     Gets together: Not on file     Attends Hindu service: Not on file     Active member of club or organization: Not on file     Attends meetings of clubs or organizations: Not on file     Relationship status: Not on file    Intimate partner violence     Fear of current or ex partner: Not on file     Emotionally abused: Not on file     Physically abused: Not on file     Forced sexual activity: Not on file   Other Topics Concern    Not on file   Social History Narrative    Not on file       Family History   Problem Relation Age of Onset    Heart Disease Mother     Cancer Mother     Heart Disease Father          Physical Exam:  Vitals:    02/15/21 1123 02/15/21 1124 02/15/21 1510 02/15/21 1515   BP:  (!) 106/44  (!) 122/54   Pulse:  55  56   Resp:  20 20 22   Temp:  97.5 °F (36.4 °C)  98.2 °F (36.8 °C)   TempSrc:  Oral  Oral   SpO2: 95% 96% 94% 97%   Weight:       Height:         I/O last 3 completed shifts: In: 610 [P.O.:610]  Out: 700 [Urine:700]    General:  Awake, alert, not in distress, and comfortable on high flow. Appears to be stated age. HEENT: Atraumatic, normocephalic. Anicteric sclera. Pink and moist oral mucosa. Neck supple. No JVD. Chest: Bilateral air entry, clear to auscultation, no wheezing, rhonchi or rales. Cardiovascular: RRR, S1S2, no murmur, rub or gallop. No lower extremity edema. Abdomen: Soft, non tender to palpation. Active bowel sounds x 4 quadrants. Musculoskeletal: Active ROM x 4 extremities. No cyanosis or clubbing. Integumentary: Pink, warm and dry. Free from rash or lesions. Skin turgor normal.  CNS: Oriented to person, place and time. Speech clear. Face symmetrical. No tremor.      Data:    CBC:   Lab Results   Component Value Date    WBC 9.2 02/15/2021    HGB 9.7 (L) 02/15/2021    HCT 33.4 (L) 02/15/2021    MCV 89.3 02/15/2021     02/15/2021     BMP:    Lab Results   Component Value Date     (H) 02/15/2021     02/14/2021     02/13/2021    K 4.4 02/15/2021    K 4.3 02/14/2021    K 4.5 02/13/2021     02/15/2021     02/14/2021     02/13/2021    CO2 36 (H) 02/15/2021    CO2 35 (H) 02/14/2021    CO2 32 (H) 02/13/2021    BUN 32 (H) 02/15/2021    BUN 26 (H) 02/14/2021    BUN 27 (H) 02/13/2021    CREATININE 0.66 02/15/2021    CREATININE 0.66 02/14/2021    CREATININE 0.74 02/13/2021    GLUCOSE 178 (H) 02/15/2021    GLUCOSE 125 (H) 02/14/2021    GLUCOSE 119 (H) 02/13/2021     CMP:   Lab Results   Component Value Date     02/15/2021    K 4.4 02/15/2021     02/15/2021    CO2 36 02/15/2021    BUN 32 02/15/2021    CREATININE 0.66 02/15/2021    GLUCOSE 178 02/15/2021    CALCIUM 8.5 02/15/2021    PROT 6.1 05/01/2020    LABALBU 3.3 05/01/2020    BILITOT 0.71 05/01/2020    ALKPHOS 72 05/01/2020    AST 16 05/01/2020    ALT 8 05/01/2020      Hepatic:   Lab Results   Component Value Date    AST 16 05/01/2020    AST 67 (H) 10/02/2019    AST 18 11/07/2016    ALT 8 05/01/2020    ALT 63 (H) 10/02/2019    ALT 12 11/07/2016    BILITOT 0.71 05/01/2020    BILITOT 0.82 10/02/2019    BILITOT 0.39 11/07/2016    ALKPHOS 72 05/01/2020    ALKPHOS 103 10/02/2019    ALKPHOS 70 11/07/2016     BNP: No results found for: BNP  Lipids:   Lab Results   Component Value Date    CHOL 109 12/08/2020    HDL 38 (L) 12/08/2020     INR:   Lab Results   Component Value Date    INR 1.1 10/02/2019    INR 1.2 11/11/2016    INR 1.2 11/10/2016     PTH: No results found for: PTH  Phosphorus:    Lab Results   Component Value Date    PHOS 3.5 02/15/2021     Ionized Calcium: No results found for: IONCA  Magnesium:   Lab Results   Component Value Date    MG 1.9 02/15/2021     Albumin:   Lab Results   Component Value Date    LABALBU 3.3 05/01/2020     Last 3 CK, CKMB, Troponin: @LABRCNT(CKTOTAL:3,CKMB:3,TROPONINI:3)       URINE:)No results found for: Mauricio Sickle    Radiology:   Reviewed. Assessment:  Acute Kidney Injury, likely hemodynamically related. FeNa 0.26%. Cr is better. Baseline Creatinine 0.6. Hypernatremia. Hypokalemia  Hypertension with recent hypotension. CHF exacerbation. Multifocal pneumonia. Hypoxic respiratory failure. Plan:  Off lisinopril.   Will increase water restriction to 1500 ml/24 hours. Continue Lasix at 20 mg iv BID. Monitor blood pressure and heart rate. On amiodarone and isosorbide. Strict I&O. Antibiotics per primary and pulmonary. Cardiac diet. Avoid nephrotoxic drugs and IV contrast exposure. Follow up chemistries. Please do not hesitate to contact us for any further questions/concerns. We will continue to follow along with you.        Electronically signed by Emry Olszewski, MD  on 2/15/2021 at 5:28 PM

## 2021-02-15 NOTE — PLAN OF CARE
Problem: Falls - Risk of:  Goal: Will remain free from falls  Description: Will remain free from falls  2/15/2021 1628 by Pk Lea RN  Outcome: Ongoing    Patient has remained free from falls this shift. Patient is alert and oriented times four. Bed to lowest position with door open. Patient care items and call light in reach. Patient uses call light appropriately for assist. Will continue to monitor. Please see fall assessment.

## 2021-02-15 NOTE — CARE COORDINATION
Discharge Planning:    Ken Mcallister at [phone], and discussed her Mother's current O2 needs, which are HFNC 50% and 30L. Initial plan was to d/c to [facility], however they are unable to accommodate the patient's O2 requirement. Dtr, Vitaliy Santiago, is agreeable to an [facility] with plan to d/c to [facility] when patient is off of HFNC. Writer provided options and Vitaliy Santiago asked for a referral to [facility]. Writer faxed face sheet to Marcelino at [phone] and informed Dong Roberson, [employer]. Awaiting response. The Plan for Transition of Care is related to the following treatment goals: PT/OT, SN, RT    The Patient and/or patient representative was provided with a choice of provider and agrees   with the discharge plan. [x] Yes [] No    Freedom of choice list was provided with basic dialogue that supports the patient's individualized plan of care/goals, treatment preferences and shares the quality data associated with the providers.  [x] Yes [] No

## 2021-02-16 ENCOUNTER — APPOINTMENT (OUTPATIENT)
Dept: GENERAL RADIOLOGY | Age: 86
DRG: 291 | End: 2021-02-16
Payer: MEDICARE

## 2021-02-16 ENCOUNTER — APPOINTMENT (OUTPATIENT)
Dept: CT IMAGING | Age: 86
DRG: 291 | End: 2021-02-16
Payer: MEDICARE

## 2021-02-16 LAB
ANION GAP SERPL CALCULATED.3IONS-SCNC: 8 MMOL/L (ref 9–17)
BUN BLDV-MCNC: 50 MG/DL (ref 8–23)
BUN/CREAT BLD: 59 (ref 9–20)
CALCIUM SERPL-MCNC: 8.8 MG/DL (ref 8.6–10.4)
CHLORIDE BLD-SCNC: 100 MMOL/L (ref 98–107)
CO2: 36 MMOL/L (ref 20–31)
CREAT SERPL-MCNC: 0.85 MG/DL (ref 0.5–0.9)
GFR AFRICAN AMERICAN: >60 ML/MIN
GFR NON-AFRICAN AMERICAN: >60 ML/MIN
GFR SERPL CREATININE-BSD FRML MDRD: ABNORMAL ML/MIN/{1.73_M2}
GFR SERPL CREATININE-BSD FRML MDRD: ABNORMAL ML/MIN/{1.73_M2}
GLUCOSE BLD-MCNC: 176 MG/DL (ref 70–99)
GLUCOSE BLD-MCNC: 207 MG/DL (ref 65–105)
HCT VFR BLD CALC: 33.8 % (ref 36.3–47.1)
HEMOGLOBIN: 10 G/DL (ref 11.9–15.1)
MAGNESIUM: 1.9 MG/DL (ref 1.6–2.6)
MCH RBC QN AUTO: 26.5 PG (ref 25.2–33.5)
MCHC RBC AUTO-ENTMCNC: 29.6 G/DL (ref 28.4–34.8)
MCV RBC AUTO: 89.4 FL (ref 82.6–102.9)
NRBC AUTOMATED: 0 PER 100 WBC
PDW BLD-RTO: 16.2 % (ref 11.8–14.4)
PHOSPHORUS: 4.2 MG/DL (ref 2.6–4.5)
PLATELET # BLD: 291 K/UL (ref 138–453)
PMV BLD AUTO: 12 FL (ref 8.1–13.5)
POTASSIUM SERPL-SCNC: 4.8 MMOL/L (ref 3.7–5.3)
RBC # BLD: 3.78 M/UL (ref 3.95–5.11)
SODIUM BLD-SCNC: 144 MMOL/L (ref 135–144)
WBC # BLD: 12.7 K/UL (ref 3.5–11.3)

## 2021-02-16 PROCEDURE — 6360000002 HC RX W HCPCS: Performed by: FAMILY MEDICINE

## 2021-02-16 PROCEDURE — 74230 X-RAY XM SWLNG FUNCJ C+: CPT

## 2021-02-16 PROCEDURE — 85027 COMPLETE CBC AUTOMATED: CPT

## 2021-02-16 PROCEDURE — 94640 AIRWAY INHALATION TREATMENT: CPT

## 2021-02-16 PROCEDURE — 82947 ASSAY GLUCOSE BLOOD QUANT: CPT

## 2021-02-16 PROCEDURE — 92611 MOTION FLUOROSCOPY/SWALLOW: CPT

## 2021-02-16 PROCEDURE — 6360000002 HC RX W HCPCS: Performed by: INTERNAL MEDICINE

## 2021-02-16 PROCEDURE — 36415 COLL VENOUS BLD VENIPUNCTURE: CPT

## 2021-02-16 PROCEDURE — 80048 BASIC METABOLIC PNL TOTAL CA: CPT

## 2021-02-16 PROCEDURE — 6370000000 HC RX 637 (ALT 250 FOR IP): Performed by: FAMILY MEDICINE

## 2021-02-16 PROCEDURE — 2060000000 HC ICU INTERMEDIATE R&B

## 2021-02-16 PROCEDURE — 83735 ASSAY OF MAGNESIUM: CPT

## 2021-02-16 PROCEDURE — 71250 CT THORAX DX C-: CPT

## 2021-02-16 PROCEDURE — 2700000000 HC OXYGEN THERAPY PER DAY

## 2021-02-16 PROCEDURE — 2580000003 HC RX 258: Performed by: FAMILY MEDICINE

## 2021-02-16 PROCEDURE — 84100 ASSAY OF PHOSPHORUS: CPT

## 2021-02-16 RX ORDER — FUROSEMIDE 10 MG/ML
20 INJECTION INTRAMUSCULAR; INTRAVENOUS DAILY
Status: DISCONTINUED | OUTPATIENT
Start: 2021-02-17 | End: 2021-02-20

## 2021-02-16 RX ADMIN — ACETAMINOPHEN 650 MG: 325 TABLET ORAL at 00:28

## 2021-02-16 RX ADMIN — ISOSORBIDE MONONITRATE 30 MG: 30 TABLET ORAL at 09:43

## 2021-02-16 RX ADMIN — IPRATROPIUM BROMIDE AND ALBUTEROL SULFATE 1 AMPULE: .5; 3 SOLUTION RESPIRATORY (INHALATION) at 10:20

## 2021-02-16 RX ADMIN — SODIUM CHLORIDE, PRESERVATIVE FREE 10 ML: 5 INJECTION INTRAVENOUS at 21:39

## 2021-02-16 RX ADMIN — SODIUM CHLORIDE, PRESERVATIVE FREE 10 ML: 5 INJECTION INTRAVENOUS at 09:43

## 2021-02-16 RX ADMIN — METOPROLOL TARTRATE 25 MG: 25 TABLET, FILM COATED ORAL at 21:38

## 2021-02-16 RX ADMIN — METHYLPREDNISOLONE SODIUM SUCCINATE 40 MG: 40 INJECTION, POWDER, FOR SOLUTION INTRAMUSCULAR; INTRAVENOUS at 09:43

## 2021-02-16 RX ADMIN — CEFEPIME HYDROCHLORIDE 1000 MG: 1 INJECTION, POWDER, FOR SOLUTION INTRAMUSCULAR; INTRAVENOUS at 03:03

## 2021-02-16 RX ADMIN — APIXABAN 2.5 MG: 2.5 TABLET, FILM COATED ORAL at 21:38

## 2021-02-16 RX ADMIN — FUROSEMIDE 20 MG: 10 INJECTION, SOLUTION INTRAMUSCULAR; INTRAVENOUS at 09:43

## 2021-02-16 RX ADMIN — CEFEPIME HYDROCHLORIDE 1000 MG: 1 INJECTION, POWDER, FOR SOLUTION INTRAMUSCULAR; INTRAVENOUS at 15:29

## 2021-02-16 RX ADMIN — IPRATROPIUM BROMIDE AND ALBUTEROL SULFATE 1 AMPULE: .5; 3 SOLUTION RESPIRATORY (INHALATION) at 19:47

## 2021-02-16 RX ADMIN — LATANOPROST 1 DROP: 50 SOLUTION OPHTHALMIC at 21:39

## 2021-02-16 RX ADMIN — PRAVASTATIN SODIUM 40 MG: 40 TABLET ORAL at 09:43

## 2021-02-16 RX ADMIN — APIXABAN 5 MG: 5 TABLET, FILM COATED ORAL at 09:43

## 2021-02-16 RX ADMIN — IPRATROPIUM BROMIDE AND ALBUTEROL SULFATE 1 AMPULE: .5; 3 SOLUTION RESPIRATORY (INHALATION) at 06:10

## 2021-02-16 RX ADMIN — METHYLPREDNISOLONE SODIUM SUCCINATE 40 MG: 40 INJECTION, POWDER, FOR SOLUTION INTRAMUSCULAR; INTRAVENOUS at 21:38

## 2021-02-16 RX ADMIN — SERTRALINE HYDROCHLORIDE 100 MG: 50 TABLET ORAL at 09:43

## 2021-02-16 RX ADMIN — PANTOPRAZOLE SODIUM 40 MG: 40 TABLET, DELAYED RELEASE ORAL at 05:47

## 2021-02-16 RX ADMIN — IPRATROPIUM BROMIDE AND ALBUTEROL SULFATE 1 AMPULE: .5; 3 SOLUTION RESPIRATORY (INHALATION) at 14:20

## 2021-02-16 ASSESSMENT — PAIN SCALES - GENERAL: PAINLEVEL_OUTOF10: 0

## 2021-02-16 NOTE — PROGRESS NOTES
Progress note  [facility]    Adult Hospitalist      Name: Mahogany Ferrera  MRN: [id number]     Acct: [de-identified]  Room: [address] [place] [address]    Admit Date: 2/8/2021  6:28 PM  PCP: Melanie Mancilla MD    Primary Problem  Active Problems:    Acute left-sided CHF (congestive heart failure) ([address]  Resolved Problems:    * No resolved hospital problems.  *        Assesment:   Acute on chronic hypoxemic respiratory failure  Acute on chronic diastolic congestive heart failure  Pulmonary edema  Multifocal pneumonia- ruled out   Acute kidney injury  Chronic coronary artery disease status post CABG in 2016  Chronic atrial fibrillation  Chronic anticoagulation with Eliquis  Essential hypertension  Mixed hyperlipidemia  Mild pulmonary hypertension  Depression with anxiety  Former smoker  GERD    Plan:   Admit patient to progressive unit  Oxygen keep SPO2 more than 90%  BiPAP as needed  Blood culture no growth  BiPAP as needed  Oral azithromycin, completed  Appreciate Nephrology assistance, IV diuretics have been increased to Lasix 20 IV b.i.d.   DuoNeb breathing treatment  Continue Eliquis  Continue lisinopril, metoprolol, Imdur, amiodarone  Continue pravastatin  Continue Zoloft  Pulmonology on consult, appreciated  Consult cardiology, appreciated  [address] nephrology assistance  Patient continues on high-flow nasal cannula oxygen, 30 L with 50% FiO2, requirement has come down since yesterday  IV cefepime  D/w Son and Pt  Continue other medication as below      Scheduled Meds:   apixaban  2.5 mg Oral BID    methylPREDNISolone  40 mg Intravenous Q12H    furosemide  20 mg Intravenous BID    cefepime  1,000 mg Intravenous Q12H    amiodarone  200 mg Oral Daily    isosorbide mononitrate  30 mg Oral QAM    metoprolol tartrate  25 mg Oral BID    oxybutynin  5 mg Oral Every Other Day    pantoprazole  40 mg Oral QAM AC    pravastatin  40 mg Oral Daily    sertraline  100 mg Oral Daily    latanoprost  1 drop Both Eyes Nightly system is reviewed and negative otherwise mentioned in HPI. Past Medical History:     Past Medical History:   Diagnosis Date    Acute on chronic diastolic CHF (congestive heart failure) (HCC) 10/2/2019    Bradycardia     CAD (coronary artery disease)     Cancer (HCC)     Skin CA on nose    Depression     Gallstones     GERD (gastroesophageal reflux disease)     Hiatal hernia     History of cardiac cath 10/2016    CAD    Hyperlipidemia     Hypertension     Insomnia     [facility]     PNA (pneumonia)     SOB (shortness of breath)     UTI (urinary tract infection)         Past Surgical History:     Past Surgical History:   Procedure Laterality Date    APPENDECTOMY      CARDIAC SURGERY  11/08/2016    CABG x 3; LIMA-LAD, SVG-PDA, SVG-OM    CARDIOVERSION  12/17/2019    CHOLECYSTECTOMY      COLONOSCOPY      CORONARY ANGIOPLASTY WITH STENT PLACEMENT      CORONARY ARTERY BYPASS GRAFT  11/08/2016    X 3    HYSTERECTOMY          Medications Prior to Admission:       Prior to Admission medications    Medication Sig Start Date End Date Taking?  Authorizing Provider   lisinopril (PRINIVIL;ZESTRIL) 2.5 MG tablet Take 1 tablet by mouth daily 12/12/20  Yes Josselin Phipps MD   furosemide (LASIX) 40 MG tablet Take 1 tablet by mouth daily 12/11/20  Yes Leonora Marx APRN - CNP   metoprolol tartrate (LOPRESSOR) 25 MG tablet Take 25 mg by mouth 2 times daily  11/4/19  Yes Historical Provider, MD   amiodarone (CORDARONE) 200 MG tablet Take 200 mg by mouth daily   Yes Historical Provider, MD   apixaban (ELIQUIS) 5 MG TABS tablet Take 1 tablet by mouth 2 times daily 10/5/19  Yes Doris Fritz MD   travoprost benzalkonium, (TRAVATAN) 0.004 % ophthalmic solution Place 1 drop into the right eye nightly   Yes Historical Provider, MD   isosorbide mononitrate (IMDUR) 30 MG extended release tablet Take 30 mg by mouth every morning   Yes Historical Provider, MD   sertraline (ZOLOFT) 100 MG tablet Take 100 mg by mouth daily    Yes Historical Provider, MD   oxybutynin (DITROPAN) 5 MG tablet Take 5 mg by mouth every other day    Yes Historical Provider, MD   pravastatin (PRAVACHOL) 40 MG tablet Take 40 mg by mouth daily    Yes Historical Provider, MD   pantoprazole (PROTONIX) 40 MG tablet Take 40 mg by mouth every morning (before breakfast)    Yes Historical Provider, MD   aspirin 81 MG EC tablet Take 81 mg by mouth daily    Historical Provider, MD        Allergies:       Seasonal    Social History:     Tobacco:    reports that she quit smoking about 49 years ago. Her smoking use included cigarettes. She has never used smokeless tobacco.  Alcohol:      reports current alcohol use. Drug Use:  reports no history of drug use.     Family History:     Family History   Problem Relation Age of Onset    Heart Disease Mother     Cancer Mother     Heart Disease Father          Physical Exam:     Vitals:  BP (!) 148/59   Pulse 63   Temp 97.7 °F (36.5 °C) (Oral)   Resp 22   Ht 5' 5\" (1.651 m)   Wt 126 lb 14.4 oz (57.6 kg)   SpO2 94%   BMI 21.12 kg/m²   Temp (24hrs), Av.8 °F (36.6 °C), Min:97.2 °F (36.2 °C), Max:98.2 °F (36.8 °C)          General appearance - alert, well appearing, and in no acute distress  Mental status - oriented to person, place, and time with normal affect  Head - normocephalic and atraumatic  Eyes - pupils equal and reactive, extraocular eye movements intact, conjunctiva clear  Ears - hearing appears to be intact  Nose - no drainage noted  Mouth - mucous membranes moist  Neck - supple, no carotid bruits, thyroid not palpable  Chest - clear to auscultation, normal effort  Heart - normal rate, regular rhythm, no murmur  Abdomen - soft, nontender, nondistended, bowel sounds present all four quadrants, no masses, hepatomegaly or splenomegaly  Neurological - normal speech, no focal findings or movement disorder noted, cranial nerves II through XII grossly intact  Extremities - peripheral pulses palpable, no pedal edema or calf pain with palpation  Skin - no gross lesions, rashes, or induration noted        Data:     Labs:    Hematology:  Recent Labs     02/14/21  0526 02/15/21  0518 02/16/21  0608   WBC 9.2 9.2 12.7*   RBC 3.51* 3.74* 3.78*   HGB 9.3* 9.7* 10.0*   HCT 31.8* 33.4* 33.8*   MCV 90.6 89.3 89.4   MCH 26.5 25.9 26.5   MCHC 29.2 29.0 29.6   RDW 16.1* 15.9* 16.2*    246 291   MPV 11.3 10.9 12.0     Chemistry:  Recent Labs     02/14/21  0526 02/15/21  0518 02/16/21  0608    145* 144   K 4.3 4.4 4.8    102 100   CO2 35* 36* 36*   GLUCOSE 125* 178* 176*   BUN 26* 32* 50*   CREATININE 0.66 0.66 0.85   MG 1.8 1.9 1.9   ANIONGAP 6* 7* 8*   LABGLOM >60 >60 >60   GFRAA >60 >60 >60   CALCIUM 8.5* 8.5* 8.8   PHOS 3.1 3.5 4.2     No results for input(s): PROT, LABALBU, LABA1C, G3MRNNF, T5VRFCN, FT4, TSH, AST, ALT, LDH, GGT, ALKPHOS, LABGGT, BILITOT, BILIDIR, AMMONIA, AMYLASE, LIPASE, LACTATE, CHOL, HDL, LDLCHOLESTEROL, CHOLHDLRATIO, TRIG, VLDL, SKJ51BN, PHENYTOIN, PHENYF, URICACID, POCGLU in the last 72 hours.     Lab Results   Component Value Date    INR 1.1 10/02/2019    INR 1.2 11/11/2016    INR 1.2 11/10/2016    PROTIME 11.2 10/02/2019    PROTIME 12.3 (H) 11/11/2016    PROTIME 12.7 (H) 11/10/2016       Lab Results   Component Value Date/Time    SPECIAL NOT REPORTED 02/08/2021 07:45 PM    SPECIAL NOT REPORTED 02/08/2021 07:45 PM     Lab Results   Component Value Date/Time    CULTURE NO GROWTH 6 DAYS 02/08/2021 07:45 PM    CULTURE NO GROWTH 6 DAYS 02/08/2021 07:45 PM       Lab Results   Component Value Date    POCPH 7.35 11/09/2016    POCPCO2 41 11/09/2016    POCPO2 85 11/09/2016    POCHCO3 22.4 11/09/2016    NBEA 3 11/09/2016    PBEA NOT REPORTED 11/09/2016    MCI0QSN 24 11/09/2016    XZIA2WTV 96 11/09/2016    FIO2 NOT REPORTED 10/02/2019       Radiology:    Xr Chest Portable    Result Date: 2/9/2021  Stable interstitial opacities with superimposed patchy airspace disease probably combination of factors including chronic interstitial disease, vascular congestion and pneumonia. Xr Chest Portable    Result Date: 2/8/2021  Progressive bilateral patchy interstitial and alveolar infiltrates greater on the left. Overall findings suggest progressive pulmonary vascular congestion with possible superimposed bilateral pneumonia. All radiological studies reviewed                Code Status:  DNR-CCA    Electronically signed by Hilda Richards MD on 2/16/2021 at 12:49 PM     Copy sent to Dr. Betty Zelaya MD    This note was created with the assistance of a speech-recognition program.  Although the intention is to generate a document that actually reflects the content of the visit, no guarantees can be provided that every mistake has been identified and corrected by editing. Note was updated later by me after  physical examination and  completion of the assessment.

## 2021-02-16 NOTE — PROGRESS NOTES
Physical Therapy  Facility/Department: [facility] PROGRESSIVE CARE  Daily Treatment Note  NAME: Katlyn Vega  : 3/26/1932  MRN: [id number]    Date of Service: 2021    Discharge Recommendations:  Subacute/Skilled Nursing Facility      Assessment   Body structures, Functions, Activity limitations: Decreased functional mobility ; Decreased safe awareness;Decreased endurance;Decreased balance  Assessment: Patient appropriate for discharge to SNF to maximize independence and return to PLOF. Prognosis: Good  PT Education: Energy Conservation;General Safety;Gait Training;Functional Mobility Training  Patient Education: Ed pt on functional mobility, safety awareness, importance of being up & OOB to regain strength, & prevention of sedentary complications,  & optimal breathing techniques  REQUIRES PT FOLLOW UP: Yes     Patient Diagnosis(es): The encounter diagnosis was Acute congestive heart failure, unspecified heart failure type ([facility] has a past medical history of Acute on chronic diastolic CHF (congestive heart failure) ([facility] [address] [facility] Bradycardia, CAD (coronary artery disease), Cancer ([facility] [facility] Depression, Gallstones, GERD (gastroesophageal reflux disease), Hiatal hernia, History of cardiac cath, Hyperlipidemia, Hypertension, Insomnia, MI (myocardial infarction) ([address] PNA (pneumonia), SOB (shortness of breath), and UTI (urinary tract infection). has a past surgical history that includes Appendectomy; Cholecystectomy; Hysterectomy; Coronary angioplasty with stent; Colonoscopy; Coronary artery bypass graft (2016); Cardiac surgery (2016); and Cardioversion (2019). Restrictions  Restrictions/Precautions  Restrictions/Precautions: General Precautions, Fall Risk, Up as Tolerated  Required Braces or Orthoses?: No  Position Activity Restriction  Other position/activity restrictions: telemetry, HI Flow, cont SPO2 monitor, IV site  Subjective   General  Chart Reviewed:  Yes  Additional Pertinent Hx: CHF, CAD, HLPD, HTN, UTI, SOB  Response To Previous Treatment: Patient reporting fatigue but able to participate. Family / Caregiver Present: Yes  Subjective  Subjective: Patient seated up in chair with daughter present. Agreeable for PT treatment. General Comment  Comments: RNMaribel approves patient for therapy. Pain Screening  Patient Currently in Pain: No  Vital Signs  Patient Currently in Pain: No     Objective   Bed mobility  Rolling to Left: Unable to assess  Transfers  Sit to Stand: Contact guard assistance  Stand to sit: Contact guard assistance  Bed to Chair: Unable to assess  Stand Pivot Transfers: Unable to assess  Squat Pivot Transfers: Unable to assess     Neuromuscular Education  NDT Treatment: Standing  Neuromuscular Comments: VCs & tactile cues provided for proper balance vince. as well as postural control & alignment during standing activity with RW. Patient stood x 2 mins for balance activity. Balance  Posture: Fair  Sitting - Static: Good  Sitting - Dynamic: Good  Standing - Static: Fair;+  Standing - Dynamic: Fair;+  Comments: Standing with RW  Exercises  Comments: Seated tello LE AROM x 10 reps with VCs for tech and pursed lip breathing tech while instruction patient to observe SpO2 levels during exercises. AROM RLE (degrees)  RLE AROM: WFL  AROM LLE (degrees)  LLE AROM : WFL  Strength RLE  Strength RLE: WFL  Strength LLE  Strength LLE: WFL     Goals  Short term goals  Time Frame for Short term goals: 12 visits  Short term goal 1: Inc bed-mobility & transfers to independent to enable pt to safely get in/OOB  Short term goal 2: Inc gait to amb 200 ft with R/walker indep;   Short term goal 3: Pt able to tolerate 30-40 min of activity to include 15-20 reps of ex & functional mobility including 5 minutes of standing to facilitate better activity tolerance  Short term goal 5: Ed pt on home ex's, optimal breathing techniques, safety & energy principles, fall prevention & issue written pt education  Patient Goals   Patient goals :  To go and be with grandkids    Plan    Plan  Times per week: 1-2x/D, 5-6D/week  Current Treatment Recommendations: Strengthening, Balance Training, Functional Mobility Training, Transfer Training, Gait Training, Endurance Training, Home Exercise Program, Safety Education & Training, Patient/Caregiver Education & Training  Safety Devices  Type of devices: Call light within reach, Gait belt, Patient at risk for falls, Left in chair  Restraints  Initially in place: No     Therapy Time   Individual Concurrent Group Co-treatment   Time In 1400         Time Out 1439         Minutes 39            Treatment provided by Naseem Bowen PTA  Co-signed by Rosa Elena Maldonado PT

## 2021-02-16 NOTE — PROGRESS NOTES
Pulmonary Critical Care Progress Note       Patient seen for the follow up of acute on chronic hypoxic respiratory failure, decompensated CHF, bilateral pulmonary infiltrates, most likely secondary to pulmonary edema, mild secondary pulmonary hypertension    Subjective:  She had desaturation on O2 nasal cannula is back on high-flow at 45% O2. She has no change in shortness of breath on ambulation and has desaturation. She has occasional dry cough. She denies chest pain  . She has fair appetite. Son is at bedside    Examination:  Vitals: BP (!) 148/59   Pulse 63   Temp 97.7 °F (36.5 °C) (Oral)   Resp 22   Ht 5' 5\" (1.651 m)   Wt 126 lb 14.4 oz (57.6 kg)   SpO2 96%   BMI 21.12 kg/m²   General appearance: alert and cooperative with exam  Neck: No JVD  Lungs:  Decreased breath sounds no crackles or wheezing  Heart: regular rate and rhythm, S1, S2 normal, no gallop  Abdomen: Soft, non tender, + BS  Extremities: no cyanosis or clubbing. No significant edema    LABs:  CBC:   Recent Labs     02/15/21  0518 02/16/21  0608   WBC 9.2 12.7*   HGB 9.7* 10.0*   HCT 33.4* 33.8*    291     BMP:   Recent Labs     02/15/21  0518 02/16/21  0608   * 144   K 4.4 4.8   CO2 36* 36*   BUN 32* 50*   CREATININE 0.66 0.85   LABGLOM >60 >60   GLUCOSE 178* 176*      Ref. Range 2/9/2021 04:39   Procalcitonin Latest Ref Range: <0.09 ng/mL 0.30 (H)   Results for Charlett Primes (MRN [id number]) as of 2/13/2021 18:05   Ref. Range 2/8/2021 18:35   Pro-BNP Latest Ref Range: <300 pg/mL 5,808 (H)   Results for Charlett Primes (MRN [id number]) as of 2/14/2021 15:39   Ref. Range 2/13/2021 16:09 2/14/2021 05:26   Procalcitonin Latest Ref Range: <0.09 ng/mL 0.20 (H) 0.16 (H)     Radiology:       chest x-ray 2/15  No significant change in appearance of diffuse multifocal pneumonia and small   bilateral pleural effusions.                CT chest 4/29/2020  Diffuse prominence of the interstitium with some vague ground-glass most   likely from volume overload.       No evidence of pulmonary embolism.       Enlarged pulmonary artery can be seen the setting of pulmonary hypertension           Video swallow study 2/16  1.  Flash penetration with nectar thick liquid.       2.  Positive penetration and aspiration with thin liquid.  No elicited cough   reflex.       3.  Residue with puree, soft solid and cookie. Impression:  · Acute on chronic hypoxic respiratory failure  · Decompensated diastolic CHF (EF: 55 to 95% by echo by 12/8/2020)  ·  pulmonary edema, doubt pneumonia  · History of CAD/HTN/HDL/HTN, s/p CABG in 2016  · Mild secondary pulmonary hypertension, RVSP 37, secondary to above    Recommendations:  · Oxygen via high flow nasal cannula.   Wean as able, keep SPO2 90% or greater  ·  incentive spirometer every hour awake  · BiPAP as needed  · Albuterol and Ipratropium Q 4 hours and prn  · Solu-Medrol 40 IV every 12 hours  ·   On cefepime off Rocephin/finished Zithromax  · Dysphagia diet per speech  · Nephrology  On consult consider diuresis  ·  Cardiology on consult   · Await CT chest without contrast to assess for any evidence of amiodarone induced pneumonitis/fibrosis  · Rehab discharge evaluation  · Ambulate/physical therapy  ·  discussed with son  · DVT prophylaxis, on Eliquis per Cardiology      Electronically signed by     Felisa Wilson MD on 2/16/2021 at 3:56 PM  Pulmonary Critical Care and Sleep Medicine,  [facility]: [phone]

## 2021-02-16 NOTE — PROGRESS NOTES
Section of Cardiology  Progress Note      Date:  2/16/2021  Patient: Jake Juarez  Admission:  2/8/2021  6:28 PM  Admit DX: Acute left-sided CH[facility] (congestive [facility] failure) [facility] [I50.1]  Age:  80 y.o., 3/26/1932     LOS: 8 days     Reason for evaluation:   atrial fibrillation, CHF and coronary artery disease      SUBJECTIVE:     The patient was seen and examined. Notes and labs reviewed. Her son Kathy Pina was in the room when I saw her. Patient denies chest pain she still feels short of breath with mild activity. Denies palpitation. She is still on oxygen. OBJECTIVE:      EXAM:   Vitals:    VITALS:  BP (!) 146/56   Pulse 59   Temp 97.9 °F (36.6 °C) (Oral)   Resp 16   Ht 5' 5\" (1.651 m)   Wt 126 lb 14.4 oz (57.6 kg)   SpO2 95%   BMI 21.12 kg/m²   24HR INTAKE/OUTPUT:      Intake/Output Summary (Last 24 hours) at 2/16/2021 1141  Last data filed at 2/16/2021 0551  Gross per 24 hour   Intake 1000 ml   Output 700 ml   Net 300 ml       CONSTITUTIONAL:  awake, alert, cooperative, no apparent distress, and appears stated age.,  On high flow oxygen  HEENT: Normal jugular venous pulsations  LUNGS: Dry bibasilar rales and decreased lung sounds in the lower third of lung field bilaterally, otherwise clear, nonlabored  CARDIOVASCULAR:  regular rate and rhythm, normal S1 and S2, no S3 or S4, and 1/6 early systolic murmur at the aortic area   SKIN: Warm and dry. EXTREMITIES:No lower extremity edema.      Current Inpatient Medications:   apixaban  2.5 mg Oral BID    methylPREDNISolone  40 mg Intravenous Q12H    furosemide  20 mg Intravenous BID    cefepime  1,000 mg Intravenous Q12H    amiodarone  200 mg Oral Daily    isosorbide mononitrate  30 mg Oral QAM    metoprolol tartrate  25 mg Oral BID    oxybutynin  5 mg Oral Every Other Day    pantoprazole  40 mg Oral QAM AC    pravastatin  40 mg Oral Daily    sertraline  100 mg Oral Daily    latanoprost  1 drop Both Eyes Nightly    sodium chloride flush 10 mL Intravenous 2 times per day    ipratropium-albuterol  1 ampule Inhalation Q4H WA       IV Infusions (if any):      Diagnostics:   Telemetry: Sinus rhythm   labs:   CBC:  Recent Labs     02/15/21  0518 02/16/21  0608   WBC 9.2 12.7*   HGB 9.7* 10.0*   HCT 33.4* 33.8*    291     Magnesium:  Recent Labs     02/15/21  0518 02/16/21  0608   MG 1.9 1.9     BMP:  Recent Labs     02/15/21  0518 02/16/21  0608   * 144   K 4.4 4.8   CALCIUM 8.5* 8.8   CO2 36* 36*   BUN 32* 50*   CREATININE 0.66 0.85   LABGLOM >60 >60   GLUCOSE 178* 176*     BNP:No results for input(s): BNP, PROBNP in the last 72 hours. PT/INR:No results for input(s): PROTIME, INR in the last 72 hours. APTT:No results for input(s): APTT in the last 72 hours. CARDIAC ENZYMES:No results for input(s): MYOGLOBIN, CKTOTAL, CKMB, CKMBINDEX, TROPHS, TROPONINT in the last 72 hours. FASTING LIPID PANEL:  Lab Results   Component Value Date    HDL 38 12/08/2020    TRIG 78 12/08/2020     LIVER PROFILE:No results for input(s): AST, ALT, LABALBU, ALKPHOS, BILITOT, BILIDIR, IBILI, PROT, GLOB, ALBUMIN in the last 72 hours. ASSESSMENT:    · Acute on chronic diastolic heart failure, on IV diuretics, managed by nephrology  · Respiratory failure/possible pneumonia on high flow nasal cannula oxygen, managed by others  · CAD with prior CABG in 2016-free of chest pain   · paroxysmal atrial fibrillation, maintaining sinus rhythm with history of cardioversion  · Hypertension-well-controlled  · Dyslipidemia-treated    PLAN:  Continue current cardiac medications. ,  I am stopping amiodarone because of her long situation. Diuretics are currently managed by nephrology. Continue Eliquis. Discussed with patient and her son and nursing.     Gaby Jiang MD

## 2021-02-16 NOTE — PROGRESS NOTES
Progress Note    Reason for Consult: ELICEO    Requesting Physician:  Sommer Melissa MD    INTERVAL HISTORY:   Creatinine increased 0.85. . SBP trending 110-140s today. Her SOB is unchanged today. She did not tolerate being off high flow nasal cannula. Failed swallow study. HISTORY OF PRESENT ILLNESS:    The patient is a 80 y.o. female who presents with worsening shortness of breath. Initial chest x-ray showed progressive pulmonary vascular congestion with possible superimposed bilateral pneumonia. Chest x-ray today showed bilateral airspace disease slightly increased at the right apex compared to prior. Creatinine baseline 0.6 which she was at on admission and yesterday. Creatinine elevated to 1.46 today. Hypokalemia on admission and yesterday. Potassium was 3.8 today. Recent hemoglobin 9.6. WBC was 14.4 today and today 12.0. SBP was originally trending as high as 214/75 on day of admission. She became hypotensive at 99/78 about 8 hours later. SBP trending 100s to 130s today. She originally was requiring nasal cannula and is now on high flow. Home medications include lisinopril, Lasix, metoprolol, amiodarone. She is currently on Lasix 20 mg IV twice daily, lisinopril, metoprolol, amiodarone. She has been up to the bedside commode voiding. No urinary retention on bladder scan    Review Of Systems:   Constitutional: No fever, chills, lethargy, weakness or wt loss  Cardiac:  No chest pain, +dyspnea. Chest:   No cough, phlegm or wheezing. Abdomen:  No abdominal pain, nausea, vomiting or diarrhea. :   No hematuria, pyuria, dysuria or flank pain.       Past Medical History:   Diagnosis Date    Acute on chronic diastolic CHF (congestive heart failure) (HCC) 10/2/2019    Bradycardia     CAD (coronary artery disease)     Cancer (HCC)     Skin CA on nose    Depression     Gallstones     GERD (gastroesophageal reflux disease)     Hiatal hernia     History of cardiac cath 10/2016 CAD    Hyperlipidemia     Hypertension     Insomnia     MI (myocardial infarction) (HCC)     PNA (pneumonia)     SOB (shortness of breath)     UTI (urinary tract infection)        Past Surgical History:   Procedure Laterality Date    APPENDECTOMY      CARDIAC SURGERY  11/08/2016    CABG x 3; LIMA-LAD, SVG-PDA, SVG-OM    CARDIOVERSION  12/17/2019    CHOLECYSTECTOMY      COLONOSCOPY      CORONARY ANGIOPLASTY WITH STENT PLACEMENT      CORONARY ARTERY BYPASS GRAFT  11/08/2016    X 3    HYSTERECTOMY         Prior to Admission medications    Medication Sig Start Date End Date Taking?  Authorizing Provider   lisinopril (PRINIVIL;ZESTRIL) 2.5 MG tablet Take 1 tablet by mouth daily 12/12/20  Yes Aruna Bridges MD   furosemide (LASIX) 40 MG tablet Take 1 tablet by mouth daily 12/11/20  Yes SERA Zavala - CNP   metoprolol tartrate (LOPRESSOR) 25 MG tablet Take 25 mg by mouth 2 times daily  11/4/19  Yes Historical Provider, MD   amiodarone (CORDARONE) 200 MG tablet Take 200 mg by mouth daily   Yes Historical Provider, MD   apixaban (ELIQUIS) 5 MG TABS tablet Take 1 tablet by mouth 2 times daily 10/5/19  Yes Rafael Simpson MD   travoprost, benzalkonium, (TRAVATAN) 0.004 % ophthalmic solution Place 1 drop into the right eye nightly   Yes Historical Provider, MD   isosorbide mononitrate (IMDUR) 30 MG extended release tablet Take 30 mg by mouth every morning   Yes Historical Provider, MD   sertraline (ZOLOFT) 100 MG tablet Take 100 mg by mouth daily    Yes Historical Provider, MD   oxybutynin (DITROPAN) 5 MG tablet Take 5 mg by mouth every other day    Yes Historical Provider, MD   pravastatin (PRAVACHOL) 40 MG tablet Take 40 mg by mouth daily    Yes Historical Provider, MD   pantoprazole (PROTONIX) 40 MG tablet Take 40 mg by mouth every morning (before breakfast)    Yes Historical Provider, MD   aspirin 81 MG EC tablet Take 81 mg by mouth daily    Historical Provider, MD       Scheduled Meds:   apixaban  2.5 mg Oral BID    methylPREDNISolone  40 mg Intravenous Q12H    furosemide  20 mg Intravenous BID    cefepime  1,000 mg Intravenous Q12H    isosorbide mononitrate  30 mg Oral QAM    metoprolol tartrate  25 mg Oral BID    oxybutynin  5 mg Oral Every Other Day    pantoprazole  40 mg Oral QAM AC    pravastatin  40 mg Oral Daily    sertraline  100 mg Oral Daily    latanoprost  1 drop Both Eyes Nightly    sodium chloride flush  10 mL Intravenous 2 times per day    ipratropium-albuterol  1 ampule Inhalation Q4H WA     Continuous Infusions:  PRN Meds:sodium chloride flush, promethazine **OR** ondansetron, nicotine, polyethylene glycol, acetaminophen **OR** acetaminophen, albuterol, hydrALAZINE    Allergies   Allergen Reactions    Seasonal        Social History     Socioeconomic History    Marital status:       Spouse name: Not on file    Number of children: Not on file    Years of education: Not on file    Highest education level: Not on file   Occupational History    Not on file   Social Needs    Financial resource strain: Not on file    Food insecurity     Worry: Not on file     Inability: Not on file    Transportation needs     Medical: Not on file     Non-medical: Not on file   Tobacco Use    Smoking status: Former Smoker     Types: Cigarettes     Quit date: 1971     Years since quittin.2    Smokeless tobacco: Never Used    Tobacco comment: quit smoking yrs ago   Substance and Sexual Activity    Alcohol use: Yes     Comment: Rarely    Drug use: No    Sexual activity: Not on file   Lifestyle    Physical activity     Days per week: Not on file     Minutes per session: Not on file    Stress: Not on file   Relationships    Social connections     Talks on phone: Not on file     Gets together: Not on file     Attends [employer] service: Not on file     Active member of club or organization: Not on file     Attends meetings of clubs or organizations: Not on file 02/14/2021    BUN 50 (H) 02/16/2021    BUN 32 (H) 02/15/2021    BUN 26 (H) 02/14/2021    CREATININE 0.85 02/16/2021    CREATININE 0.66 02/15/2021    CREATININE 0.66 02/14/2021    GLUCOSE 176 (H) 02/16/2021    GLUCOSE 178 (H) 02/15/2021    GLUCOSE 125 (H) 02/14/2021     CMP:   Lab Results   Component Value Date     02/16/2021    K 4.8 02/16/2021     02/16/2021    CO2 36 02/16/2021    BUN 50 02/16/2021    CREATININE 0.85 02/16/2021    GLUCOSE 176 02/16/2021    CALCIUM 8.8 02/16/2021    PROT 6.1 05/01/2020    LABALBU 3.3 05/01/2020    BILITOT 0.71 05/01/2020    ALKPHOS 72 05/01/2020    AST 16 05/01/2020    ALT 8 05/01/2020      Hepatic:   Lab Results   Component Value Date    AST 16 05/01/2020    AST 67 (H) 10/02/2019    AST 18 11/07/2016    ALT 8 05/01/2020    ALT 63 (H) 10/02/2019    ALT 12 11/07/2016    BILITOT 0.71 05/01/2020    BILITOT 0.82 10/02/2019    BILITOT 0.39 11/07/2016    ALKPHOS 72 05/01/2020    ALKPHOS 103 10/02/2019    ALKPHOS 70 11/07/2016     BNP: No results found for: BNP  Lipids:   Lab Results   Component Value Date    CHOL 109 12/08/2020    HDL 38 (L) 12/08/2020     INR:   Lab Results   Component Value Date    INR 1.1 10/02/2019    INR 1.2 11/11/2016    INR 1.2 11/10/2016     PTH: No results found for: PTH  Phosphorus:    Lab Results   Component Value Date    PHOS 4.2 02/16/2021     Ionized Calcium: No results found for: IONCA  Magnesium:   Lab Results   Component Value Date    MG 1.9 02/16/2021     Albumin:   Lab Results   Component Value Date    LABALBU 3.3 05/01/2020     Last 3 CK, CKMB, Troponin: @LABRCNT(CKTOTAL:3,CKMB:3,TROPONINI:3)       URINE:)No results found for: Radha Ramey    Radiology:   Reviewed. Assessment:  Acute Kidney Injury, likely hemodynamically related. FeNa 0.26%. Cr is better. Baseline Creatinine 0.6. Hypernatremia. Hypokalemia  Hypertension with recent hypotension. CHF exacerbation. Multifocal pneumonia. Hypoxic respiratory failure.     Plan:  Off lisinopril. BP stable. Increased water restriction to 1500 ml/24 hours. Now on dental soft with honey thickened liquids. Will also decrease Lasix to 20 mg IV daily. Discussed the option of PEG tube placement for giving water to prevent dehydration. Monitor blood pressure and heart rate. On amiodarone and isosorbide. Strict I&O. Antibiotics per primary and pulmonary. Cardiac diet. Avoid nephrotoxic drugs and IV contrast exposure. Follow up chemistries. Please do not hesitate to contact us for any further questions/concerns. We will continue to follow along with you. Pt seen in collaboration with Dr. Davi Murry. Electronically signed by SERA Peres CNP  on 2/16/2021 at 3:59 PM       Patient seen and examined. Agree with above note. Above note was modified. We will continue to follow up with you. Electronically signed by Christine Contreras MD on 2/16/2021 at 4:47 PM  [facility].   Ph: [phone]

## 2021-02-16 NOTE — PROGRESS NOTES
Physician Progress Note      Ricardo Allen  CSN #:                  [id number]  :                       3/26/1932  ADMIT DATE:       2021 6:28 PM  [address] [place] DATE:  RESPONDING  PROVIDER #:        Montse Adams MD          QUERY TEXT:    Patient admitted with  Acute  diastolic CHF. Noted documentation of    Pneumonia in PCP progress notes , however pulmonary is documenting he doubts   Pneumonia . He thinks SOB infiltrates is from pulmonary edema. If possible, please document in progress notes and discharge summary if you   are evaluating and /or treating any of the following: The medical record reflects the following:  Risk Factors: age 79 yo w respiratory failure, CHF,  Clinical Indicators: Pneumonia is documented by others however pulmonary MD on   the case is documenting -\"pulmonary infiltrates, most likely secondary to   pulmonary edema, mild secondary pulmonary hypertension Doubts Pneumonia \"  Treatment: High Flow oxygen, Albuterol and Ipratropium Q 4 hours and prn ,    Start Solu-Medrol 40 IV every 12 hours  On cefepime off Rocephin/finished Zithromax , on step down unit for close   monitoring.   Thank you,  Cally Fournier, MSN, CDS  Options provided:  -- Pneumonia POA  -- Pneumonia was ruled out  -- Other - I will add my own diagnosis  -- Disagree - Not applicable / Not valid  -- Disagree - Clinically unable to determine / Unknown  -- Refer to Clinical Documentation Reviewer    PROVIDER RESPONSE TEXT:    Pneumonia was ruled out    Query created by: Sheila Felix on 2021 7:09 AM      Electronically signed by:  Montse Adams MD 2021 12:48 PM

## 2021-02-16 NOTE — PROCEDURES
INSTRUMENTAL SWALLOW REPORT  MODIFIED BARIUM SWALLOW    NAME: Ketty Aguilar   : 3/26/1932  MRN: [id number]       Date of Eval: 2021       Past Medical History:  has a past medical history of Acute on chronic diastolic CHF (congestive heart failure) ([address] Bradycardia, CAD (coronary artery disease), Cancer ([facility] [address] Depression, Gallstones, GERD (gastroesophageal reflux disease), Hiatal hernia, History of cardiac cath, Hyperlipidemia, Hypertension, Insomnia, MI (myocardial infarction) ([address] PNA (pneumonia), SOB (shortness of breath), and UTI (urinary tract infection). Past Surgical History:  has a past surgical history that includes Appendectomy; Cholecystectomy; Hysterectomy; Coronary angioplasty with stent; Colonoscopy; Coronary artery bypass graft (2016); Cardiac surgery (2016); and Cardioversion (2019). Type of Study: Initial MBS      Patient Complaints/Reason for Referral:  Ketty Aguilar was referred for a MBS to assess the efficiency of her swallow function, assess for aspiration, and to make recommendations regarding safe dietary consistencies, effective compensatory strategies, and safe eating environment. Behavior/Cognition/Vision/Hearing:  Behavior/Cognition: Alert; Cooperative  Vision: Within Functional Limits  Hearing: Within functional limits    Impressions:   Pt. with + penetration, + aspiration, + cough with thin liquid.  + flash penetration, no aspiration with nectar thick liquid. No penetration, no aspiration with honey thick liquid. No penetration, no aspiraiton with puree. Min-mod vallecula residual noted. Decreased with double swallow. No penetration, no aspiration with soft and regular solid. Min vallecula residual noted with soft and regular solid. Moderate extended mastication noted with regular solids. Otherwise, oral phase is functional.  ST to recommend Dysphagia soft and bite/sized (Dysphagia III) with honey thick liquid.   If s/s of aspiration occur, d/c all PO and recommend NPO. Results and recommendations reported to RN. Patient Degrees: upright in chair  Consistencies Administered: Dysphagia Soft and Bite-Sized (Dysphagia III); Reg solid; Dysphagia Pureed (Dysphagia I); Honey teaspoon;Nectar straw; Thin straw    Recommended Diet:  Solid consistency: Dysphagia Soft and Bite-Sized (Dysphagia III)  Liquid consistency: Moderately Thick (Honey)  Medication administration: Meds in puree    Safe Swallow Protocol:     Compensatory Swallowing Strategies: Upright as possible for all oral intake;Eat/Feed slowly; Small bites/sips    Recommendations/Treatment  Requires SLP Intervention: No  D/C Recommendations: SNF    Recommended Exercises:    Therapeutic Interventions: Oral motor exercises;Diet tolerance monitoring;Patient/Family education;Love;Vital Stim/NMES    Referral To: Dysphagia evaluation    Education: Images and recommendations were reviewed with pt. And RN following this exam.   Patient Education: yes  Patient Education Response: Verbalizes understanding    Prognosis  Prognosis for safe diet advancement: fair  Duration/Frequency of Treatment  Duration/Frequency of Treatment: 3-5 x week      Goals:    Long Term: To Maximize safety with intake, optimize nutrition/hydration and minimize risk for aspiration. Short Term:     Goal 1: Pt. will complete OMEX for dysphagia 10-20 x per session. Dysphagia Goals: The patient will tolerate recommended diet without observed clinical signs of aspiration      Oral Preparation / Oral Phase  Oral Phase: WFL   Oral Phase: Moderate extended mastication noted with regular solids. Otherwise, oral phase is functional.    Pharyngeal Phase  Pharyngeal Phase: Impaired   Pharyngeal: Pt. with + penetration, + aspiration, + cough with thin liquid.  + flash penetration, no aspiration with nectar thick liquid. No penetration, no aspiration with honey thick liquid. No penetration, no aspiraiton with puree.   Min-mod vallecula residual noted. Decreased with double swallow. No penetration, no aspiration with soft and regular solid. Min vallecula residual noted with soft and regular solid. Esophageal Phase  Esophageal Screen: WFL    Pain   Patient Currently in Pain: No  Pain Level: 0  Pain Type: Acute pain      Therapy Time:   Individual Concurrent Group Co-treatment   Time In 1000         Time Out 1010         Minutes 10                   MEGAN ALVARENGA M.A.  CCC-SLP 2/16/2021, 1:34 PM

## 2021-02-17 LAB
ADENOVIRUS PCR: NOT DETECTED
ANION GAP SERPL CALCULATED.3IONS-SCNC: 7 MMOL/L (ref 9–17)
BORDETELLA PARAPERTUSSIS: NOT DETECTED
BORDETELLA PERTUSSIS PCR: NOT DETECTED
BUN BLDV-MCNC: 52 MG/DL (ref 8–23)
BUN/CREAT BLD: 63 (ref 9–20)
CALCIUM SERPL-MCNC: 8.4 MG/DL (ref 8.6–10.4)
CHLAMYDIA PNEUMONIAE BY PCR: NOT DETECTED
CHLORIDE BLD-SCNC: 102 MMOL/L (ref 98–107)
CO2: 34 MMOL/L (ref 20–31)
CORONAVIRUS 229E PCR: NOT DETECTED
CORONAVIRUS HKU1 PCR: NOT DETECTED
CORONAVIRUS NL63 PCR: NOT DETECTED
CORONAVIRUS OC43 PCR: NOT DETECTED
CREAT SERPL-MCNC: 0.82 MG/DL (ref 0.5–0.9)
GFR AFRICAN AMERICAN: >60 ML/MIN
GFR NON-AFRICAN AMERICAN: >60 ML/MIN
GFR SERPL CREATININE-BSD FRML MDRD: ABNORMAL ML/MIN/{1.73_M2}
GFR SERPL CREATININE-BSD FRML MDRD: ABNORMAL ML/MIN/{1.73_M2}
GLUCOSE BLD-MCNC: 179 MG/DL (ref 70–99)
HCT VFR BLD CALC: 33 % (ref 36.3–47.1)
HEMOGLOBIN: 9.8 G/DL (ref 11.9–15.1)
HUMAN METAPNEUMOVIRUS PCR: NOT DETECTED
INFLUENZA A BY PCR: NOT DETECTED
INFLUENZA A H1 (2009) PCR: NORMAL
INFLUENZA A H1 PCR: NORMAL
INFLUENZA A H3 PCR: NORMAL
INFLUENZA B BY PCR: NOT DETECTED
MCH RBC QN AUTO: 26.5 PG (ref 25.2–33.5)
MCHC RBC AUTO-ENTMCNC: 29.7 G/DL (ref 28.4–34.8)
MCV RBC AUTO: 89.2 FL (ref 82.6–102.9)
MYCOPLASMA PNEUMONIAE PCR: NOT DETECTED
NRBC AUTOMATED: 0 PER 100 WBC
PARAINFLUENZA 1 PCR: NOT DETECTED
PARAINFLUENZA 2 PCR: NOT DETECTED
PARAINFLUENZA 3 PCR: NOT DETECTED
PARAINFLUENZA 4 PCR: NOT DETECTED
PDW BLD-RTO: 16.3 % (ref 11.8–14.4)
PHOSPHORUS: 3.5 MG/DL (ref 2.6–4.5)
PLATELET # BLD: 318 K/UL (ref 138–453)
PMV BLD AUTO: 12.1 FL (ref 8.1–13.5)
POTASSIUM SERPL-SCNC: 4.6 MMOL/L (ref 3.7–5.3)
RBC # BLD: 3.7 M/UL (ref 3.95–5.11)
RESP SYNCYTIAL VIRUS PCR: NOT DETECTED
RHINO/ENTEROVIRUS PCR: NOT DETECTED
SARS-COV-2, PCR: NOT DETECTED
SODIUM BLD-SCNC: 143 MMOL/L (ref 135–144)
SPECIMEN DESCRIPTION: NORMAL
WBC # BLD: 15.7 K/UL (ref 3.5–11.3)

## 2021-02-17 PROCEDURE — 0202U NFCT DS 22 TRGT SARS-COV-2: CPT

## 2021-02-17 PROCEDURE — 2060000000 HC ICU INTERMEDIATE R&B

## 2021-02-17 PROCEDURE — 2580000003 HC RX 258: Performed by: FAMILY MEDICINE

## 2021-02-17 PROCEDURE — 6370000000 HC RX 637 (ALT 250 FOR IP): Performed by: FAMILY MEDICINE

## 2021-02-17 PROCEDURE — 2700000000 HC OXYGEN THERAPY PER DAY

## 2021-02-17 PROCEDURE — 6360000002 HC RX W HCPCS: Performed by: FAMILY MEDICINE

## 2021-02-17 PROCEDURE — 6360000002 HC RX W HCPCS: Performed by: INTERNAL MEDICINE

## 2021-02-17 PROCEDURE — 94761 N-INVAS EAR/PLS OXIMETRY MLT: CPT

## 2021-02-17 PROCEDURE — 97116 GAIT TRAINING THERAPY: CPT

## 2021-02-17 PROCEDURE — 97110 THERAPEUTIC EXERCISES: CPT

## 2021-02-17 PROCEDURE — 85027 COMPLETE CBC AUTOMATED: CPT

## 2021-02-17 PROCEDURE — 36415 COLL VENOUS BLD VENIPUNCTURE: CPT

## 2021-02-17 PROCEDURE — 84100 ASSAY OF PHOSPHORUS: CPT

## 2021-02-17 PROCEDURE — 80048 BASIC METABOLIC PNL TOTAL CA: CPT

## 2021-02-17 PROCEDURE — 94640 AIRWAY INHALATION TREATMENT: CPT

## 2021-02-17 RX ADMIN — IPRATROPIUM BROMIDE AND ALBUTEROL SULFATE 1 AMPULE: .5; 3 SOLUTION RESPIRATORY (INHALATION) at 10:37

## 2021-02-17 RX ADMIN — OXYBUTYNIN CHLORIDE 5 MG: 5 TABLET ORAL at 09:22

## 2021-02-17 RX ADMIN — ISOSORBIDE MONONITRATE 30 MG: 30 TABLET ORAL at 09:23

## 2021-02-17 RX ADMIN — CEFEPIME HYDROCHLORIDE 1000 MG: 1 INJECTION, POWDER, FOR SOLUTION INTRAMUSCULAR; INTRAVENOUS at 01:31

## 2021-02-17 RX ADMIN — PRAVASTATIN SODIUM 40 MG: 40 TABLET ORAL at 09:23

## 2021-02-17 RX ADMIN — SODIUM CHLORIDE, PRESERVATIVE FREE 10 ML: 5 INJECTION INTRAVENOUS at 09:23

## 2021-02-17 RX ADMIN — FUROSEMIDE 20 MG: 10 INJECTION, SOLUTION INTRAMUSCULAR; INTRAVENOUS at 09:23

## 2021-02-17 RX ADMIN — PANTOPRAZOLE SODIUM 40 MG: 40 TABLET, DELAYED RELEASE ORAL at 06:24

## 2021-02-17 RX ADMIN — APIXABAN 2.5 MG: 2.5 TABLET, FILM COATED ORAL at 21:33

## 2021-02-17 RX ADMIN — APIXABAN 2.5 MG: 2.5 TABLET, FILM COATED ORAL at 09:23

## 2021-02-17 RX ADMIN — IPRATROPIUM BROMIDE AND ALBUTEROL SULFATE 1 AMPULE: .5; 3 SOLUTION RESPIRATORY (INHALATION) at 06:14

## 2021-02-17 RX ADMIN — IPRATROPIUM BROMIDE AND ALBUTEROL SULFATE 1 AMPULE: .5; 3 SOLUTION RESPIRATORY (INHALATION) at 18:14

## 2021-02-17 RX ADMIN — METHYLPREDNISOLONE SODIUM SUCCINATE 40 MG: 40 INJECTION, POWDER, FOR SOLUTION INTRAMUSCULAR; INTRAVENOUS at 21:33

## 2021-02-17 RX ADMIN — METOPROLOL TARTRATE 25 MG: 25 TABLET, FILM COATED ORAL at 21:33

## 2021-02-17 RX ADMIN — LATANOPROST 1 DROP: 50 SOLUTION OPHTHALMIC at 21:42

## 2021-02-17 RX ADMIN — SERTRALINE HYDROCHLORIDE 100 MG: 50 TABLET ORAL at 09:23

## 2021-02-17 RX ADMIN — IPRATROPIUM BROMIDE AND ALBUTEROL SULFATE 1 AMPULE: .5; 3 SOLUTION RESPIRATORY (INHALATION) at 14:45

## 2021-02-17 RX ADMIN — SODIUM CHLORIDE, PRESERVATIVE FREE 10 ML: 5 INJECTION INTRAVENOUS at 21:34

## 2021-02-17 RX ADMIN — METHYLPREDNISOLONE SODIUM SUCCINATE 40 MG: 40 INJECTION, POWDER, FOR SOLUTION INTRAMUSCULAR; INTRAVENOUS at 09:22

## 2021-02-17 ASSESSMENT — PAIN SCALES - GENERAL
PAINLEVEL_OUTOF10: 0

## 2021-02-17 NOTE — PROGRESS NOTES
Pulmonary Critical Care Progress Note  Vasiliy Sylvester MD     Patient seen for the follow up of acute on chronic hypoxic respiratory failure, diastolic heart failure, pulmonary edema, aspiration pneumonia, mild secondary pulmonary hypertension    Subjective:  She is resting in bed, sleepy. She remains on high flow nasal cannula, 30% / 30 L, continues to not tolerate weaning to nasal cannula. Shortness of breath is not much change. She has occasional nonproductive cough, denies chest pain. Examination:  Vitals: /79   Pulse 57   Temp 98.1 °F (36.7 °C) (Oral)   Resp 20   Ht 5' 5\" (1.651 m)   Wt 131 lb 1.6 oz (59.5 kg)   SpO2 99%   BMI 21.82 kg/m²   General appearance: Resting in bed, sleepy but cooperative with exam  Neck: No JVD  Lungs: Moderate air exchange, positive crackles  Heart: regular rate and rhythm, S1, S2 normal, no gallop  Abdomen: Soft, non tender, + BS  Extremities: no cyanosis or clubbing.  No significant edema    LABs:  CBC:   Recent Labs     02/16/21  0608 02/17/21  0531   WBC 12.7* 15.7*   HGB 10.0* 9.8*   HCT 33.8* 33.0*    318     BMP:   Recent Labs     02/16/21  0608 02/17/21  0531    143   K 4.8 4.6   CO2 36* 34*   BUN 50* 52*   CREATININE 0.85 0.82   LABGLOM >60 >60   GLUCOSE 176* 179*       Radiology:  2/16/21      Impression:  · Acute on chronic hypoxic respiratory failure  · Decompensated diastolic heart failure  · Pulmonary edema  · Severe diffuse groundglass opacities  · COVID-19 PCR negative  · Aspiration pneumonia  · Mild secondary pulmonary hypertension, RVSP 37 mmHg  · History of CAD, HTN, HLD, CABG in 2016  · Dysphagia/aspiration  · Thyroid lesion    Recommendations:  · Oxygen via high flow nasal cannula, wean as able, keep SPO2 90% or greater  · BiPAP while asleep and as needed  · Incentive spirometry every hour while awake  · Albuterol and ipratropium every 4 hours and as needed  · IV Solu-Medrol 40 mg every 12 hours  · Cefepime  · Diuresis with IV Lasix 20 mg daily  · Dysphagia diet as per speech recommendations  · Nephrology and cardiology following  · DVT prophylaxis on Eliquis   · DNR CCA  · Will follow with you    Electronically signed by     Oliver Pryor MD on 2/17/2021 at 4:51 PM  Pulmonary Critical Care and Sleep Medicine,  [facility]  Cell: [phone]  Office: [phone]

## 2021-02-17 NOTE — PLAN OF CARE
Problem: Skin Integrity:  Goal: Absence of new skin breakdown  Description: Absence of new skin breakdown  Outcome: Ongoing     Problem: Falls - Risk of:  Goal: Will remain free from falls  Description: Will remain free from falls. Fall risk assessment completed. Patient instructed to use call light. Bed locked and in lowest position, side rails up 2/4, call light and bedside table within reach, clutter removed, and non-skid footwear on when pt out of bed. Hourly rounds will continue. Bed/chair alarm in place.    Outcome: Ongoing     Problem: Breathing Pattern - Ineffective:  Goal: Ability to achieve and maintain a regular respiratory rate will improve  Description: Ability to achieve and maintain a regular respiratory rate will improve  Outcome: Ongoing

## 2021-02-17 NOTE — PLAN OF CARE
Nutrition Problem #1: Swallowing difficulty  Intervention: Food and/or Nutrient Delivery: Continue Current Diet, Modify Oral Nutrition Supplement  Nutritional Goals: p.o intake >50% of estimated energy needs for all meals

## 2021-02-17 NOTE — PROGRESS NOTES
Occupational Therapy  Facility/Department: [facility]  Daily Treatment Note  NAME: Katlyn Vega  : 3/26/1932  MRN: [id number]    Date of Service: 2021    Discharge Recommendations:  LTACH, [address]  OT Equipment Recommendations  Equipment Needed: Yes  Mobility Devices: ADL Assistive Devices  ADL Assistive Devices: Toileting - 3-in-1 Commode;Reacher;Long-handled Shoe Horn;Long-handled Sponge    Assessment   Performance deficits / Impairments: Decreased functional mobility ; Decreased safe awareness;Decreased balance;Decreased ADL status; Decreased strength;Decreased high-level IADLs;Decreased endurance;Decreased posture;Decreased vision/visual deficit; Decreased coordination  Assessment: Skilled OT indicated to improve I and safety in areas of self care and function to return home with assist as needed. Prognosis: Fair  OT Education: OT Role;Plan of Care;Energy Conservation;Transfer Training;ADL Adaptive Strategies  REQUIRES OT FOLLOW UP: Yes  Activity Tolerance  Activity Tolerance: Patient limited by fatigue  Activity Tolerance: poor plus; pt fatigues easily and with decreased act josie  Safety Devices  Safety Devices in place: Yes  Type of devices: All fall risk precautions in place; Left in chair;Call light within reach;Nurse notified; Chair alarm in place;Gait belt;Patient at risk for falls         Patient Diagnosis(es): The encounter diagnosis was Acute congestive heart failure, unspecified heart failure type ([address] has a past medical history of Acute on chronic diastolic CHF (congestive heart failure) ([address] Bradycardia, CAD (coronary artery disease), Cancer ([address] Depression, Gallstones, GERD (gastroesophageal reflux disease), Hiatal hernia, History of cardiac cath, Hyperlipidemia, Hypertension, Insomnia, MI (myocardial infarction) ([address] PNA (pneumonia), SOB (shortness of breath), and UTI (urinary tract infection).    has a past surgical history that includes Retraction - 10 reps - 3 sets - 1x daily - 7x weekly   Seated Shoulder Shrugs - 10 reps - 3 sets - 1x daily - 7x weekly   Seated Shoulder External Rotation - 10 reps - 3 sets - 1x daily - 7x weekly   Seated Shoulder Flexion Full Range - 10 reps - 3 sets - 1x daily - 7x weekly   Seated Shoulder Abduction - Thumbs Up - 10 reps - 3 sets - 1x daily - 7x weekly   Seated Punches with Trunk Rotation - 10 reps - 3 sets - 1x daily - 7x weekly   Seated Single Arm Shoulder Abduction with Dumbbell - Thumb Up - 10 reps - 3 sets - 1x daily - 7x weekly   Seated Shoulder Horizontal Abduction - Thumbs Up - 10 reps - 3 sets - 1x daily - 7x weekly   Seated Shoulder Abduction with Dumbbells - Palms Down - 10 reps - 3 sets - 1x daily - 7x weekly   Seated Shoulder Abduction with Dumbbells - Palms Down - 10 reps - 3 sets - 1x daily - 7x weekly   Seated Single Arm Shoulder Abduction with Elbow Bent and Dumbbell - 10 reps - 3 sets - 1x daily - 7x weekly   Wrist AROM Flexion Extension - 10 reps - 3 sets - 1x daily - 7x weekly   Thumb Opposition - 10 reps - 3 sets - 1x daily - 7x weekly   Wrist Extension AROM - 10 reps - 3 sets - 1x daily - 7x weekly   Seated Elbow Flexion and Extension AROM - 10 reps - 3 sets - 1x daily - 7x weekly   Finger Spreading - 10 reps - 3 sets - 1x daily - 7x weekly         Plan   Plan  Times per week: 4-5x/week 1x/day as josie  Current Treatment Recommendations: Strengthening, Balance Training, Functional Mobility Training, Safety Education & Training, Positioning, Endurance Training, Neuromuscular Re-education, Patient/Caregiver Education & Training, Equipment Evaluation, Education, & procurement, Self-Care / ADL, Home Management Training, Cognitive/Perceptual Training  Goals  Short term goals  Time Frame for Short term goals: by discharge, pt to demo  Short term goal 1: bed mob tech with use of rail as needed to CG.   Short term goal 2: increase in BUE strength by a 1/2 grade to assist with ADL tasks and be I with simple BUE HEP with use of handouts. Short term goal 3: UB ADL to set up and LB ADL to CG with use of AE/AD as needed. Short term goal 4: toileting tasks with use of BSC/AD and grab bar as needed to CG. Short term goal 5: ADL transfers and functional mob with AD as needed to CG. Long term goals  Long term goal 1: Pt to stand with SBA and AD josie > 5 mins as able to reduce falls with functional tasks. Long term goal 2: Caregivers to be I with EC/WS and fall prevention tech, pressure relief, AE/DME recommendations with use of handouts. Patient Goals   Patient goals : I just want to be able to breath better and get home! Therapy Time   Individual Concurrent Group Co-treatment   Time In 1201         Time Out 6943         Minutes 23           Upon writer exit, call light within reach, pt retired to chair. All lines intact and patient positioned comfortably. Chair alarm in place. All patient needs addressed prior to ending therapy session. Chart reviewed prior to treatment and patient is agreeable for therapy. RN reports patient is medically stable for therapy treatment this date.       MORGAN Lam/INDIRA

## 2021-02-17 NOTE — PROGRESS NOTES
Section of Cardiology  Progress Note      Date:  2/17/2021  Patient: Mahogany Ferrera  Admission:  2/8/2021  6:28 PM  Admit DX: Acute left-sided CHF (congestive heart failure) ([address] [I50.1]  Age:  80 y.o., 3/26/1932     LOS: 9 days     Reason for evaluation:   atrial fibrillation, CHF and coronary artery disease      SUBJECTIVE:     The patient was seen and examined. Notes and labs reviewed. Patient has not had significant improvement in her breathing. Denies chest pain and palpitations. OBJECTIVE:      EXAM:   Vitals:    VITALS:  /79   Pulse 57   Temp 98.1 °F (36.7 °C) (Oral)   Resp 20   Ht 5' 5\" (1.651 m)   Wt 131 lb 1.6 oz (59.5 kg)   SpO2 98%   BMI 21.82 kg/m²   24HR INTAKE/OUTPUT:      Intake/Output Summary (Last 24 hours) at 2/17/2021 1532  Last data filed at 2/17/2021 1509  Gross per 24 hour   Intake 1050 ml   Output 200 ml   Net 850 ml       CONSTITUTIONAL: Alert, no signs of acute distress   HEENT: Normal jugular venous pulsations  LUNGS: Decreased in bases otherwise clear   CARDIOVASCULAR:  regular rate and rhythm, normal S1 and S2, no S3 or S4, and 1/6 early systolic murmur at the aortic area   SKIN: Warm and dry. EXTREMITIES:No lower extremity edema.      Current Inpatient Medications:   apixaban  2.5 mg Oral BID    furosemide  20 mg Intravenous Daily    methylPREDNISolone  40 mg Intravenous Q12H    cefepime  1,000 mg Intravenous Q12H    isosorbide mononitrate  30 mg Oral QAM    metoprolol tartrate  25 mg Oral BID    oxybutynin  5 mg Oral Every Other Day    pantoprazole  40 mg Oral QAM AC    pravastatin  40 mg Oral Daily    sertraline  100 mg Oral Daily    latanoprost  1 drop Both Eyes Nightly    sodium chloride flush  10 mL Intravenous 2 times per day    ipratropium-albuterol  1 ampule Inhalation Q4H WA       IV Infusions (if any):      Diagnostics:   Telemetry: Sinus rhythm   labs:   CBC:  Recent Labs     02/16/21  0608 02/17/21  0531   WBC 12.7* 15.7*   HGB 10.0* 9.8*   HCT 33.8* 33.0*    318     Magnesium:  Recent Labs     02/15/21  0518 02/16/21  0608   MG 1.9 1.9     BMP:  Recent Labs     02/16/21  0608 02/17/21  0531    143   K 4.8 4.6   CALCIUM 8.8 8.4*   CO2 36* 34*   BUN 50* 52*   CREATININE 0.85 0.82   LABGLOM >60 >60   GLUCOSE 176* 179*     BNP:No results for input(s): BNP, PROBNP in the last 72 hours. PT/INR:No results for input(s): PROTIME, INR in the last 72 hours. APTT:No results for input(s): APTT in the last 72 hours. CARDIAC ENZYMES:No results for input(s): MYOGLOBIN, CKTOTAL, CKMB, CKMBINDEX, TROPHS, TROPONINT in the last 72 hours. FASTING LIPID PANEL:  Lab Results   Component Value Date    HDL 38 12/08/2020    TRIG 78 12/08/2020     LIVER PROFILE:No results for input(s): AST, ALT, LABALBU, ALKPHOS, BILITOT, BILIDIR, IBILI, PROT, GLOB, ALBUMIN in the last 72 hours. ASSESSMENT:    · Acute on chronic diastolic heart failure, mild, on IV diuretics, managed by nephrology  · Respiratory failure/possible pneumonia on high flow nasal cannula oxygen, managed by others  · CAD with prior CABG in 2016-no clinical angina  · paroxysmal atrial fibrillation, maintaining sinus rhythm with history of cardioversion, amiodarone was stopped this admission and patient is maintaining sinus rhythm and remains on apixaban  · Hypertension-well-controlled  · Dyslipidemia-treated    PLAN:  Continue current cardiac medications. Patient remains in sinus rhythm off of amiodarone we will continue to monitor. Discussed with patient and nursing.     Rogers Leroy, APRN - CNP

## 2021-02-17 NOTE — PROGRESS NOTES
Progress note  [facility]    Adult Hospitalist      Name: Federica Lassiter  MRN: [id number]     Acct: [de-identified]  Room: [address] [place] [address]    Admit Date: 2/8/2021  6:28 PM  PCP: Sharonda Leo MD    Primary Problem  Active Problems:    Acute left-sided CHF (congestive heart failure) ([address]  Resolved Problems:    * No resolved hospital problems.  *        Assesment:   Acute on chronic hypoxemic respiratory failure  Acute on chronic diastolic congestive heart failure  Pulmonary edema  Aspiration pneumonia  Acute kidney injury  Coronary artery disease status post CABG in 2016  Chronic atrial fibrillation  Chronic anticoagulation with Eliquis  Essential hypertension  Mixed hyperlipidemia  Mild pulmonary hypertension  Depression with anxiety  Former smoker  GERD  Covid ruled out      Plan:   Admit patient to progressive unit  Oxygen keep SPO2 more than 90%  BiPAP as needed  Blood culture no growth  BiPAP as needed  Oral azithromycin, completed  Appreciate Nephrology assistance, IV diuretics have been increased to Lasix 20 IV b.i.d.   DuoNeb breathing treatment  Continue Eliquis  Continue lisinopril, metoprolol, Imdur, amiodarone  Continue pravastatin  Continue Zoloft  Pulmonology on consult, appreciated  Consult cardiology, appreciated  [address] nephrology assistance  Patient continues on high-flow nasal cannula oxygen, 30 L with 50% FiO2, requirement has come down since yesterday  IV cefepime  D/w Pt   In Droplet isolation now  Pt has several qts  Answered to pt's satisfaction  Pureed food now  Continue other medication as below      Scheduled Meds:   apixaban  2.5 mg Oral BID    furosemide  20 mg Intravenous Daily    methylPREDNISolone  40 mg Intravenous Q12H    cefepime  1,000 mg Intravenous Q12H    isosorbide mononitrate  30 mg Oral QAM    metoprolol tartrate  25 mg Oral BID    oxybutynin  5 mg Oral Every Other Day    pantoprazole  40 mg Oral QAM AC    pravastatin  40 mg Oral Daily    sertraline  100 mg history, and summarized in the note. Review of Systems:     All 10 point system is reviewed and negative otherwise mentioned in HPI. Past Medical History:     Past Medical History:   Diagnosis Date    Acute on chronic diastolic CHF (congestive heart failure) (HCC) 10/2/2019    Bradycardia     CAD (coronary artery disease)     Cancer (HCC)     Skin CA on nose    Depression     Gallstones     GERD (gastroesophageal reflux disease)     Hiatal hernia     History of cardiac cath 10/2016    CAD    Hyperlipidemia     Hypertension     Insomnia     MI (myocardial infarction) ([address]     PNA (pneumonia)     SOB (shortness of breath)     UTI (urinary tract infection)         Past Surgical History:     Past Surgical History:   Procedure Laterality Date    APPENDECTOMY      CARDIAC SURGERY  11/08/2016    CABG x 3; LIMA-LAD, SVG-PDA, SVG-OM    CARDIOVERSION  12/17/2019    CHOLECYSTECTOMY      COLONOSCOPY      CORONARY ANGIOPLASTY WITH STENT PLACEMENT      CORONARY ARTERY BYPASS GRAFT  11/08/2016    X 3    HYSTERECTOMY          Medications Prior to Admission:       Prior to Admission medications    Medication Sig Start Date End Date Taking?  Authorizing Provider   lisinopril (PRINIVIL;ZESTRIL) 2.5 MG tablet Take 1 tablet by mouth daily 12/12/20  Yes Abel Murillo MD   furosemide (LASIX) 40 MG tablet Take 1 tablet by mouth daily 12/11/20  Yes SERA Aguilar - CNP   metoprolol tartrate (LOPRESSOR) 25 MG tablet Take 25 mg by mouth 2 times daily  11/4/19  Yes Historical Provider, MD   amiodarone (CORDARONE) 200 MG tablet Take 200 mg by mouth daily   Yes Historical Provider, MD   apixaban (ELIQUIS) 5 MG TABS tablet Take 1 tablet by mouth 2 times daily 10/5/19  Yes Celeste Morgan MD   travoprost, benzalkonium, (TRAVATAN) 0.004 % ophthalmic solution Place 1 drop into the right eye nightly   Yes Historical Provider, MD   isosorbide mononitrate (IMDUR) 30 MG extended release tablet Take 30 mg by mouth every morning   Yes Historical Provider, MD   sertraline (ZOLOFT) 100 MG tablet Take 100 mg by mouth daily    Yes Historical Provider, MD   oxybutynin (DITROPAN) 5 MG tablet Take 5 mg by mouth every other day    Yes Historical Provider, MD   pravastatin (PRAVACHOL) 40 MG tablet Take 40 mg by mouth daily    Yes Historical Provider, MD   pantoprazole (PROTONIX) 40 MG tablet Take 40 mg by mouth every morning (before breakfast)    Yes Historical Provider, MD   aspirin 81 MG EC tablet Take 81 mg by mouth daily    Historical Provider, MD        Allergies:       Seasonal    Social History:     Tobacco:    reports that she quit smoking about 49 years ago. Her smoking use included cigarettes. She has never used smokeless tobacco.  Alcohol:      reports current alcohol use. Drug Use:  reports no history of drug use.     Family History:     Family History   Problem Relation Age of Onset    Heart Disease Mother     Cancer Mother     Heart Disease Father          Physical Exam:     Vitals:  BP (!) 130/53   Pulse 57   Temp 97.9 °F (36.6 °C) (Oral)   Resp 24   Ht 5' 5\" (1.651 m)   Wt 131 lb 1.6 oz (59.5 kg)   SpO2 96%   BMI 21.82 kg/m²   Temp (24hrs), Av °F (36.7 °C), Min:97.8 °F (36.6 °C), Max:98.3 °F (36.8 °C)          General appearance - alert, well appearing, and in no acute distress  Mental status - oriented to person, place, and time with normal affect  Head - normocephalic and atraumatic  Eyes - pupils equal and reactive, extraocular eye movements intact, conjunctiva clear  Ears - hearing appears to be intact  Nose - no drainage noted  Mouth - mucous membranes moist  Neck - supple, no carotid bruits, thyroid not palpable  Chest - clear to auscultation, normal effort  Heart - normal rate, regular rhythm, no murmur  Abdomen - soft, nontender, nondistended, bowel sounds present all four quadrants, no masses, hepatomegaly or splenomegaly  Neurological - normal speech, no focal findings or movement disorder noted, cranial nerves II through XII grossly intact  Extremities - peripheral pulses palpable, no pedal edema or calf pain with palpation  Skin - no gross lesions, rashes, or induration noted        Data:     Labs:    Hematology:  Recent Labs     02/15/21  0518 02/16/21  0608 02/17/21  0531   WBC 9.2 12.7* 15.7*   RBC 3.74* 3.78* 3.70*   HGB 9.7* 10.0* 9.8*   HCT 33.4* 33.8* 33.0*   MCV 89.3 89.4 89.2   MCH 25.9 26.5 26.5   MCHC 29.0 29.6 29.7   RDW 15.9* 16.2* 16.3*    291 318   MPV 10.9 12.0 12.1     Chemistry:  Recent Labs     02/15/21  0518 02/16/21  0608 02/17/21  0531   * 144 143   K 4.4 4.8 4.6    100 102   CO2 36* 36* 34*   GLUCOSE 178* 176* 179*   BUN 32* 50* 52*   CREATININE 0.66 0.85 0.82   MG 1.9 1.9  --    ANIONGAP 7* 8* 7*   LABGLOM >60 >60 >60   GFRAA >60 >60 >60   CALCIUM 8.5* 8.8 8.4*   PHOS 3.5 4.2 3.5     Recent Labs     02/16/21 1955   POCGLU 207*       Lab Results   Component Value Date    INR 1.1 10/02/2019    INR 1.2 11/11/2016    INR 1.2 11/10/2016    PROTIME 11.2 10/02/2019    PROTIME 12.3 (H) 11/11/2016    PROTIME 12.7 (H) 11/10/2016       Lab Results   Component Value Date/Time    SPECIAL NOT REPORTED 02/08/2021 07:45 PM    SPECIAL NOT REPORTED 02/08/2021 07:45 PM     Lab Results   Component Value Date/Time    CULTURE NO GROWTH 6 DAYS 02/08/2021 07:45 PM    CULTURE NO GROWTH 6 DAYS 02/08/2021 07:45 PM       Lab Results   Component Value Date    POCPH 7.35 11/09/2016    POCPCO2 41 11/09/2016    POCPO2 85 11/09/2016    POCHCO3 22.4 11/09/2016    NBEA 3 11/09/2016    PBEA NOT REPORTED 11/09/2016    KJL1QJI 24 11/09/2016    LIUU5WOK 96 11/09/2016    FIO2 NOT REPORTED 10/02/2019       Radiology:    Xr Chest Portable    Result Date: 2/9/2021  Stable interstitial opacities with superimposed patchy airspace disease probably combination of factors including chronic interstitial disease, vascular congestion and pneumonia.      Xr Chest Portable    Result Date: 2/8/2021  Progressive bilateral patchy interstitial and alveolar infiltrates greater on the left. Overall findings suggest progressive pulmonary vascular congestion with possible superimposed bilateral pneumonia. All radiological studies reviewed                Code Status:  DNR-CCA    Electronically signed by Sommer Melissa MD on 2/17/2021 at 5:02 PM     Copy sent to Dr. Ricky Garcia MD    This note was created with the assistance of a speech-recognition program.  Although the intention is to generate a document that actually reflects the content of the visit, no guarantees can be provided that every mistake has been identified and corrected by editing. Note was updated later by me after  physical examination and  completion of the assessment.

## 2021-02-17 NOTE — PROGRESS NOTES
Nutrition Assessment     Type and Reason for Visit: Reassess    Nutrition Recommendations/Plan:   1. Continue  DIET CARDIAC; Dental Soft; Moderately Thick (Honey); Daily Fluid Restriction: 1500 ml  2. Modify oral nutrition supplement to Magic Cup 2x/day  3. Monitor p.o intakes, diet tolerance and labs    Nutrition Assessment:  Patient is status post modified barium swallow study (2/16) with the recommendation of honey thick liquids. RN reports patient felt better today and ate oatmeal and juice at breakfast this morning. Will change oral nutrition supplement to [employer] which is appropriate for thickened liquids. Continue current diet. Monitor p.o intakes, diet tolerance and labs. Malnutrition Assessment:  Malnutrition Status: At risk for malnutrition (Comment)    Estimated Daily Nutrient Needs:  Energy (kcal): 1200 kcal based on [place]-St. Jeor (1.2 factor); Weight Used for Energy Requirements:  Admission     Protein (g): 67-73 gm based on 1.2-1.3 gm/kg; Weight Used for Protein Requirements:  Admission        Fluid (ml/day): 1200 mL per physician      Nutrition Related Findings: No edema. Modified barium swallow study (2/16)      Current Nutrition Therapies:    DIET CARDIAC; Dental Soft; Moderately Thick (Honey);  Daily Fluid Restriction: 1500 ml  Dietary Nutrition Supplements: Frozen Oral Supplement    Anthropometric Measures:  · Height: 5' 5\" (165.1 cm)  · Current Body Wt: 131 lb (59.4 kg)   · BMI: 21.8    Nutrition Diagnosis:   · Swallowing difficulty related to swallowing difficulty as evidenced by swallow study results(honey thick liquids)      Nutrition Interventions:   Food and/or Nutrient Delivery:  Continue Current Diet, Modify Oral Nutrition Supplement  Nutrition Education/Counseling:  Education not indicated   Coordination of Nutrition Care:  Continue to monitor while inpatient    Goals:  p.o intake >50% of estimated energy needs for all meals       Nutrition Monitoring and Evaluation: Behavioral-Environmental Outcomes:  None Identified   Food/Nutrient Intake Outcomes:  Food and Nutrient Intake, Supplement Intake  Physical Signs/Symptoms Outcomes:  Biochemical Data, Skin, Weight, Fluid Status or Edema     Discharge Planning:    Continue current diet, Continue Oral Nutrition Supplement         Simone NUÑEZN, RDN, LDN  Lead Clinical Dietitian  RD Office Phone [phone]

## 2021-02-17 NOTE — PROGRESS NOTES
Progress Note    Reason for Consult: ELICEO    Requesting Physician:  Rosa Bailey MD    INTERVAL HISTORY:   Continues to require high flow oxygen  Creatinine in normal range    HISTORY OF PRESENT ILLNESS:    The patient is a 80 y.o. female who presents with worsening shortness of breath. Initial chest x-ray showed progressive pulmonary vascular congestion with possible superimposed bilateral pneumonia. Chest x-ray today showed bilateral airspace disease slightly increased at the right apex compared to prior. Creatinine baseline 0.6 which she was at on admission and yesterday. Creatinine elevated to 1.46 today. Hypokalemia on admission and yesterday. Potassium was 3.8 today. Recent hemoglobin 9.6. WBC was 14.4 today and today 12.0. SBP was originally trending as high as 214/75 on day of admission. She became hypotensive at 99/78 about 8 hours later. SBP trending 100s to 130s today. She originally was requiring nasal cannula and is now on high flow. Home medications include lisinopril, Lasix, metoprolol, amiodarone. She is currently on Lasix 20 mg IV twice daily, lisinopril, metoprolol, amiodarone. She has been up to the bedside commode voiding. No urinary retention on bladder scan       Prior to Admission medications    Medication Sig Start Date End Date Taking?  Authorizing Provider   lisinopril (PRINIVIL;ZESTRIL) 2.5 MG tablet Take 1 tablet by mouth daily 12/12/20  Yes Rosa Bailey MD   furosemide (LASIX) 40 MG tablet Take 1 tablet by mouth daily 12/11/20  Yes SERA Gastelum CNP   metoprolol tartrate (LOPRESSOR) 25 MG tablet Take 25 mg by mouth 2 times daily  11/4/19  Yes Historical Provider, MD   amiodarone (CORDARONE) 200 MG tablet Take 200 mg by mouth daily   Yes Historical Provider, MD   apixaban (ELIQUIS) 5 MG TABS tablet Take 1 tablet by mouth 2 times daily 10/5/19  Yes Damaso Lott MD   travoprost, benzalkonium, (TRAVATAN) 0.004 % ophthalmic solution Place 1 drop into the right eye nightly   Yes Historical Provider, MD   isosorbide mononitrate (IMDUR) 30 MG extended release tablet Take 30 mg by mouth every morning   Yes Historical Provider, MD   sertraline (ZOLOFT) 100 MG tablet Take 100 mg by mouth daily    Yes Historical Provider, MD   oxybutynin (DITROPAN) 5 MG tablet Take 5 mg by mouth every other day    Yes Historical Provider, MD   pravastatin (PRAVACHOL) 40 MG tablet Take 40 mg by mouth daily    Yes Historical Provider, MD   pantoprazole (PROTONIX) 40 MG tablet Take 40 mg by mouth every morning (before breakfast)    Yes Historical Provider, MD   aspirin 81 MG EC tablet Take 81 mg by mouth daily    Historical Provider, MD       Scheduled Meds:   apixaban  2.5 mg Oral BID    furosemide  20 mg Intravenous Daily    methylPREDNISolone  40 mg Intravenous Q12H    cefepime  1,000 mg Intravenous Q12H    isosorbide mononitrate  30 mg Oral QAM    metoprolol tartrate  25 mg Oral BID    oxybutynin  5 mg Oral Every Other Day    pantoprazole  40 mg Oral QAM AC    pravastatin  40 mg Oral Daily    sertraline  100 mg Oral Daily    latanoprost  1 drop Both Eyes Nightly    sodium chloride flush  10 mL Intravenous 2 times per day    ipratropium-albuterol  1 ampule Inhalation Q4H WA     Continuous Infusions:  PRN Meds:sodium chloride, sodium chloride flush, promethazine **OR** ondansetron, nicotine, polyethylene glycol, acetaminophen **OR** acetaminophen, albuterol, hydrALAZINE     Physical Exam:  Vitals:    02/17/21 1224 02/17/21 1445 02/17/21 1551 02/17/21 1614   BP: 114/79   (!) 130/53   Pulse: 57   57   Resp: 20   24   Temp: 98.1 °F (36.7 °C)   97.9 °F (36.6 °C)   TempSrc: Oral   Oral   SpO2: 99% 98%  96%   Weight:       Height:   5' 5\" (1.651 m)      I/O last 3 completed shifts: In: 800 [P.O.:800]  Out: 200 [Urine:200]    General:  Awake, alert, not in distress, and comfortable on high flow. Appears to be stated age. HEENT: Atraumatic, normocephalic. Anicteric sclera.  Pink and for: BNP  Lipids:   Lab Results   Component Value Date    CHOL 109 12/08/2020    HDL 38 (L) 12/08/2020     INR:   Lab Results   Component Value Date    INR 1.1 10/02/2019    INR 1.2 11/11/2016    INR 1.2 11/10/2016     PTH: No results found for: PTH  Phosphorus:    Lab Results   Component Value Date    PHOS 3.5 02/17/2021     Ionized Calcium: No results found for: IONCA  Magnesium:   Lab Results   Component Value Date    MG 1.9 02/16/2021     Albumin:   Lab Results   Component Value Date    LABALBU 3.3 05/01/2020     Last 3 CK, CKMB, Troponin: @LABRCNT(CKTOTAL:3,CKMB:3,TROPONINI:3)       URINE:)No results found for: Shagufta Quintana    Radiology:   Reviewed. Assessment:  Acute Kidney Injury, likely hemodynamically related. FeNa 0.26%. Cr is better. Baseline Creatinine 0.6. Hypernatremia. Hypokalemia  Hypertension with recent hypotension. CHF exacerbation. Multifocal pneumonia. Hypoxic respiratory failure. Plan: Tolerating current dose of Lasix   off lisinopril. BP stable. Serum sodium improved  Continue water restriction to 1500 ml/24 hours. Now on dental soft with honey thickened liquids. Monitor blood pressure and heart rate. On amiodarone and isosorbide. Strict I&O. Antibiotics per primary and pulmonary. Cardiac diet. Avoid nephrotoxic drugs and IV contrast exposure. Follow up chemistries. Please do not hesitate to contact us for any further questions/concerns. We will continue to follow along with you.         Electronically signed by Zulay Gutierrez MD  on 2/17/2021 at 6:19 PM

## 2021-02-17 NOTE — PROGRESS NOTES
Physical Therapy  Facility/Department: [facility]  Daily Treatment Note  NAME: Monserrat Bianchi  : 3/26/1932  MRN: [id number]    Date of Service: 2021    Discharge Recommendations:  Subacute/Skilled Nursing Facility        Assessment   Body structures, Functions, Activity limitations: Decreased functional mobility ; Decreased safe awareness;Decreased endurance;Decreased balance  Assessment: Recommend SNF due to patient only able to amb 5 feet with high flow due to 02 desat. Patient will benefit from skilled PT to improve gait, transfers, balance, strenght, and endurance. Prognosis: Good  Decision Making: High Complexity  REQUIRES PT FOLLOW UP: Yes  Activity Tolerance  Activity Tolerance: Patient limited by endurance  Activity Tolerance: High flow     Patient Diagnosis(es): The encounter diagnosis was Acute congestive heart failure, unspecified heart failure type ([facility] has a past medical history of Acute on chronic diastolic CHF (congestive heart failure) ([address] Bradycardia, CAD (coronary artery disease), Cancer ([facility] Depression, Gallstones, GERD (gastroesophageal reflux disease), Hiatal hernia, History of cardiac cath, Hyperlipidemia, Hypertension, Insomnia, MI (myocardial infarction) ([address] PNA (pneumonia), SOB (shortness of breath), and UTI (urinary tract infection). has a past surgical history that includes Appendectomy; Cholecystectomy; Hysterectomy; Coronary angioplasty with stent; Colonoscopy; Coronary artery bypass graft (2016); Cardiac surgery (2016); and Cardioversion (2019). Restrictions  Restrictions/Precautions  Restrictions/Precautions: General Precautions, Fall Risk, Up as Tolerated  Required Braces or Orthoses?: No  Position Activity Restriction  Other position/activity restrictions: telemetry, HI Flow, cont SPO2 monitor, IV site, Now being tested for COVID  Subjective   General  Chart Reviewed:  Yes  Additional Pertinent Hx: CHF, CAD, HLPD, HTN, UTI, safely get in/OOB  Short term goal 2: Inc gait to amb 200 ft with R/walker indep; Short term goal 3: Pt able to tolerate 30-40 min of activity to include 15-20 reps of ex & functional mobility including 5 minutes of standing to facilitate better activity tolerance  Short term goal 5: Ed pt on home ex's, optimal breathing techniques, safety & energy principles, fall prevention & issue written pt education  Patient Goals   Patient goals :  To go and be with grandkids    Plan    Plan  Times per week: 1-2x/D, 5-6D/week  Current Treatment Recommendations: Strengthening, Balance Training, Functional Mobility Training, Transfer Training, Gait Training, Endurance Training, Home Exercise Program, Safety Education & Training, Patient/Caregiver Education & Training  Safety Devices  Type of devices: Call light within reach, Gait belt, Patient at risk for falls, Left in chair  Restraints  Initially in place: No     Therapy Time   Individual Concurrent Group Co-treatment   Time In 1125         Time Out 1155         Minutes EDISON Arroyo

## 2021-02-18 LAB
ANION GAP SERPL CALCULATED.3IONS-SCNC: 3 MMOL/L (ref 9–17)
BUN BLDV-MCNC: 48 MG/DL (ref 8–23)
BUN/CREAT BLD: 62 (ref 9–20)
CALCIUM SERPL-MCNC: 8.6 MG/DL (ref 8.6–10.4)
CHLORIDE BLD-SCNC: 104 MMOL/L (ref 98–107)
CO2: 38 MMOL/L (ref 20–31)
CREAT SERPL-MCNC: 0.78 MG/DL (ref 0.5–0.9)
GFR AFRICAN AMERICAN: >60 ML/MIN
GFR NON-AFRICAN AMERICAN: >60 ML/MIN
GFR SERPL CREATININE-BSD FRML MDRD: ABNORMAL ML/MIN/{1.73_M2}
GFR SERPL CREATININE-BSD FRML MDRD: ABNORMAL ML/MIN/{1.73_M2}
GLUCOSE BLD-MCNC: 167 MG/DL (ref 70–99)
HCT VFR BLD CALC: 33.5 % (ref 36.3–47.1)
HEMOGLOBIN: 10 G/DL (ref 11.9–15.1)
MCH RBC QN AUTO: 26.7 PG (ref 25.2–33.5)
MCHC RBC AUTO-ENTMCNC: 29.9 G/DL (ref 28.4–34.8)
MCV RBC AUTO: 89.3 FL (ref 82.6–102.9)
NRBC AUTOMATED: 0 PER 100 WBC
PDW BLD-RTO: 16.2 % (ref 11.8–14.4)
PLATELET # BLD: 300 K/UL (ref 138–453)
PMV BLD AUTO: 11.5 FL (ref 8.1–13.5)
POTASSIUM SERPL-SCNC: 5.1 MMOL/L (ref 3.7–5.3)
RBC # BLD: 3.75 M/UL (ref 3.95–5.11)
SODIUM BLD-SCNC: 145 MMOL/L (ref 135–144)
WBC # BLD: 16 K/UL (ref 3.5–11.3)

## 2021-02-18 PROCEDURE — 94640 AIRWAY INHALATION TREATMENT: CPT

## 2021-02-18 PROCEDURE — 2580000003 HC RX 258: Performed by: FAMILY MEDICINE

## 2021-02-18 PROCEDURE — 85027 COMPLETE CBC AUTOMATED: CPT

## 2021-02-18 PROCEDURE — 6360000002 HC RX W HCPCS: Performed by: INTERNAL MEDICINE

## 2021-02-18 PROCEDURE — 97535 SELF CARE MNGMENT TRAINING: CPT

## 2021-02-18 PROCEDURE — 80048 BASIC METABOLIC PNL TOTAL CA: CPT

## 2021-02-18 PROCEDURE — 6370000000 HC RX 637 (ALT 250 FOR IP): Performed by: FAMILY MEDICINE

## 2021-02-18 PROCEDURE — 94761 N-INVAS EAR/PLS OXIMETRY MLT: CPT

## 2021-02-18 PROCEDURE — 2700000000 HC OXYGEN THERAPY PER DAY

## 2021-02-18 PROCEDURE — 2060000000 HC ICU INTERMEDIATE R&B

## 2021-02-18 PROCEDURE — 6360000002 HC RX W HCPCS: Performed by: FAMILY MEDICINE

## 2021-02-18 PROCEDURE — 36415 COLL VENOUS BLD VENIPUNCTURE: CPT

## 2021-02-18 RX ADMIN — LATANOPROST 1 DROP: 50 SOLUTION OPHTHALMIC at 20:33

## 2021-02-18 RX ADMIN — CEFEPIME HYDROCHLORIDE 1000 MG: 1 INJECTION, POWDER, FOR SOLUTION INTRAMUSCULAR; INTRAVENOUS at 13:32

## 2021-02-18 RX ADMIN — IPRATROPIUM BROMIDE AND ALBUTEROL SULFATE 1 AMPULE: .5; 3 SOLUTION RESPIRATORY (INHALATION) at 11:12

## 2021-02-18 RX ADMIN — METOPROLOL TARTRATE 25 MG: 25 TABLET, FILM COATED ORAL at 20:33

## 2021-02-18 RX ADMIN — METHYLPREDNISOLONE SODIUM SUCCINATE 40 MG: 40 INJECTION, POWDER, FOR SOLUTION INTRAMUSCULAR; INTRAVENOUS at 20:35

## 2021-02-18 RX ADMIN — IPRATROPIUM BROMIDE AND ALBUTEROL SULFATE 1 AMPULE: .5; 3 SOLUTION RESPIRATORY (INHALATION) at 06:04

## 2021-02-18 RX ADMIN — METHYLPREDNISOLONE SODIUM SUCCINATE 40 MG: 40 INJECTION, POWDER, FOR SOLUTION INTRAMUSCULAR; INTRAVENOUS at 09:03

## 2021-02-18 RX ADMIN — SERTRALINE HYDROCHLORIDE 100 MG: 50 TABLET ORAL at 09:03

## 2021-02-18 RX ADMIN — IPRATROPIUM BROMIDE AND ALBUTEROL SULFATE 1 AMPULE: .5; 3 SOLUTION RESPIRATORY (INHALATION) at 18:17

## 2021-02-18 RX ADMIN — SODIUM CHLORIDE, PRESERVATIVE FREE 10 ML: 5 INJECTION INTRAVENOUS at 09:03

## 2021-02-18 RX ADMIN — FUROSEMIDE 20 MG: 10 INJECTION, SOLUTION INTRAMUSCULAR; INTRAVENOUS at 09:03

## 2021-02-18 RX ADMIN — CEFEPIME HYDROCHLORIDE 1000 MG: 1 INJECTION, POWDER, FOR SOLUTION INTRAMUSCULAR; INTRAVENOUS at 02:01

## 2021-02-18 RX ADMIN — SODIUM CHLORIDE, PRESERVATIVE FREE 10 ML: 5 INJECTION INTRAVENOUS at 20:33

## 2021-02-18 RX ADMIN — IPRATROPIUM BROMIDE AND ALBUTEROL SULFATE 1 AMPULE: .5; 3 SOLUTION RESPIRATORY (INHALATION) at 15:36

## 2021-02-18 RX ADMIN — ISOSORBIDE MONONITRATE 30 MG: 30 TABLET ORAL at 09:02

## 2021-02-18 RX ADMIN — APIXABAN 2.5 MG: 2.5 TABLET, FILM COATED ORAL at 20:33

## 2021-02-18 RX ADMIN — PRAVASTATIN SODIUM 40 MG: 40 TABLET ORAL at 09:03

## 2021-02-18 RX ADMIN — PANTOPRAZOLE SODIUM 40 MG: 40 TABLET, DELAYED RELEASE ORAL at 05:15

## 2021-02-18 RX ADMIN — APIXABAN 2.5 MG: 2.5 TABLET, FILM COATED ORAL at 09:03

## 2021-02-18 NOTE — CARE COORDINATION
DC Planning      Spoke with Fely Varela he will start precert for [facility] today pt cont on [facility] 30L.

## 2021-02-18 NOTE — PROGRESS NOTES
Occupational Therapy  Facility/Department: [facility] PROGRESSIVE CARE  Daily Treatment Note  NAME: Edvin Spence  : 3/26/1932  MRN: [id number]    Date of Service: 2021    Discharge Recommendations:  LTACH, [address]  OT Equipment Recommendations  Equipment Needed: Yes  Mobility Devices: ADL Assistive Devices  ADL Assistive Devices: Toileting - 3-in-1 Commode;Reacher;Long-handled Shoe [place];Long-handled Sponge    Assessment   Performance deficits / Impairments: Decreased functional mobility ; Decreased safe awareness;Decreased balance;Decreased ADL status; Decreased strength;Decreased high-level IADLs;Decreased endurance;Decreased posture;Decreased vision/visual deficit; Decreased coordination  Assessment: Skilled OT indicated to improve I and safety in areas of self care and function to return home with assist as needed. Prognosis: Fair  OT Education: OT Role;Plan of Care;Energy Conservation;Transfer Training;ADL Adaptive Strategies;Precautions  REQUIRES OT FOLLOW UP: Yes  Activity Tolerance  Activity Tolerance: Patient limited by fatigue  Safety Devices  Safety Devices in place: Yes  Type of devices: All fall risk precautions in place; Left in chair;Call light within reach;Nurse notified; Chair alarm in place;Gait belt;Patient at risk for falls         Patient Diagnosis(es): The encounter diagnosis was Acute congestive heart failure, unspecified heart failure type ([address] has a past medical history of Acute on chronic diastolic CHF (congestive heart failure) ([address] Bradycardia, CAD (coronary artery disease), Cancer ([address] Depression, Gallstones, GERD (gastroesophageal reflux disease), Hiatal hernia, History of cardiac cath, Hyperlipidemia, Hypertension, Insomnia, MI (myocardial infarction) ([address] PNA (pneumonia), SOB (shortness of breath), and UTI (urinary tract infection). has a past surgical history that includes Appendectomy; Cholecystectomy;  Hysterectomy; Coronary angioplasty with stent; Colonoscopy; Coronary artery bypass graft (11/08/2016); Cardiac surgery (11/08/2016); and Cardioversion (12/17/2019). Restrictions  Restrictions/Precautions  Restrictions/Precautions: General Precautions, Fall Risk, Up as Tolerated  Required Braces or Orthoses?: No  Position Activity Restriction  Other position/activity restrictions: telemetry, HFNC, cont SPO2 monitor, IV site  Subjective   General  Chart Reviewed: Yes  Patient assessed for rehabilitation services?: Yes  Response to previous treatment: Patient with no complaints from previous session  Family / Caregiver Present: No      Orientation  Orientation  Overall Orientation Status: Within Functional Limits  Objective    ADL  Grooming: Setup;Stand by assistance  UE Bathing: Setup;Minimal assistance(Min A to wash back)  LE Bathing: Setup;Minimal assistance(Min  A to wash feet)  UE Dressing: Minimal assistance;Setup(Min A to don/doff gown with line mgmt)  LE Dressing: Setup;Minimal assistance(To don/doff socks, patient with increased fatigue this date)  Toileting: Contact guard assistance(Patient used BSC d/t limited by HFNC, CGA for nasreen hygiene)        Balance  Sitting Balance: Supervision    Standing Balance: Minimal assistance  Standing Balance  Time: <10 seconds for posterior hygiene    Functional Mobility  Functional Mobility Comments: limited d/t HFNC, squat pivot completed from BSC>Chair  Toilet Transfers  Toilet - Technique: Stand pivot  Equipment Used: Standard bedside commode  Toilet Transfer: Minimal assistance    Bed mobility  Comment: Patient up on BSC with RN upon arrival    Transfers  Sit to stand: Contact guard assistance  Stand to sit: Contact guard assistance  Transfer Comments: Total assist for line mgmt, verbal cues for hand placements and controlled sit<>stands      Cognition  Overall Cognitive Status: Exceptions  Arousal/Alertness: Appropriate responses to stimuli  Following Commands:  Follows one step commands consistently  Attention Span: Appears intact  Memory: Decreased short term memory  Safety Judgement: Decreased awareness of need for assistance;Decreased awareness of need for safety  Problem Solving: Assistance required to correct errors made;Assistance required to identify errors made;Decreased awareness of errors  Insights: Decreased awareness of deficits  Initiation: Requires cues for some  Sequencing: Requires cues for some         Plan   Plan  Times per week: 4-5x/week 1x/day as josie  Current Treatment Recommendations: Strengthening, Balance Training, Functional Mobility Training, Safety Education & Training, Positioning, Endurance Training, Neuromuscular Re-education, Patient/Caregiver Education & Training, Equipment Evaluation, Education, & procurement, Self-Care / ADL, Home Management Training, Cognitive/Perceptual Training  AM-PAC Score     AM-PAC Inpatient Mobility without Stair Climbing Raw Score : 15 (02/18/21 1056)  AM-PAC Inpatient without Stair Climbing T-Scale Score : 43.03 (02/18/21 1056)  Mobility Inpatient CMS 0-100% Score: 47.43 (02/18/21 1056)  Mobility Inpatient without Stair CMS G-Code Modifier : CK (02/18/21 1056)  AM-PAC Inpatient Daily Activity Raw Score: 20 (02/18/21 1055)  AM-PAC Inpatient ADL T-Scale Score : 42.03 (02/18/21 1055)  ADL Inpatient CMS 0-100% Score: 38.32 (02/18/21 1055)  ADL Inpatient CMS G-Code Modifier : Alverna Reas (02/18/21 1055)    Goals  Short term goals  Time Frame for Short term goals: by discharge, pt to demo  Short term goal 1: bed mob tech with use of rail as needed to CG. Short term goal 2: increase in BUE strength by a 1/2 grade to assist with ADL tasks and be I with simple BUE HEP with use of handouts. Short term goal 3: UB ADL to set up and LB ADL to CG with use of AE/AD as needed. Short term goal 4: toileting tasks with use of BSC/AD and grab bar as needed to CG. Short term goal 5: ADL transfers and functional mob with AD as needed to CG.   Long term goals  Long term goal 1: Pt to stand with SBA and AD josie > 5 mins as able to reduce falls with functional tasks. Long term goal 2: Caregivers to be I with EC/WS and fall prevention tech, pressure relief, AE/DME recommendations with use of handouts. Patient Goals   Patient goals : I just want to be able to breath better and get home! Therapy Time   Individual Concurrent Group Co-treatment   Time In 0978         Time Out 1015         Minutes 28           Upon writer exit, call light within reach, pt retired to chair. All lines intact and patient positioned comfortably. Chair alarm in place. All patient needs addressed prior to ending therapy session. Chart reviewed prior to treatment and patient is agreeable for therapy. RN reports patient is medically stable for therapy treatment this date.       MORGAN Ledezma/INDIRA

## 2021-02-18 NOTE — PROGRESS NOTES
318 300     Magnesium:  Recent Labs     02/16/21  0608   MG 1.9     BMP:  Recent Labs     02/17/21  0531 02/18/21  0505    145*   K 4.6 5.1   CALCIUM 8.4* 8.6   CO2 34* 38*   BUN 52* 48*   CREATININE 0.82 0.78   LABGLOM >60 >60   GLUCOSE 179* 167*     BNP:No results for input(s): BNP, PROBNP in the last 72 hours. PT/INR:No results for input(s): PROTIME, INR in the last 72 hours. APTT:No results for input(s): APTT in the last 72 hours. CARDIAC ENZYMES:No results for input(s): MYOGLOBIN, CKTOTAL, CKMB, CKMBINDEX, TROPHS, TROPONINT in the last 72 hours. FASTING LIPID PANEL:  Lab Results   Component Value Date    HDL 38 12/08/2020    TRIG 78 12/08/2020     LIVER PROFILE:No results for input(s): AST, ALT, LABALBU, ALKPHOS, BILITOT, BILIDIR, IBILI, PROT, GLOB, ALBUMIN in the last 72 hours. ASSESSMENT:    · Acute on chronic diastolic heart failure, mild, on IV diuretics, managed by nephrology  · Respiratory failure/possible pneumonia on high flow nasal cannula oxygen, managed by others  · CAD with prior CABG in 2016-free of chest pain  · paroxysmal atrial fibrillation, maintaining sinus rhythm with history of cardioversion, amiodarone was stopped this admission and patient is maintaining sinus rhythm and remains on apixaban, clinically doing well  · Hypertension-well-controlled  · Dyslipidemia-treated    PLAN:  Continue current cardiac medications. Continue apixaban and diuretic therapy. Conservative cardiac management. Discussed with patient and nursing.     Marco Anand MD

## 2021-02-18 NOTE — PROGRESS NOTES
speech recommendations  · Nephrology and cardiology following  · Xray chest in am   · Repeat procalcitonin in a.m.   · Labs in am   · DVT prophylaxis on Eliquis   · DNR CCA  · Will follow with you    Electronically signed by     Elle Stephens MD on 2/18/2021 at 12:24 PM  Pulmonary Critical Care and Sleep Medicine,  [facility]  Cell: [phone]  Office: [phone]

## 2021-02-18 NOTE — PROGRESS NOTES
[address]  Speech Language Pathology    Date: 2/18/2021  Patient Name: Ana Shaw  YOB: 1932   AGE: 80 y.o. MRN: [id number]        Patient Not Available for Speech Therapy     Due to:  [] Testing  [] Hemodialysis  [] Cancelled by RN  [] Surgery   [] Intubation/Sedation/Pain Medication  [] Medical instability  [x] Other: Pt. Using bedside commode. Next scheduled treatment: 2/19  Completed by: George Dia M.A.  CCC-SLP

## 2021-02-18 NOTE — PLAN OF CARE
Problem: Skin Integrity:  Goal: Will show no infection signs and symptoms  Description: Will show no infection signs and symptoms  Outcome: Ongoing  Note: Monitor for signs & symptoms of infection. Utilize standard precautions & proper handwashing. Communicate referral to infection control. Goal: Absence of new skin breakdown  Description: Absence of new skin breakdown  2/18/2021 0004 by Naina Ramirez RN  Outcome: Ongoing  Note: Continuing to monitor for skin integrity risks. Patient independent with turning/repositioning. Turning/repositioning encouraged at least once every 2 hrs, and prn basis. Hygiene care being completed independently per patient; assistance provided when deemed necessary. 2/17/2021 1046 by Brannon Cabrera RN  Outcome: Ongoing     Problem: Nutrition  Goal: Optimal nutrition therapy  Outcome: Ongoing  Note: Review diet daily and as needed with pt. Provide supplement nutrition as ordered by physician & educate pt. Review nutritional guidelines with pt. Problem: Falls - Risk of:  Goal: Will remain free from falls  Description: Will remain free from falls  2/18/2021 0004 by Naina Ramirez RN  Outcome: Ongoing  Note: Pt fall risk, fall band present, falling star, safety alarm activated and in use as needed. Hourly rounding performed. Pt encouraged to use call light. See Jorge Stair fall risk assessment. 2/17/2021 1046 by Brannon Cabrera RN  Outcome: Ongoing  Goal: Absence of physical injury  Description: Absence of physical injury  Outcome: Ongoing  Note: Non-skid socks in place, up with assistance, bed in lowest position, bed exit & alarm as needed, provide toileting every 2 hours an d as needed.       Problem: Breathing Pattern - Ineffective:  Goal: Ability to achieve and maintain a regular respiratory rate will improve  Description: Ability to achieve and maintain a regular respiratory rate will improve  2/18/2021 0004 by Naina Ramirez RN  Outcome: Ongoing  Note: Assess

## 2021-02-18 NOTE — CONSULTS
..    Palliative Care Inpatient Consult    NAME:  Fred Mcwilliams  MEDICAL RECORD NUMBER:  [id number]  AGE: 80 y.o. GENDER: female  : 3/26/1932  TODAY'S DATE:  2021    Reasons for Consultation:    Provision of information regarding PC and/or hospice philosophies  Complex, time-intensive communication and interdisciplinary psychosocial support  Clarification of goals of care and/or assistance with difficult decision-making  Guidance in regards to resources and transition(s)      Members of PC team contributing to this consultation are :  Omar Roberts RN    History of Present Illness     The patient is a 80 y.o. Non-/non  female who presents with Shortness of Breath    Referred to Palliative Care by   [x] Physician   [] Nursing  [] Family Request   [] Other:       She was admitted to the primary service for Acute left-sided CHF (congestive heart failure) ([facility] [I50.1]. Her hospital course has been associated with <principal problem not specified>. The patient has a complicated medical history and has been hospitalized since 2021  6:28 PM.    Active Hospital Problems    Diagnosis Date Noted    Acute left-sided CHF (congestive heart failure) ([facility] [I50.1] 2020       Data        Code Status: DNR-CCA     ADVANCED CARE PLANNING:  Patient has capacity for medical decisions: yes  [address] of : yes- daughter Cassia Mae  Living Will: no     Personal, Social, and Family History  Marital Status:   Living situation:[facility]  Jeanne/Spiritual History:   Not asked  Psychological Distress: mild  Does patient understand diagnosis/treatment? yes  Does family/caregiver understand diagnosis/treatment?  yes    Assessment        Palliative Performance Scale:    ___100% Full ambulation; normal activity and work; no evidence of disease; able to do own self care; normal intake; fully conscious  ___90% Full ambulation; normal activity and work; some evidence of disease; able to do own self care; normal intake; fully conscious  ___80% Full ambulation; normal activity with effort; some evidence of disease; able to do own self care; normal or reduced intake; fully conscious  ___70% Ambulation reduced; unable to perform normal job/work; significant disease; able to do own self care; normal or reduced intake; fully conscious  ___60%  Ambulation reduced; cannot do hobbies/housework; significant disease; occasional assist; intake normal or reduced; fully conscious/some confusion  _x__50%  Mainly sit/lie; can't do any work; extensive disease; considerable assist; intake normal or reduced; fully conscious/some confusion  ___40%  Mainly in bed; extensive disease; mainly assist; intake normal or reduced; fully conscious/ some confusion   ___30%  Bed bound; extensive disease; total care; intake reduced; fully conscious/some confusion  ___20%  Bed bound; extensive disease; total care; intake minimal; drowsy/coma  ___10%  Bed bound; extensive disease; total care; mouth care only; drowsy/coma  ___0       Death       Risk Assessments:    Srinivasa Risk Score: [unfilled]    Readmission Risk Score: 23        1 Year Mortality Risk Score: @1YEARMORTALITYRISK@     Plan        Palliative Interaction: Received report from bedside RN who states patient has made comments all day saying, \"I'm done with all of this\". Etc. Nurse reports patient's high flow needs have been high and patient has been unable to wean. Nurse reports that patient was living independently at home doing her own laundry 2x week before admission. Pt sitting in chair on high flow when I entered the room. I introduced myself and the role of palliative care. Pt's nephew is visiting at bedside. Pt gave me permission to talk in front of nephew. Pt is alert and oriented. She is able to tell me of her previous admissions r/t heart failure/pneumonia, the last one being 6 months ago.  Pt states she was living independently until this admission. Pt states, \"They can't seem to get my oxygen down\". Pt states she may have to go to [place] if they can't get her oxygen needs down. Pt seems slightly anxious but does not appear in distress. She denies pain at present. She states she is SOB with exertion. She states she is having a hard time falling asleep but once she is asleep, she sleeps well. She denies having this problem at home. I explained what palliative care is and how this service could help her. She defers to her daughter Diego Javed and gives me permission to call her. I called daughter Diego Javed on the phone. I introduced myself and the palliative role. Diego Javed works at [employer]. She states patient will probably be going to [place]. I explained the difference between palliative care and hospice to Diego Javed. Diego Javed states she is not sure if [facility] has palliative care on campus or not but she will find out. If they do opt for palliative care they would like to use [facility]. Diego Javed states she would have to talk to the rest of the family before deciding on palliative care. I told her I would drop some palliative brochures off tomorrow to her brother who she says will be here visiting all day. I told her we could reconvene tomorrow after we know what the discharge plan will be. I will call [place] tomorrow as well to see if they offer palliative services. Will touch base with family again tomorrow. Support offered. CODE STATUS: I asked patient about her wishes re: intubation, ventilation, CPR. She states, \"no, none of that\". [facility] reflected on patient's chart.       Education/support to family  Education/support to patient  Discharge planning/helping to coordinate care  Communications with primary service  Providing support for coping/adaptation/distress of family  Providing support for coping/adaptation/distress of patient  Discussing meaning/purpose   Continue with current plan of care  Clarification of medical condition to patient and family  Code status clarified: [facility]  Provided information about hospice  Validating patient/family distress  Continued communication updates  Discussed benefits of palliative care with patient and daughter Tracy Timmons. Pt defers to daughter. Tracy Timmons will consider it. Await discharge plan. [facility] vs. [place]. Will follow along. Will drop palliatve information off to patient's son tomorrow when he is visiting. Principle Problem/Diagnosis:  Acute left-sided CHF (congestive heart failure) (HCC) [I50.1]    Goals of care evaluation:  The patient goals of care are improve or maintain function/quality of life and provide comfort care/support/palliation/relieve suffering   Goals of care discussed with:    [] Patient independently    [x] Patient and Family    [] Family or Healthcare DPOA independently    [] Unable to discuss with patient, family/DPOA not present    Code Status  DNR-CCA    Other recommendations:  Please call with any palliative questions or concerns. Palliative Care Team is available via perfect serve or via phone - [phone]. Palliative Care will continue to follow Ms. Jose's care as needed. Thank you for allowing Palliative Care to participate in the care of Ms. Jose .     Electronically signed by   Brian Meza RN  Palliative Care Team  on 2/18/2021 at 4:54 PM

## 2021-02-18 NOTE — PLAN OF CARE
Problem: Skin Integrity:  Goal: Will show no infection signs and symptoms  Description: Will show no infection signs and symptoms  2/18/2021 1316 by Christiano Cabrales RN  Outcome: Ongoing     Problem: Skin Integrity:  Goal: Absence of new skin breakdown  Description: Absence of new skin breakdown  2/18/2021 1316 by Christiano Cabrales RN  Outcome: Ongoing     Problem: Nutrition  Goal: Optimal nutrition therapy  2/18/2021 1316 by Christiano Cabrales RN  Outcome: Ongoing     Problem: Falls - Risk of:  Goal: Will remain free from falls  Description: Will remain free from falls  2/18/2021 1316 by Christiano Cabrales RN  Outcome: Ongoing  Note: Patient will remain free from falls. Fall risk assessment completed. Call light and personal items within reach. Bed locked, in lowest position, side rails up x2. Non-skid socks in place. Bed/chair alarm in place. Room free of clutter. Hourly rounding implemented. Will continue to monitor.     Problem: Falls - Risk of:  Goal: Absence of physical injury  Description: Absence of physical injury  2/18/2021 1316 by Christiano Cabrales RN  Outcome: Ongoing     Problem: Breathing Pattern - Ineffective:  Goal: Ability to achieve and maintain a regular respiratory rate will improve  Description: Ability to achieve and maintain a regular respiratory rate will improve  2/18/2021 1316 by Christiano Cabrales RN  Outcome: Ongoing

## 2021-02-18 NOTE — PROGRESS NOTES
Physical Therapy  Facility/Department: [facility] PROGRESS[facility] CARE  Daily Treatment Note  NAME: Ger Johnson  : 3/26/1932  MRN: [id number]    Date of Service: 2021    Discharge Recommendations:  Subacute/Skilled Nursing Facility   PT Equipment Recommendations  Equipment Needed: No    Assessment   Body structures, Functions, Activity limitations: Decreased functional mobility ; Decreased safe awareness;Decreased endurance;Decreased balance  Assessment: Recommend SNF due to patient only able to amb 5 feet with high flow due to 02 desat. Patient will benefit from skilled PT to improve gait, transfers, balance, strenght, and endurance. Prognosis: Good  Decision Making: High Complexity  Exam: ROM, MMT, functional mobility, activity tolerance, Balance, & [employer] AM-PAC 6 Clicks Basic Mobility  Clinical Presentation: unstable  Patient Education: Ed pt on functional mobility, safety awareness, importance of being up & OOB to regain strength, & prevention of sedentary complications,  & optimal breathing techniques  REQUIRES PT FOLLOW UP: Yes  Activity Tolerance  Activity Tolerance: Patient limited by endurance  Activity Tolerance: High flow     Patient Diagnosis(es): The encounter diagnosis was Acute congestive heart failure, unspecified heart failure type ([address] has a past medical history of Acute on chronic diastolic CHF (congestive heart failure) ([address] Bradycardia, CAD (coronary artery disease), Cancer ([address] Depression, Gallstones, GERD (gastroesophageal reflux disease), Hiatal hernia, History of cardiac cath, Hyperlipidemia, Hypertension, Insomnia, MI (myocardial infarction) ([address] PNA (pneumonia), SOB (shortness of breath), and UTI (urinary tract infection). has a past surgical history that includes Appendectomy; Cholecystectomy; Hysterectomy; Coronary angioplasty with stent; Colonoscopy; Coronary artery bypass graft (2016);  Cardiac surgery (2016); and Cardioversion (12/17/2019). Restrictions  Restrictions/Precautions  Restrictions/Precautions: General Precautions, Fall Risk, Up as Tolerated  Required Braces or Orthoses?: No  Position Activity Restriction  Other position/activity restrictions: telemetry, HFNC, cont SPO2 monitor, IV site  Subjective   General  Chart Reviewed: Yes  Additional Pertinent Hx: CHF, CAD, HLPD, HTN, UTI, SOB  Response To Previous Treatment: Patient with no complaints from previous session. Family / Caregiver Present: No  Subjective  Subjective: Patient pleasant and agreeable to PT. General Comment  Comments: RN Katie reports patient medically appropriate for PT, still on high flow, but stable. Pain Screening  Patient Currently in Pain: Denies  Vital Signs  Patient Currently in Pain: Denies       Orientation  Orientation  Overall Orientation Status: Within Functional Limits  Cognition      Objective   Bed mobility  Comment: Patient up in side chair upon arrival.  Transfers  Sit to Stand: Contact guard assistance  Stand to sit: Contact guard assistance  Bed to Chair: Unable to assess  Comment: Patient uses RW for support and balance and for energy conservation during transfers  Ambulation  Ambulation?: Yes  More Ambulation?: No  Ambulation 1  Surface: level tile  Device: Rolling Walker  Assistance: Contact guard assistance  Quality of Gait: Patient with good self pacing techniques and restbreaks. Patient SpO2 drops from 96 to 85% during mobility tasks. Patient recovers to 90% with 5 minute seated rest break. Gait Deviations: Slow Lizeth; Increased LO; Decreased step length;Decreased step height;Decreased head and trunk rotation  Distance: 5 feet forward  x 3  and back with rest between each ambulation to recover SpO2 from mid 80's back up to mid to high 90%. Comments: rest between each ambulation to recover SpO2 from mid 80's back up to mid to high 90%. Patient with good safety awareness.   Patient educated on pursed lip breathing techniques to recover SpO2 to 96%  Neuromuscular Education  NDT Treatment: Standing  Neuromuscular Comments: Patient performs weight shifts and standing marches x10 to facilitate good postural alignment and facilitate balance stratigies during gait activities. Balance  Posture: Fair  Sitting - Static: Good  Sitting - Dynamic: Good  Standing - Static: Fair;+  Standing - Dynamic: Fair  Comments: Standing with RW  Exercises  Comments: Patient completes ABRAHAM LE exercises 1 set x 10 reps with rest breaks and education on pursed lip breathing techniques to increase tolerance to activity and work on self pacing techniques. Patient with good verbal understanding of techniques and energy conservation techniques. Patient limited by fatigue and decreased endurance. AM-PAC Score  AM-PAC Inpatient Mobility Raw Score : 14 (02/18/21 1151)  AM-PAC Inpatient T-Scale Score : 38.1 (02/18/21 1151)  Mobility Inpatient CMS 0-100% Score: 61.29 (02/18/21 1151)  Mobility Inpatient CMS G-Code Modifier : CL (02/18/21 1151)          Goals  Short term goals  Time Frame for Short term goals: 12 visits  Short term goal 1: Inc bed-mobility & transfers to independent to enable pt to safely get in/OOB  Short term goal 2: Inc gait to amb 200 ft with R/walker indep; Short term goal 3: Pt able to tolerate 30-40 min of activity to include 15-20 reps of ex & functional mobility including 5 minutes of standing to facilitate better activity tolerance  Short term goal 5: Ed pt on home ex's, optimal breathing techniques, safety & energy principles, fall prevention & issue written pt education  Patient Goals   Patient goals :  To go and be with grandkids    Plan    Plan  Times per week: 1-2x/D, 5-6D/week  Current Treatment Recommendations: Strengthening, Balance Training, Functional Mobility Training, Transfer Training, Gait Training, Endurance Training, Home Exercise Program, Safety Education & Training, Patient/Caregiver Education & Training  Safety Devices  Type of devices: Call light within reach, Gait belt, Patient at risk for falls, Left in chair  Restraints  Initially in place: No     Therapy Time   Individual Concurrent Group Co-treatment   Time In 1125         Time Out 1152         Minutes Roseline Ahmadi

## 2021-02-18 NOTE — PROGRESS NOTES
Progress note  [facility]    Adult Hospitalist      Name: Jake Juarez  MRN: [id number]     Acct: [de-identified]  Room: [address] [place] [address]    Admit Date: 2/8/2021  6:28 PM  PCP: Nia Miles MD    Primary Problem  Active Problems:    Acute left-sided CHF (congestive heart failure) ([address]  Resolved Problems:    * No resolved hospital problems.  *        Assesment:   Acute on chronic hypoxemic respiratory failure  Acute on chronic diastolic congestive heart failure  Pulmonary edema  Aspiration pneumonia  Acute kidney injury  Coronary artery disease status post CABG in 2016  Chronic atrial fibrillation  Chronic anticoagulation with Eliquis  Essential hypertension  Mixed hyperlipidemia  Mild pulmonary hypertension  Depression with anxiety  Former smoker  GERD  Covid ruled out      Plan:   Admit patient to progressive unit  Oxygen keep SPO2 more than 90%  BiPAP as needed  Blood culture no growth  BiPAP as needed  Oral azithromycin, completed  Appreciate Nephrology assistance, IV diuretics have been increased to Lasix 20 IV b.i.d.   DuoNeb breathing treatment  Continue Eliquis  Continue lisinopril, metoprolol, Imdur, amiodarone  Continue pravastatin  Continue Zoloft  Pulmonology on consult, appreciated  Consult cardiology, appreciated  [address] nephrology assistance  Patient continues on high-flow nasal cannula oxygen, 30 L with 50% FiO2, requirement has come down since yesterday  IV cefepime  D/w Pt   Pureed food now  Continue other medication as below      Scheduled Meds:   apixaban  2.5 mg Oral BID    furosemide  20 mg Intravenous Daily    methylPREDNISolone  40 mg Intravenous Q12H    cefepime  1,000 mg Intravenous Q12H    isosorbide mononitrate  30 mg Oral QAM    metoprolol tartrate  25 mg Oral BID    oxybutynin  5 mg Oral Every Other Day    pantoprazole  40 mg Oral QAM AC    pravastatin  40 mg Oral Daily    sertraline  100 mg Oral Daily    latanoprost  1 drop Both Eyes Nightly    sodium chloride oxybutynin (DITROPAN) 5 MG tablet Take 5 mg by mouth every other day    Yes Historical Provider, MD   pravastatin (PRAVACHOL) 40 MG tablet Take 40 mg by mouth daily    Yes Historical Provider, MD   pantoprazole (PROTONIX) 40 MG tablet Take 40 mg by mouth every morning (before breakfast)    Yes Historical Provider, MD   aspirin 81 MG EC tablet Take 81 mg by mouth daily    Historical Provider, MD        Allergies:       Seasonal    Social History:     Tobacco:    reports that she quit smoking about 49 years ago. Her smoking use included cigarettes. She has never used smokeless tobacco.  Alcohol:      reports current alcohol use. Drug Use:  reports no history of drug use.     Family History:     Family History   Problem Relation Age of Onset    Heart Disease Mother     Cancer Mother     Heart Disease Father          Physical Exam:     Vitals:  /60   Pulse 58   Temp 97 °F (36.1 °C) (Oral)   Resp 20   Ht 5' 5\" (1.651 m)   Wt 127 lb 9 oz (57.9 kg)   SpO2 93%   BMI 21.23 kg/m²   Temp (24hrs), Av.7 °F (36.5 °C), Min:97 °F (36.1 °C), Max:98.1 °F (36.7 °C)          General appearance - alert, malnourished, and in mild acute distress  Mental status - oriented to person, place, and time with normal affect  Head - normocephalic and atraumatic  Eyes - pupils equal and reactive, extraocular eye movements intact, conjunctiva clear  Ears - hearing appears to be intact  Nose - no drainage noted  Mouth - mucous membranes moist  Neck - supple, no carotid bruits, thyroid not palpable  Chest - BL basilar crackles, increased effort  Heart - normal rate, regular rhythm,  Abdomen - soft, nontender, nondistended, bowel sounds present all four quadrants, no masses, hepatomegaly or splenomegaly  Neurological - normal speech, no focal findings or movement disorder noted, cranial nerves II through XII grossly intact  Extremities - peripheral pulses palpable, no pedal edema or calf pain with palpation  Skin - no gross lesions, rashes, or induration noted        Data:     Labs:    Hematology:  Recent Labs     02/16/21  0608 02/17/21  0531 02/18/21  0505   WBC 12.7* 15.7* 16.0*   RBC 3.78* 3.70* 3.75*   HGB 10.0* 9.8* 10.0*   HCT 33.8* 33.0* 33.5*   MCV 89.4 89.2 89.3   MCH 26.5 26.5 26.7   MCHC 29.6 29.7 29.9   RDW 16.2* 16.3* 16.2*    318 300   MPV 12.0 12.1 11.5     Chemistry:  Recent Labs     02/16/21 0608 02/17/21  0531 02/18/21  0505    143 145*   K 4.8 4.6 5.1    102 104   CO2 36* 34* 38*   GLUCOSE 176* 179* 167*   BUN 50* 52* 48*   CREATININE 0.85 0.82 0.78   MG 1.9  --   --    ANIONGAP 8* 7* 3*   LABGLOM >60 >60 >60   GFRAA >60 >60 >60   CALCIUM 8.8 8.4* 8.6   PHOS 4.2 3.5  --      Recent Labs     02/16/21 1955   POCGLU 207*       Lab Results   Component Value Date    INR 1.1 10/02/2019    INR 1.2 11/11/2016    INR 1.2 11/10/2016    PROTIME 11.2 10/02/2019    PROTIME 12.3 (H) 11/11/2016    PROTIME 12.7 (H) 11/10/2016       Lab Results   Component Value Date/Time    SPECIAL NOT REPORTED 02/08/2021 07:45 PM    SPECIAL NOT REPORTED 02/08/2021 07:45 PM     Lab Results   Component Value Date/Time    CULTURE NO GROWTH 6 DAYS 02/08/2021 07:45 PM    CULTURE NO GROWTH 6 DAYS 02/08/2021 07:45 PM       Lab Results   Component Value Date    POCPH 7.35 11/09/2016    POCPCO2 41 11/09/2016    POCPO2 85 11/09/2016    POCHCO3 22.4 11/09/2016    NBEA 3 11/09/2016    PBEA NOT REPORTED 11/09/2016    FFG7FCZ 24 11/09/2016    IBBB5UNB 96 11/09/2016    FIO2 NOT REPORTED 10/02/2019       Radiology:    Xr Chest Portable    Result Date: 2/9/2021  Stable interstitial opacities with superimposed patchy airspace disease probably combination of factors including chronic interstitial disease, vascular congestion and pneumonia. Xr Chest Portable    Result Date: 2/8/2021  Progressive bilateral patchy interstitial and alveolar infiltrates greater on the left.   Overall findings suggest progressive pulmonary vascular congestion with possible superimposed bilateral pneumonia. All radiological studies reviewed                Code Status:  DNR-CCA    Electronically signed by Eli Talley MD on 2/18/2021 at 7:37 AM     Copy sent to Dr. Ebonie Willis MD    This note was created with the assistance of a speech-recognition program.  Although the intention is to generate a document that actually reflects the content of the visit, no guarantees can be provided that every mistake has been identified and corrected by editing. Note was updated later by me after  physical examination and  completion of the assessment.

## 2021-02-18 NOTE — PROGRESS NOTES
Progress Note    Reason for Consult: ELICEO    Requesting Physician:  Sommer Melissa MD    INTERVAL HISTORY:   Continues to require high flow oxygen  Creatinine in normal range  BP stable. K 5.1,   She has been eating mostly mashed potatoes and drinking OJ. HISTORY OF PRESENT ILLNESS:    The patient is a 80 y.o. female who presents with worsening shortness of breath. Initial chest x-ray showed progressive pulmonary vascular congestion with possible superimposed bilateral pneumonia. Chest x-ray today showed bilateral airspace disease slightly increased at the right apex compared to prior. Creatinine baseline 0.6 which she was at on admission and yesterday. Creatinine elevated to 1.46 today. Hypokalemia on admission and yesterday. Potassium was 3.8 today. Recent hemoglobin 9.6. WBC was 14.4 today and today 12.0. SBP was originally trending as high as 214/75 on day of admission. She became hypotensive at 99/78 about 8 hours later. SBP trending 100s to 130s today. She originally was requiring nasal cannula and is now on high flow. Home medications include lisinopril, Lasix, metoprolol, amiodarone. She is currently on Lasix 20 mg IV twice daily, lisinopril, metoprolol, amiodarone. She has been up to the bedside commode voiding. No urinary retention on bladder scan       Prior to Admission medications    Medication Sig Start Date End Date Taking?  Authorizing Provider   lisinopril (PRINIVIL;ZESTRIL) 2.5 MG tablet Take 1 tablet by mouth daily 12/12/20  Yes Sommer Melissa MD   furosemide (LASIX) 40 MG tablet Take 1 tablet by mouth daily 12/11/20  Yes SERA Tang CNP   metoprolol tartrate (LOPRESSOR) 25 MG tablet Take 25 mg by mouth 2 times daily  11/4/19  Yes Historical Provider, MD   amiodarone (CORDARONE) 200 MG tablet Take 200 mg by mouth daily   Yes Historical Provider, MD   apixaban (ELIQUIS) 5 MG TABS tablet Take 1 tablet by mouth 2 times daily 10/5/19  Yes Breanne Shirley MD travoprost, benzalkonium, (TRAVATAN) 0.004 % ophthalmic solution Place 1 drop into the right eye nightly   Yes Historical Provider, MD   isosorbide mononitrate (IMDUR) 30 MG extended release tablet Take 30 mg by mouth every morning   Yes Historical Provider, MD   sertraline (ZOLOFT) 100 MG tablet Take 100 mg by mouth daily    Yes Historical Provider, MD   oxybutynin (DITROPAN) 5 MG tablet Take 5 mg by mouth every other day    Yes Historical Provider, MD   pravastatin (PRAVACHOL) 40 MG tablet Take 40 mg by mouth daily    Yes Historical Provider, MD   pantoprazole (PROTONIX) 40 MG tablet Take 40 mg by mouth every morning (before breakfast)    Yes Historical Provider, MD   aspirin 81 MG EC tablet Take 81 mg by mouth daily    Historical Provider, MD       Scheduled Meds:   apixaban  2.5 mg Oral BID    furosemide  20 mg Intravenous Daily    methylPREDNISolone  40 mg Intravenous Q12H    cefepime  1,000 mg Intravenous Q12H    isosorbide mononitrate  30 mg Oral QAM    metoprolol tartrate  25 mg Oral BID    oxybutynin  5 mg Oral Every Other Day    pantoprazole  40 mg Oral QAM AC    pravastatin  40 mg Oral Daily    sertraline  100 mg Oral Daily    latanoprost  1 drop Both Eyes Nightly    sodium chloride flush  10 mL Intravenous 2 times per day    ipratropium-albuterol  1 ampule Inhalation Q4H WA     Continuous Infusions:  PRN Meds:sodium chloride, sodium chloride flush, promethazine **OR** ondansetron, nicotine, polyethylene glycol, acetaminophen **OR** acetaminophen, albuterol, hydrALAZINE     Physical Exam:  Vitals:    02/18/21 0601 02/18/21 0604 02/18/21 0724 02/18/21 1305   BP:   127/60 113/65   Pulse:   58 63   Resp:  20 20 24   Temp:   97 °F (36.1 °C) 98.4 °F (36.9 °C)   TempSrc:   Oral Oral   SpO2:  97% 93% 93%   Weight: 127 lb 9 oz (57.9 kg)      Height:         I/O last 3 completed shifts:   In: 950 [P.O.:950]  Out: 900 [Urine:900]    General:  Awake, alert, not in distress, and comfortable on high flow.  Appears to be stated age. HEENT: Atraumatic, normocephalic. Anicteric sclera. Pink and moist oral mucosa. Neck supple. No JVD. Chest: Bilateral air entry, clear to auscultation, no wheezing, rhonchi or rales. Cardiovascular: RRR, S1S2, no murmur, rub or gallop. No lower extremity edema. Abdomen: Soft, non tender to palpation. Musculoskeletal:  No cyanosis or clubbing. Integumentary: Pink, warm and dry. Free from rash or lesions. Skin turgor normal.  CNS: Oriented to person, place and time. Speech clear. Face symmetrical. No tremor.      Data:    CBC:   Lab Results   Component Value Date    WBC 16.0 (H) 02/18/2021    HGB 10.0 (L) 02/18/2021    HCT 33.5 (L) 02/18/2021    MCV 89.3 02/18/2021     02/18/2021     BMP:    Lab Results   Component Value Date     (H) 02/18/2021     02/17/2021     02/16/2021    K 5.1 02/18/2021    K 4.6 02/17/2021    K 4.8 02/16/2021     02/18/2021     02/17/2021     02/16/2021    CO2 38 (H) 02/18/2021    CO2 34 (H) 02/17/2021    CO2 36 (H) 02/16/2021    BUN 48 (H) 02/18/2021    BUN 52 (H) 02/17/2021    BUN 50 (H) 02/16/2021    CREATININE 0.78 02/18/2021    CREATININE 0.82 02/17/2021    CREATININE 0.85 02/16/2021    GLUCOSE 167 (H) 02/18/2021    GLUCOSE 179 (H) 02/17/2021    GLUCOSE 176 (H) 02/16/2021     CMP:   Lab Results   Component Value Date     02/18/2021    K 5.1 02/18/2021     02/18/2021    CO2 38 02/18/2021    BUN 48 02/18/2021    CREATININE 0.78 02/18/2021    GLUCOSE 167 02/18/2021    CALCIUM 8.6 02/18/2021    PROT 6.1 05/01/2020    LABALBU 3.3 05/01/2020    BILITOT 0.71 05/01/2020    ALKPHOS 72 05/01/2020    AST 16 05/01/2020    ALT 8 05/01/2020      Hepatic:   Lab Results   Component Value Date    AST 16 05/01/2020    AST 67 (H) 10/02/2019    AST 18 11/07/2016    ALT 8 05/01/2020    ALT 63 (H) 10/02/2019    ALT 12 11/07/2016    BILITOT 0.71 05/01/2020    BILITOT 0.82 10/02/2019    BILITOT 0.39 11/07/2016 ALKPHOS 72 05/01/2020    ALKPHOS 103 10/02/2019    ALKPHOS 70 11/07/2016     BNP: No results found for: BNP  Lipids:   Lab Results   Component Value Date    CHOL 109 12/08/2020    HDL 38 (L) 12/08/2020     INR:   Lab Results   Component Value Date    INR 1.1 10/02/2019    INR 1.2 11/11/2016    INR 1.2 11/10/2016     PTH: No results found for: PTH  Phosphorus:    Lab Results   Component Value Date    PHOS 3.5 02/17/2021     Ionized Calcium: No results found for: IONCA  Magnesium:   Lab Results   Component Value Date    MG 1.9 02/16/2021     Albumin:   Lab Results   Component Value Date    LABALBU 3.3 05/01/2020     Last 3 CK, CKMB, Troponin: @LABNT(CKTOTAL:3,CKMB:3,TROPONINI:3)       URINE:)No results found for: Sheyla Reis    Radiology:   Reviewed. Assessment:  Acute Kidney Injury, likely hemodynamically related. FeNa 0.26%. Cr is better. Baseline Creatinine 0.6. Hypernatremia. Hyper kalemia  Hypertension with recent hypotension. CHF exacerbation. Multifocal pneumonia. Hypoxic respiratory failure. Plan: Tolerating current dose of Lasix. Off lisinopril. BP stable. Serum sodium trending higher today. Will monitor. DC FR as SNA increasing. On dental soft with honey thickened liquids. K increasing. Change to low K diet for now. Educated on high K foods. Diet is limited with current food choices. Once K improved, can resume diet without K restriction. Monitor blood pressure and heart rate. On amiodarone and isosorbide. Strict I&O. Antibiotics per primary and pulmonary. Avoid nephrotoxic drugs and IV contrast exposure. Follow up chemistries. Please do not hesitate to contact us for any further questions/concerns. We will continue to follow along with you. Pt seen in collaboration with Dr. Anita Enciso. Electronically signed by SERA Gonzalez CNP  on 2/18/2021 at 2:54 PM      Physician Addendum  I have seen and examined pt at bed side.    I reviewed and agree with CNP's note.  I performed all key and critical portions of this evaluation. I agree with the plan of care as noted above.   Low potassium diet  Discontinue fluid restriction  Monitor electrolytes closely    Electronically signed by Georgene Galeazzi MD on 02/18/21 4:39 PM

## 2021-02-19 ENCOUNTER — APPOINTMENT (OUTPATIENT)
Dept: GENERAL RADIOLOGY | Age: 86
DRG: 291 | End: 2021-02-19
Payer: MEDICARE

## 2021-02-19 LAB
ANGIOTENSIN-CONVERTING ENZYME: 24 U/L (ref 8–52)
ANION GAP SERPL CALCULATED.3IONS-SCNC: 7 MMOL/L (ref 9–17)
BUN BLDV-MCNC: 41 MG/DL (ref 8–23)
BUN/CREAT BLD: 59 (ref 9–20)
CALCIUM SERPL-MCNC: 8.2 MG/DL (ref 8.6–10.4)
CHLORIDE BLD-SCNC: 102 MMOL/L (ref 98–107)
CO2: 36 MMOL/L (ref 20–31)
CREAT SERPL-MCNC: 0.7 MG/DL (ref 0.5–0.9)
GFR AFRICAN AMERICAN: >60 ML/MIN
GFR NON-AFRICAN AMERICAN: >60 ML/MIN
GFR SERPL CREATININE-BSD FRML MDRD: ABNORMAL ML/MIN/{1.73_M2}
GFR SERPL CREATININE-BSD FRML MDRD: ABNORMAL ML/MIN/{1.73_M2}
GLUCOSE BLD-MCNC: 158 MG/DL (ref 70–99)
HCT VFR BLD CALC: 33.2 % (ref 36.3–47.1)
HEMOGLOBIN: 9.9 G/DL (ref 11.9–15.1)
MCH RBC QN AUTO: 26.4 PG (ref 25.2–33.5)
MCHC RBC AUTO-ENTMCNC: 29.8 G/DL (ref 28.4–34.8)
MCV RBC AUTO: 88.5 FL (ref 82.6–102.9)
NRBC AUTOMATED: 0 PER 100 WBC
PDW BLD-RTO: 16.3 % (ref 11.8–14.4)
PHOSPHORUS: 3.5 MG/DL (ref 2.6–4.5)
PLATELET # BLD: 297 K/UL (ref 138–453)
PMV BLD AUTO: 11.9 FL (ref 8.1–13.5)
POTASSIUM SERPL-SCNC: 5 MMOL/L (ref 3.7–5.3)
PROCALCITONIN: 0.13 NG/ML
RBC # BLD: 3.75 M/UL (ref 3.95–5.11)
RHEUMATOID FACTOR: <10 IU/ML
SODIUM BLD-SCNC: 145 MMOL/L (ref 135–144)
WBC # BLD: 19.7 K/UL (ref 3.5–11.3)

## 2021-02-19 PROCEDURE — 6370000000 HC RX 637 (ALT 250 FOR IP): Performed by: FAMILY MEDICINE

## 2021-02-19 PROCEDURE — 80048 BASIC METABOLIC PNL TOTAL CA: CPT

## 2021-02-19 PROCEDURE — 6360000002 HC RX W HCPCS: Performed by: INTERNAL MEDICINE

## 2021-02-19 PROCEDURE — 2700000000 HC OXYGEN THERAPY PER DAY

## 2021-02-19 PROCEDURE — 86431 RHEUMATOID FACTOR QUANT: CPT

## 2021-02-19 PROCEDURE — 82164 ANGIOTENSIN I ENZYME TEST: CPT

## 2021-02-19 PROCEDURE — 2580000003 HC RX 258: Performed by: FAMILY MEDICINE

## 2021-02-19 PROCEDURE — 84145 PROCALCITONIN (PCT): CPT

## 2021-02-19 PROCEDURE — 86225 DNA ANTIBODY NATIVE: CPT

## 2021-02-19 PROCEDURE — 36415 COLL VENOUS BLD VENIPUNCTURE: CPT

## 2021-02-19 PROCEDURE — 85027 COMPLETE CBC AUTOMATED: CPT

## 2021-02-19 PROCEDURE — 97530 THERAPEUTIC ACTIVITIES: CPT

## 2021-02-19 PROCEDURE — 84100 ASSAY OF PHOSPHORUS: CPT

## 2021-02-19 PROCEDURE — 94761 N-INVAS EAR/PLS OXIMETRY MLT: CPT

## 2021-02-19 PROCEDURE — 71045 X-RAY EXAM CHEST 1 VIEW: CPT

## 2021-02-19 PROCEDURE — 97110 THERAPEUTIC EXERCISES: CPT

## 2021-02-19 PROCEDURE — 6360000002 HC RX W HCPCS: Performed by: FAMILY MEDICINE

## 2021-02-19 PROCEDURE — 94640 AIRWAY INHALATION TREATMENT: CPT

## 2021-02-19 PROCEDURE — 2060000000 HC ICU INTERMEDIATE R&B

## 2021-02-19 PROCEDURE — 97116 GAIT TRAINING THERAPY: CPT

## 2021-02-19 PROCEDURE — 86038 ANTINUCLEAR ANTIBODIES: CPT

## 2021-02-19 PROCEDURE — 83516 IMMUNOASSAY NONANTIBODY: CPT

## 2021-02-19 RX ORDER — METHYLPREDNISOLONE SODIUM SUCCINATE 40 MG/ML
40 INJECTION, POWDER, LYOPHILIZED, FOR SOLUTION INTRAMUSCULAR; INTRAVENOUS EVERY 6 HOURS
Status: DISCONTINUED | OUTPATIENT
Start: 2021-02-19 | End: 2021-02-21

## 2021-02-19 RX ADMIN — PRAVASTATIN SODIUM 40 MG: 40 TABLET ORAL at 10:04

## 2021-02-19 RX ADMIN — SODIUM CHLORIDE, PRESERVATIVE FREE 10 ML: 5 INJECTION INTRAVENOUS at 20:59

## 2021-02-19 RX ADMIN — FUROSEMIDE 20 MG: 10 INJECTION, SOLUTION INTRAMUSCULAR; INTRAVENOUS at 10:03

## 2021-02-19 RX ADMIN — METHYLPREDNISOLONE SODIUM SUCCINATE 40 MG: 40 INJECTION, POWDER, FOR SOLUTION INTRAMUSCULAR; INTRAVENOUS at 20:58

## 2021-02-19 RX ADMIN — SODIUM CHLORIDE, PRESERVATIVE FREE 10 ML: 5 INJECTION INTRAVENOUS at 10:05

## 2021-02-19 RX ADMIN — LATANOPROST 1 DROP: 50 SOLUTION OPHTHALMIC at 20:59

## 2021-02-19 RX ADMIN — METHYLPREDNISOLONE SODIUM SUCCINATE 40 MG: 40 INJECTION, POWDER, FOR SOLUTION INTRAMUSCULAR; INTRAVENOUS at 10:03

## 2021-02-19 RX ADMIN — ISOSORBIDE MONONITRATE 30 MG: 30 TABLET ORAL at 10:03

## 2021-02-19 RX ADMIN — CEFEPIME HYDROCHLORIDE 1000 MG: 1 INJECTION, POWDER, FOR SOLUTION INTRAMUSCULAR; INTRAVENOUS at 13:39

## 2021-02-19 RX ADMIN — IPRATROPIUM BROMIDE AND ALBUTEROL SULFATE 1 AMPULE: .5; 3 SOLUTION RESPIRATORY (INHALATION) at 14:54

## 2021-02-19 RX ADMIN — IPRATROPIUM BROMIDE AND ALBUTEROL SULFATE 1 AMPULE: .5; 3 SOLUTION RESPIRATORY (INHALATION) at 10:59

## 2021-02-19 RX ADMIN — IPRATROPIUM BROMIDE AND ALBUTEROL SULFATE 1 AMPULE: .5; 3 SOLUTION RESPIRATORY (INHALATION) at 20:45

## 2021-02-19 RX ADMIN — OXYBUTYNIN CHLORIDE 5 MG: 5 TABLET ORAL at 10:04

## 2021-02-19 RX ADMIN — APIXABAN 2.5 MG: 2.5 TABLET, FILM COATED ORAL at 20:58

## 2021-02-19 RX ADMIN — METHYLPREDNISOLONE SODIUM SUCCINATE 40 MG: 40 INJECTION, POWDER, FOR SOLUTION INTRAMUSCULAR; INTRAVENOUS at 16:26

## 2021-02-19 RX ADMIN — IPRATROPIUM BROMIDE AND ALBUTEROL SULFATE 1 AMPULE: .5; 3 SOLUTION RESPIRATORY (INHALATION) at 06:28

## 2021-02-19 RX ADMIN — APIXABAN 2.5 MG: 2.5 TABLET, FILM COATED ORAL at 10:03

## 2021-02-19 RX ADMIN — NYSTATIN 500000 UNITS: 100000 SUSPENSION ORAL at 20:58

## 2021-02-19 RX ADMIN — SERTRALINE HYDROCHLORIDE 100 MG: 50 TABLET ORAL at 10:04

## 2021-02-19 RX ADMIN — PANTOPRAZOLE SODIUM 40 MG: 40 TABLET, DELAYED RELEASE ORAL at 05:32

## 2021-02-19 RX ADMIN — CEFEPIME HYDROCHLORIDE 1000 MG: 1 INJECTION, POWDER, FOR SOLUTION INTRAMUSCULAR; INTRAVENOUS at 02:02

## 2021-02-19 ASSESSMENT — PAIN SCALES - GENERAL: PAINLEVEL_OUTOF10: 0

## 2021-02-19 NOTE — PROGRESS NOTES
.. PALLIATIVE CARE NURSING ASSESSMENT    Patient: Jose Curry  Room: 1026/1026-    Reason For Consult   Goals of care evaluation  Distress management  Guidance and support  Facilitate communications  Assistance in coordinating care      Impression: Jose Curry is a 80y.o. year old female  has a past medical history of Acute on chronic diastolic CHF (congestive heart failure) ([facility] Bradycardia, CAD (coronary artery disease), [facility] ([facility] Depression, Gallstones, GERD (gastroesophageal reflux disease), Hiatal hernia, History of cardiac cath, Hyperlipidemia, Hypertension, Insomnia, MI (myocardial infarction) ([facility] PNA (pneumonia), SOB (shortness of breath), and UTI (urinary tract infection). .  Currently hospitalized for the management of acute on chronic respiratory failure . The Palliative Care Team is following to assist with goals of care/family support. Vital Signs  Blood pressure (!) 125/53, pulse 58, temperature 98.1 °F (36.7 °C), temperature source Oral, resp. rate 16, height 5' 5\" (1.651 m), weight 131 lb 2 oz (59.5 kg), SpO2 96 %, not currently breastfeeding. Patient Active Problem List   Diagnosis    Symptomatic bradycardia    Suspected sleep apnea    Unexplained night sweats    Pneumonia    GERD (gastroesophageal reflux disease)    Hyperlipidemia    Hypertension    Chronic atrial fibrillation (HCC)    Acute on chronic diastolic CHF (congestive heart failure) (HCC)    Generalized anxiety disorder    Depressive disorder    Nonrheumatic tricuspid valve disorder    Atherosclerotic heart disease of native coronary artery without angina pectoris    Heart failure, diastolic, acute on chronic ([facility]    Suspected COVID-19 virus infection    Acute left-sided CHF (congestive heart failure) (HCC)    Mild malnutrition (HCC)       Palliative Interaction: Pt sitting up in the chair, she continues on high flow O2 and her oxygen demands have gone up since yesterday.  She is currently on 60% high flow (40L). She appears SOB with talking but no distress noted at rest. According to staff, she decompensates when getting up to use the commode. Pt is alert and oriented. Her son is present visiting with her. I introduced myself to patient's son Evelin Hung at bedside. I explained the role of palliative care to him. I explained that I spoke with his sister Tali Dias yesterday on the phone and we discussed palliative care. I explained I am dropping off PC brochures for them to look over. I also told him Tali Dias was going to check to see if they have PC at [facility]. He took the brochures and said they will them over. I asked the patient how she is feeling. She is discouraged from her breathing not improving. Her son states, Kylie Marina other time she has been in the hospital, she has been in and out in a few days\". I explained that as people age, it can take longer to recover from illness. Son states patient was doing other people's laundry 3 days before coming into the hospital. States she was very active. Emotional support offered to son and patient and encouragement given that patient needs more time. I asked the patient if she wants to continue on this path of aggressive treatment, she states, \"yes, I have to, for them\" as she points to her family picture. She states, \"I have a lot to live for\". I told her if she changes her mind on aggressive treatment, she needs to let us know. Will continue to follow for support.       Goals/Plan of care  Education/support to family  Education/support to patient  Discharge planning/helping to coordinate care  Communications with primary service  Providing support for coping/adaptation/distress of family  Providing support for coping/adaptation/distress of patient  Discussing meaning/purpose   Continue with current plan of care  Clarification of medical condition to patient and family  Code status clarified: [facility]  Validating patient/family distress  Continued communication

## 2021-02-19 NOTE — PROGRESS NOTES
Section of Cardiology  Progress Note      Date:  2/19/2021  Patient: January Green  Admission:  2/8/2021  6:28 PM  Admit DX: Acute left-sided [facility]congest[facility] [I50.1]  Age:  80 y.o., 3/26/1932     LOS: 11 days     Reason for evaluation:   atrial fibrillation, CHF and coronary artery disease      SUBJECTIVE:     The patient was seen and examined. Notes and labs reviewed. The patient reports that her breathing was improved today when she got up to the chair when compared to yesterday. Denies chest pain and palpitations. OBJECTIVE:      EXAM:   Vitals:    VITALS:  BP (!) 125/53   Pulse 58   Temp 98.1 °F (36.7 °C) (Oral)   Resp 16   Ht 5' 5\" (1.651 m)   Wt 131 lb 2 oz (59.5 kg)   SpO2 96%   BMI 21.82 kg/m²   24HR INTAKE/OUTPUT:      Intake/Output Summary (Last 24 hours) at 2/19/2021 1208  Last data filed at 2/19/2021 4054  Gross per 24 hour   Intake 540 ml   Output 400 ml   Net 140 ml       CONSTITUTIONAL: Awake, sitting in the chair, no signs of acute distress  HEENT: Normal jugular venous pulsations  LUNGS: Dry bibasilar rales otherwise clear, nonlabored  CARDIOVASCULAR:  regular rate and rhythm, normal S1 and S2, no S3 or S4, and 1/6 early systolic murmur at the aortic area   SKIN: Warm and dry.   EXTREMITIES: No leg edema    Current Inpatient Medications:   apixaban  2.5 mg Oral BID    furosemide  20 mg Intravenous Daily    methylPREDNISolone  40 mg Intravenous Q12H    cefepime  1,000 mg Intravenous Q12H    isosorbide mononitrate  30 mg Oral QAM    metoprolol tartrate  25 mg Oral BID    oxybutynin  5 mg Oral Every Other Day    pantoprazole  40 mg Oral QAM AC    pravastatin  40 mg Oral Daily    sertraline  100 mg Oral Daily    latanoprost  1 drop Both Eyes Nightly    sodium chloride flush  10 mL Intravenous 2 times per day    ipratropium-albuterol  1 ampule Inhalation Q4H WA       IV Infusions (if any):      Diagnostics:   Telemetry: Sinus rhythm     Labs: CBC:  Recent Labs     02/18/21  0505 02/19/21  0541   WBC 16.0* 19.7*   HGB 10.0* 9.9*   HCT 33.5* 33.2*    297     Magnesium:  No results for input(s): MG in the last 72 hours. BMP:  Recent Labs     02/18/21  0505 02/19/21  0541   * 145*   K 5.1 5.0   CALCIUM 8.6 8.2*   CO2 38* 36*   BUN 48* 41*   CREATININE 0.78 0.70   LABGLOM >60 >60   GLUCOSE 167* 158*     BNP:No results for input(s): BNP, PROBNP in the last 72 hours. PT/INR:No results for input(s): PROTIME, INR in the last 72 hours. APTT:No results for input(s): APTT in the last 72 hours. CARDIAC ENZYMES:No results for input(s): MYOGLOBIN, CKTOTAL, CKMB, CKMBINDEX, TROPHS, TROPONINT in the last 72 hours. FASTING LIPID PANEL:  Lab Results   Component Value Date    HDL 38 12/08/2020    TRIG 78 12/08/2020     LIVER PROFILE:No results for input(s): AST, ALT, LABALBU, ALKPHOS, BILITOT, BILIDIR, IBILI, PROT, GLOB, ALBUMIN in the last 72 hours. ASSESSMENT:    · Acute on chronic diastolic heart failure, mild, on IV diuretics, managed by nephrology  · Respiratory failure/possible pneumonia on high flow nasal cannula oxygen, managed by others  · CAD with prior CABG in 2016-no clinical angina  · Paroxysmal atrial fibrillation, maintaining sinus rhythm with history of cardioversion, amiodarone was stopped this admission and patient is maintaining sinus rhythm and remains on apixaban  · Hypertension-well-controlled  · Dyslipidemia-treated    PLAN:  Continue current cardiac medications. Patient is maintaining sinus rhythm off of amiodarone. She remains on apixaban. Diuretics are managed by nephrology. Discussed with patient and nursing and Dr. Nicole Moscoso.     Stephanie Tabares, APRN - CNP

## 2021-02-19 NOTE — PROGRESS NOTES
Pulmonary Critical Care Progress Note  Alejandro Nguyen MD     Patient seen for the follow up of acute on chronic hypoxic respiratory failure, diastolic heart failure, pulmonary edema, aspiration pneumonia, mild secondary pulmonary hypertension    Subjective:  She is resting comfortably in the bed. She remains on high flow nasal cannula, FiO2 has again been increased, currently at 60% FiO2/40 L. Shortness of breath is not much changed. She has occasional nonproductive cough, denies chest pain. She reports feeling frustrated and depressed secondary to not making much progress in her feeling better. Examination:  Vitals: BP (!) 169/50   Pulse 59   Temp 97.9 °F (36.6 °C) (Oral)   Resp 16   Ht 5' 5\" (1.651 m)   Wt 131 lb 2 oz (59.5 kg)   SpO2 97%   BMI 21.82 kg/m²   General appearance:    Neck: No JVD  Lungs: Moderate air exchange, positive crackles  Heart: regular rate and rhythm, S1, S2 normal, no gallop  Abdomen: Soft, non tender, + BS  Extremities: no cyanosis or clubbing.  No significant edema    LABs:  CBC:   Recent Labs     02/18/21  0505 02/19/21  0541   WBC 16.0* 19.7*   HGB 10.0* 9.9*   HCT 33.5* 33.2*    297     BMP:   Recent Labs     02/18/21  0505 02/19/21  0541   * 145*   K 5.1 5.0   CO2 38* 36*   BUN 48* 41*   CREATININE 0.78 0.70   LABGLOM >60 >60   GLUCOSE 167* 158*       Radiology:  2/19/2021 2/16/21      Impression:  · Acute on chronic hypoxic respiratory failure  · Decompensated diastolic heart failure  · Pulmonary edema  · Severe diffuse groundglass opacities  · COVID-19 PCR negative  · Aspiration pneumonia  · Mild secondary pulmonary hypertension, RVSP 37 mmHg  · History of CAD, HTN, HLD, CABG in 2016  · Dysphagia/aspiration  · Thyroid lesion    Recommendations:  · Oxygen via high flow nasal cannula, wean as able, keep SPO2 90% or greater  · BiPAP while asleep and as needed  · Incentive spirometry every hour while awake  · Albuterol and ipratropium every 4 hours and as needed  · IV Solu-Medrol 40 mg every 12 hours, increase to every 6 hours   · CVD work up   · Cefepime  · Diuresis with IV Lasix 20 mg daily  · Dysphagia diet as per speech recommendations  · Nephrology and cardiology following  · Labs in am   · DVT prophylaxis on Eliquis   · DNR CCA  · Palliative care following  · Will follow with you    Electronically signed by     Josette Fink MD on 2/19/2021 at 1:31 PM  Pulmonary Critical Care and Sleep Medicine,  [facility]  Cell: [phone]  Office: [phone]

## 2021-02-19 NOTE — CARE COORDINATION
DC Planning    Rec notification from [facility] pt will need a perdetermination peer to peer for insurance intent to deny. Chayo Sheppard [phone] opt 5 to be completed by 2/22/2021 by 10am.     Emailed and messaged Dr. Tanvir Joseph did receive message.

## 2021-02-19 NOTE — PROGRESS NOTES
Occupational Therapy  Facility/Department: [facility] PROGRESSIVE CARE  Daily Treatment Note  NAME: Amanda Cline  : 3/26/1932  MRN: [id number]    Date of Service: 2021    Discharge Recommendations:  LTACH, [address]  OT Equipment Recommendations  Equipment Needed: Yes  Mobility Devices: ADL Assistive Devices  ADL Assistive Devices: Toileting - 3-in-1 Commode;Reacher;Long-handled Shoe [place];Long-handled Sponge    Assessment   Performance deficits / Impairments: Decreased functional mobility ; Decreased safe awareness;Decreased balance;Decreased ADL status; Decreased strength;Decreased high-level IADLs;Decreased endurance;Decreased posture;Decreased vision/visual deficit; Decreased coordination  Assessment: Skilled OT indicated to improve I and safety in areas of self care and function to return home with assist as needed. Prognosis: Fair  OT Education: OT Role;Plan of Care;Energy Conservation;Transfer Training;ADL Adaptive Strategies;Precautions  REQUIRES OT FOLLOW UP: Yes  Activity Tolerance  Activity Tolerance: Patient Tolerated treatment well  Safety Devices  Safety Devices in place: Yes  Type of devices: All fall risk precautions in place;Call light within reach;Nurse notified;Gait belt;Patient at risk for falls; Bed alarm in place; Left in bed         Patient Diagnosis(es): The encounter diagnosis was Acute congestive heart failure, unspecified heart failure type ([address] has a past medical history of Acute on chronic diastolic CHF (congestive heart failure) ([address] Bradycardia, CAD (coronary artery disease), Cancer ([address] Depression, Gallstones, GERD (gastroesophageal reflux disease), Hiatal hernia, History of cardiac cath, Hyperlipidemia, Hypertension, Insomnia, MI (myocardial infarction) ([address] PNA (pneumonia), SOB (shortness of breath), and UTI (urinary tract infection). has a past surgical history that includes Appendectomy; Cholecystectomy;  Hysterectomy; Coronary angioplasty with stent; Colonoscopy; Coronary artery bypass graft (11/08/2016); Cardiac surgery (11/08/2016); and Cardioversion (12/17/2019). Restrictions  Restrictions/Precautions  Restrictions/Precautions: General Precautions, Fall Risk, Up as Tolerated  Required Braces or Orthoses?: No  Position Activity Restriction  Other position/activity restrictions: telemetry, HFNC, cont SPO2 monitor, IV site  Subjective   General  Chart Reviewed: Yes  Patient assessed for rehabilitation services?: Yes  Response to previous treatment: Patient with no complaints from previous session  Family / Caregiver Present: No  General Comment  Comments: RN reported patient's SpO2 dropped to 70's getting up to the commode, and they have been using bed pan. RN reported OT treatment is ok to do what patient can tolerate. Orientation  Orientation  Overall Orientation Status: Within Functional Limits  Objective    ADL  Additional Comments: Unable to complete d/t respiratory status        Balance  Sitting Balance: Stand by assistance(Patient sat EOB for ~3 minutes, SpO2 dropped to 85%, patient visibly SOB and reported not feeling well, laid patient down and sat's dropped to 83%, increased time to recover)    Bed mobility  Supine to Sit: Minimal assistance  Sit to Supine: Minimal assistance  Scooting: Minimal assistance  Comment: Patient completed bed mobility with verbal cues for sequencing/technique and Min A for UB mgmt. Patient able to scoot self up in bed by bridging hips and pushing through BLE's to scoot up in bed    Transfers  Transfer Comments: Unable to complete d/t respiratory status      Cognition  Overall Cognitive Status: Exceptions  Arousal/Alertness: Appropriate responses to stimuli  Following Commands:  Follows one step commands consistently  Attention Span: Appears intact  Memory: Decreased short term memory  Safety Judgement: Decreased awareness of need for assistance;Decreased awareness of need for safety  Problem Solving: Assistance required to correct errors made;Assistance required to identify errors made;Decreased awareness of errors  Insights: Decreased awareness of deficits  Initiation: Requires cues for some  Sequencing: Requires cues for some      Plan   Plan  Times per week: 4-5x/week 1x/day as josie  Current Treatment Recommendations: Strengthening, Balance Training, Functional Mobility Training, Safety Education & Training, Positioning, Endurance Training, Neuromuscular Re-education, Patient/Caregiver Education & Training, Equipment Evaluation, Education, & procurement, Self-Care / ADL, Home Management Training, Cognitive/Perceptual Training    AM-PAC Score     AM-PAC Inpatient Mobility without Stair Climbing Raw Score : 15 (02/18/21 1056)  AM-PAC Inpatient without Stair Climbing T-Scale Score : 43.03 (02/18/21 1056)  Mobility Inpatient CMS 0-100% Score: 47.43 (02/18/21 1056)  Mobility Inpatient without Stair CMS G-Code Modifier : CK (02/18/21 1056)  AM-PAC Inpatient Daily Activity Raw Score: 20 (02/18/21 1055)  AM-PAC Inpatient ADL T-Scale Score : 42.03 (02/18/21 1055)  ADL Inpatient CMS 0-100% Score: 38.32 (02/18/21 1055)  ADL Inpatient CMS G-Code Modifier : Chan Day (02/18/21 1055)    Goals  Short term goals  Time Frame for Short term goals: by discharge, pt to demo  Short term goal 1: bed mob tech with use of rail as needed to CG. Short term goal 2: increase in BUE strength by a 1/2 grade to assist with ADL tasks and be I with simple BUE HEP with use of handouts. Short term goal 3: UB ADL to set up and LB ADL to CG with use of AE/AD as needed. Short term goal 4: toileting tasks with use of BSC/AD and grab bar as needed to CG. Short term goal 5: ADL transfers and functional mob with AD as needed to CG. Long term goals  Long term goal 1: Pt to stand with SBA and AD josie > 5 mins as able to reduce falls with functional tasks.   Long term goal 2: Caregivers to be I with EC/WS and fall prevention tech, pressure relief, AE/DME recommendations with use of handouts. Patient Goals   Patient goals : I just want to be able to breath better and get home! Therapy Time   Individual Concurrent Group Co-treatment   Time In 0933         Time Out 0950         Minutes 17           Upon writer exit, call light within reach, pt retired to bed. All lines intact and patient positioned comfortably. All patient needs addressed prior to ending therapy session. Chart reviewed prior to treatment and patient is agreeable for therapy. RN reports patient is medically stable for therapy treatment this date.       MARIAN Fernandez

## 2021-02-19 NOTE — PROGRESS NOTES
Pt calls out to use BSC. Upon entering, noticed IV infiltrated from IV antibiotics. INT removed. SaO2 = 93% on Hi-Miguel. Increased oxygen level on Hi-Miguel, before assisting patient to [facility]. Patient deSats to high 60s to low 70s. Instructed patient to breathe in through nose. SaO2 increases to low 80s. Assisted patient back to bed. SaO2 drops to 70s. Elevated HOB, and called respiratory therapist to assess patient. IV restarted. Hi-Miguel increased to 40L & 60% on FiO2. SaO2 = 98%. Will continue to monitor.

## 2021-02-19 NOTE — CARE COORDINATION
DC Planning    Messaged Armando from Pending sale to [facility]. [address] for Stevo Zambrano is still pending.

## 2021-02-19 NOTE — PROGRESS NOTES
Progress note  [facility]    Adult Hospitalist      Name: César Martinez  MRN: [id number]     Acct: [de-identified]  Room: [address] [place] [address]    Admit Date: 2/8/2021  6:28 PM  PCP: Bakari Robles MD    Primary Problem  Active Problems:    Acute left-sided CHF (congestive heart failure) ([address]  Resolved Problems:    * No resolved hospital problems.  *        Assesment:   Acute on chronic hypoxemic respiratory failure  Acute on chronic diastolic congestive heart failure  Pulmonary edema  Aspiration pneumonia  Acute kidney injury  Coronary artery disease status post CABG in 2016  Chronic atrial fibrillation  Chronic anticoagulation with Eliquis  Essential hypertension  Mixed hyperlipidemia  Mild pulmonary hypertension  Depression with anxiety  Former smoker  GERD  Covid ruled out  Hypernatremia   Oral candidiasis       Plan:   Admit patient to progressive unit  Oxygen keep SPO2 more than 90%  BiPAP as needed  Blood culture no growth  BiPAP as needed  Oral azithromycin, completed  Appreciate Nephrology assistance, IV diuretics have been increased to Lasix 20 IV b.i.d.   DuoNeb breathing treatment  Continue Eliquis  Continue lisinopril, metoprolol, Imdur, amiodarone  Continue pravastatin  Continue Zoloft  Pulmonology on consult, appreciated  Consult cardiology, appreciated  [address] nephrology assistance  Patient continues on high-flow nasal cannula oxygen, 30 L with 50% FiO2, requirement has come down since yesterday  IV cefepime  D/w Pt   Pureed food now  Continue other medication as below  Nystatin soln - w thickened food      Scheduled Meds:   apixaban  2.5 mg Oral BID    furosemide  20 mg Intravenous Daily    methylPREDNISolone  40 mg Intravenous Q12H    cefepime  1,000 mg Intravenous Q12H    isosorbide mononitrate  30 mg Oral QAM    metoprolol tartrate  25 mg Oral BID    oxybutynin  5 mg Oral Every Other Day    pantoprazole  40 mg Oral QAM AC    pravastatin  40 mg Oral Daily    sertraline  100 mg Oral Daily    latanoprost  1 drop Both Eyes Nightly    sodium chloride flush  10 mL Intravenous 2 times per day    ipratropium-albuterol  1 ampule Inhalation Q4H WA     Continuous Infusions:    PRN Meds:      sodium chloride, 1 spray, TID PRN      sodium chloride flush, 10 mL, PRN      promethazine, 12.5 mg, Q6H PRN    Or      ondansetron, 4 mg, Q6H PRN      nicotine, 1 patch, Daily PRN      polyethylene glycol, 17 g, Daily PRN      acetaminophen, 650 mg, Q6H PRN    Or      acetaminophen, 650 mg, Q6H PRN      albuterol, 2.5 mg, Q2H PRN      hydrALAZINE, 20 mg, Q6H PRN        Chief Complaint:     Chief Complaint   Patient presents with    Shortness of Breath         History of Present Illness: Ketty Aguilar is a 80 y.o.  female who presents with Shortness of Breath  Patient seen and examined at bedside and reviewed  last 24 hrs events with nursing staff   Son visited during day  No acute events overnight. Patient denies any acute complaints. Ate a little  Patient continues on high flow nasal cannula oxygen   Was desaturating yesterday with minimal activity  Sitting up in chair today  Playing cards  Says slightly better now  Afebrile  Patient denies any chest pain, palpitation, headache, dizziness, nausea, vomiting, abdominal pain, pain, changes in urination or bowel habit or rash.              HPI  This is a 31-year-old female is been admitted to emergency room, patient came to the ER with a complaint of having shortness of breath, further patient is been having some shortness of breath for past couple of days, patient does have history of congestive heart failure in view of oxygen at home, also history of coronary disease, hypertension hyperlipidemia and CABG in the past, patient initial testing in the emergency room is consistent with congestive heart failure, admitted for further management    I have personally reviewed the past medical history, past surgical history, medications, social history, and family history, and summarized in the note. Review of Systems:     All 10 point system is reviewed and negative otherwise mentioned in HPI. Past Medical History:     Past Medical History:   Diagnosis Date    Acute on chronic diastolic CHF (congestive heart failure) (HCC) 10/2/2019    Bradycardia     CAD (coronary artery disease)     Cancer (HCC)     Skin CA on nose    Depression     Gallstones     GERD (gastroesophageal reflux disease)     Hiatal hernia     History of cardiac cath 10/2016    CAD    Hyperlipidemia     Hypertension     Insomnia     MI (myocardial infarction) ([address]     PNA (pneumonia)     SOB (shortness of breath)     UTI (urinary tract infection)         Past Surgical History:     Past Surgical History:   Procedure Laterality Date    APPENDECTOMY      CARDIAC SURGERY  11/08/2016    CABG x 3; LIMA-LAD, SVG-PDA, SVG-OM    CARDIOVERSION  12/17/2019    CHOLECYSTECTOMY      COLONOSCOPY      CORONARY ANGIOPLASTY WITH STENT PLACEMENT      CORONARY ARTERY BYPASS GRAFT  11/08/2016    X 3    HYSTERECTOMY          Medications Prior to Admission:       Prior to Admission medications    Medication Sig Start Date End Date Taking?  Authorizing Provider   lisinopril (PRINIVIL;ZESTRIL) 2.5 MG tablet Take 1 tablet by mouth daily 12/12/20  Yes Nella Oreilly MD   furosemide (LASIX) 40 MG tablet Take 1 tablet by mouth daily 12/11/20  Yes SERA Bates - CNP   metoprolol tartrate (LOPRESSOR) 25 MG tablet Take 25 mg by mouth 2 times daily  11/4/19  Yes Historical Provider, MD   amiodarone (CORDARONE) 200 MG tablet Take 200 mg by mouth daily   Yes Historical Provider, MD   apixaban (ELIQUIS) 5 MG TABS tablet Take 1 tablet by mouth 2 times daily 10/5/19  Yes Fatou Finch MD   travoprost, benzalkonium, (TRAVATAN) 0.004 % ophthalmic solution Place 1 drop into the right eye nightly   Yes Historical Provider, MD   isosorbide mononitrate (IMDUR) 30 MG extended release tablet Take noted, cranial nerves II through XII grossly intact  Extremities - peripheral pulses palpable, no pedal edema or calf pain with palpation  Skin - no gross lesions, rashes, or induration noted        Data:     Labs:    Hematology:  Recent Labs     02/17/21  0531 02/18/21  0505 02/19/21  0541   WBC 15.7* 16.0* 19.7*   RBC 3.70* 3.75* 3.75*   HGB 9.8* 10.0* 9.9*   HCT 33.0* 33.5* 33.2*   MCV 89.2 89.3 88.5   MCH 26.5 26.7 26.4   MCHC 29.7 29.9 29.8   RDW 16.3* 16.2* 16.3*    300 297   MPV 12.1 11.5 11.9     Chemistry:  Recent Labs     02/17/21 0531 02/18/21  0505 02/19/21  0541    145* 145*   K 4.6 5.1 5.0    104 102   CO2 34* 38* 36*   GLUCOSE 179* 167* 158*   BUN 52* 48* 41*   CREATININE 0.82 0.78 0.70   ANIONGAP 7* 3* 7*   LABGLOM >60 >60 >60   GFRAA >60 >60 >60   CALCIUM 8.4* 8.6 8.2*   PHOS 3.5  --  3.5     Recent Labs     02/16/21 1955   POCGLU 207*       Lab Results   Component Value Date    INR 1.1 10/02/2019    INR 1.2 11/11/2016    INR 1.2 11/10/2016    PROTIME 11.2 10/02/2019    PROTIME 12.3 (H) 11/11/2016    PROTIME 12.7 (H) 11/10/2016       Lab Results   Component Value Date/Time    SPECIAL NOT REPORTED 02/08/2021 07:45 PM    SPECIAL NOT REPORTED 02/08/2021 07:45 PM     Lab Results   Component Value Date/Time    CULTURE NO GROWTH 6 DAYS 02/08/2021 07:45 PM    CULTURE NO GROWTH 6 DAYS 02/08/2021 07:45 PM       Lab Results   Component Value Date    POCPH 7.35 11/09/2016    POCPCO2 41 11/09/2016    POCPO2 85 11/09/2016    POCHCO3 22.4 11/09/2016    NBEA 3 11/09/2016    PBEA NOT REPORTED 11/09/2016    KNP1RHE 24 11/09/2016    QHSA7LGO 96 11/09/2016    FIO2 NOT REPORTED 10/02/2019       Radiology:    Xr Chest Portable    Result Date: 2/9/2021  Stable interstitial opacities with superimposed patchy airspace disease probably combination of factors including chronic interstitial disease, vascular congestion and pneumonia.      Xr Chest Portable    Result Date: 2/8/2021  Progressive bilateral patchy interstitial and alveolar infiltrates greater on the left. Overall findings suggest progressive pulmonary vascular congestion with possible superimposed bilateral pneumonia. All radiological studies reviewed                Code Status:  DNR-CCA    Electronically signed by Rosa Bailey MD on 2/19/2021 at 10:58 AM     Copy sent to Dr. Bonnie Israel MD    This note was created with the assistance of a speech-recognition program.  Although the intention is to generate a document that actually reflects the content of the visit, no guarantees can be provided that every mistake has been identified and corrected by editing. Note was updated later by me after  physical examination and  completion of the assessment.

## 2021-02-19 NOTE — PLAN OF CARE
Problem: Skin Integrity:  Goal: Will show no infection signs and symptoms  Description: Will show no infection signs and symptoms  Outcome: Ongoing     Problem: Skin Integrity:  Goal: Absence of new skin breakdown  Description: Absence of new skin breakdown  Outcome: Ongoing     Problem: Nutrition  Goal: Optimal nutrition therapy  Outcome: Ongoing     Problem: Falls - Risk of:  Goal: Will remain free from falls  Description: Will remain free from falls  Outcome: Ongoing  Note: Patient will remain free from falls. Fall risk assessment completed. Bed locked, in lowest position, side rails up x2. Non-skid socks in place. Call light and personal items within reach. Patient calls out appropriately. Room free of clutter. Hourly rounding implemented. Will continue to monitor.       Problem: Falls - Risk of:  Goal: Absence of physical injury  Description: Absence of physical injury  Outcome: Ongoing     Problem: Breathing Pattern - Ineffective:  Goal: Ability to achieve and maintain a regular respiratory rate will improve  Description: Ability to achieve and maintain a regular respiratory rate will improve  Outcome: Ongoing

## 2021-02-19 NOTE — ACP (ADVANCE CARE PLANNING)
..Advance Care Planning     Advance Care Planning Activator (Inpatient)  Conversation Note      Date of ACP Conversation: 2/8/2021    Conversation Conducted with: Patient with Decision Making Capacity    ACP Activator: [address] Decision Maker:  Daughter Desirae Paredes     Current [facility] Decision Maker:     Primary Decision Maker: Ana M Nicole - Child - [phone]    Secondary Decision Maker: Adrian Enriquez - Child - [phone]    Today we reviewed DPOA/LW    Care Preferences    Ventilation: \"If you were in your present state of health and suddenly became very ill and were unable to breathe on your own, what would your preference be about the use of a ventilator (breathing machine) if it were available to you? \"      Would the patient desire the use of ventilator (breathing machine)?: no    \"If your health worsens and it becomes clear that your chance of recovery is unlikely, what would your preference be about the use of a ventilator (breathing machine) if it were available to you? \"     Would the patient desire the use of ventilator (breathing machine)?: No      Resuscitation  \"CPR works best to restart the heart when there is a sudden event, like a heart attack, in someone who is otherwise healthy. Unfortunately, CPR does not typically restart the heart for people who have serious health conditions or who are very sick. \"    \"In the event your heart stopped as a result of an underlying serious health condition, would you want attempts to be made to restart your heart (answer \"yes\" for attempt to resuscitate) or would you prefer a natural death (answer \"no\" for do not attempt to resuscitate)? \" no       [] Yes   [] No   Educated Patient / Sebastian Chauhan regarding differences between Advance Directives and portable DNR orders.     Length of ACP Conversation in minutes:      Conversation Outcomes:  [] ACP discussion completed  [x] Existing advance directive reviewed with patient; no changes to patient's previously recorded wishes  [] New Advance Directive completed  [] Portable Do Not Rescitate prepared for Provider review and signature  [] POLST/POST/MOLST/MOST prepared for Provider review and signature      Follow-up plan:    [] Schedule follow-up conversation to continue planning  [] Referred individual to Provider for additional questions/concerns   [x] Advised patient/agent/surrogate to review completed ACP document and update if needed with changes in condition, patient preferences or care setting    [] This note routed to one or more involved healthcare providers

## 2021-02-19 NOTE — PROGRESS NOTES
Physical Therapy  Facility/Department: [facility] PROGRESSIVE CARE  Daily Treatment Note  NAME: Choco Julio  : 3/26/1932  MRN: [id number]    Date of Service: 2021    Discharge Recommendations:  Subacute/Skilled Nursing Facility   PT Equipment Recommendations  Equipment Needed: No    Assessment   Body structures, Functions, Activity limitations: Decreased functional mobility ; Decreased safe awareness;Decreased endurance;Decreased balance  Assessment: Pt demonstrates decreased aerobic capacity &  is quick to fatigue requiring multiple rest breaks. Pt unable to ambulate greater than few ft without RR & saO2 dropping below 88% significantly increasing. At current level of function & impaired activity tolerance, recommend D/C to [address] to maximize independence with functional mobility, balance, safety awareness & activity tolerance to ensure safe D/C back to home environment. Prognosis: Good  Decision Making: High Complexity  Exam: functional mobility, activity tolerance, Balance, & [employer] AM-PAC 6 Clicks Basic Mobility  Clinical Presentation: unstable  Patient Education: Ed pt on functional mobility, safety awareness, importance of being up & OOB to regain strength, & prevention of sedentary complications,  & optimal breathing techniques  REQUIRES PT FOLLOW UP: Yes  Activity Tolerance  Activity Tolerance: Patient limited by endurance  Activity Tolerance: High flow & saO2 dropped to 82% with transfers/gait & toileing. 5 minutes to recover to 94-96%     Patient Diagnosis(es): The encounter diagnosis was Acute congestive heart failure, unspecified heart failure type ([address]      has a past medical history of Acute on chronic diastolic CHF (congestive heart failure) ([address] Bradycardia, CAD (coronary artery disease), Cancer ([address] Depression, Gallstones, GERD (gastroesophageal reflux disease), Hiatal hernia, History of cardiac cath, Hyperlipidemia, Hypertension, Insomnia, MI (myocardial Assistance  Bed to Chair: Minimal assistance  Stand Pivot Transfers: Minimal Assistance  Comment: Patient uses RW for support and balance and for energy conservation during transfers, is Rosalio for multiple lines & management  Ambulation  Ambulation?: Yes  Ambulation 1  Surface: level tile  Device: Rolling Walker  Assistance: Contact guard assistance  Quality of Gait: Patient with good self pacing techniques and restbreaks. Patient SpO2 drops from 96 to 82% during mobility tasks. Patient recovers to 90% within 5 minutes of seated rest  Gait Deviations: Slow Lizeth; Increased LO; Decreased step length;Decreased step height;Decreased head and trunk rotation  Distance: 5 feet to [facility] for toileting, Min Assistance to sit to toilet. Pt stood with Min Assistance,stood 3 minutes for pericare           Ambulation 2  Surface: level tile  Device: Rolling Walker  Assistance: Contact guard assistance  Quality of Gait: Patient with good self pacing techniques and restbreaks. Patient SpO2 drops from 96 to 82% during mobility tasks. Patient recovers to 90% within 5 minutes of seated rest  Gait Deviations: Slow Lizeth; Increased LO; Decreased step length;Decreased step height;Decreased head and trunk rotation  Distance: 5 feet forward  x 3  and back with rest between each ambulation to recover SpO2 from mid 80's back up to mid to high 90%. Comments: rest between each ambulation to recover SpO2 from 82% back up to mid to high 90%. Patient with good safety awareness. Patient educated on pursed lip breathing techniques to recover SpO2 to 96%  Neuromuscular Education  NDT Treatment: Standing  Neuromuscular Comments: Patient performs weight shifts and standing marches x10 to facilitate good postural alignment and facilitate balance stratigies during gait activities.   Balance  Posture: Fair  Sitting - Static: Good  Sitting - Dynamic: Good  Standing - Static: Fair;+  Standing - Dynamic: Fair  Comments: Standing with RW  Exercises  Comments: Pt performed seated LE ex's x 10 reps to promote mobility exercises and promote joint congruency, completed sit to stands x 5 to promote mobility and functional quad strengthening         All lines intact, call light within reach, and patient positioned comfortably at end of treatment. All patient needs addressed prior to ending therapy session. G-Code     OutComes Score                                                     AM-PAC Score  AM-PAC Inpatient Mobility Raw Score : 15 (02/19/21 1136)  AM-PAC Inpatient T-Scale Score : 39.45 (02/19/21 1136)  Mobility Inpatient CMS 0-100% Score: 57.7 (02/19/21 1136)  Mobility Inpatient CMS G-Code Modifier : CK (02/19/21 1136)          Goals  Short term goals  Time Frame for Short term goals: 12 visits  Short term goal 1: Inc bed-mobility & transfers to independent to enable pt to safely get in/OOB  Short term goal 2: Inc gait to amb 200 ft with R/walker indep; Short term goal 3: Pt able to tolerate 30-40 min of activity to include 15-20 reps of ex & functional mobility including 5 minutes of standing to facilitate better activity tolerance  Short term goal 5: Ed pt on home ex's, optimal breathing techniques, safety & energy principles, fall prevention & issue written pt education  Patient Goals   Patient goals :  To go and be with grandkids    Plan    Plan  Times per week: 1-2x/D, 5-6D/week  Current Treatment Recommendations: Strengthening, Balance Training, Functional Mobility Training, Transfer Training, Gait Training, Endurance Training, Home Exercise Program, Safety Education & Training, Patient/Caregiver Education & Training  Safety Devices  Type of devices: Call light within reach, Gait belt, Patient at risk for falls, Left in chair  Restraints  Initially in place: No     Therapy Time   Individual Concurrent Group Co-treatment   Time In 1110         Time Out 1149         Minutes Melisa 62, PT

## 2021-02-19 NOTE — PLAN OF CARE
Problem: Skin Integrity:  Goal: Will show no infection signs and symptoms  Description: Will show no infection signs and symptoms  2/18/2021 2332 by Luis Joel RN  Outcome: Ongoing  Note: Monitor for signs & symptoms of infection. Utilize standard precautions & proper handwashing. Communicate referral to infection control. 2/18/2021 1316 by Reema Lucero RN  Outcome: Ongoing  Goal: Absence of new skin breakdown  Description: Absence of new skin breakdown  2/18/2021 2332 by Luis Joel RN  Outcome: Ongoing  Note: Continuing to monitor for skin integrity risks. Patient independent with turning/repositioning. Turning/repositioning encouraged at least once every 2 hrs, and prn basis. Hygiene care being completed independently per patient; assistance provided when deemed necessary. 2/18/2021 1316 by Reema Lucero RN  Outcome: Ongoing     Problem: Nutrition  Goal: Optimal nutrition therapy  2/18/2021 2332 by Luis Joel RN  Outcome: Ongoing  Note: Review diet daily and as needed with pt. Provide supplement nutrition as ordered by physician & educate pt. Review nutritional guidelines with pt.   2/18/2021 1316 by Reema Lucero RN  Outcome: Ongoing     Problem: Falls - Risk of:  Goal: Will remain free from falls  Description: Will remain free from falls  2/18/2021 2332 by Luis Joel RN  Outcome: Ongoing  Note: Pt fall risk, fall band present, falling star, safety alarm activated and in use as needed. Hourly rounding performed. Pt encouraged to use call light. See Iqra Stallworth fall risk assessment. 2/18/2021 1316 by Reema Lucero RN  Outcome: Ongoing  Note: Patient will remain free from falls. Fall risk assessment completed. Call light and personal items within reach. Bed locked, in lowest position, side rails up x2. Non-skid socks in place. Bed/chair alarm in place. Room free of clutter. Hourly rounding implemented. Will continue to monitor.   Goal: Absence of physical injury  Description: Absence of physical injury  2/18/2021 2332 by Aleyda Jin RN  Outcome: Ongoing  Note: Non-skid socks in place, up with assistance, bed in lowest position, bed exit & alarm as needed, provide toileting every 2 hours an d as needed. 2/18/2021 1316 by Curt Negro RN  Outcome: Ongoing     Problem: Breathing Pattern - Ineffective:  Goal: Ability to achieve and maintain a regular respiratory rate will improve  Description: Ability to achieve and maintain a regular respiratory rate will improve  2/18/2021 2332 by Aleyda Jin RN  Outcome: Ongoing  Note: Assess for adventitious breath sounds. Monitored SaO2 > 90%. Applied 02 per nasal cannula as needed. Elevated HOB to improve breathing as needed.      2/18/2021 1316 by Curt Negro RN  Outcome: Ongoing

## 2021-02-19 NOTE — PROGRESS NOTES
Pt up to [facility] to void with x1 assist.  Patient deSats to 70s. FiO2 = 55% & on 30 L. After return to 85% on [facility], assisted patient back to bed. SaO2 maintaining between 80% to 84%. Patient short of breath. Called respiratory. Hi-Miguel increased to 40 L. SaO2 increased to 93%. Improved breathing. Will continue to monitor.

## 2021-02-19 NOTE — PROGRESS NOTES
Progress Note    Reason for Consult: ELICEO    Requesting Physician:  Al Reveles MD    INTERVAL HISTORY:   Continues to require high flow oxygen  Creatinine stable. BP stable. K 5.0       HISTORY OF PRESENT ILLNESS:    The patient is a 80 y.o. female who presents with worsening shortness of breath. Initial chest x-ray showed progressive pulmonary vascular congestion with possible superimposed bilateral pneumonia. Chest x-ray today showed bilateral airspace disease slightly increased at the right apex compared to prior. Creatinine baseline 0.6 which she was at on admission and yesterday. Creatinine elevated to 1.46 today. Hypokalemia on admission and yesterday. Potassium was 3.8 today. Recent hemoglobin 9.6. WBC was 14.4 today and today 12.0. SBP was originally trending as high as 214/75 on day of admission. She became hypotensive at 99/78 about 8 hours later. SBP trending 100s to 130s today. She originally was requiring nasal cannula and is now on high flow. Home medications include lisinopril, Lasix, metoprolol, amiodarone. She is currently on Lasix 20 mg IV twice daily, lisinopril, metoprolol, amiodarone. She has been up to the bedside commode voiding. No urinary retention on bladder scan       Prior to Admission medications    Medication Sig Start Date End Date Taking?  Authorizing Provider   lisinopril (PRINIVIL;ZESTRIL) 2.5 MG tablet Take 1 tablet by mouth daily 12/12/20  Yes Al Reveles MD   furosemide (LASIX) 40 MG tablet Take 1 tablet by mouth daily 12/11/20  Yes SERA Gay CNP   metoprolol tartrate (LOPRESSOR) 25 MG tablet Take 25 mg by mouth 2 times daily  11/4/19  Yes Historical Provider, MD   amiodarone (CORDARONE) 200 MG tablet Take 200 mg by mouth daily   Yes Historical Provider, MD   apixaban (ELIQUIS) 5 MG TABS tablet Take 1 tablet by mouth 2 times daily 10/5/19  Yes Maninder Singh MD   travoprost, benzalkonium, (TRAVATAN) 0.004 % ophthalmic solution Place 1 drop into the right eye nightly   Yes Historical Provider, MD   isosorbide mononitrate (IMDUR) 30 MG extended release tablet Take 30 mg by mouth every morning   Yes Historical Provider, MD   sertraline (ZOLOFT) 100 MG tablet Take 100 mg by mouth daily    Yes Historical Provider, MD   oxybutynin (DITROPAN) 5 MG tablet Take 5 mg by mouth every other day    Yes Historical Provider, MD   pravastatin (PRAVACHOL) 40 MG tablet Take 40 mg by mouth daily    Yes Historical Provider, MD   pantoprazole (PROTONIX) 40 MG tablet Take 40 mg by mouth every morning (before breakfast)    Yes Historical Provider, MD   aspirin 81 MG EC tablet Take 81 mg by mouth daily    Historical Provider, MD       Scheduled Meds:   methylPREDNISolone  40 mg Intravenous Q6H    apixaban  2.5 mg Oral BID    furosemide  20 mg Intravenous Daily    cefepime  1,000 mg Intravenous Q12H    isosorbide mononitrate  30 mg Oral QAM    metoprolol tartrate  25 mg Oral BID    oxybutynin  5 mg Oral Every Other Day    pantoprazole  40 mg Oral QAM AC    pravastatin  40 mg Oral Daily    sertraline  100 mg Oral Daily    latanoprost  1 drop Both Eyes Nightly    sodium chloride flush  10 mL Intravenous 2 times per day    ipratropium-albuterol  1 ampule Inhalation Q4H WA     Continuous Infusions:  PRN Meds:sodium chloride, sodium chloride flush, promethazine **OR** ondansetron, nicotine, polyethylene glycol, acetaminophen **OR** acetaminophen, albuterol, hydrALAZINE     Physical Exam:  Vitals:    02/19/21 0803 02/19/21 0943 02/19/21 1013 02/19/21 1156   BP: (!) 169/50  (!) 129/39 (!) 125/53   Pulse: 50 59 57 58   Resp: 16   16   Temp: 97.9 °F (36.6 °C)   98.1 °F (36.7 °C)   TempSrc: Oral   Oral   SpO2: 97%   96%   Weight:       Height:         I/O last 3 completed shifts: In: 900 [P.O.:900]  Out: 625 [Urine:625]    General:  Awake, alert, not in distress, and comfortable on high flow. Appears to be stated age. HEENT: Atraumatic, normocephalic. Anicteric sclera. Pink and moist oral mucosa. Neck supple. No JVD. Chest: Bilateral air entry, clear to auscultation, no wheezing, rhonchi or rales. Cardiovascular: RRR, S1S2, no murmur, rub or gallop. No lower extremity edema. Abdomen: Soft, non tender to palpation. Musculoskeletal:  No cyanosis or clubbing. Integumentary: Pink, warm and dry. Free from rash or lesions. Skin turgor normal.  CNS: Oriented to person, place and time. Speech clear. Face symmetrical. No tremor.      Data:    CBC:   Lab Results   Component Value Date    WBC 19.7 (H) 02/19/2021    HGB 9.9 (L) 02/19/2021    HCT 33.2 (L) 02/19/2021    MCV 88.5 02/19/2021     02/19/2021     BMP:    Lab Results   Component Value Date     (H) 02/19/2021     (H) 02/18/2021     02/17/2021    K 5.0 02/19/2021    K 5.1 02/18/2021    K 4.6 02/17/2021     02/19/2021     02/18/2021     02/17/2021    CO2 36 (H) 02/19/2021    CO2 38 (H) 02/18/2021    CO2 34 (H) 02/17/2021    BUN 41 (H) 02/19/2021    BUN 48 (H) 02/18/2021    BUN 52 (H) 02/17/2021    CREATININE 0.70 02/19/2021    CREATININE 0.78 02/18/2021    CREATININE 0.82 02/17/2021    GLUCOSE 158 (H) 02/19/2021    GLUCOSE 167 (H) 02/18/2021    GLUCOSE 179 (H) 02/17/2021     CMP:   Lab Results   Component Value Date     02/19/2021    K 5.0 02/19/2021     02/19/2021    CO2 36 02/19/2021    BUN 41 02/19/2021    CREATININE 0.70 02/19/2021    GLUCOSE 158 02/19/2021    CALCIUM 8.2 02/19/2021    PROT 6.1 05/01/2020    LABALBU 3.3 05/01/2020    BILITOT 0.71 05/01/2020    ALKPHOS 72 05/01/2020    AST 16 05/01/2020    ALT 8 05/01/2020      Hepatic:   Lab Results   Component Value Date    AST 16 05/01/2020    AST 67 (H) 10/02/2019    AST 18 11/07/2016    ALT 8 05/01/2020    ALT 63 (H) 10/02/2019    ALT 12 11/07/2016    BILITOT 0.71 05/01/2020    BILITOT 0.82 10/02/2019    BILITOT 0.39 11/07/2016    ALKPHOS 72 05/01/2020    ALKPHOS 103 10/02/2019    ALKPHOS 70 11/07/2016     BNP: No results found for: BNP  Lipids:   Lab Results   Component Value Date    CHOL 109 12/08/2020    HDL 38 (L) 12/08/2020     INR:   Lab Results   Component Value Date    INR 1.1 10/02/2019    INR 1.2 11/11/2016    INR 1.2 11/10/2016     PTH: No results found for: PTH  Phosphorus:    Lab Results   Component Value Date    PHOS 3.5 02/19/2021     Ionized Calcium: No results found for: IONCA  Magnesium:   Lab Results   Component Value Date    MG 1.9 02/16/2021     Albumin:   Lab Results   Component Value Date    LABALBU 3.3 05/01/2020     Last 3 CK, CKMB, Troponin: @LABRCNT(CKTOTAL:3,CKMB:3,TROPONINI:3)       URINE:)No results found for: Carmen Leavitt    Radiology:   Reviewed. Assessment:  Acute Kidney Injury, likely hemodynamically related. FeNa 0.26%. Cr is better. Baseline Creatinine 0.6. Hypernatremia. Hyper kalemia  Hypertension with recent hypotension. CHF exacerbation. Multifocal pneumonia. Hypoxic respiratory failure. Plan: Tolerating current dose of Lasix. Off lisinopril. BP stable. Serum sodium trending higher today. Will monitor. Off fluid restriction. On dental soft with honey thickened liquids. K stable. On low K diet for now. Monitor blood pressure and heart rate. On amiodarone and isosorbide. Strict I&O. Antibiotics per primary and pulmonary. Avoid nephrotoxic drugs and IV contrast exposure. Follow up chemistries. Please do not hesitate to contact us for any further questions/concerns. We will continue to follow along with you. Pt seen in collaboration with Dr. Jhonathan Marti. Electronically signed by SERA Brar CNP  on 2/19/2021 at 1:37 PM     Physician Addendum  I have seen and examined pt at bed side.    I reviewed and agree with CNP's note. I performed all key and critical portions of this evaluation.  I agree with the plan of care as noted above.     Electronically signed by Tre Angulo MD on 02/19/21 4:41 PM

## 2021-02-20 LAB
ANION GAP SERPL CALCULATED.3IONS-SCNC: 6 MMOL/L (ref 9–17)
BUN BLDV-MCNC: 37 MG/DL (ref 8–23)
BUN/CREAT BLD: 58 (ref 9–20)
CALCIUM SERPL-MCNC: 8.1 MG/DL (ref 8.6–10.4)
CHLORIDE BLD-SCNC: 100 MMOL/L (ref 98–107)
CO2: 34 MMOL/L (ref 20–31)
CREAT SERPL-MCNC: 0.64 MG/DL (ref 0.5–0.9)
GFR AFRICAN AMERICAN: >60 ML/MIN
GFR NON-AFRICAN AMERICAN: >60 ML/MIN
GFR SERPL CREATININE-BSD FRML MDRD: ABNORMAL ML/MIN/{1.73_M2}
GFR SERPL CREATININE-BSD FRML MDRD: ABNORMAL ML/MIN/{1.73_M2}
GLUCOSE BLD-MCNC: 161 MG/DL (ref 70–99)
HCT VFR BLD CALC: 32.5 % (ref 36.3–47.1)
HEMOGLOBIN: 10.1 G/DL (ref 11.9–15.1)
MCH RBC QN AUTO: 27.1 PG (ref 25.2–33.5)
MCHC RBC AUTO-ENTMCNC: 31.1 G/DL (ref 28.4–34.8)
MCV RBC AUTO: 87.1 FL (ref 82.6–102.9)
NRBC AUTOMATED: 0 PER 100 WBC
PDW BLD-RTO: 16 % (ref 11.8–14.4)
PHOSPHORUS: 3.5 MG/DL (ref 2.6–4.5)
PLATELET # BLD: 277 K/UL (ref 138–453)
PMV BLD AUTO: 11.8 FL (ref 8.1–13.5)
POTASSIUM SERPL-SCNC: 5 MMOL/L (ref 3.7–5.3)
RBC # BLD: 3.73 M/UL (ref 3.95–5.11)
SODIUM BLD-SCNC: 140 MMOL/L (ref 135–144)
WBC # BLD: 19.3 K/UL (ref 3.5–11.3)

## 2021-02-20 PROCEDURE — 2700000000 HC OXYGEN THERAPY PER DAY

## 2021-02-20 PROCEDURE — 2060000000 HC ICU INTERMEDIATE R&B

## 2021-02-20 PROCEDURE — 94640 AIRWAY INHALATION TREATMENT: CPT

## 2021-02-20 PROCEDURE — 80048 BASIC METABOLIC PNL TOTAL CA: CPT

## 2021-02-20 PROCEDURE — 2580000003 HC RX 258: Performed by: FAMILY MEDICINE

## 2021-02-20 PROCEDURE — 97116 GAIT TRAINING THERAPY: CPT

## 2021-02-20 PROCEDURE — 36415 COLL VENOUS BLD VENIPUNCTURE: CPT

## 2021-02-20 PROCEDURE — 6360000002 HC RX W HCPCS: Performed by: INTERNAL MEDICINE

## 2021-02-20 PROCEDURE — 6370000000 HC RX 637 (ALT 250 FOR IP): Performed by: FAMILY MEDICINE

## 2021-02-20 PROCEDURE — 97110 THERAPEUTIC EXERCISES: CPT

## 2021-02-20 PROCEDURE — 6360000002 HC RX W HCPCS: Performed by: FAMILY MEDICINE

## 2021-02-20 PROCEDURE — 84100 ASSAY OF PHOSPHORUS: CPT

## 2021-02-20 PROCEDURE — 94761 N-INVAS EAR/PLS OXIMETRY MLT: CPT

## 2021-02-20 PROCEDURE — 6360000002 HC RX W HCPCS: Performed by: NURSE PRACTITIONER

## 2021-02-20 PROCEDURE — 85027 COMPLETE CBC AUTOMATED: CPT

## 2021-02-20 RX ORDER — FUROSEMIDE 10 MG/ML
20 INJECTION INTRAMUSCULAR; INTRAVENOUS ONCE
Status: COMPLETED | OUTPATIENT
Start: 2021-02-20 | End: 2021-02-20

## 2021-02-20 RX ORDER — FUROSEMIDE 10 MG/ML
20 INJECTION INTRAMUSCULAR; INTRAVENOUS 2 TIMES DAILY
Status: DISCONTINUED | OUTPATIENT
Start: 2021-02-20 | End: 2021-02-21

## 2021-02-20 RX ADMIN — NYSTATIN 500000 UNITS: 100000 SUSPENSION ORAL at 08:52

## 2021-02-20 RX ADMIN — NYSTATIN 500000 UNITS: 100000 SUSPENSION ORAL at 15:16

## 2021-02-20 RX ADMIN — METHYLPREDNISOLONE SODIUM SUCCINATE 40 MG: 40 INJECTION, POWDER, FOR SOLUTION INTRAMUSCULAR; INTRAVENOUS at 09:56

## 2021-02-20 RX ADMIN — IPRATROPIUM BROMIDE AND ALBUTEROL SULFATE 1 AMPULE: .5; 3 SOLUTION RESPIRATORY (INHALATION) at 14:10

## 2021-02-20 RX ADMIN — NYSTATIN 500000 UNITS: 100000 SUSPENSION ORAL at 21:11

## 2021-02-20 RX ADMIN — IPRATROPIUM BROMIDE AND ALBUTEROL SULFATE 1 AMPULE: .5; 3 SOLUTION RESPIRATORY (INHALATION) at 05:33

## 2021-02-20 RX ADMIN — CEFEPIME HYDROCHLORIDE 1000 MG: 1 INJECTION, POWDER, FOR SOLUTION INTRAMUSCULAR; INTRAVENOUS at 15:17

## 2021-02-20 RX ADMIN — SERTRALINE HYDROCHLORIDE 100 MG: 50 TABLET ORAL at 08:51

## 2021-02-20 RX ADMIN — FUROSEMIDE 20 MG: 10 INJECTION, SOLUTION INTRAMUSCULAR; INTRAVENOUS at 17:51

## 2021-02-20 RX ADMIN — SODIUM CHLORIDE, PRESERVATIVE FREE 10 ML: 5 INJECTION INTRAVENOUS at 21:11

## 2021-02-20 RX ADMIN — METHYLPREDNISOLONE SODIUM SUCCINATE 40 MG: 40 INJECTION, POWDER, FOR SOLUTION INTRAMUSCULAR; INTRAVENOUS at 15:16

## 2021-02-20 RX ADMIN — SODIUM CHLORIDE, PRESERVATIVE FREE 10 ML: 5 INJECTION INTRAVENOUS at 08:52

## 2021-02-20 RX ADMIN — LATANOPROST 1 DROP: 50 SOLUTION OPHTHALMIC at 22:05

## 2021-02-20 RX ADMIN — METHYLPREDNISOLONE SODIUM SUCCINATE 40 MG: 40 INJECTION, POWDER, FOR SOLUTION INTRAMUSCULAR; INTRAVENOUS at 04:34

## 2021-02-20 RX ADMIN — PRAVASTATIN SODIUM 40 MG: 40 TABLET ORAL at 08:51

## 2021-02-20 RX ADMIN — APIXABAN 2.5 MG: 2.5 TABLET, FILM COATED ORAL at 08:51

## 2021-02-20 RX ADMIN — PANTOPRAZOLE SODIUM 40 MG: 40 TABLET, DELAYED RELEASE ORAL at 06:25

## 2021-02-20 RX ADMIN — FUROSEMIDE 20 MG: 10 INJECTION, SOLUTION INTRAMUSCULAR; INTRAVENOUS at 08:52

## 2021-02-20 RX ADMIN — ACETAMINOPHEN 650 MG: 325 TABLET ORAL at 21:27

## 2021-02-20 RX ADMIN — APIXABAN 2.5 MG: 2.5 TABLET, FILM COATED ORAL at 21:11

## 2021-02-20 RX ADMIN — METOPROLOL TARTRATE 25 MG: 25 TABLET, FILM COATED ORAL at 21:10

## 2021-02-20 RX ADMIN — CEFEPIME HYDROCHLORIDE 1000 MG: 1 INJECTION, POWDER, FOR SOLUTION INTRAMUSCULAR; INTRAVENOUS at 02:12

## 2021-02-20 RX ADMIN — METHYLPREDNISOLONE SODIUM SUCCINATE 40 MG: 40 INJECTION, POWDER, FOR SOLUTION INTRAMUSCULAR; INTRAVENOUS at 21:11

## 2021-02-20 RX ADMIN — ISOSORBIDE MONONITRATE 30 MG: 30 TABLET ORAL at 08:52

## 2021-02-20 RX ADMIN — FUROSEMIDE 20 MG: 10 INJECTION, SOLUTION INTRAMUSCULAR; INTRAVENOUS at 09:56

## 2021-02-20 RX ADMIN — IPRATROPIUM BROMIDE AND ALBUTEROL SULFATE 1 AMPULE: .5; 3 SOLUTION RESPIRATORY (INHALATION) at 10:24

## 2021-02-20 RX ADMIN — IPRATROPIUM BROMIDE AND ALBUTEROL SULFATE 1 AMPULE: .5; 3 SOLUTION RESPIRATORY (INHALATION) at 18:00

## 2021-02-20 ASSESSMENT — PAIN SCALES - GENERAL
PAINLEVEL_OUTOF10: 0
PAINLEVEL_OUTOF10: 0

## 2021-02-20 NOTE — PLAN OF CARE
Problem: Skin Integrity:  Goal: Will show no infection signs and symptoms  Description: Will show no infection signs and symptoms  2/20/2021 0031 by Polly Adame RN  Outcome: Ongoing  Note: Monitor for signs & symptoms of infection. Utilize standard precautions & proper handwashing. Communicate referral to infection control. 2/19/2021 1754 by Buster Hays RN  Outcome: Ongoing  Goal: Absence of new skin breakdown  Description: Absence of new skin breakdown  2/20/2021 0031 by Polly Adame RN  Outcome: Ongoing  Note: Continuing to monitor for skin integrity risks. Patient independent with turning/repositioning. Turning/repositioning encouraged at least once every 2 hrs, and prn basis. Hygiene care being completed independently per patient; assistance provided when deemed necessary. 2/19/2021 1754 by Buster Hays RN  Outcome: Ongoing     Problem: Nutrition  Goal: Optimal nutrition therapy  2/20/2021 0031 by Polly Adame RN  Outcome: Ongoing  Note: Review diet daily and as needed with pt. Provide supplement nutrition as ordered by physician & educate pt. Review nutritional guidelines with pt.   2/19/2021 1754 by Buster Hays RN  Outcome: Ongoing     Problem: Falls - Risk of:  Goal: Will remain free from falls  Description: Will remain free from falls  2/20/2021 0031 by Polly Adame RN  Outcome: Ongoing  Note: Pt fall risk, fall band present, falling star, safety alarm activated and in use as needed. Hourly rounding performed. Pt encouraged to use call light. See Maisie Bence fall risk assessment. 2/19/2021 1754 by Buster Hays RN  Outcome: Ongoing  Note: Patient will remain free from falls. Fall risk assessment completed. Bed locked, in lowest position, side rails up x2. Non-skid socks in place. Call light and personal items within reach. Patient calls out appropriately. Room free of clutter. Hourly rounding implemented. Will continue to monitor.    Goal: Absence of physical injury  Description: Absence of physical injury  2/20/2021 0031 by Lorene Mcfarland RN  Outcome: Ongoing  Note: Non-skid socks in place, up with assistance, bed in lowest position, bed exit & alarm as needed, provide toileting every 2 hours an d as needed. 2/19/2021 1754 by Mario Alberto Cerrato RN  Outcome: Ongoing     Problem: Breathing Pattern - Ineffective:  Goal: Ability to achieve and maintain a regular respiratory rate will improve  Description: Ability to achieve and maintain a regular respiratory rate will improve  2/20/2021 0031 by Lorene Mcfarland RN  Outcome: Ongoing  Note: Assess for adventitious breath sounds. Monitored SaO2 > 90%. Applied 02 per nasal cannula as needed. Elevated HOB to improve breathing as needed.      2/19/2021 1754 by Mario Alberto Cerrato RN  Outcome: Ongoing

## 2021-02-20 NOTE — PROGRESS NOTES
SaO2 drops to high 60's to low 70's, despite FiO2 increased to 60% & 30 L on [facility]. Respiratory therapy notified. Increased FiO2 to 100%. Will continue to monitor.

## 2021-02-20 NOTE — PROGRESS NOTES
Progress note  [facility]    Adult Hospitalist      Name: Sarah Moss  MRN: [id number]     Acct: [de-identified]  Room: [address] [place] [address]    Admit Date: 2/8/2021  6:28 PM  PCP: Elder Huerta MD    Primary Problem  Active Problems:    Acute left-sided CHF (congestive heart failure) ([facility] [address]  Resolved Problems:    * No resolved hospital problems.  *        Assesment:   Acute on chronic hypoxemic respiratory failure  Acute on chronic diastolic congestive heart failure  Pulmonary edema  Aspiration pneumonia  Acute kidney injury  Coronary artery disease status post CABG in 2016  Chronic atrial fibrillation  Chronic anticoagulation with Eliquis  Essential hypertension  Mixed hyperlipidemia  Mild pulmonary hypertension  Depression with anxiety  Former smoker  GERD  Covid ruled out  Hypernatremia   Oral candidiasis       Plan:   Admit patient to progressive unit  Oxygen keep SPO2 more than 90%  BiPAP as needed  Blood culture no growth  BiPAP as needed  Oral azithromycin, completed  Appreciate Nephrology assistance, IV diuretics have been increased to Lasix 20 IV b.i.d.   DuoNeb breathing treatment  Continue Eliquis  Continue lisinopril, metoprolol, Imdur, amiodarone  Continue pravastatin  Continue Zoloft  Pulmonology on consult, appreciated  Consult cardiology, appreciated  [address] nephrology assistance  Patient continues on high-flow nasal cannula oxygen, 30 L with 50% FiO2, requirement has come down since yesterday  IV cefepime  D/w Pt   Pureed food now  Continue other medication as below  Nystatin soln - w thickened food  Off Amiodarone   Await bed [facility]      Code status DNRCCA      Scheduled Meds:   furosemide  20 mg Intravenous BID    methylPREDNISolone  40 mg Intravenous Q6H    nystatin  5 mL Oral TID    apixaban  2.5 mg Oral BID    cefepime  1,000 mg Intravenous Q12H    isosorbide mononitrate  30 mg Oral QAM    metoprolol tartrate  25 mg Oral BID    oxybutynin  5 mg Oral Every Other Day    pantoprazole  40 mg Oral QAM AC    pravastatin  40 mg Oral Daily    sertraline  100 mg Oral Daily    latanoprost  1 drop Both Eyes Nightly    sodium chloride flush  10 mL Intravenous 2 times per day    ipratropium-albuterol  1 ampule Inhalation Q4H WA     Continuous Infusions:    PRN Meds:      sodium chloride, 1 spray, TID PRN      sodium chloride flush, 10 mL, PRN      promethazine, 12.5 mg, Q6H PRN    Or      ondansetron, 4 mg, Q6H PRN      nicotine, 1 patch, Daily PRN      polyethylene glycol, 17 g, Daily PRN      acetaminophen, 650 mg, Q6H PRN    Or      acetaminophen, 650 mg, Q6H PRN      albuterol, 2.5 mg, Q2H PRN      hydrALAZINE, 20 mg, Q6H PRN        Chief Complaint:     Chief Complaint   Patient presents with    Shortness of Breath         History of Present Illness: Tobi Bravo is a 80 y.o.  female who presents with Shortness of Breath  Patient seen and examined at bedside and reviewed  last 24 hrs events with nursing staff   Son at bedside  Has several qts  Pt required more O2 during night   Patient denies any acute complaints. Patient continues on high flow nasal cannula oxygen   Sitting up in chair   Says slightly better now  Afebrile  Patient denies any chest pain, palpitation, headache, dizziness, nausea, vomiting, abdominal pain, pain, changes in urination or bowel habit or rash.              HPI  This is a 80-year-old female is been admitted to emergency room, patient came to the ER with a complaint of having shortness of breath, further patient is been having some shortness of breath for past couple of days, patient does have history of congestive heart failure in view of oxygen at home, also history of coronary disease, hypertension hyperlipidemia and CABG in the past, patient initial testing in the emergency room is consistent with congestive heart failure, admitted for further management    I have personally reviewed the past medical history, past surgical history, medications, social history, and family history, and summarized in the note. Review of Systems:     All 10 point system is reviewed and negative otherwise mentioned in HPI. Past Medical History:     Past Medical History:   Diagnosis Date    Acute on chronic diastolic CHF (congestive heart failure) (HCC) 10/2/2019    Bradycardia     CAD (coronary artery disease)     Cancer (HCC)     Skin CA on nose    Depression     Gallstones     GERD (gastroesophageal reflux disease)     Hiatal hernia     History of cardiac cath 10/2016    CAD    Hyperlipidemia     Hypertension     Insomnia     MI (myocardial infarction) ([address]     PNA (pneumonia)     SOB (shortness of breath)     UTI (urinary tract infection)         Past Surgical History:     Past Surgical History:   Procedure Laterality Date    APPENDECTOMY      CARDIAC SURGERY  11/08/2016    CABG x 3; LIMA-LAD, SVG-PDA, SVG-OM    CARDIOVERSION  12/17/2019    CHOLECYSTECTOMY      COLONOSCOPY      CORONARY ANGIOPLASTY WITH STENT PLACEMENT      CORONARY ARTERY BYPASS GRAFT  11/08/2016    X 3    HYSTERECTOMY          Medications Prior to Admission:       Prior to Admission medications    Medication Sig Start Date End Date Taking?  Authorizing Provider   lisinopril (PRINIVIL;ZESTRIL) 2.5 MG tablet Take 1 tablet by mouth daily 12/12/20  Yes July Katz MD   furosemide (LASIX) 40 MG tablet Take 1 tablet by mouth daily 12/11/20  Yes Anastsaiya Haley APRN - CNP   metoprolol tartrate (LOPRESSOR) 25 MG tablet Take 25 mg by mouth 2 times daily  11/4/19  Yes Historical Provider, MD   amiodarone (CORDARONE) 200 MG tablet Take 200 mg by mouth daily   Yes Historical Provider, MD   apixaban (ELIQUIS) 5 MG TABS tablet Take 1 tablet by mouth 2 times daily 10/5/19  Yes Christos Reid MD   travoprost, benzalkonium, (TRAVATAN) 0.004 % ophthalmic solution Place 1 drop into the right eye nightly   Yes Historical Provider, MD   isosorbide mononitrate (IMDUR) 30 MG extended release tablet Take 30 mg by mouth every morning   Yes Historical Provider, MD   sertraline (ZOLOFT) 100 MG tablet Take 100 mg by mouth daily    Yes Historical Provider, MD   oxybutynin (DITROPAN) 5 MG tablet Take 5 mg by mouth every other day    Yes Historical Provider, MD   pravastatin (PRAVACHOL) 40 MG tablet Take 40 mg by mouth daily    Yes Historical Provider, MD   pantoprazole (PROTONIX) 40 MG tablet Take 40 mg by mouth every morning (before breakfast)    Yes Historical Provider, MD   aspirin 81 MG EC tablet Take 81 mg by mouth daily    Historical Provider, MD        Allergies:       Seasonal    Social History:     Tobacco:    reports that she quit smoking about 49 years ago. Her smoking use included cigarettes. She has never used smokeless tobacco.  Alcohol:      reports current alcohol use. Drug Use:  reports no history of drug use.     Family History:     Family History   Problem Relation Age of Onset    Heart Disease Mother     Cancer Mother     Heart Disease Father          Physical Exam:     Vitals:  BP (!) 125/92   Pulse 67   Temp 98.2 °F (36.8 °C) (Oral)   Resp 18   Ht 5' 5\" (1.651 m)   Wt 131 lb 2 oz (59.5 kg)   SpO2 95%   BMI 21.82 kg/m²   Temp (24hrs), Av.8 °F (36.6 °C), Min:97.5 °F (36.4 °C), Max:98.2 °F (36.8 °C)          General appearance - alert, malnourished, and in mild acute distress  Mental status - oriented to person, place, and time with normal affect  Head - normocephalic and atraumatic  Eyes - pupils equal and reactive, extraocular eye movements intact, conjunctiva clear  Ears - hearing appears to be intact  Nose - no drainage noted  Mouth - mucous membranes moist  Neck - supple, no carotid bruits, thyroid not palpable  Chest - BL basilar crackles, increased effort  Heart - normal rate, regular rhythm,  Abdomen - soft, nontender, nondistended, bowel sounds present all four quadrants, no masses, hepatomegaly or splenomegaly  Neurological - normal speech, no focal findings or movement disorder noted, cranial nerves II through XII grossly intact  Extremities - peripheral pulses palpable, no pedal edema or calf pain with palpation  Skin - no gross lesions, rashes, or induration noted        Data:     Labs:    Hematology:  Recent Labs     02/18/21  0505 02/19/21  0541 02/20/21  0533   WBC 16.0* 19.7* 19.3*   RBC 3.75* 3.75* 3.73*   HGB 10.0* 9.9* 10.1*   HCT 33.5* 33.2* 32.5*   MCV 89.3 88.5 87.1   MCH 26.7 26.4 27.1   MCHC 29.9 29.8 31.1   RDW 16.2* 16.3* 16.0*    297 277   MPV 11.5 11.9 11.8     Chemistry:  Recent Labs     02/18/21  0505 02/19/21  0541 02/20/21  0533   * 145* 140   K 5.1 5.0 5.0    102 100   CO2 38* 36* 34*   GLUCOSE 167* 158* 161*   BUN 48* 41* 37*   CREATININE 0.78 0.70 0.64   ANIONGAP 3* 7* 6*   LABGLOM >60 >60 >60   GFRAA >60 >60 >60   CALCIUM 8.6 8.2* 8.1*   PHOS  --  3.5 3.5     No results for input(s): PROT, LABALBU, LABA1C, Y6HSSTR, P8XRAXR, FT4, TSH, AST, ALT, LDH, GGT, ALKPHOS, LABGGT, BILITOT, BILIDIR, AMMONIA, AMYLASE, LIPASE, LACTATE, CHOL, HDL, LDLCHOLESTEROL, CHOLHDLRATIO, TRIG, VLDL, OWZ11GR, PHENYTOIN, PHENYF, URICACID, POCGLU in the last 72 hours.     Lab Results   Component Value Date    INR 1.1 10/02/2019    INR 1.2 11/11/2016    INR 1.2 11/10/2016    PROTIME 11.2 10/02/2019    PROTIME 12.3 (H) 11/11/2016    PROTIME 12.7 (H) 11/10/2016       Lab Results   Component Value Date/Time    SPECIAL NOT REPORTED 02/08/2021 07:45 PM    SPECIAL NOT REPORTED 02/08/2021 07:45 PM     Lab Results   Component Value Date/Time    CULTURE NO GROWTH 6 DAYS 02/08/2021 07:45 PM    CULTURE NO GROWTH 6 DAYS 02/08/2021 07:45 PM       Lab Results   Component Value Date    POCPH 7.35 11/09/2016    POCPCO2 41 11/09/2016    POCPO2 85 11/09/2016    POCHCO3 22.4 11/09/2016    NBEA 3 11/09/2016    PBEA NOT REPORTED 11/09/2016    XTV0KHK 24 11/09/2016    QVYH1SLV 96 11/09/2016    FIO2 NOT REPORTED 10/02/2019       Radiology:    Xr Chest Portable    Result Date: 2/9/2021  Stable interstitial opacities with superimposed patchy airspace disease probably combination of factors including chronic interstitial disease, vascular congestion and pneumonia. Xr Chest Portable    Result Date: 2/8/2021  Progressive bilateral patchy interstitial and alveolar infiltrates greater on the left. Overall findings suggest progressive pulmonary vascular congestion with possible superimposed bilateral pneumonia. All radiological studies reviewed                Code Status:  DNR-CCA    Electronically signed by Dottie Katz MD on 2/20/2021 at 6:58 PM     Copy sent to Dr. Laura Long MD    This note was created with the assistance of a speech-recognition program.  Although the intention is to generate a document that actually reflects the content of the visit, no guarantees can be provided that every mistake has been identified and corrected by editing. Note was updated later by me after  physical examination and  completion of the assessment.

## 2021-02-20 NOTE — PROGRESS NOTES
Physical Therapy  Facility/Department: [facility] PROGRESSIVE CARE  Daily Treatment Note  NAME: Cayla Fernandes  : 3/26/1932  MRN: [id number]    Date of Service: 2021    Discharge Recommendations:  Subacute/Skilled Nursing Facility        Assessment   Body structures, Functions, Activity limitations: Decreased functional mobility ; Decreased safe awareness;Decreased endurance;Decreased balance  Assessment: Patient continues to be on high flow 02 and requires constant monitoring of SP02 during all activity and frequent rest breaks. Patient reports that she does better during the day, but does worse with her breathing at night. Therapist did suggest to patient and nurse that maybe patient would do better sleeping in a recliner at night. Patient Sp02 at 95% when therapist left patient. Prognosis: Good  Decision Making: High Complexity  REQUIRES PT FOLLOW UP: Yes  Activity Tolerance  Activity Tolerance: Patient limited by endurance     Patient Diagnosis(es): The encounter diagnosis was Acute congestive heart failure, unspecified heart failure type ([address] has a past medical history of Acute on chronic diastolic CHF (congestive heart failure) ([address] Bradycardia, CAD (coronary artery disease), Cancer ([address] Depression, Gallstones, GERD (gastroesophageal reflux disease), Hiatal hernia, History of cardiac cath, Hyperlipidemia, Hypertension, Insomnia, MI (myocardial infarction) ([address] PNA (pneumonia), SOB (shortness of breath), and UTI (urinary tract infection). has a past surgical history that includes Appendectomy; Cholecystectomy; Hysterectomy; Coronary angioplasty with stent; Colonoscopy; Coronary artery bypass graft (2016); Cardiac surgery (2016); and Cardioversion (2019).     Restrictions  Restrictions/Precautions  Restrictions/Precautions: General Precautions, Fall Risk, Up as Tolerated  Required Braces or Orthoses?: No  Position Activity Restriction  Other position/activity restrictions: telemetry, HFNC, cont SPO2 monitor, IV site  Subjective   General  Chart Reviewed: Yes  Additional Pertinent Hx: CHF, CAD, HLPD, HTN, UTI, SOB  Response To Previous Treatment: Patient with no complaints from previous session. Family / Caregiver Present: Yes(Son)  Subjective  Subjective: Pt agreeable to PT, very motivated to work with PT. Patient voices frustration that she has been here so long and is not getting better. PT provided support and encouragement to patient. General Comment  Comments: Salty Knowles RN reports patient medically stable for PT. Orientation     Cognition      Objective   Bed mobility  Comment: Patient up in chair upon arrival  Transfers  Sit to Stand: Stand by assistance(RW)  Stand to sit: Stand by assistance(RW)  Comment: Patient steady with transfers, demo good awareness of line and tubes  Ambulation  Ambulation?: Yes  Ambulation 1  Surface: level tile  Device: Rolling Walker  Assistance: Contact guard assistance  Quality of Gait: Steady gait, but poor activity tolerance. Patient demo good safety judgement. Sp02 94-96% at rest, down to 86% with amb, recovers to 94% after 3 minutes of seated rest.  Gait Deviations: Slow Lizeth; Increased LO; Decreased step length;Decreased step height;Decreased head and trunk rotation  Distance: 5 feet forward and back x 2 repetitions with seated rest between ambulation     Balance  Posture: Fair  Sitting - Static: Good  Sitting - Dynamic: Good  Standing - Static: Fair;+(RW)  Standing - Dynamic: Fair(Rw)  Exercises  Gluteal Sets: 10x  Hip Flexion: 15x (seated marches)  Knee Long Arc Quad: 15x  Ankle Pumps: 20x      OutComes Score  Balance Score: 5 (02/17/21 1407)  Gait Score: 5 (02/17/21 1407)        Tinetti Total Score: 10 (02/17/21 1407)                                      AM-PAC Score  AM-PAC Inpatient Mobility Raw Score : 16 (02/20/21 1459)  AM-PAC Inpatient T-Scale Score : 40.78 (02/20/21 1459)  Mobility Inpatient CMS 0-100% Score: 54.16 (02/20/21 3535)  Mobility Inpatient CMS G-Code Modifier : CK (02/20/21 1459)          Goals  Short term goals  Time Frame for Short term goals: 12 visits  Short term goal 1: Inc bed-mobility & transfers to independent to enable pt to safely get in/OOB  Short term goal 2: Inc gait to amb 200 ft with R/walker indep; Short term goal 3: Pt able to tolerate 30-40 min of activity to include 15-20 reps of ex & functional mobility including 5 minutes of standing to facilitate better activity tolerance  Short term goal 5: Ed pt on home ex's, optimal breathing techniques, safety & energy principles, fall prevention & issue written pt education  Patient Goals   Patient goals :  To go and be with grandkids    Plan    Plan  Times per week: 1-2x/D, 5-6D/week  Current Treatment Recommendations: Strengthening, Balance Training, Functional Mobility Training, Transfer Training, Gait Training, Endurance Training, Home Exercise Program, Safety Education & Training, Patient/Caregiver Education & Training  Safety Devices  Type of devices: Call light within reach, Gait belt, Left in chair, All fall risk precautions in place, Nurse notified  Restraints  Initially in place: No     Therapy Time   Individual Concurrent Group Co-treatment   Time In 1334         Time Out 1404         Minutes Dina, PT

## 2021-02-20 NOTE — PROGRESS NOTES
SaO2 remains low between mid-70s & mid-80s. Hi-Miguel with FiO2 at 100% & 50 L. Patient c/o shortness of breath. Respiratory therapy called. Placed non-rebreather mask. SaO2 increases to 93%. Will monitor.

## 2021-02-20 NOTE — PROGRESS NOTES
Section of Cardiology  Progress Note      Date:  2/20/2021  Patient: Choco Julio  Admission:  2/8/2021  6:28 PM  Admit DX: Acute left-sided CHF (congestive heart failure) ([facility] [I50.1]  Age:  80 y.o., 3/26/1932     LOS: 12 days     Reason for evaluation:   atrial fibrillation, CHF and coronary artery disease      SUBJECTIVE:     The patient was seen and examined. Notes and labs reviewed. Breathing is not significantly changed still requiring high flow oxygen. Son was at the bedside. Patient denies chest pain. No palpitation. OBJECTIVE:      EXAM:   Vitals:    VITALS:  BP (!) 149/88   Pulse 65   Temp 97.9 °F (36.6 °C) (Oral)   Resp 18   Ht 5' 5\" (1.651 m)   Wt 131 lb 2 oz (59.5 kg)   SpO2 97%   BMI 21.82 kg/m²   24HR INTAKE/OUTPUT:      Intake/Output Summary (Last 24 hours) at 2/20/2021 1059  Last data filed at 2/19/2021 1753  Gross per 24 hour   Intake 750 ml   Output --   Net 750 ml       CONSTITUTIONAL: Awake, sitting in the chair, no signs of acute distress  HEENT: Normal jugular venous pulsations  LUNGS: Dry bibasilar rales otherwise clear, no wheezing was heard. Nonlabored  CARDIOVASCULAR:  regular rate and rhythm, normal S1 and S2, no S3 or S4, and 1/6 early systolic murmur at the aortic area   SKIN: Warm and dry.   EXTREMITIES: No leg edema    Current Inpatient Medications:   furosemide  20 mg Intravenous BID    methylPREDNISolone  40 mg Intravenous Q6H    nystatin  5 mL Oral TID    apixaban  2.5 mg Oral BID    cefepime  1,000 mg Intravenous Q12H    isosorbide mononitrate  30 mg Oral QAM    metoprolol tartrate  25 mg Oral BID    oxybutynin  5 mg Oral Every Other Day    pantoprazole  40 mg Oral QAM AC    pravastatin  40 mg Oral Daily    sertraline  100 mg Oral Daily    latanoprost  1 drop Both Eyes Nightly    sodium chloride flush  10 mL Intravenous 2 times per day    ipratropium-albuterol  1 ampule Inhalation Q4H WA       IV Infusions (if any):      Diagnostics:

## 2021-02-20 NOTE — PROGRESS NOTES
Assisted patient up to [facility] with assist at 2140. SaO2 decreased from low 90s to 80, then back to bed & SaO2 returned to low 90's. At 2155, SaO2 drops to high 70s. Respiratory therapy called. FiO2 increased to 60%. Will monitor.

## 2021-02-20 NOTE — PROGRESS NOTES
benzalkonium, (TRAVATAN) 0.004 % ophthalmic solution Place 1 drop into the right eye nightly   Yes Historical Provider, MD   isosorbide mononitrate (IMDUR) 30 MG extended release tablet Take 30 mg by mouth every morning   Yes Historical Provider, MD   sertraline (ZOLOFT) 100 MG tablet Take 100 mg by mouth daily    Yes Historical Provider, MD   oxybutynin (DITROPAN) 5 MG tablet Take 5 mg by mouth every other day    Yes Historical Provider, MD   pravastatin (PRAVACHOL) 40 MG tablet Take 40 mg by mouth daily    Yes Historical Provider, MD   pantoprazole (PROTONIX) 40 MG tablet Take 40 mg by mouth every morning (before breakfast)    Yes Historical Provider, MD   aspirin 81 MG EC tablet Take 81 mg by mouth daily    Historical Provider, MD       Scheduled Meds:   methylPREDNISolone  40 mg Intravenous Q6H    nystatin  5 mL Oral TID    apixaban  2.5 mg Oral BID    furosemide  20 mg Intravenous Daily    cefepime  1,000 mg Intravenous Q12H    isosorbide mononitrate  30 mg Oral QAM    metoprolol tartrate  25 mg Oral BID    oxybutynin  5 mg Oral Every Other Day    pantoprazole  40 mg Oral QAM AC    pravastatin  40 mg Oral Daily    sertraline  100 mg Oral Daily    latanoprost  1 drop Both Eyes Nightly    sodium chloride flush  10 mL Intravenous 2 times per day    ipratropium-albuterol  1 ampule Inhalation Q4H WA     Continuous Infusions:  PRN Meds:sodium chloride, sodium chloride flush, promethazine **OR** ondansetron, nicotine, polyethylene glycol, acetaminophen **OR** acetaminophen, albuterol, hydrALAZINE     Physical Exam:  Vitals:    02/19/21 2311 02/19/21 2356 02/20/21 0322 02/20/21 0439   BP:  (!) 130/57  (!) 137/58   Pulse:  55  50   Resp:  18  18   Temp:  97.7 °F (36.5 °C)  97.5 °F (36.4 °C)   TempSrc:  Oral  Oral   SpO2: 97% 95% 94% 95%   Weight:       Height:         I/O last 3 completed shifts:   In: 750 [P.O.:750]  Out: 150 [Urine:150]    General:  Awake, alert, not in distress, and comfortable on high flow.  Appears to be stated age. HEENT: Atraumatic, normocephalic. Anicteric sclera. Pink and moist oral mucosa. Neck supple. No JVD. Chest: Bilateral air entry, clear to auscultation, no wheezing, rhonchi or rales. Cardiovascular: RRR, S1S2, no murmur, rub or gallop. No lower extremity edema. Abdomen: Soft, non tender to palpation. Musculoskeletal:  No cyanosis or clubbing. Integumentary: Pink, warm and dry. Free from rash or lesions. Skin turgor normal.  CNS: Oriented to person, place and time. Speech clear. Face symmetrical. No tremor.      Data:    CBC:   Lab Results   Component Value Date    WBC 19.3 (H) 02/20/2021    HGB 10.1 (L) 02/20/2021    HCT 32.5 (L) 02/20/2021    MCV 87.1 02/20/2021     02/20/2021     BMP:    Lab Results   Component Value Date     02/20/2021     (H) 02/19/2021     (H) 02/18/2021    K 5.0 02/20/2021    K 5.0 02/19/2021    K 5.1 02/18/2021     02/20/2021     02/19/2021     02/18/2021    CO2 34 (H) 02/20/2021    CO2 36 (H) 02/19/2021    CO2 38 (H) 02/18/2021    BUN 37 (H) 02/20/2021    BUN 41 (H) 02/19/2021    BUN 48 (H) 02/18/2021    CREATININE 0.64 02/20/2021    CREATININE 0.70 02/19/2021    CREATININE 0.78 02/18/2021    GLUCOSE 161 (H) 02/20/2021    GLUCOSE 158 (H) 02/19/2021    GLUCOSE 167 (H) 02/18/2021     CMP:   Lab Results   Component Value Date     02/20/2021    K 5.0 02/20/2021     02/20/2021    CO2 34 02/20/2021    BUN 37 02/20/2021    CREATININE 0.64 02/20/2021    GLUCOSE 161 02/20/2021    CALCIUM 8.1 02/20/2021    PROT 6.1 05/01/2020    LABALBU 3.3 05/01/2020    BILITOT 0.71 05/01/2020    ALKPHOS 72 05/01/2020    AST 16 05/01/2020    ALT 8 05/01/2020      Hepatic:   Lab Results   Component Value Date    AST 16 05/01/2020    AST 67 (H) 10/02/2019    AST 18 11/07/2016    ALT 8 05/01/2020    ALT 63 (H) 10/02/2019    ALT 12 11/07/2016    BILITOT 0.71 05/01/2020    BILITOT 0.82 10/02/2019    BILITOT 0.39 11/07/2016 ALKPHOS 72 05/01/2020    ALKPHOS 103 10/02/2019    ALKPHOS 70 11/07/2016     BNP: No results found for: BNP  Lipids:   Lab Results   Component Value Date    CHOL 109 12/08/2020    HDL 38 (L) 12/08/2020     INR:   Lab Results   Component Value Date    INR 1.1 10/02/2019    INR 1.2 11/11/2016    INR 1.2 11/10/2016     PTH: No results found for: PTH  Phosphorus:    Lab Results   Component Value Date    PHOS 3.5 02/20/2021     Ionized Calcium: No results found for: IONCA  Magnesium:   Lab Results   Component Value Date    MG 1.9 02/16/2021     Albumin:   Lab Results   Component Value Date    LABALBU 3.3 05/01/2020     Last 3 CK, CKMB, Troponin: @LABRCNT(CKTOTAL:3,CKMB:3,TROPONINI:3)       URINE:)No results found for: Rondal Expose    Radiology:   Reviewed. Assessment:  Acute Kidney Injury, likely hemodynamically related. FeNa 0.26%. Cr is better. Baseline Creatinine 0.6. Hypernatremia. Hyper kalemia  Hypertension with recent hypotension. CHF exacerbation. Multifocal pneumonia. Hypoxic respiratory failure. Plan: Tolerating current dose of Lasix. CXR yesterday showed improved aeration. Off lisinopril. BP stable. SNA improving off FR. On dental soft with honey thickened liquids. K stable. On low K diet for now. Monitor blood pressure and heart rate. Strict I&O. Antibiotics per primary and pulmonary. Avoid nephrotoxic drugs and IV contrast exposure. Follow up chemistries. Please do not hesitate to contact us for any further questions/concerns. We will continue to follow along with you. Pt seen in collaboration with Dr. Cachorro Lanier.      Electronically signed by SERA Preciado CNP  on 2/20/2021 at 6:04 AM

## 2021-02-21 ENCOUNTER — APPOINTMENT (OUTPATIENT)
Dept: GENERAL RADIOLOGY | Age: 86
DRG: 291 | End: 2021-02-21
Payer: MEDICARE

## 2021-02-21 LAB
ANION GAP SERPL CALCULATED.3IONS-SCNC: 7 MMOL/L (ref 9–17)
BUN BLDV-MCNC: 42 MG/DL (ref 8–23)
BUN/CREAT BLD: 54 (ref 9–20)
CALCIUM SERPL-MCNC: 8.1 MG/DL (ref 8.6–10.4)
CHLORIDE BLD-SCNC: 99 MMOL/L (ref 98–107)
CO2: 34 MMOL/L (ref 20–31)
CREAT SERPL-MCNC: 0.78 MG/DL (ref 0.5–0.9)
GFR AFRICAN AMERICAN: >60 ML/MIN
GFR NON-AFRICAN AMERICAN: >60 ML/MIN
GFR SERPL CREATININE-BSD FRML MDRD: ABNORMAL ML/MIN/{1.73_M2}
GFR SERPL CREATININE-BSD FRML MDRD: ABNORMAL ML/MIN/{1.73_M2}
GLUCOSE BLD-MCNC: 166 MG/DL (ref 70–99)
HCT VFR BLD CALC: 34.7 % (ref 36.3–47.1)
HEMOGLOBIN: 10.6 G/DL (ref 11.9–15.1)
MCH RBC QN AUTO: 26.6 PG (ref 25.2–33.5)
MCHC RBC AUTO-ENTMCNC: 30.5 G/DL (ref 28.4–34.8)
MCV RBC AUTO: 87 FL (ref 82.6–102.9)
NRBC AUTOMATED: 0 PER 100 WBC
PDW BLD-RTO: 16.2 % (ref 11.8–14.4)
PHOSPHORUS: 3.8 MG/DL (ref 2.6–4.5)
PLATELET # BLD: 297 K/UL (ref 138–453)
PMV BLD AUTO: 12.4 FL (ref 8.1–13.5)
POTASSIUM SERPL-SCNC: 4.5 MMOL/L (ref 3.7–5.3)
RBC # BLD: 3.99 M/UL (ref 3.95–5.11)
SODIUM BLD-SCNC: 140 MMOL/L (ref 135–144)
TROPONIN INTERP: ABNORMAL
TROPONIN INTERP: NORMAL
TROPONIN T: ABNORMAL NG/ML
TROPONIN T: NORMAL NG/ML
TROPONIN, HIGH SENSITIVITY: 14 NG/L (ref 0–14)
TROPONIN, HIGH SENSITIVITY: 15 NG/L (ref 0–14)
TROPONIN, HIGH SENSITIVITY: 16 NG/L (ref 0–14)
WBC # BLD: 21.7 K/UL (ref 3.5–11.3)

## 2021-02-21 PROCEDURE — 36415 COLL VENOUS BLD VENIPUNCTURE: CPT

## 2021-02-21 PROCEDURE — 6360000002 HC RX W HCPCS: Performed by: NURSE PRACTITIONER

## 2021-02-21 PROCEDURE — 6370000000 HC RX 637 (ALT 250 FOR IP): Performed by: FAMILY MEDICINE

## 2021-02-21 PROCEDURE — 2060000000 HC ICU INTERMEDIATE R&B

## 2021-02-21 PROCEDURE — 71045 X-RAY EXAM CHEST 1 VIEW: CPT

## 2021-02-21 PROCEDURE — 6360000002 HC RX W HCPCS: Performed by: FAMILY MEDICINE

## 2021-02-21 PROCEDURE — 97530 THERAPEUTIC ACTIVITIES: CPT

## 2021-02-21 PROCEDURE — 94640 AIRWAY INHALATION TREATMENT: CPT

## 2021-02-21 PROCEDURE — 84100 ASSAY OF PHOSPHORUS: CPT

## 2021-02-21 PROCEDURE — 80048 BASIC METABOLIC PNL TOTAL CA: CPT

## 2021-02-21 PROCEDURE — 6370000000 HC RX 637 (ALT 250 FOR IP): Performed by: INTERNAL MEDICINE

## 2021-02-21 PROCEDURE — 93005 ELECTROCARDIOGRAM TRACING: CPT | Performed by: FAMILY MEDICINE

## 2021-02-21 PROCEDURE — 97110 THERAPEUTIC EXERCISES: CPT

## 2021-02-21 PROCEDURE — 6360000002 HC RX W HCPCS: Performed by: INTERNAL MEDICINE

## 2021-02-21 PROCEDURE — 94761 N-INVAS EAR/PLS OXIMETRY MLT: CPT

## 2021-02-21 PROCEDURE — 2700000000 HC OXYGEN THERAPY PER DAY

## 2021-02-21 PROCEDURE — 2580000003 HC RX 258: Performed by: FAMILY MEDICINE

## 2021-02-21 PROCEDURE — 84484 ASSAY OF TROPONIN QUANT: CPT

## 2021-02-21 PROCEDURE — 85027 COMPLETE CBC AUTOMATED: CPT

## 2021-02-21 RX ORDER — NITROGLYCERIN 0.4 MG/1
0.4 TABLET SUBLINGUAL EVERY 5 MIN PRN
Status: DISCONTINUED | OUTPATIENT
Start: 2021-02-21 | End: 2021-02-24 | Stop reason: HOSPADM

## 2021-02-21 RX ORDER — FUROSEMIDE 10 MG/ML
40 INJECTION INTRAMUSCULAR; INTRAVENOUS 2 TIMES DAILY
Status: DISCONTINUED | OUTPATIENT
Start: 2021-02-21 | End: 2021-02-23

## 2021-02-21 RX ORDER — METHYLPREDNISOLONE SODIUM SUCCINATE 40 MG/ML
40 INJECTION, POWDER, LYOPHILIZED, FOR SOLUTION INTRAMUSCULAR; INTRAVENOUS EVERY 12 HOURS
Status: DISCONTINUED | OUTPATIENT
Start: 2021-02-21 | End: 2021-02-24 | Stop reason: HOSPADM

## 2021-02-21 RX ADMIN — FUROSEMIDE 40 MG: 10 INJECTION, SOLUTION INTRAMUSCULAR; INTRAVENOUS at 17:35

## 2021-02-21 RX ADMIN — SERTRALINE HYDROCHLORIDE 100 MG: 50 TABLET ORAL at 09:47

## 2021-02-21 RX ADMIN — NYSTATIN 500000 UNITS: 100000 SUSPENSION ORAL at 15:21

## 2021-02-21 RX ADMIN — ISOSORBIDE MONONITRATE 30 MG: 30 TABLET ORAL at 09:46

## 2021-02-21 RX ADMIN — METHYLPREDNISOLONE SODIUM SUCCINATE 40 MG: 40 INJECTION, POWDER, FOR SOLUTION INTRAMUSCULAR; INTRAVENOUS at 04:24

## 2021-02-21 RX ADMIN — IPRATROPIUM BROMIDE AND ALBUTEROL SULFATE 1 AMPULE: .5; 3 SOLUTION RESPIRATORY (INHALATION) at 20:34

## 2021-02-21 RX ADMIN — APIXABAN 2.5 MG: 2.5 TABLET, FILM COATED ORAL at 22:06

## 2021-02-21 RX ADMIN — ASPIRIN 325 MG: 325 TABLET, COATED ORAL at 06:01

## 2021-02-21 RX ADMIN — LATANOPROST 1 DROP: 50 SOLUTION OPHTHALMIC at 22:05

## 2021-02-21 RX ADMIN — IPRATROPIUM BROMIDE AND ALBUTEROL SULFATE 1 AMPULE: .5; 3 SOLUTION RESPIRATORY (INHALATION) at 10:19

## 2021-02-21 RX ADMIN — Medication 0.4 MG: at 06:02

## 2021-02-21 RX ADMIN — METHYLPREDNISOLONE SODIUM SUCCINATE 40 MG: 40 INJECTION, POWDER, FOR SOLUTION INTRAMUSCULAR; INTRAVENOUS at 09:46

## 2021-02-21 RX ADMIN — PRAVASTATIN SODIUM 40 MG: 40 TABLET ORAL at 09:46

## 2021-02-21 RX ADMIN — PANTOPRAZOLE SODIUM 40 MG: 40 TABLET, DELAYED RELEASE ORAL at 09:46

## 2021-02-21 RX ADMIN — ACETAMINOPHEN 650 MG: 325 TABLET ORAL at 22:06

## 2021-02-21 RX ADMIN — FUROSEMIDE 20 MG: 10 INJECTION, SOLUTION INTRAMUSCULAR; INTRAVENOUS at 09:46

## 2021-02-21 RX ADMIN — IPRATROPIUM BROMIDE AND ALBUTEROL SULFATE 1 AMPULE: .5; 3 SOLUTION RESPIRATORY (INHALATION) at 06:19

## 2021-02-21 RX ADMIN — CEFEPIME HYDROCHLORIDE 1000 MG: 1 INJECTION, POWDER, FOR SOLUTION INTRAMUSCULAR; INTRAVENOUS at 15:21

## 2021-02-21 RX ADMIN — NYSTATIN 500000 UNITS: 100000 SUSPENSION ORAL at 09:47

## 2021-02-21 RX ADMIN — CEFEPIME HYDROCHLORIDE 1000 MG: 1 INJECTION, POWDER, FOR SOLUTION INTRAMUSCULAR; INTRAVENOUS at 02:43

## 2021-02-21 RX ADMIN — IPRATROPIUM BROMIDE AND ALBUTEROL SULFATE 1 AMPULE: .5; 3 SOLUTION RESPIRATORY (INHALATION) at 14:12

## 2021-02-21 RX ADMIN — APIXABAN 2.5 MG: 2.5 TABLET, FILM COATED ORAL at 09:46

## 2021-02-21 RX ADMIN — OXYBUTYNIN CHLORIDE 5 MG: 5 TABLET ORAL at 09:46

## 2021-02-21 RX ADMIN — SODIUM CHLORIDE, PRESERVATIVE FREE 10 ML: 5 INJECTION INTRAVENOUS at 09:47

## 2021-02-21 RX ADMIN — SODIUM CHLORIDE, PRESERVATIVE FREE 10 ML: 5 INJECTION INTRAVENOUS at 04:25

## 2021-02-21 RX ADMIN — NYSTATIN 500000 UNITS: 100000 SUSPENSION ORAL at 22:06

## 2021-02-21 RX ADMIN — METHYLPREDNISOLONE SODIUM SUCCINATE 40 MG: 40 INJECTION, POWDER, FOR SOLUTION INTRAMUSCULAR; INTRAVENOUS at 22:07

## 2021-02-21 RX ADMIN — SODIUM CHLORIDE, PRESERVATIVE FREE 10 ML: 5 INJECTION INTRAVENOUS at 22:15

## 2021-02-21 ASSESSMENT — PAIN DESCRIPTION - DESCRIPTORS: DESCRIPTORS: HEAVINESS;PRESSURE

## 2021-02-21 ASSESSMENT — PAIN DESCRIPTION - PAIN TYPE
TYPE: CHRONIC PAIN
TYPE: ACUTE PAIN

## 2021-02-21 ASSESSMENT — PAIN SCALES - GENERAL
PAINLEVEL_OUTOF10: 0
PAINLEVEL_OUTOF10: 0
PAINLEVEL_OUTOF10: 8

## 2021-02-21 ASSESSMENT — PAIN DESCRIPTION - FREQUENCY
FREQUENCY: INTERMITTENT
FREQUENCY: CONTINUOUS

## 2021-02-21 ASSESSMENT — PAIN DESCRIPTION - LOCATION
LOCATION: CHEST
LOCATION: GENERALIZED

## 2021-02-21 ASSESSMENT — PAIN DESCRIPTION - ONSET: ONSET: AWAKENED FROM SLEEP

## 2021-02-21 NOTE — PROGRESS NOTES
Progress Note    Reason for Consult: ELICEO    Requesting Physician:  Jorge Graf MD    INTERVAL HISTORY:   Continues to have high O2 requirements    BP stable. Creatinine stable below 1  K stable. SNA improved    HISTORY OF PRESENT ILLNESS:    The patient is a 80 y.o. female who presents with worsening shortness of breath. Initial chest x-ray showed progressive pulmonary vascular congestion with possible superimposed bilateral pneumonia. Chest x-ray today showed bilateral airspace disease slightly increased at the right apex compared to prior. Creatinine baseline 0.6 which she was at on admission and yesterday. Creatinine elevated to 1.46 today. Hypokalemia on admission and yesterday. Potassium was 3.8 today. Recent hemoglobin 9.6. WBC was 14.4 today and today 12.0. SBP was originally trending as high as 214/75 on day of admission. She became hypotensive at 99/78 about 8 hours later. SBP trending 100s to 130s today. She originally was requiring nasal cannula and is now on high flow. Home medications include lisinopril, Lasix, metoprolol, amiodarone. She is currently on Lasix 20 mg IV twice daily, lisinopril, metoprolol, amiodarone. She has been up to the bedside commode voiding. No urinary retention on bladder scan       Prior to Admission medications    Medication Sig Start Date End Date Taking?  Authorizing Provider   lisinopril (PRINIVIL;ZESTRIL) 2.5 MG tablet Take 1 tablet by mouth daily 12/12/20  Yes Jorge Graf MD   furosemide (LASIX) 40 MG tablet Take 1 tablet by mouth daily 12/11/20  Yes Savannah Nyhan, APRN - CNP   metoprolol tartrate (LOPRESSOR) 25 MG tablet Take 25 mg by mouth 2 times daily  11/4/19  Yes Historical Provider, MD   amiodarone (CORDARONE) 200 MG tablet Take 200 mg by mouth daily   Yes Historical Provider, MD   apixaban (ELIQUIS) 5 MG TABS tablet Take 1 tablet by mouth 2 times daily 10/5/19  Yes Billy Mckinnon MD   travoprost, benzalkonium, (TRAVATAN) 0.004 % Atraumatic, normocephalic. Anicteric sclera. Pink and moist oral mucosa. Neck supple. No JVD. Chest: Bilateral air entry, clear to auscultation, no wheezing, rhonchi or rales. Cardiovascular: RRR, S1S2, no murmur, rub or gallop. No lower extremity edema. Abdomen: Soft, non tender to palpation. Musculoskeletal:  No cyanosis or clubbing. Integumentary: Pink, warm and dry. Free from rash or lesions. Skin turgor normal.  CNS: Oriented to person, place and time. Speech clear. Face symmetrical. No tremor.      Data:    CBC:   Lab Results   Component Value Date    WBC 21.7 (H) 02/21/2021    HGB 10.6 (L) 02/21/2021    HCT 34.7 (L) 02/21/2021    MCV 87.0 02/21/2021     02/21/2021     BMP:    Lab Results   Component Value Date     02/21/2021     02/20/2021     (H) 02/19/2021    K 4.5 02/21/2021    K 5.0 02/20/2021    K 5.0 02/19/2021    CL 99 02/21/2021     02/20/2021     02/19/2021    CO2 34 (H) 02/21/2021    CO2 34 (H) 02/20/2021    CO2 36 (H) 02/19/2021    BUN 42 (H) 02/21/2021    BUN 37 (H) 02/20/2021    BUN 41 (H) 02/19/2021    CREATININE 0.78 02/21/2021    CREATININE 0.64 02/20/2021    CREATININE 0.70 02/19/2021    GLUCOSE 166 (H) 02/21/2021    GLUCOSE 161 (H) 02/20/2021    GLUCOSE 158 (H) 02/19/2021     CMP:   Lab Results   Component Value Date     02/21/2021    K 4.5 02/21/2021    CL 99 02/21/2021    CO2 34 02/21/2021    BUN 42 02/21/2021    CREATININE 0.78 02/21/2021    GLUCOSE 166 02/21/2021    CALCIUM 8.1 02/21/2021    PROT 6.1 05/01/2020    LABALBU 3.3 05/01/2020    BILITOT 0.71 05/01/2020    ALKPHOS 72 05/01/2020    AST 16 05/01/2020    ALT 8 05/01/2020      Hepatic:   Lab Results   Component Value Date    AST 16 05/01/2020    AST 67 (H) 10/02/2019    AST 18 11/07/2016    ALT 8 05/01/2020    ALT 63 (H) 10/02/2019    ALT 12 11/07/2016    BILITOT 0.71 05/01/2020    BILITOT 0.82 10/02/2019    BILITOT 0.39 11/07/2016    ALKPHOS 72 05/01/2020    ALKPHOS 103 10/02/2019    ALKPHOS 70 11/07/2016     BNP: No results found for: BNP  Lipids:   Lab Results   Component Value Date    CHOL 109 12/08/2020    HDL 38 (L) 12/08/2020     INR:   Lab Results   Component Value Date    INR 1.1 10/02/2019    INR 1.2 11/11/2016    INR 1.2 11/10/2016     PTH: No results found for: PTH  Phosphorus:    Lab Results   Component Value Date    PHOS 3.8 02/21/2021     Ionized Calcium: No results found for: IONCA  Magnesium:   Lab Results   Component Value Date    MG 1.9 02/16/2021     Albumin:   Lab Results   Component Value Date    LABALBU 3.3 05/01/2020     Last 3 CK, CKMB, Troponin: @LABRCNT(CKTOTAL:3,CKMB:3,TROPONINI:3)       URINE:)No results found for: Dodie Villalpando    Radiology:   Reviewed. Assessment:  Acute Kidney Injury, likely hemodynamically related. FeNa 0.26%. Cr is better. Baseline Creatinine 0.6. Hypernatremia. Hyper kalemia  Hypertension with recent hypotension. CHF exacerbation. Multifocal pneumonia. Hypoxic respiratory failure. Plan: Tolerating current dose of Lasix. Off lisinopril. BP stable. SNA stable  K stable. On low K diet for now. Strict I&O. Antibiotics per primary and pulmonary. Avoid nephrotoxic drugs and IV contrast exposure. Follow up chemistries in the AM.    Please do not hesitate to contact us for any further questions/concerns. We will continue to follow along with you.         Electronically signed by Tarah Puentes MD  on 2/21/2021 at 3:33 PM

## 2021-02-21 NOTE — PROGRESS NOTES
Section of Cardiology  Progress Note      Date:  2/21/2021  Patient: Danielle Ko  Admission:  2/8/2021  6:28 PM  Admit DX: Acute left-sided CHF (congestive heart failure) ([facility] [I50.1]  Age:  80 y.o., 3/26/1932     LOS: 13 days     Reason for evaluation:   atrial fibrillation, CHF and coronary artery disease      SUBJECTIVE:     The patient was seen and examined. Notes and labs reviewed. Patient states that her breathing is still the same and he is still on high flow oxygen. Denies chest pain denies palpitation. OBJECTIVE:      EXAM:   Vitals:    VITALS:  BP (!) 153/78   Pulse 58   Temp 97.5 °F (36.4 °C) (Oral)   Resp 22   Ht 5' 5\" (1.651 m)   Wt 131 lb 2 oz (59.5 kg)   SpO2 95%   BMI 21.82 kg/m²   24HR INTAKE/OUTPUT:      Intake/Output Summary (Last 24 hours) at 2/21/2021 1031  Last data filed at 2/20/2021 2100  Gross per 24 hour   Intake 525 ml   Output 350 ml   Net 175 ml       CONSTITUTIONAL: Awake, sitting in the chair, no signs of acute distress  HEENT: Normal jugular venous pulsations  LUNGS: Dry bibasilar rales otherwise clear, I do not hear any wheezing   CARDIOVASCULAR:  regular rate and rhythm, normal S1 and S2, no S3 or S4, and 1/6 early systolic murmur at the aortic area   SKIN: Warm and dry.   EXTREMITIES: No leg edema    Current Inpatient Medications:   furosemide  40 mg Intravenous BID    methylPREDNISolone  40 mg Intravenous Q6H    nystatin  5 mL Oral TID    apixaban  2.5 mg Oral BID    cefepime  1,000 mg Intravenous Q12H    isosorbide mononitrate  30 mg Oral QAM    metoprolol tartrate  25 mg Oral BID    oxybutynin  5 mg Oral Every Other Day    pantoprazole  40 mg Oral QAM AC    pravastatin  40 mg Oral Daily    sertraline  100 mg Oral Daily    latanoprost  1 drop Both Eyes Nightly    sodium chloride flush  10 mL Intravenous 2 times per day    ipratropium-albuterol  1 ampule Inhalation Q4H WA       IV Infusions (if any):      Diagnostics:   Telemetry: Sinus rhythm  Labs:   CBC:  Recent Labs     02/20/21  0533 02/21/21  0532   WBC 19.3* 21.7*   HGB 10.1* 10.6*   HCT 32.5* 34.7*    297     Magnesium:  No results for input(s): MG in the last 72 hours. BMP:  Recent Labs     02/20/21  0533 02/21/21  0532    140   K 5.0 4.5   CALCIUM 8.1* 8.1*   CO2 34* 34*   BUN 37* 42*   CREATININE 0.64 0.78   LABGLOM >60 >60   GLUCOSE 161* 166*     BNP:No results for input(s): BNP, PROBNP in the last 72 hours. PT/INR:No results for input(s): PROTIME, INR in the last 72 hours. APTT:No results for input(s): APTT in the last 72 hours. CARDIAC ENZYMES:  Recent Labs     02/21/21  0532 02/21/21  0857   TROPHS 15* 14   TROPONINT NOT REPORTED NOT REPORTED     FASTING LIPID PANEL:  Lab Results   Component Value Date    HDL 38 12/08/2020    TRIG 78 12/08/2020     LIVER PROFILE:No results for input(s): AST, ALT, LABALBU, ALKPHOS, BILITOT, BILIDIR, IBILI, PROT, GLOB, ALBUMIN in the last 72 hours. ASSESSMENT:    · Acute on chronic diastolic heart failure, mild, on IV diuretics, managed by nephrology  · Respiratory failure/possible pneumonia on high flow nasal cannula oxygen, managed by others, patient is frustrated as she is not making significant progress regarding her breathing. · CAD with prior CABG in 2016-denies chest pain. · Paroxysmal atrial fibrillation, maintaining sinus rhythm with history of cardioversion, amiodarone was stopped this admission and patient is maintaining sinus rhythm and remains on apixaban  · Hypertension-well-controlled  · Dyslipidemia-treated    PLAN:  Continue current cardiac medications. Continue with diuretic therapy. Continue Eliquis. Keep patient off amiodarone, continue apixaban. . I discussed above with the patient, and nursing.       Diana Lopez MD

## 2021-02-21 NOTE — PROGRESS NOTES
Physical Therapy  Facility/Department: [facility] PROGRESSIVE CARE  Daily Treatment Note  NAME: Shereen Haro  : 3/26/1932  MRN: [id number]    Date of Service: 2021    Discharge Recommendations:  Continue to assess pending progress, Subacute/Skilled Nursing Facility   PT Equipment Recommendations  Equipment Needed: No    Assessment   Body structures, Functions, Activity limitations: Decreased functional mobility ; Decreased ROM; Decreased strength;Decreased safe awareness;Decreased balance;Decreased endurance;Decreased posture  Assessment: pt demonstrated improved mobility once sitting EOB. Bed mobility required increased effort over other activity. Prognosis: Good  PT Education: PT Role;Plan of Care;Home Exercise Program;Transfer Training  Patient Education: hand placement with tranfers  Activity Tolerance  Activity Tolerance: Patient limited by fatigue;Patient limited by endurance  Activity Tolerance: 02 sat on high flow with activity 90%     Patient Diagnosis(es): The encounter diagnosis was Acute congestive heart failure, unspecified heart failure type ([address] has a past medical history of Acute on chronic diastolic CHF (congestive heart failure) ([address] Bradycardia, CAD (coronary artery disease), Cancer ([address] Depression, Gallstones, GERD (gastroesophageal reflux disease), Hiatal hernia, History of cardiac cath, Hyperlipidemia, Hypertension, Insomnia, MI (myocardial infarction) ([address] PNA (pneumonia), SOB (shortness of breath), and UTI (urinary tract infection). has a past surgical history that includes Appendectomy; Cholecystectomy; Hysterectomy; Coronary angioplasty with stent; Colonoscopy; Coronary artery bypass graft (2016); Cardiac surgery (2016); and Cardioversion (2019).     Restrictions  Restrictions/Precautions  Restrictions/Precautions: General Precautions, Fall Risk, Up as Tolerated  Required Braces or Orthoses?: No  Position Activity Restriction  Other position/activity restrictions: telemetry, HFNC, cont SPO2 monitor, IV site  Orientation  ABBEY Larson PT services and Pt agreeable. Pt requested to get to commode and wanted to each breakfast.   Orientation  Overall Orientation Status: Within Normal Limits  Cognition      Objective   Bed mobility  Bridging: Moderate assistance  Rolling to Left: Moderate assistance  Rolling to Right: Moderate assistance  Supine to Sit: Moderate assistance  Sit to Supine: Unable to assess  Scooting: Moderate assistance  Comment: HOB elevated. Transfers  Sit to Stand: Minimal Assistance  Stand to sit: Minimal Assistance  Bed to Chair: Minimal assistance  Stand Pivot Transfers: Minimal Assistance  Lateral Transfers: Minimal Assistance  Comment: assist pt from bed to commode, assist wtih hygiene, then to bedside chair. Pt declined additional pregait activity due to wanting to eat breakfast, and interrupted via LAB  Ambulation  Ambulation?: Yes  Ambulation 1  Surface: level tile  Device: Rolling Walker  Assistance: Minimal assistance  Quality of Gait: limitation with 02 high flow tubing length. Gait Deviations: Slow Lizeth;Decreased step length  Distance: 3 ft x 2  Comments: fwd head and torso. Exercises: Ankle pumps 20x AROM. Lobo LE hip abd, hip/knee flexion x 10 reps  LAQ and hip flexion x 10 reps. AM-PAC Score  AM-PAC Inpatient Mobility Raw Score : 17 (02/21/21 1138)  AM-PAC Inpatient T-Scale Score : 42.13 (02/21/21 1138)  Mobility Inpatient CMS 0-100% Score: 50.57 (02/21/21 1138)  Mobility Inpatient CMS G-Code Modifier : CK (02/21/21 1138)          Goals  Short term goals  Time Frame for Short term goals: 12 visits  Short term goal 1: Inc bed-mobility & transfers to independent to enable pt to safely get in/OOB  Short term goal 2: Inc gait to amb 200 ft with R/walker indep;   Short term goal 3: Pt able to tolerate 30-40 min of activity to include 15-20 reps of ex & functional mobility including 5 minutes of standing to facilitate better activity tolerance  Short term goal 5: Ed pt on home ex's, optimal breathing techniques, safety & energy principles, fall prevention & issue written pt education  Patient Goals   Patient goals : To go and be with grandkids    Plan    Plan  Times per week: 1-2x/D, 5-6D/week  Times per day: Daily  Current Treatment Recommendations: Strengthening, ROM, Balance Training, Functional Mobility Training, Transfer Training, Gait Training, Home Exercise Program  Plan Comment: progress standing activity, gait as able due to 02 tubing length/high flow  Safety Devices  Type of devices:  All fall risk precautions in place, Call light within reach, Nurse notified, Left in chair  Restraints  Initially in place: No     Therapy Time   Individual Concurrent Group Co-treatment   Time In 0836         Time Out 0904         Minutes [address] PTA

## 2021-02-21 NOTE — PROGRESS NOTES
Patient calls out states she has \"chest pressure\"  States it is 8/10 feels like \"squeezing\"   EKG obtained no changes noted perfectserved Dr Wade Parmar.

## 2021-02-21 NOTE — PLAN OF CARE
Problem: Skin Integrity:  Goal: Absence of new skin breakdown  Description: Absence of new skin breakdown  2/21/2021 1409 by Alie Rich RN  Outcome: Ongoing  Pt up in chair at this time. Reposition every Q2 and PRN. Mepilex on coccyx. Coccyx redenned. Will monitor.

## 2021-02-22 LAB
ANION GAP SERPL CALCULATED.3IONS-SCNC: 6 MMOL/L (ref 9–17)
ANTI DNA DOUBLE STRANDED: 6 IU/ML
ANTI-NUCLEAR ANTIBODY (ANA): NEGATIVE
BUN BLDV-MCNC: 57 MG/DL (ref 8–23)
BUN/CREAT BLD: 53 (ref 9–20)
CALCIUM SERPL-MCNC: 8 MG/DL (ref 8.6–10.4)
CHLORIDE BLD-SCNC: 98 MMOL/L (ref 98–107)
CO2: 35 MMOL/L (ref 20–31)
CREAT SERPL-MCNC: 1.08 MG/DL (ref 0.5–0.9)
EKG ATRIAL RATE: 44 BPM
EKG P AXIS: 51 DEGREES
EKG P-R INTERVAL: 194 MS
EKG Q-T INTERVAL: 518 MS
EKG QRS DURATION: 94 MS
EKG QTC CALCULATION (BAZETT): 442 MS
EKG R AXIS: 40 DEGREES
EKG T AXIS: 37 DEGREES
EKG VENTRICULAR RATE: 44 BPM
GFR AFRICAN AMERICAN: 58 ML/MIN
GFR NON-AFRICAN AMERICAN: 48 ML/MIN
GFR SERPL CREATININE-BSD FRML MDRD: ABNORMAL ML/MIN/{1.73_M2}
GFR SERPL CREATININE-BSD FRML MDRD: ABNORMAL ML/MIN/{1.73_M2}
GLUCOSE BLD-MCNC: 174 MG/DL (ref 70–99)
HCT VFR BLD CALC: 34 % (ref 36.3–47.1)
HEMOGLOBIN: 10.3 G/DL (ref 11.9–15.1)
MCH RBC QN AUTO: 26.3 PG (ref 25.2–33.5)
MCHC RBC AUTO-ENTMCNC: 30.3 G/DL (ref 28.4–34.8)
MCV RBC AUTO: 86.7 FL (ref 82.6–102.9)
NRBC AUTOMATED: 0 PER 100 WBC
PDW BLD-RTO: 16.3 % (ref 11.8–14.4)
PHOSPHORUS: 4.1 MG/DL (ref 2.6–4.5)
PLATELET # BLD: 314 K/UL (ref 138–453)
PMV BLD AUTO: 11.8 FL (ref 8.1–13.5)
POTASSIUM SERPL-SCNC: 4.9 MMOL/L (ref 3.7–5.3)
RBC # BLD: 3.92 M/UL (ref 3.95–5.11)
SODIUM BLD-SCNC: 139 MMOL/L (ref 135–144)
TROPONIN INTERP: ABNORMAL
TROPONIN T: ABNORMAL NG/ML
TROPONIN, HIGH SENSITIVITY: 15 NG/L (ref 0–14)
TROPONIN, HIGH SENSITIVITY: 16 NG/L (ref 0–14)
TROPONIN, HIGH SENSITIVITY: 18 NG/L (ref 0–14)
WBC # BLD: 23.9 K/UL (ref 3.5–11.3)

## 2021-02-22 PROCEDURE — 84484 ASSAY OF TROPONIN QUANT: CPT

## 2021-02-22 PROCEDURE — 85027 COMPLETE CBC AUTOMATED: CPT

## 2021-02-22 PROCEDURE — 6370000000 HC RX 637 (ALT 250 FOR IP): Performed by: FAMILY MEDICINE

## 2021-02-22 PROCEDURE — 2580000003 HC RX 258: Performed by: FAMILY MEDICINE

## 2021-02-22 PROCEDURE — 36415 COLL VENOUS BLD VENIPUNCTURE: CPT

## 2021-02-22 PROCEDURE — 97110 THERAPEUTIC EXERCISES: CPT

## 2021-02-22 PROCEDURE — 6360000002 HC RX W HCPCS: Performed by: FAMILY MEDICINE

## 2021-02-22 PROCEDURE — 2700000000 HC OXYGEN THERAPY PER DAY

## 2021-02-22 PROCEDURE — 92526 ORAL FUNCTION THERAPY: CPT

## 2021-02-22 PROCEDURE — 80048 BASIC METABOLIC PNL TOTAL CA: CPT

## 2021-02-22 PROCEDURE — 6370000000 HC RX 637 (ALT 250 FOR IP): Performed by: INTERNAL MEDICINE

## 2021-02-22 PROCEDURE — 97535 SELF CARE MNGMENT TRAINING: CPT

## 2021-02-22 PROCEDURE — 94640 AIRWAY INHALATION TREATMENT: CPT

## 2021-02-22 PROCEDURE — 6360000002 HC RX W HCPCS: Performed by: INTERNAL MEDICINE

## 2021-02-22 PROCEDURE — 2060000000 HC ICU INTERMEDIATE R&B

## 2021-02-22 PROCEDURE — 84100 ASSAY OF PHOSPHORUS: CPT

## 2021-02-22 PROCEDURE — 97530 THERAPEUTIC ACTIVITIES: CPT

## 2021-02-22 PROCEDURE — 6360000002 HC RX W HCPCS: Performed by: NURSE PRACTITIONER

## 2021-02-22 PROCEDURE — 94761 N-INVAS EAR/PLS OXIMETRY MLT: CPT

## 2021-02-22 PROCEDURE — 94660 CPAP INITIATION&MGMT: CPT

## 2021-02-22 PROCEDURE — 93010 ELECTROCARDIOGRAM REPORT: CPT | Performed by: INTERNAL MEDICINE

## 2021-02-22 RX ORDER — LISINOPRIL 20 MG/1
20 TABLET ORAL DAILY
Status: DISCONTINUED | OUTPATIENT
Start: 2021-02-22 | End: 2021-02-22

## 2021-02-22 RX ORDER — METOPROLOL SUCCINATE 25 MG/1
25 TABLET, EXTENDED RELEASE ORAL DAILY
Status: DISCONTINUED | OUTPATIENT
Start: 2021-02-22 | End: 2021-02-24

## 2021-02-22 RX ADMIN — SODIUM CHLORIDE, PRESERVATIVE FREE 10 ML: 5 INJECTION INTRAVENOUS at 21:34

## 2021-02-22 RX ADMIN — IPRATROPIUM BROMIDE AND ALBUTEROL SULFATE 1 AMPULE: .5; 3 SOLUTION RESPIRATORY (INHALATION) at 06:11

## 2021-02-22 RX ADMIN — PRAVASTATIN SODIUM 40 MG: 40 TABLET ORAL at 08:04

## 2021-02-22 RX ADMIN — CEFEPIME HYDROCHLORIDE 1000 MG: 1 INJECTION, POWDER, FOR SOLUTION INTRAMUSCULAR; INTRAVENOUS at 02:33

## 2021-02-22 RX ADMIN — FUROSEMIDE 40 MG: 10 INJECTION, SOLUTION INTRAMUSCULAR; INTRAVENOUS at 18:02

## 2021-02-22 RX ADMIN — ISOSORBIDE MONONITRATE 30 MG: 30 TABLET ORAL at 08:04

## 2021-02-22 RX ADMIN — IPRATROPIUM BROMIDE AND ALBUTEROL SULFATE 1 AMPULE: .5; 3 SOLUTION RESPIRATORY (INHALATION) at 10:48

## 2021-02-22 RX ADMIN — PANTOPRAZOLE SODIUM 40 MG: 40 TABLET, DELAYED RELEASE ORAL at 06:22

## 2021-02-22 RX ADMIN — IPRATROPIUM BROMIDE AND ALBUTEROL SULFATE 1 AMPULE: .5; 3 SOLUTION RESPIRATORY (INHALATION) at 15:04

## 2021-02-22 RX ADMIN — APIXABAN 2.5 MG: 2.5 TABLET, FILM COATED ORAL at 21:34

## 2021-02-22 RX ADMIN — FUROSEMIDE 40 MG: 10 INJECTION, SOLUTION INTRAMUSCULAR; INTRAVENOUS at 08:04

## 2021-02-22 RX ADMIN — METOPROLOL SUCCINATE 25 MG: 25 TABLET, EXTENDED RELEASE ORAL at 21:56

## 2021-02-22 RX ADMIN — LATANOPROST 1 DROP: 50 SOLUTION OPHTHALMIC at 21:34

## 2021-02-22 RX ADMIN — NYSTATIN 500000 UNITS: 100000 SUSPENSION ORAL at 08:04

## 2021-02-22 RX ADMIN — METOPROLOL TARTRATE 25 MG: 25 TABLET, FILM COATED ORAL at 08:04

## 2021-02-22 RX ADMIN — NYSTATIN 500000 UNITS: 100000 SUSPENSION ORAL at 15:46

## 2021-02-22 RX ADMIN — CEFEPIME HYDROCHLORIDE 1000 MG: 1 INJECTION, POWDER, FOR SOLUTION INTRAMUSCULAR; INTRAVENOUS at 15:46

## 2021-02-22 RX ADMIN — IPRATROPIUM BROMIDE AND ALBUTEROL SULFATE 1 AMPULE: .5; 3 SOLUTION RESPIRATORY (INHALATION) at 19:59

## 2021-02-22 RX ADMIN — SERTRALINE HYDROCHLORIDE 100 MG: 50 TABLET ORAL at 08:04

## 2021-02-22 RX ADMIN — APIXABAN 2.5 MG: 2.5 TABLET, FILM COATED ORAL at 08:04

## 2021-02-22 RX ADMIN — SODIUM CHLORIDE, PRESERVATIVE FREE 10 ML: 5 INJECTION INTRAVENOUS at 08:05

## 2021-02-22 RX ADMIN — METHYLPREDNISOLONE SODIUM SUCCINATE 40 MG: 40 INJECTION, POWDER, FOR SOLUTION INTRAMUSCULAR; INTRAVENOUS at 09:47

## 2021-02-22 RX ADMIN — NYSTATIN 500000 UNITS: 100000 SUSPENSION ORAL at 21:34

## 2021-02-22 RX ADMIN — METHYLPREDNISOLONE SODIUM SUCCINATE 40 MG: 40 INJECTION, POWDER, FOR SOLUTION INTRAMUSCULAR; INTRAVENOUS at 21:34

## 2021-02-22 NOTE — PROGRESS NOTES
.. PALLIATIVE CARE TEAM    Patient: Danielle Ko  Room: 1026/1026-01    Reason For Consult   Goals of care evaluation  Distress management  Symptom Management  Guidance and support  Facilitate communications  Assistance in coordinating care  Recommendations for the above    Impression: Danielle Ko is a 80y.o. year old female with  has a past medical history of Acute on chronic diastolic CHF (congestive heart failure) ([facility] [address] Bradycardia, CAD (coronary artery disease), Cancer ([facility] [address] [facility] Depression, Gallstones, GERD (gastroesophageal reflux disease), Hiatal hernia, History of cardiac cath, Hyperlipidemia, Hypertension, Insomnia, MI (myocardial infarction) ([facility] [address] PNA (pneumonia), SOB (shortness of breath), and UTI (urinary tract infection). .  Currently hospitalized for the management of acute left sided CHF. The Palliative Care Team is following to assist with goals of care and support. Code Status  DNR-CCA    Vital Signs:  /60   Pulse (!) 48   Temp 97.7 °F (36.5 °C) (Oral)   Resp 18   Ht 5' 5\" (1.651 m)   Wt 136 lb 12.8 oz (62.1 kg)   SpO2 94%   BMI 22.76 kg/m²     Patient Active Problem List   Diagnosis    Symptomatic bradycardia    Suspected sleep apnea    Unexplained night sweats    Pneumonia    GERD (gastroesophageal reflux disease)    Hyperlipidemia    Hypertension    Chronic atrial fibrillation (HCC)    Acute on chronic diastolic CHF (congestive heart failure) (HCC)    Generalized anxiety disorder    Depressive disorder    Nonrheumatic tricuspid valve disorder    Atherosclerotic heart disease of native coronary artery without angina pectoris    Heart failure, diastolic, acute on chronic (HCC)    Suspected COVID-19 virus infection    Acute left-sided CHF (congestive heart failure) (HCC)    Mild malnutrition ([facility]       Palliative Interaction:Patient is in the chair, and she continues with the high flow at 25 liters or 30%.  She is alert and oriented and is a bit frustrated over her current medical condition. Her daughter in law Hailey Farris is at the bedside. The patient is very talkative and tells me that she is from the [facility] [address] and is in independent living. She recently has had oxygen brought in to her home, and it has slowed her independence. She states that she has been very active and was even able to do her family's laundry, and was involved in activities and played cards everyday. The patient is really hoping that she recovers so that she can return to her previous quality of life. Her family is very supportive and she is very involved in their lives. The patient's goal is to return home and have the ability to be active and to enjoy her life. I offer her much encouragement and emotional support. Will follow for goals of care and support. Goals/Plan of care  Education/support to family  Education/support to patient  Providing support for coping/adaptation/distress of family  Providing support for coping/adaptation/distress of patient  Discussing meaning/purpose   Continue with current plan of care  Code status clarified: [facility]  Validating patient/family distress  Continued communication updates  Patient is in the chair and she is alert, oriented and interactive. She is hopeful that she can return to her independence at [address] [place] [address].      Electronically signed by   Theresa Manuel RN  Palliative Care Team  on 2/22/2021 at 2:16 PM

## 2021-02-22 NOTE — PROGRESS NOTES
Physical Therapy  Facility/Department: [facility] PROGRESSIVE CARE  Daily Treatment Note  NAME: Zhane Mitchell  : 3/26/1932  MRN: [id number]    Date of Service: 2021    Discharge Recommendations:  Subacute/Skilled Nursing Facility   PT Equipment Recommendations  Equipment Needed: No    Assessment   Body structures, Functions, Activity limitations: Decreased functional mobility ; Decreased safe awareness;Decreased endurance;Decreased balance  Assessment: Pt demonstrates decreased aerobic capacity &  is quick to fatigue requiring multiple rest breaks. Pt unable to tolerate ambulation due to drop of saO2 dropping to 82%. At current level of function & impaired activity tolerance, recommend D/C to [address] to maximize independence with functional mobility, balance, safety awareness & activity tolerance to ensure safe D/C back to home environment. Prognosis: Good  Decision Making: High Complexity  Exam: functional mobility, activity tolerance, Balance, & [employer] AM-PAC 6 Clicks Basic Mobility  Clinical Presentation: unstable  Patient Education: optimal breathing techniques, LE ex's with incorporation of breathing techniques, posture & positioning for optimal breathing  REQUIRES PT FOLLOW UP: Yes  Activity Tolerance  Activity Tolerance: Patient limited by endurance  Activity Tolerance: High flow & saO2 dropped to 82% with transfers/gait in place only, 5 minutes to recover to 90--92% & extended time working on pursed lip breathing     Patient Diagnosis(es): The encounter diagnosis was Acute congestive heart failure, unspecified heart failure type ([facility] [address] [facility]      has a past medical history of Acute on chronic diastolic CHF (congestive heart failure) ([facility] [address] Bradycardia, CAD (coronary artery disease), Cancer ([facility] [address] Depression, Gallstones, GERD (gastroesophageal reflux disease), Hiatal hernia, History of cardiac cath, Hyperlipidemia, Hypertension, Insomnia, MI (myocardial infarction) ([address] PNA (pneumonia), SOB (shortness of breath), and UTI (urinary tract infection). has a past surgical history that includes Appendectomy; Cholecystectomy; Hysterectomy; Coronary angioplasty with stent; Colonoscopy; Coronary artery bypass graft (11/08/2016); Cardiac surgery (11/08/2016); and Cardioversion (12/17/2019). Restrictions  Restrictions/Precautions  Restrictions/Precautions: General Precautions, Fall Risk, Up as Tolerated  Required Braces or Orthoses?: No  Position Activity Restriction  Other position/activity restrictions: telemetry, HFNC, cont SPO2 monitor, IV site  Subjective   General  Chart Reviewed: Yes  Additional Pertinent Hx: CHF, CAD, HLPD, HTN, UTI, SOB  Response To Previous Treatment: Patient with no complaints from previous session. Family / Caregiver Present: No  Subjective  Subjective: Pt agreeable to PT  General Comment  Comments: RN okays PT  Pain Screening  Patient Currently in Pain: Denies  Vital Signs  BP Location: Left Arm  Patient Currently in Pain: Denies  Oxygen Therapy  O2 Device: Heated high flow cannula       Orientation  Orientation  Overall Orientation Status: Within Functional Limits  Cognition   Cognition  Overall Cognitive Status: Exceptions  Arousal/Alertness: Appropriate responses to stimuli  Following Commands:  Follows one step commands consistently  Attention Span: Appears intact  Safety Judgement: Decreased awareness of need for assistance;Decreased awareness of need for safety  Problem Solving: Assistance required to correct errors made;Assistance required to identify errors made;Decreased awareness of errors  Insights: Decreased awareness of deficits  Initiation: Requires cues for some  Sequencing: Requires cues for some  Objective   Bed mobility  Comment: NT, pt up in chair & remained up in chair  Transfers  Sit to Stand: Minimal Assistance  Stand to sit: Minimal Assistance  Bed to Chair: Minimal assistance  Stand Pivot Transfers: Minimal Assistance  Comment: needs RW for support and balance and for energy conservation during transfers, is Rosalio for multiple lines & management  Ambulation  Ambulation?: Yes  More Ambulation?: No  Ambulation 1  Surface: level tile  Device: Rolling Walker  Assistance: Contact guard assistance  Quality of Gait: stood in place only, saO2 kept dropping so did not tolerate more functional activity  Distance: in place only  Neuromuscular Education  NDT Treatment: Standing  Neuromuscular Comments: Patient performs weight shifts and standing marches x10 to facilitate good postural alignment and facilitate balance stratigies during gait activities. Balance  Posture: Fair  Sitting - Static: Good  Sitting - Dynamic: Good  Standing - Static: Fair;+  Standing - Dynamic: Fair  Comments: Standing with RW  Exercises  Comments: Pt performed seated LE ex's x 10 reps to promote mobility exercises and promote joint congruency, completed sit to stands x 5 to promote mobility and functional quad strengthening         All lines intact, call light within reach, and patient positioned comfortably at end of treatment. All patient needs addressed prior to ending therapy session. G-Code     OutComes Score                                                     AM-PAC Score  AM-PAC Inpatient Mobility Raw Score : 15 (02/22/21 1154)  -PAC Inpatient T-Scale Score : 39.45 (02/22/21 1154)  Mobility Inpatient CMS 0-100% Score: 57.7 (02/22/21 1154)  Mobility Inpatient CMS G-Code Modifier : CK (02/22/21 [facility]4)          Goals  Short term goals  Time Frame for Short term goals: 12 visits  Short term goal 1: Inc bed-mobility & transfers to independent to enable pt to safely get in/OOB  Short term goal 2: Inc gait to amb 200 ft with R/walker indep;   Short term goal 3: Pt able to tolerate 30-40 min of activity to include 15-20 reps of ex & functional mobility including 5 minutes of standing to facilitate better activity tolerance  Short term goal 5: Ed pt on home ex's, optimal breathing techniques, safety & energy principles, fall prevention & issue written pt education  Patient Goals   Patient goals :  To go and be with grandkids    Plan    Plan  Times per week: 1-2x/D, 5-6D/week  Times per day: Daily  Current Treatment Recommendations: Strengthening, Balance Training, Functional Mobility Training, Transfer Training, Gait Training, Endurance Training, Home Exercise Program, Safety Education & Training, Patient/Caregiver Education & Training  Plan Comment: progress standing activity, gait as able due to 02 tubing length/high flow  Safety Devices  Type of devices: Call light within reach, Gait belt, Patient at risk for falls, Left in chair  Restraints  Initially in place: No     Therapy Time   Individual Concurrent Group Co-treatment   Time In 1128         Time Out 1154         Minutes [id number] [address], PT

## 2021-02-22 NOTE — FLOWSHEET NOTE
Patient is awake and alert while sitting up in a chair. Patient's daughter is sitting nearby. Patient and daughter are approachable and engages in conversation. Patient reports that she is being discharged to rehab tomorrow. Patient appears to be coping adequately. Patient does not acknowledge any spiritual or emotional concerns. Patient expresses appreciation for the visit.        02/22/21 1921   Encounter Summary   Services provided to: Patient and family together   Referral/Consult From: Palliative Care;Rounding   Support System Children   Place of [facility] No   Continue Visiting   (2/22/21)   Complexity of Encounter Low   Length of Encounter 15 minutes   Routine   Type Follow up   Assessment Approachable   Intervention Active listening;Explored feelings, thoughts, concerns;Explored coping resources;Nurtured hope   Outcome Expressed gratitude

## 2021-02-22 NOTE — CARE COORDINATION
Devin Corrales did complete peer to peer, physician from insurance company will disccus with her superior on final decision. They will notify [facility] with determination. Called Armando from [facility]-informed to watch out for determination-ne will let writer know when a decision comes thru.

## 2021-02-22 NOTE — PLAN OF CARE
Problem: Skin Integrity:  Goal: Absence of new skin breakdown  Description: Absence of new skin breakdown  2/22/2021 1003 by Cande Chung RN  Outcome: Ongoing     Problem: Falls - Risk of:  Goal: Will remain free from falls  Description: Will remain free from falls  2/22/2021 1003 by Cande Chung RN  Outcome: Ongoing     Problem: Daily Care:  Goal: Daily care needs are met  Description: Daily care needs are met  Outcome: Ongoing     Problem: Discharge Planning:  Goal: Patients continuum of care needs are met  Description: Patients continuum of care needs are met  Outcome: Ongoing

## 2021-02-22 NOTE — PROGRESS NOTES
Nutrition Assessment     Type and Reason for Visit: Reassess    Nutrition Recommendations/Plan:   1. Continue DIET CARDIAC; Dental Soft; Mildly Thick (Nectar); Daily Fluid Restriction: 1500 ml; Low Potassium  2. Monitor p.o intakes, diet tolerance and labs  3. Encourage oral intake    Nutrition Assessment:  Patient reports continued poor oral intake due to difficulty breathing. Patient also report poor dentention and loose teeth. Patient states that she does eat the Magic Cup sometimes but will try to eating more now that she knows it has protein. Continue current diet and Magic Cup 3x/day. Monitor p.o intakes, diet tolerance and labs. Encourage oral intake. Malnutrition Assessment:  Malnutrition Status: At risk for malnutrition (Comment)    Estimated Daily Nutrient Needs:  Energy (kcal): 1200 kcal based on Rock-St. Jeor (1.2 factor); Weight Used for Energy Requirements:  Admission     Protein (g): 67-73 gm based on 1.2-1.3 gm/kg; Weight Used for Protein Requirements:  Admission        Fluid (ml/day): 1200 mL per physician; Weight Used for Fluid Requirements:  1 ml/kcal      Nutrition Related Findings: No edema. Modified barium swallow study (2/16)      Current Nutrition Therapies:    Dietary Nutrition Supplements: Frozen Oral Supplement  DIET CARDIAC; Dental Soft; Mildly Thick (Nectar);  Daily Fluid Restriction: 1500 ml; Low Potassium    Anthropometric Measures:  · Height: 5' 5\" (165.1 cm)  · Current Body Wt: 136 lb (61.7 kg)   · BMI: 22.6    Nutrition Diagnosis:   · Swallowing difficulty related to swallowing difficulty as evidenced by swallow study results(honey thick liquids)      Nutrition Interventions:   Food and/or Nutrient Delivery:  Continue Current Diet, Continue Oral Nutrition Supplement  Nutrition Education/Counseling:  Education not indicated   Coordination of Nutrition Care:  Continue to monitor while inpatient    Goals:  PO intakes are greater than 50% at meals       Nutrition Monitoring and Evaluation:   Behavioral-Environmental Outcomes:  None Identified   Food/Nutrient Intake Outcomes:  Food and Nutrient Intake, Supplement Intake  Physical Signs/Symptoms Outcomes:  Biochemical Data, Fluid Status or Edema, Skin, Weight, Chewing or Swallowing     Discharge Planning:    Continue current diet, Continue Oral Nutrition Supplement         Ok NUÑEZN, RDN, LDN  Lead Clinical Dietitian  RD Office Phone [phone]

## 2021-02-22 NOTE — PROGRESS NOTES
Progress Note    Reason for Consult: ELICEO    Requesting Physician:  Sommer Melissa MD    INTERVAL HISTORY:   Oxygen requirements decreasing. BP stable. Creatinine higher at 1.08  Electrolytes ok. No new complaints. HISTORY OF PRESENT ILLNESS:    The patient is a 80 y.o. female who presents with worsening shortness of breath. Initial chest x-ray showed progressive pulmonary vascular congestion with possible superimposed bilateral pneumonia. Chest x-ray today showed bilateral airspace disease slightly increased at the right apex compared to prior. Creatinine baseline 0.6 which she was at on admission and yesterday. Creatinine elevated to 1.46 today. Hypokalemia on admission and yesterday. Potassium was 3.8 today. Recent hemoglobin 9.6. WBC was 14.4 today and today 12.0. SBP was originally trending as high as 214/75 on day of admission. She became hypotensive at 99/78 about 8 hours later. SBP trending 100s to 130s today. She originally was requiring nasal cannula and is now on high flow. Home medications include lisinopril, Lasix, metoprolol, amiodarone. She is currently on Lasix 20 mg IV twice daily, lisinopril, metoprolol, amiodarone. She has been up to the bedside commode voiding. No urinary retention on bladder scan       Prior to Admission medications    Medication Sig Start Date End Date Taking?  Authorizing Provider   lisinopril (PRINIVIL;ZESTRIL) 2.5 MG tablet Take 1 tablet by mouth daily 12/12/20  Yes Sommer Melissa MD   furosemide (LASIX) 40 MG tablet Take 1 tablet by mouth daily 12/11/20  Yes Murtis Castleman, APRN - CNP   metoprolol tartrate (LOPRESSOR) 25 MG tablet Take 25 mg by mouth 2 times daily  11/4/19  Yes Historical Provider, MD   amiodarone (CORDARONE) 200 MG tablet Take 200 mg by mouth daily   Yes Historical Provider, MD   apixaban (ELIQUIS) 5 MG TABS tablet Take 1 tablet by mouth 2 times daily 10/5/19  Yes Breanne Shirley MD   travoprost, benzalkonium, (TRAVATAN) 0.004 % stated age. HEENT: Atraumatic, normocephalic. Anicteric sclera. Pink and moist oral mucosa. Neck supple. No JVD. Chest: Bilateral air entry, clear to auscultation, no wheezing, rhonchi or rales. Cardiovascular: RRR, S1S2, no murmur, rub or gallop. No lower extremity edema. Abdomen: Soft, non tender to palpation. Musculoskeletal:  No cyanosis or clubbing. Integumentary: Pink, warm and dry. Free from rash or lesions. Skin turgor normal.  CNS: Oriented to person, place and time. Speech clear. Face symmetrical. No tremor.      Data:    CBC:   Lab Results   Component Value Date    WBC 23.9 (H) 02/22/2021    HGB 10.3 (L) 02/22/2021    HCT 34.0 (L) 02/22/2021    MCV 86.7 02/22/2021     02/22/2021     BMP:    Lab Results   Component Value Date     02/22/2021     02/21/2021     02/20/2021    K 4.9 02/22/2021    K 4.5 02/21/2021    K 5.0 02/20/2021    CL 98 02/22/2021    CL 99 02/21/2021     02/20/2021    CO2 35 (H) 02/22/2021    CO2 34 (H) 02/21/2021    CO2 34 (H) 02/20/2021    BUN 57 (H) 02/22/2021    BUN 42 (H) 02/21/2021    BUN 37 (H) 02/20/2021    CREATININE 1.08 (H) 02/22/2021    CREATININE 0.78 02/21/2021    CREATININE 0.64 02/20/2021    GLUCOSE 174 (H) 02/22/2021    GLUCOSE 166 (H) 02/21/2021    GLUCOSE 161 (H) 02/20/2021     CMP:   Lab Results   Component Value Date     02/22/2021    K 4.9 02/22/2021    CL 98 02/22/2021    CO2 35 02/22/2021    BUN 57 02/22/2021    CREATININE 1.08 02/22/2021    GLUCOSE 174 02/22/2021    CALCIUM 8.0 02/22/2021    PROT 6.1 05/01/2020    LABALBU 3.3 05/01/2020    BILITOT 0.71 05/01/2020    ALKPHOS 72 05/01/2020    AST 16 05/01/2020    ALT 8 05/01/2020      Hepatic:   Lab Results   Component Value Date    AST 16 05/01/2020    AST 67 (H) 10/02/2019    AST 18 11/07/2016    ALT 8 05/01/2020    ALT 63 (H) 10/02/2019    ALT 12 11/07/2016    BILITOT 0.71 05/01/2020    BILITOT 0.82 10/02/2019    BILITOT 0.39 11/07/2016    ALKPHOS 72 05/01/2020 ALKPHOS 103 10/02/2019    ALKPHOS 70 11/07/2016     BNP: No results found for: BNP  Lipids:   Lab Results   Component Value Date    CHOL 109 12/08/2020    HDL 38 (L) 12/08/2020     INR:   Lab Results   Component Value Date    INR 1.1 10/02/2019    INR 1.2 11/11/2016    INR 1.2 11/10/2016     PTH: No results found for: PTH  Phosphorus:    Lab Results   Component Value Date    PHOS 4.1 02/22/2021     Ionized Calcium: No results found for: IONCA  Magnesium:   Lab Results   Component Value Date    MG 1.9 02/16/2021     Albumin:   Lab Results   Component Value Date    LABALBU 3.3 05/01/2020     Last 3 CK, CKMB, Troponin: @LABRCNT(CKTOTAL:3,CKMB:3,TROPONINI:3)       URINE:)No results found for: Mary Bethalejandra Piero    Radiology:   Reviewed. Assessment:  Acute Kidney Injury, likely hemodynamically related. FeNa 0.26%. Cr is higher. Baseline Creatinine 0.6. Hypernatremia. Hyper kalemia  Hypertension with recent hypotension. CHF exacerbation. Multifocal pneumonia. Hypoxic respiratory failure. Plan: Tolerating current dose of Lasix. Avoid lisinopril for now. BP stable. SNA stable  K stable. On low K diet for now. Strict I&O. Antibiotics per primary and pulmonary. Avoid nephrotoxic drugs and IV contrast exposure. Follow up chemistries in the AM.    Please do not hesitate to contact us for any further questions/concerns. We will continue to follow along with you. Pt seen in collaboration with Dr. Miya Davalos. Electronically signed by SERA Leal CNP  on 2/22/2021 at 8:10 AM         Patient seen and examined. Agree with above note. Above note was modified. We will continue to follow up with you. Electronically signed by Tamar Soria MD on 2/22/2021 at 12:28 PM  [facility].   Ph: [phone]

## 2021-02-22 NOTE — PROGRESS NOTES
Occupational Therapy  Facility/Department: [facility] PROGRESSIVE CARE  Daily Treatment Note  NAME: Harsh Perez  : 3/26/1932  MRN: [id number]    Date of Service: 2021    Discharge Recommendations:  [facility], [facility]  OT Equipment Recommendations  Equipment Needed: No    Assessment   Performance deficits / Impairments: Decreased functional mobility ; Decreased safe awareness;Decreased balance;Decreased ADL status; Decreased strength;Decreased high-level IADLs;Decreased endurance;Decreased posture;Decreased vision/visual deficit; Decreased coordination  Assessment: Skilled OT indicated to improve I and safety in areas of self care and function to return home with assist as needed. Prognosis: Fair  OT Education: OT Role;Plan of Care;Energy Conservation;Transfer Training;ADL Adaptive Strategies;Precautions  REQUIRES OT FOLLOW UP: Yes  Activity Tolerance  Activity Tolerance: Patient Tolerated treatment well  Safety Devices  Safety Devices in place: Yes  Type of devices: All fall risk precautions in place;Call light within reach;Nurse notified;Gait belt;Patient at risk for falls; Left in chair         Patient Diagnosis(es): The encounter diagnosis was Acute congestive heart failure, unspecified heart failure type ([address] has a past medical history of Acute on chronic diastolic CHF (congestive heart failure) ([address] Bradycardia, CAD (coronary artery disease), Cancer ([address] Depression, Gallstones, GERD (gastroesophageal reflux disease), Hiatal hernia, History of cardiac cath, Hyperlipidemia, Hypertension, Insomnia, MI (myocardial infarction) ([address] PNA (pneumonia), SOB (shortness of breath), and UTI (urinary tract infection). has a past surgical history that includes Appendectomy; Cholecystectomy; Hysterectomy; Coronary angioplasty with stent; Colonoscopy; Coronary artery bypass graft (2016); Cardiac surgery (2016); and Cardioversion (2019).     Restrictions  Restrictions/Precautions  Restrictions/Precautions: General Precautions, Fall Risk, Up as Tolerated  Required Braces or Orthoses?: No  Position Activity Restriction  Other position/activity restrictions: telemetry, HFNC, cont SPO2 monitor, IV site  Subjective   General  Chart Reviewed: Yes  Patient assessed for rehabilitation services?: Yes  Response to previous treatment: Patient with no complaints from previous session  Family / Caregiver Present: Yes(Daughter-in-law)      Orientation  Orientation  Overall Orientation Status: Within Functional Limits  Objective    ADL  Toileting: Contact guard assistance  Additional Comments: CGA to complete toileting routine on BSC, limited with HFNC        Balance  Sitting Balance: Stand by assistance  Standing Balance: Contact guard assistance    Toilet Transfers  Toilet - Technique: Stand pivot  Equipment Used: Standard bedside commode  Toilet Transfer: Contact guard assistance     Transfers  Sit to stand: Contact guard assistance  Stand to sit: Contact guard assistance  Transfer Comments: Verbal cues for hand placements, controlled sit<>stand, and pursed lip breathing    THERAPEUTIC ACTIVITY  Activity: Cards: Patient in room with Daughter-in-law playing card game. Writer encouraged patient to completed therapeutic activity standing at heightened table top to increase standing balance, activity tolerance, and endurance to facilitate ease with ADL completion. Patient stood for ~3 minutes then required use of BSC. Patient verbalized understanding of importance of cognition and being active with family verses watching tv. In patient's room, patient has a board of pictures of her family, patient able to recognize family members and navigate through family tree. Participation: Active participation      Cognition  Overall Cognitive Status: Exceptions  Arousal/Alertness: Appropriate responses to stimuli  Following Commands:  Follows one step commands consistently  Attention Span: Appears intact  Safety Judgement: Decreased awareness of need for assistance;Decreased awareness of need for safety  Problem Solving: Assistance required to correct errors made;Assistance required to identify errors made;Decreased awareness of errors  Insights: Decreased awareness of deficits  Initiation: Requires cues for some  Sequencing: Requires cues for some      Plan   Plan  Times per week: 4-5x/week 1x/day as josie  Current Treatment Recommendations: Strengthening, Balance Training, Functional Mobility Training, Safety Education & Training, Positioning, Endurance Training, Neuromuscular Re-education, Patient/Caregiver Education & Training, Equipment Evaluation, Education, & procurement, Self-Care / ADL, Home Management Training, Cognitive/Perceptual Training    AM-PAC Score     AM-PAC Inpatient Mobility without Stair Climbing Raw Score : 15 (02/18/21 1056)  AM-PAC Inpatient without Stair Climbing T-Scale Score : 43.03 (02/18/21 1056)  Mobility Inpatient CMS 0-100% Score: 47.43 (02/18/21 1056)  Mobility Inpatient without Stair CMS G-Code Modifier : CK (02/18/21 1056)  AM-PAC Inpatient Daily Activity Raw Score: 20 (02/22/21 1426)  AM-PAC Inpatient ADL T-Scale Score : 42.03 (02/22/21 1426)  ADL Inpatient CMS 0-100% Score: 38.32 (02/22/21 1426)  ADL Inpatient CMS G-Code Modifier : Ryan Daniel (02/22/21 1426)    Goals  Short term goals  Time Frame for Short term goals: by discharge, pt to demo  Short term goal 1: bed mob tech with use of rail as needed to CG. Short term goal 2: increase in BUE strength by a 1/2 grade to assist with ADL tasks and be I with simple BUE HEP with use of handouts. Short term goal 3: UB ADL to set up and LB ADL to CG with use of AE/AD as needed. Short term goal 4: toileting tasks with use of BSC/AD and grab bar as needed to CG. Short term goal 5: ADL transfers and functional mob with AD as needed to CG. Long term goals  Long term goal 1: Pt to stand with SBA and AD josie > 5 mins as able to reduce falls with functional tasks.   Long term goal 2: Caregivers to be I with EC/WS and fall prevention tech, pressure relief, AE/DME recommendations with use of handouts. Patient Goals   Patient goals : I just want to be able to breath better and get home! Therapy Time   Individual Concurrent Group Co-treatment   Time In 9359         Time Out 1420         Minutes 15           Upon writer exit, call light within reach, pt retired to chair. All lines intact and patient positioned comfortably. Chair alarm in place. All patient needs addressed prior to ending therapy session. Chart reviewed prior to treatment and patient is agreeable for therapy. RN reports patient is medically stable for therapy treatment this date.       MORGAN Chance/INDIRA

## 2021-02-22 NOTE — PROGRESS NOTES
Speech Language Pathology  Speech Language Pathology  [facility]    Dysphagia Treatment Note    Date: 2/22/2021  Patients Name: Steffany Valladares  MRN: [id number]  Diagnosis:   Patient Active Problem List   Diagnosis Code    Symptomatic bradycardia R00.1    Suspected sleep apnea R29.818    Unexplained night sweats R61    Pneumonia J18.9    GERD (gastroesophageal reflux disease) K21.9    Hyperlipidemia E78.5    Hypertension I10    Chronic atrial fibrillation ([facility]) I48.20    Acute on chronic diastolic CHF (congestive heart failure) ([facility]) I50.33    Generalized anxiety disorder F41.1    Depressive disorder F32.9    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    Atherosclerotic heart disease of native coronary artery without angina pectoris I25.10    Heart failure, diastolic, acute on chronic ([facility]) I50.33    Suspected COVID-19 virus infection Z20.822    Acute left-sided CHF (congestive heart failure) ([facility]) I50.1    Mild malnutrition ([facility]) E44.1     Pain: 0    Dysphagia Treatment  Treatment time: 2:20-2:33    Subjective: [x] Alert [x] Cooperative     [] Confused     [] Agitated    [] Lethargic    Objective/Assessment:    Pt. Seen for O/M treatment program for dysphagia. Pt. Completed O/M exercises X10 X1 sets with mod verbal cues. Pt. Completed darrell maneuver X5 reps with mod verbal cues and visual model. Pt also seen for possible diet upgrade. Pt given multiple trials of soft solids and  nectar-thick liquids with no s/s of aspiration. Pt w/ min extended mastication with soft solid; reports her dentures fit loosely. ST reviewed safe swallowing strategies with pt with good return. ST recommends pt diet upgrade to Dysphagia III: Soft and Bite sized diet with nectar-thick liquids at this time. If s/s of aspiration occur, d/c PO intake and complete MBSS to r/o/confirm aspiration. Recommendations discussed with RN.       Plan:  [x] Continue ST services    [] Discharge from ST: Discharge recommendations: Further therapy recommended at discharge.     Treatment completed by: Cammie Jacobo M.S. CF-SLP

## 2021-02-22 NOTE — PROGRESS NOTES
with IV Lasix 40 mg twice daily  · Dysphagia diet as per speech recommendations  · Nephrology and cardiology following  · Labs in am   · DVT prophylaxis on Eliquis   · DNR CCA  · Palliative care following  · Evaluate for LTAC. Secondary to patient's severe pneumonia and heart failure and slow improvement she will need at minimum 2 weeks of continued treatment with IV diuretics and weaning of her oxygen requirements.   · Will follow with you    Electronically signed by     aWleska Galvan MD on 2/22/2021 at 1:15 PM  Pulmonary Critical Care and Sleep Medicine,  [facility]  Cell: [phone]  Office: [phone]

## 2021-02-22 NOTE — PLAN OF CARE
Patient Seen in: [facility] Emergency Department    History   Patient presents with:   Allergic Rxn Allergies (immune)    Stated Complaint: allergic reaction    HPI    CHIEF COMPLAINT: Hives, possible allergic reaction    HISTORY OF PRESENT ILLNESS: Problem: Skin Integrity:  Goal: Will show no infection signs and symptoms  Description: Will show no infection signs and symptoms  2/21/2021 1409 by Chapo Fortune RN  Outcome: Ongoing     Problem: Skin Integrity:  Goal: Absence of new skin breakdown  Description: Absence of new skin breakdown  2/21/2021 2329 by Carry Libman, RN  Outcome: Ongoing  2/21/2021 1409 by Chapo Fortune RN  Outcome: Ongoing  2/21/2021 1142 by Chapo Fortune RN  Outcome: Ongoing     Problem: Nutrition  Goal: Optimal nutrition therapy  2/21/2021 2329 by Carry Libman, RN  Outcome: Ongoing  2/21/2021 1409 by Chapo Fortune RN  Outcome: Ongoing  2/21/2021 1142 by Chapo Fortune RN  Outcome: Ongoing     Problem: Falls - Risk of:  Goal: Will remain free from falls  Description: Will remain free from falls  2/21/2021 2329 by Carry Libman, RN  Outcome: Ongoing  2/21/2021 1409 by Chapo Fortune RN  Outcome: Ongoing  2/21/2021 1142 by Chapo Fortune RN  Outcome: Ongoing     Problem: Falls - Risk of:  Goal: Absence of physical injury  Description: Absence of physical injury  2/21/2021 2329 by Carry Libman, RN  Outcome: Ongoing  2/21/2021 1409 by Chapo Fortune RN  Outcome: Ongoing  2/21/2021 1142 by Chapo Fortune RN  Outcome: Ongoing     Problem: Breathing Pattern - Ineffective:  Goal: Ability to achieve and maintain a regular respiratory rate will improve  Description: Ability to achieve and maintain a regular respiratory rate will improve  2/21/2021 2329 by Carry Libman, RN  Outcome: Ongoing  2/21/2021 1409 by Chapo Fortune RN  Outcome: Ongoing  2/21/2021 1142 by Chapo Fortune RN  Outcome: Ongoing Past Surgical History:   Procedure Laterality Date   •  DELIVERY ONLY     • REMOVAL GALLBLADDER             Social History    Tobacco Use      Smoking status: Never Smoker      Smokeless tobacco: Never Used    Alcohol use: Not on file    Drug mg of Pepcid and 25 mg IV Benadryl. [facility]   1725: Patient is alert and oriented, patient is in no respiratory distress, no angioedema, lingual edema. No wheezing, rhonchi or rales.   Patient still has some mild hives to the torso and extremities, but im Prescribed:  Current Discharge Medication List    START taking these medications    famoTIDine (PEPCID) 20 MG Oral Tab  Take 1 tablet (20 mg total) by mouth daily for 5 days.   Qty: 5 tablet Refills: 0

## 2021-02-22 NOTE — PLAN OF CARE
Nutrition Problem #1: Swallowing difficulty  Intervention: Food and/or Nutrient Delivery: Continue Current Diet, Continue Oral Nutrition Supplement  Nutritional Goals: PO intakes are greater than 50% at meals

## 2021-02-22 NOTE — PROGRESS NOTES
Progress note  [facility].,    Adult Hospitalist      Name: Swapnil Bateman  MRN: [id number]     Acct: [de-identified]  Room: [address] [place] [address]    Admit Date: 2/8/2021  6:28 PM  PCP: Johnathon Sanabria MD    Primary Problem  Active Problems:    Acute left-sided CHF (congestive heart failure) ([address]  Resolved Problems:    * No resolved hospital problems.  *        Assesment:   Acute on chronic hypoxemic respiratory failure  Acute on chronic diastolic congestive heart failure  Pulmonary edema  Aspiration pneumonia  Acute kidney injury  Coronary artery disease status post CABG in 2016  Chronic atrial fibrillation  Chronic anticoagulation with Eliquis  Essential hypertension  Mixed hyperlipidemia  Mild pulmonary hypertension  Depression with anxiety  Former smoker  GERD  Covid ruled out  Hypernatremia   Oral candidiasis       Plan:   Admit patient to progressive unit  Oxygen keep SPO2 more than 90%  BiPAP as needed  Blood culture no growth  BiPAP as needed  Oral azithromycin, completed  Appreciate Nephrology assistance, IV diuretics have been increased to Lasix 20 IV b.i.d.   DuoNeb breathing treatment  Continue Eliquis  Continue lisinopril, metoprolol, Imdur, amiodarone  Continue pravastatin  Continue Zoloft  Pulmonology on consult, appreciated  Consult cardiology, appreciated  [address] nephrology assistance  Patient continues on high-flow nasal cannula oxygen, 30 L with 50% FiO2, requirement has come down since yesterday  IV cefepime  D/w Pt   Pureed food now  Continue other medication as below  Nystatin soln - w thickened food  Off Amiodarone   Await bed [facility]      Code status DNRCCA      Scheduled Meds:   furosemide  40 mg Intravenous BID    methylPREDNISolone  40 mg Intravenous Q12H    nystatin  5 mL Oral TID    apixaban  2.5 mg Oral BID    cefepime  1,000 mg Intravenous Q12H    isosorbide mononitrate  30 mg Oral QAM    metoprolol tartrate  25 mg Oral BID    oxybutynin  5 mg Oral Every Other Day    pantoprazole  40 mg Oral QAM AC    pravastatin  40 mg Oral Daily    sertraline  100 mg Oral Daily    latanoprost  1 drop Both Eyes Nightly    sodium chloride flush  10 mL Intravenous 2 times per day    ipratropium-albuterol  1 ampule Inhalation Q4H WA     Continuous Infusions:    PRN Meds:      nitroGLYCERIN, 0.4 mg, Q5 Min PRN      sodium chloride, 1 spray, TID PRN      sodium chloride flush, 10 mL, PRN      promethazine, 12.5 mg, Q6H PRN    Or      ondansetron, 4 mg, Q6H PRN      nicotine, 1 patch, Daily PRN      polyethylene glycol, 17 g, Daily PRN      acetaminophen, 650 mg, Q6H PRN    Or      acetaminophen, 650 mg, Q6H PRN      albuterol, 2.5 mg, Q2H PRN      hydrALAZINE, 20 mg, Q6H PRN        Chief Complaint:     Chief Complaint   Patient presents with    Shortness of Breath         History of Present Illness: Jaycob Carranza is a 80 y.o.  female who presents with Shortness of Breath    Patient seen and examined at bedside and reviewed  last 24 hrs events with nursing staff   Feels a little better  O2 requirement slightly less today   Patient denies any acute complaints. Patient continues on high flow nasal cannula oxygen   Sitting up in chair   Afebrile  Patient denies any chest pain, palpitation, headache, dizziness, nausea, vomiting, abdominal pain, pain, changes in urination or bowel habit or rash.          HPI  This is a 77-year-old female is been admitted to emergency room, patient came to the ER with a complaint of having shortness of breath, further patient is been having some shortness of breath for past couple of days, patient does have history of congestive heart failure in view of oxygen at home, also history of coronary disease, hypertension hyperlipidemia and CABG in the past, patient initial testing in the emergency room is consistent with congestive heart failure, admitted for further management    I have personally reviewed the past medical history, past surgical history, medications, social history, and family history, and summarized in the note. Review of Systems:     All 10 point system is reviewed and negative otherwise mentioned in HPI. Past Medical History:     Past Medical History:   Diagnosis Date    Acute on chronic diastolic CHF (congestive heart failure) (HCC) 10/2/2019    Bradycardia     CAD (coronary artery disease)     Cancer (HCC)     Skin CA on nose    Depression     Gallstones     GERD (gastroesophageal reflux disease)     Hiatal hernia     History of cardiac cath 10/2016    CAD    Hyperlipidemia     Hypertension     Insomnia     MI (myocardial infarction) ([address]     PNA (pneumonia)     SOB (shortness of breath)     UTI (urinary tract infection)         Past Surgical History:     Past Surgical History:   Procedure Laterality Date    APPENDECTOMY      CARDIAC SURGERY  11/08/2016    CABG x 3; LIMA-LAD, SVG-PDA, SVG-OM    CARDIOVERSION  12/17/2019    CHOLECYSTECTOMY      COLONOSCOPY      CORONARY ANGIOPLASTY WITH STENT PLACEMENT      CORONARY ARTERY BYPASS GRAFT  11/08/2016    X 3    HYSTERECTOMY          Medications Prior to Admission:       Prior to Admission medications    Medication Sig Start Date End Date Taking?  Authorizing Provider   lisinopril (PRINIVIL;ZESTRIL) 2.5 MG tablet Take 1 tablet by mouth daily 12/12/20  Yes Angy Schmitt MD   furosemide (LASIX) 40 MG tablet Take 1 tablet by mouth daily 12/11/20  Yes Marilu Barclay APRN - CNP   metoprolol tartrate (LOPRESSOR) 25 MG tablet Take 25 mg by mouth 2 times daily  11/4/19  Yes Historical Provider, MD   amiodarone (CORDARONE) 200 MG tablet Take 200 mg by mouth daily   Yes Historical Provider, MD   apixaban (ELIQUIS) 5 MG TABS tablet Take 1 tablet by mouth 2 times daily 10/5/19  Yes Christine Zamora MD   travoprost, benzalkonium, (TRAVATAN) 0.004 % ophthalmic solution Place 1 drop into the right eye nightly   Yes Historical Provider, MD   isosorbide mononitrate (IMDUR) 30 MG extended release tablet Take 30 mg by mouth every morning   Yes Historical Provider, MD   sertraline (ZOLOFT) 100 MG tablet Take 100 mg by mouth daily    Yes Historical Provider, MD   oxybutynin (DITROPAN) 5 MG tablet Take 5 mg by mouth every other day    Yes Historical Provider, MD   pravastatin (PRAVACHOL) 40 MG tablet Take 40 mg by mouth daily    Yes Historical Provider, MD   pantoprazole (PROTONIX) 40 MG tablet Take 40 mg by mouth every morning (before breakfast)    Yes Historical Provider, MD   aspirin 81 MG EC tablet Take 81 mg by mouth daily    Historical Provider, MD        Allergies:       Seasonal    Social History:     Tobacco:    reports that she quit smoking about 49 years ago. Her smoking use included cigarettes. She has never used smokeless tobacco.  Alcohol:      reports current alcohol use. Drug Use:  reports no history of drug use.     Family History:     Family History   Problem Relation Age of Onset    Heart Disease Mother     Cancer Mother     Heart Disease Father          Physical Exam:     Vitals:  BP (!) 145/72   Pulse 64   Temp 97.3 °F (36.3 °C) (Oral)   Resp 22   Ht 5' 5\" (1.651 m)   Wt 131 lb 2 oz (59.5 kg)   SpO2 96%   BMI 21.82 kg/m²   Temp (24hrs), Av.7 °F (36.5 °C), Min:97.3 °F (36.3 °C), Max:98.4 °F (36.9 °C)          General appearance - alert, malnourished, and in mild acute distress  Mental status - oriented to person, place, and time with normal affect  Head - normocephalic and atraumatic  Eyes - pupils equal and reactive, extraocular eye movements intact, conjunctiva clear  Ears - hearing appears to be intact  Nose - no drainage noted  Mouth - mucous membranes moist  Neck - supple, no carotid bruits, thyroid not palpable  Chest - BL basilar crackles, increased effort  Heart - normal rate, regular rhythm,  Abdomen - soft, nontender, nondistended, bowel sounds present all four quadrants, no masses, hepatomegaly or splenomegaly  Neurological - normal speech, no focal findings or movement disorder noted, cranial nerves II through XII grossly intact  Extremities - peripheral pulses palpable, no pedal edema or calf pain with palpation  Skin - no gross lesions, rashes, or induration noted        Data:     Labs:    Hematology:  Recent Labs     02/19/21  0541 02/20/21  0533 02/21/21  0532   WBC 19.7* 19.3* 21.7*   RBC 3.75* 3.73* 3.99   HGB 9.9* 10.1* 10.6*   HCT 33.2* 32.5* 34.7*   MCV 88.5 87.1 87.0   MCH 26.4 27.1 26.6   MCHC 29.8 31.1 30.5   RDW 16.3* 16.0* 16.2*    277 297   MPV 11.9 11.8 12.4     Chemistry:  Recent Labs     02/19/21  0541 02/20/21  0533 02/21/21  0532 02/21/21  0532 02/21/21  1453 02/21/21  1749 02/21/21  2051   * 140 140  --   --   --   --    K 5.0 5.0 4.5  --   --   --   --     100 99  --   --   --   --    CO2 36* 34* 34*  --   --   --   --    GLUCOSE 158* 161* 166*  --   --   --   --    BUN 41* 37* 42*  --   --   --   --    CREATININE 0.70 0.64 0.78  --   --   --   --    ANIONGAP 7* 6* 7*  --   --   --   --    LABGLOM >60 >60 >60  --   --   --   --    GFRAA >60 >60 >60  --   --   --   --    CALCIUM 8.2* 8.1* 8.1*  --   --   --   --    PHOS 3.5 3.5 3.8  --   --   --   --    TROPHS  --   --  15*   < > 15* 15* 16*    < > = values in this interval not displayed. No results for input(s): PROT, LABALBU, LABA1C, G1TTJGQ, Z7OPSSN, FT4, TSH, AST, ALT, LDH, GGT, ALKPHOS, LABGGT, BILITOT, BILIDIR, AMMONIA, AMYLASE, LIPASE, LACTATE, CHOL, HDL, LDLCHOLESTEROL, CHOLHDLRATIO, TRIG, VLDL, NXN11RQ, PHENYTOIN, PHENYF, URICACID, POCGLU in the last 72 hours.     Lab Results   Component Value Date    INR 1.1 10/02/2019    INR 1.2 11/11/2016    INR 1.2 11/10/2016    PROTIME 11.2 10/02/2019    PROTIME 12.3 (H) 11/11/2016    PROTIME 12.7 (H) 11/10/2016       Lab Results   Component Value Date/Time    SPECIAL NOT REPORTED 02/08/2021 07:45 PM    SPECIAL NOT REPORTED 02/08/2021 07:45 PM     Lab Results   Component Value Date/Time    CULTURE NO GROWTH 6 DAYS 02/08/2021 07:45 PM    CULTURE NO GROWTH 6 DAYS 02/08/2021 07:45 PM       Lab Results   Component Value Date    POCPH 7.35 11/09/2016    POCPCO2 41 11/09/2016    POCPO2 85 11/09/2016    POCHCO3 22.4 11/09/2016    NBEA 3 11/09/2016    PBEA NOT REPORTED 11/09/2016    VTS1TMM 24 11/09/2016    PIIC8GKV 96 11/09/2016    FIO2 NOT REPORTED 10/02/2019       Radiology:    Xr Chest Portable    Result Date: 2/9/2021  Stable interstitial opacities with superimposed patchy airspace disease probably combination of factors including chronic interstitial disease, vascular congestion and pneumonia. Xr Chest Portable    Result Date: 2/8/2021  Progressive bilateral patchy interstitial and alveolar infiltrates greater on the left. Overall findings suggest progressive pulmonary vascular congestion with possible superimposed bilateral pneumonia. All radiological studies reviewed                Code Status:  DNR-CCA    Electronically signed by Jia Workman MD on 2/21/2021 at 10:12 PM     Copy sent to Dr. Jm Penny MD    This note was created with the assistance of a speech-recognition program.  Although the intention is to generate a document that actually reflects the content of the visit, no guarantees can be provided that every mistake has been identified and corrected by editing. Note was updated later by me after  physical examination and  completion of the assessment.

## 2021-02-22 NOTE — CARE COORDINATION
DC Planning    Haris informed writer pt was denied [facility] being too acute. There is an option to appeal or resubmit tomorrow or Wednesday, informed Dr. Mohini Sutherland. Plan for  resubmit tomorrow informed Haris.

## 2021-02-22 NOTE — PROGRESS NOTES
Section of Cardiology  Progress Note      Date:  2/22/2021  Patient: Mendel Cornet  Admission:  2/8/2021  6:28 PM  Admit DX: Acute left-sided CHF (congestive heart failure) ([facility] [I50.1]  Age:  80 y.o., 3/26/1932     LOS: 14 days     Reason for evaluation:   atrial fibrillation, CHF and coronary artery disease      SUBJECTIVE:     The patient was seen and examined. Notes and labs reviewed. Patient is bradycardic and fatigued. No overnight issues. No chest pain or shortness of breath. She is sitting in a chair and daughter is at the bedside. OBJECTIVE:      EXAM:   Vitals:    VITALS:  /60   Pulse (!) 48   Temp 97.7 °F (36.5 °C) (Oral)   Resp 18   Ht 5' 5\" (1.651 m)   Wt 136 lb 12.8 oz (62.1 kg)   SpO2 94%   BMI 22.76 kg/m²   24HR INTAKE/OUTPUT:      Intake/Output Summary (Last 24 hours) at 2/22/2021 1156  Last data filed at 2/22/2021 0840  Gross per 24 hour   Intake 1120 ml   Output 1575 ml   Net -455 ml       CONSTITUTIONAL: Awake, sitting in the chair, no signs of acute distress  HEENT: Normal jugular venous pulsations  LUNGS: Dry bibasilar rales otherwise clear,  CARDIOVASCULAR:  regular rate and rhythm, normal S1 and S2, no S3 or S4, and 1/6 early systolic murmur at the aortic area   SKIN: Warm and dry.   EXTREMITIES: No leg edema    Current Inpatient Medications:   furosemide  40 mg Intravenous BID    methylPREDNISolone  40 mg Intravenous Q12H    nystatin  5 mL Oral TID    apixaban  2.5 mg Oral BID    cefepime  1,000 mg Intravenous Q12H    isosorbide mononitrate  30 mg Oral QAM    metoprolol tartrate  25 mg Oral BID    oxybutynin  5 mg Oral Every Other Day    pantoprazole  40 mg Oral QAM AC    pravastatin  40 mg Oral Daily    sertraline  100 mg Oral Daily    latanoprost  1 drop Both Eyes Nightly    sodium chloride flush  10 mL Intravenous 2 times per day    ipratropium-albuterol  1 ampule Inhalation Q4H WA       IV Infusions (if any):      Diagnostics:   Telemetry: Sinus rhythm  Labs:   CBC:  Recent Labs     02/21/21  0532 02/22/21  0509   WBC 21.7* 23.9*   HGB 10.6* 10.3*   HCT 34.7* 34.0*    314     Magnesium:  No results for input(s): MG in the last 72 hours. BMP:  Recent Labs     02/21/21  0532 02/22/21  0509    139   K 4.5 4.9   CALCIUM 8.1* 8.0*   CO2 34* 35*   BUN 42* 57*   CREATININE 0.78 1.08*   LABGLOM >60 48*   GLUCOSE 166* 174*     BNP:No results for input(s): BNP, PROBNP in the last 72 hours. PT/INR:No results for input(s): PROTIME, INR in the last 72 hours. APTT:No results for input(s): APTT in the last 72 hours. CARDIAC ENZYMES:  Recent Labs     02/21/21  2353 02/22/21  0245 02/22/21  0509   TROPHS 18* 16* 15*   TROPONINT NOT REPORTED NOT REPORTED NOT REPORTED     FASTING LIPID PANEL:  Lab Results   Component Value Date    HDL 38 12/08/2020    TRIG 78 12/08/2020     LIVER PROFILE:No results for input(s): AST, ALT, LABALBU, ALKPHOS, BILITOT, BILIDIR, IBILI, PROT, GLOB, ALBUMIN in the last 72 hours. ASSESSMENT:    · Acute on chronic diastolic heart failure, mild, on IV diuretics, managed by nephrology  · Respiratory failure/possible pneumonia on high flow nasal cannula oxygen, managed by others, patient is frustrated as she is not making significant progress regarding her breathing. · CAD with prior CABG in 2016-denies chest pain. · Paroxysmal atrial fibrillation, maintaining sinus rhythm with history of cardioversion, amiodarone was stopped this admission and patient is maintaining sinus rhythm and remains on apixaban  · Hypertension-well-controlled  · Dyslipidemia-treated    PLAN:  Continue current cardiac medications. Continue with diuretic therapy. Continue Eliquis. Keep patient off amiodarone, continue apixaban. Decrease metoprolol to toprol xl 25mg daily  Start lisinopril 20mg po daily to treat hypertension  Replete lytes. I discussed above with the patient, and nursing.       Micheal Wong MD

## 2021-02-23 LAB
ANCA MYELOPEROXIDASE: 9 AU/ML
ANCA PROTEINASE 3: 10 AU/ML
ANION GAP SERPL CALCULATED.3IONS-SCNC: 8 MMOL/L (ref 9–17)
BUN BLDV-MCNC: 57 MG/DL (ref 8–23)
BUN/CREAT BLD: 61 (ref 9–20)
CALCIUM SERPL-MCNC: 8 MG/DL (ref 8.6–10.4)
CHLORIDE BLD-SCNC: 98 MMOL/L (ref 98–107)
CO2: 35 MMOL/L (ref 20–31)
CREAT SERPL-MCNC: 0.94 MG/DL (ref 0.5–0.9)
GBM ANTIBODY IGG: 3 AU/ML
GFR AFRICAN AMERICAN: >60 ML/MIN
GFR NON-AFRICAN AMERICAN: 56 ML/MIN
GFR SERPL CREATININE-BSD FRML MDRD: ABNORMAL ML/MIN/{1.73_M2}
GFR SERPL CREATININE-BSD FRML MDRD: ABNORMAL ML/MIN/{1.73_M2}
GLUCOSE BLD-MCNC: 162 MG/DL (ref 70–99)
HCT VFR BLD CALC: 35.6 % (ref 36.3–47.1)
HEMOGLOBIN: 10.8 G/DL (ref 11.9–15.1)
MAGNESIUM: 2.1 MG/DL (ref 1.6–2.6)
MCH RBC QN AUTO: 26.3 PG (ref 25.2–33.5)
MCHC RBC AUTO-ENTMCNC: 30.3 G/DL (ref 28.4–34.8)
MCV RBC AUTO: 86.8 FL (ref 82.6–102.9)
NRBC AUTOMATED: 0 PER 100 WBC
PDW BLD-RTO: 16.3 % (ref 11.8–14.4)
PHOSPHORUS: 3.6 MG/DL (ref 2.6–4.5)
PLATELET # BLD: 301 K/UL (ref 138–453)
PMV BLD AUTO: 11.6 FL (ref 8.1–13.5)
POTASSIUM SERPL-SCNC: 4.6 MMOL/L (ref 3.7–5.3)
RBC # BLD: 4.1 M/UL (ref 3.95–5.11)
SODIUM BLD-SCNC: 141 MMOL/L (ref 135–144)
WBC # BLD: 21 K/UL (ref 3.5–11.3)

## 2021-02-23 PROCEDURE — 97112 NEUROMUSCULAR REEDUCATION: CPT

## 2021-02-23 PROCEDURE — 94660 CPAP INITIATION&MGMT: CPT

## 2021-02-23 PROCEDURE — 94761 N-INVAS EAR/PLS OXIMETRY MLT: CPT

## 2021-02-23 PROCEDURE — 97530 THERAPEUTIC ACTIVITIES: CPT

## 2021-02-23 PROCEDURE — 85027 COMPLETE CBC AUTOMATED: CPT

## 2021-02-23 PROCEDURE — 2580000003 HC RX 258: Performed by: FAMILY MEDICINE

## 2021-02-23 PROCEDURE — 83735 ASSAY OF MAGNESIUM: CPT

## 2021-02-23 PROCEDURE — 97535 SELF CARE MNGMENT TRAINING: CPT

## 2021-02-23 PROCEDURE — 36415 COLL VENOUS BLD VENIPUNCTURE: CPT

## 2021-02-23 PROCEDURE — 6360000002 HC RX W HCPCS: Performed by: FAMILY MEDICINE

## 2021-02-23 PROCEDURE — 6360000002 HC RX W HCPCS: Performed by: INTERNAL MEDICINE

## 2021-02-23 PROCEDURE — 2700000000 HC OXYGEN THERAPY PER DAY

## 2021-02-23 PROCEDURE — 94640 AIRWAY INHALATION TREATMENT: CPT

## 2021-02-23 PROCEDURE — 2060000000 HC ICU INTERMEDIATE R&B

## 2021-02-23 PROCEDURE — 80048 BASIC METABOLIC PNL TOTAL CA: CPT

## 2021-02-23 PROCEDURE — 6370000000 HC RX 637 (ALT 250 FOR IP): Performed by: INTERNAL MEDICINE

## 2021-02-23 PROCEDURE — 97116 GAIT TRAINING THERAPY: CPT

## 2021-02-23 PROCEDURE — 6360000002 HC RX W HCPCS: Performed by: NURSE PRACTITIONER

## 2021-02-23 PROCEDURE — 99222 1ST HOSP IP/OBS MODERATE 55: CPT | Performed by: INTERNAL MEDICINE

## 2021-02-23 PROCEDURE — 6370000000 HC RX 637 (ALT 250 FOR IP): Performed by: FAMILY MEDICINE

## 2021-02-23 PROCEDURE — 84100 ASSAY OF PHOSPHORUS: CPT

## 2021-02-23 RX ORDER — FUROSEMIDE 10 MG/ML
60 INJECTION INTRAMUSCULAR; INTRAVENOUS 2 TIMES DAILY
Status: DISCONTINUED | OUTPATIENT
Start: 2021-02-23 | End: 2021-02-24 | Stop reason: HOSPADM

## 2021-02-23 RX ADMIN — PRAVASTATIN SODIUM 40 MG: 40 TABLET ORAL at 09:14

## 2021-02-23 RX ADMIN — METHYLPREDNISOLONE SODIUM SUCCINATE 40 MG: 40 INJECTION, POWDER, FOR SOLUTION INTRAMUSCULAR; INTRAVENOUS at 22:06

## 2021-02-23 RX ADMIN — NYSTATIN 500000 UNITS: 100000 SUSPENSION ORAL at 20:56

## 2021-02-23 RX ADMIN — SERTRALINE HYDROCHLORIDE 100 MG: 50 TABLET ORAL at 09:14

## 2021-02-23 RX ADMIN — SALINE NASAL SPRAY 1 SPRAY: 1.5 SOLUTION NASAL at 20:56

## 2021-02-23 RX ADMIN — IPRATROPIUM BROMIDE AND ALBUTEROL SULFATE 1 AMPULE: .5; 3 SOLUTION RESPIRATORY (INHALATION) at 10:56

## 2021-02-23 RX ADMIN — METOPROLOL SUCCINATE 25 MG: 25 TABLET, EXTENDED RELEASE ORAL at 09:15

## 2021-02-23 RX ADMIN — LATANOPROST 1 DROP: 50 SOLUTION OPHTHALMIC at 20:56

## 2021-02-23 RX ADMIN — NYSTATIN 500000 UNITS: 100000 SUSPENSION ORAL at 13:01

## 2021-02-23 RX ADMIN — OXYBUTYNIN CHLORIDE 5 MG: 5 TABLET ORAL at 09:14

## 2021-02-23 RX ADMIN — FUROSEMIDE 40 MG: 10 INJECTION, SOLUTION INTRAMUSCULAR; INTRAVENOUS at 09:13

## 2021-02-23 RX ADMIN — SODIUM CHLORIDE, PRESERVATIVE FREE 10 ML: 5 INJECTION INTRAVENOUS at 09:16

## 2021-02-23 RX ADMIN — IPRATROPIUM BROMIDE AND ALBUTEROL SULFATE 1 AMPULE: .5; 3 SOLUTION RESPIRATORY (INHALATION) at 14:20

## 2021-02-23 RX ADMIN — ISOSORBIDE MONONITRATE 30 MG: 30 TABLET ORAL at 09:13

## 2021-02-23 RX ADMIN — APIXABAN 2.5 MG: 2.5 TABLET, FILM COATED ORAL at 09:15

## 2021-02-23 RX ADMIN — FUROSEMIDE 60 MG: 10 INJECTION, SOLUTION INTRAMUSCULAR; INTRAVENOUS at 17:00

## 2021-02-23 RX ADMIN — IPRATROPIUM BROMIDE AND ALBUTEROL SULFATE 1 AMPULE: .5; 3 SOLUTION RESPIRATORY (INHALATION) at 06:16

## 2021-02-23 RX ADMIN — PANTOPRAZOLE SODIUM 40 MG: 40 TABLET, DELAYED RELEASE ORAL at 06:40

## 2021-02-23 RX ADMIN — METHYLPREDNISOLONE SODIUM SUCCINATE 40 MG: 40 INJECTION, POWDER, FOR SOLUTION INTRAMUSCULAR; INTRAVENOUS at 09:15

## 2021-02-23 RX ADMIN — SODIUM CHLORIDE, PRESERVATIVE FREE 10 ML: 5 INJECTION INTRAVENOUS at 20:56

## 2021-02-23 RX ADMIN — CEFEPIME HYDROCHLORIDE 1000 MG: 1 INJECTION, POWDER, FOR SOLUTION INTRAMUSCULAR; INTRAVENOUS at 02:05

## 2021-02-23 RX ADMIN — NYSTATIN 500000 UNITS: 100000 SUSPENSION ORAL at 09:13

## 2021-02-23 RX ADMIN — APIXABAN 2.5 MG: 2.5 TABLET, FILM COATED ORAL at 20:56

## 2021-02-23 RX ADMIN — IPRATROPIUM BROMIDE AND ALBUTEROL SULFATE 1 AMPULE: .5; 3 SOLUTION RESPIRATORY (INHALATION) at 18:18

## 2021-02-23 NOTE — PLAN OF CARE
Problem: Falls - Risk of:  Goal: Will remain free from falls  Description: Will remain free from falls  Outcome: Ongoing  Note: Patient alert, oriented, and calls out appropriately. Bed alarm on. Bed locked and in lowest position. Call light in reach. 2/4 side rails up. Patient hourly rounded on to assess needs. Problem: Breathing Pattern - Ineffective:  Goal: Ability to achieve and maintain a regular respiratory rate will improve  Description: Ability to achieve and maintain a regular respiratory rate will improve  Outcome: Ongoing     Problem: Infection:  Goal: Will remain free from infection  Description: Will remain free from infection  Outcome: Ongoing  Note: Patient on IV antibiotics. Problem: Daily Care:  Goal: Daily care needs are met  Description: Daily care needs are met  Outcome: Ongoing  Note: Patient hourly rounded on to assess needs. Problem: Pain:  Goal: Patient's pain/discomfort is manageable  Description: Patient's pain/discomfort is manageable  Outcome: Ongoing  Note: Pain assessment complete. Patient denies any pain at this time.

## 2021-02-23 NOTE — PROGRESS NOTES
.. PALLIATIVE CARE TEAM    Patient: Cleveland Levin  Room: [facility]/1026-01    Reason For Consult   Goals of care evaluation  Distress management  Symptom Management  Guidance and support  Facilitate communications  Assistance in coordinating care  Recommendations for the above    Impression: Cleveland Levni is a 80y.o. year old female with  has a past medical history of Acute on chronic diastolic CHF (congestive heart failure) ([facility] [address] Bradycardia, CAD (coronary artery disease), Cancer ([facility] Depression, Gallstones, GERD (gastroesophageal reflux disease), Hiatal hernia, History of cardiac cath, Hyperlipidemia, Hypertension, Insomnia, MI (myocardial infarction) ([facility] PNA (pneumonia), SOB (shortness of breath), and UTI (urinary tract infection). .  Currently hospitalized for the management of acute CHF. The Palliative Care Team is following to assist with goals of care. Code Status  DNR-CCA    Vital Signs:  BP (!) 121/51   Pulse 59   Temp 97.3 °F (36.3 °C) (Oral)   Resp 18   Ht 5' 5\" (1.651 m)   Wt 136 lb 14.4 oz (62.1 kg)   SpO2 95%   BMI 22.78 kg/m²     Patient Active Problem List   Diagnosis    Symptomatic bradycardia    Suspected sleep apnea    Unexplained night sweats    Pneumonia    GERD (gastroesophageal reflux disease)    Hyperlipidemia    Hypertension    Chronic atrial fibrillation (HCC)    Acute on chronic diastolic CHF (congestive heart failure) (HCC)    Generalized anxiety disorder    Depressive disorder    Nonrheumatic tricuspid valve disorder    Atherosclerotic heart disease of native coronary artery without angina pectoris    Heart failure, diastolic, acute on chronic (HCC)    Suspected COVID-19 virus infection    Acute left-sided CHF (congestive heart failure) (HCC)    Mild malnutrition ([facility]       Palliative Interaction:Patient is up in the chair and she remains on 50% high flow oxygen. She is very alert and she is playing cards with her Nephew.    She states that she is doing ok. She states\" I am better in the day, than at night. \"     The nephew asks me about the appeal for [facility] for her placement. I refer him and the patient to reach out to the discharge planner or SW for an update, as they will have the information as to where they are with that progress. I offer much encouragement and emotional support to the patient and her nephew. Will follow for goals of care and support. Goals/Plan of care  Education/support to family  Education/support to patient  Providing support for coping/adaptation/distress of family  Providing support for coping/adaptation/distress of patient  Continue with current plan of care  Code status clarified: [facility]  Validating patient/family distress  Continued communication updates  Patient is in the chair and remains of high flow at 50%. She is bright and alert and playing cards at the bedside with her nephew. I offer support to both and encouragement to the patient.      Electronically signed by   Odilia Kelly RN  Palliative Care Team  on 2/23/2021 at 1:00 PM

## 2021-02-23 NOTE — PROGRESS NOTES
Other position/activity restrictions: telemetry, HFNC, cont SPO2 monitor, IV site, high flow, up as josie, maintain heels off of bed, DNRCC, dental soft diet and mildly thick nectar liquirds, 1500 ml fluid restriction  Vision/Hearing        Subjective  General  Chart Reviewed: Yes  Patient assessed for rehabilitation services?: Yes  Pain Screening  Patient Currently in Pain: Yes          Orientation     Social/Functional History  Social/Functional History  Lives With: Alone  Type of Home: Apartment([facility] living at [address]: One level(laundry on same level)  Home Access: Level entry  Bathroom Shower/Tub: Walk-in shower  Bathroom Toilet: Standard(pull cord & GB near)  [employer]: Grab bars in shower, Shower chair, Grab bars around toilet  Home Equipment: 4 wheeled walker, Cane, Alert [employer] Help From: Family(Pt states her dtr is supportive)  ADL Assistance: Independent  Homemaking Assistance: Needs assistance(pt states she eats meals at [employer], staff cleans room, does her own laundry & some easy meal prep)  Homemaking Responsibilities: Yes  Ambulation Assistance: Independent(uses [id number] always)  Transfer Assistance: Independent  Active : No  Patient's  Info: daughter  Occupation: Retired  Type of occupation: raised 5 children  Leisure & Hobbies: plays cards  Additional Comments: Pt states she is supportive daughter & works at [employer]; pt denies falls  Cognition        Objective                                           Plan   Plan  Times per week: 1-2x/D, 5-6D/week  Times per day: Daily  Current Treatment Recommendations: Strengthening, Balance Training, Functional Mobility Training, Transfer Training, Gait Training, Endurance Training, Home Exercise Program, Safety Education & Training, Patient/Caregiver Education & Training  Plan Comment: progress standing activity, gait as able due to 02 tubing length/high flow  Safety Devices Type of devices: Call light within reach, Gait belt, Patient at risk for falls, Left in chair  Restraints  Initially in place: No    G-Code       OutComes Score                                                  AM-PAC Score             Goals  Short term goals  Time Frame for Short term goals: 12 visits  Short term goal 1: Inc bed-mobility & transfers to independent to enable pt to safely get in/OOB  Short term goal 2: Inc gait to amb 200 ft with R/walker indep; Short term goal 3: Pt able to tolerate 30-40 min of activity to include 15-20 reps of ex & functional mobility including 5 minutes of standing to facilitate better activity tolerance  Short term goal 5: Ed pt on home ex's, optimal breathing techniques, safety & energy principles, fall prevention & issue written pt education  Patient Goals   Patient goals :  To go and be with grandkids       Therapy Time   Individual Concurrent Group Co-treatment   Time In           Time Out           Minutes                   Alfredo Sethi, PT

## 2021-02-23 NOTE — PROGRESS NOTES
Occupational Therapy  Facility/Department: [facility], [place] PROGRESSIVE CARE  Daily Treatment Note  NAME: Lakeshia Antonio  : 3/26/1932  MRN: [id number]    Ian Neftaly  reports patient is medically stable for therapy treatment this date. Chart reviewed prior to treatment and patient is agreeable for therapy. All lines intact and patient positioned comfortably at end of treatment. All patient needs addressed prior to ending therapy session. Date of Service: 2021    Discharge Recommendations:  [facility], [address]  OT Equipment Recommendations  Equipment Needed: Yes  Mobility Devices: ADL Assistive Devices  ADL Assistive Devices: Toileting - 3-in-1 Commode;Reacher;Long-handled [place];Long-handled Sponge    Assessment   Performance deficits / Impairments: Decreased functional mobility ; Decreased safe awareness;Decreased balance;Decreased ADL status; Decreased strength;Decreased high-level IADLs;Decreased endurance;Decreased posture;Decreased vision/visual deficit; Decreased coordination  Assessment: Skilled OT indicated to improve I and safety in function as well as act josie, balance and strength to return home with assist as needed. Prognosis: Fair  OT Education: OT Role;Plan of Care;Energy Conservation;Transfer Training  Patient Education: pursed lip breathing, benefits of being up OOB as able, recommendations for continued therapy services, postural control, safety in function  REQUIRES OT FOLLOW UP: Yes  Activity Tolerance  Activity Tolerance: Patient Tolerated treatment well;Patient limited by fatigue;Patient limited by pain(limted by resp status/high flow and decrease in O2 sats)  Activity Tolerance: poor/poor plus  Safety Devices  Safety Devices in place: Yes  Type of devices: Call light within reach; Left in chair;Patient at risk for falls;Gait belt;Nurse notified(RN in with pt upon exit)         Patient Diagnosis(es): The encounter diagnosis was Acute congestive heart failure, unspecified heart failure type ([address] has a past medical history of Acute on chronic diastolic CHF (congestive heart failure) ([address] Bradycardia, CAD (coronary artery disease), Cancer ([address] Depression, Gallstones, GERD (gastroesophageal reflux disease), Hiatal hernia, History of cardiac cath, Hyperlipidemia, Hypertension, Insomnia, MI (myocardial infarction) ([address] PNA (pneumonia), SOB (shortness of breath), and UTI (urinary tract infection). has a past surgical history that includes Appendectomy; Cholecystectomy; Hysterectomy; Coronary angioplasty with stent; Colonoscopy; Coronary artery bypass graft (11/08/2016); Cardiac surgery (11/08/2016); and Cardioversion (12/17/2019). Restrictions  Restrictions/Precautions  Restrictions/Precautions: General Precautions, Fall Risk, Up as Tolerated  Required Braces or Orthoses?: No  Position Activity Restriction  Other position/activity restrictions: telemetry, HFNC, cont SPO2 monitor, IV site, high flow, up as josie, maintain heels off of bed, DNRCC, dental soft diet and mildly thick nectar liquirds, 1500 ml fluid restriction  Subjective   General  Chart Reviewed: Yes  Patient assessed for rehabilitation services?: Yes  Response to previous treatment: Patient with no complaints from previous session  Family / Caregiver Present: No  Pre Treatment Pain Screening  Intervention List: Nurse/Physician notified  Comments / Details: Pt states she has some chest discomfort and rates at 5/10. Patient Currently in Pain: Yes     Orientation  Orientation  Overall Orientation Status: Within Functional Limits  Objective    ADL  Grooming: Setup;Stand by assistance(seated for washing B hands and face only)  UE Bathing: Setup;Stand by assistance(for sponge bath seated EOB)  LE Bathing: Setup; Moderate assistance(assist with washing BLE's; CG to stand with RW and pt was able to wash nasreen area/verbal instruction needed to keep 1 UE support on AD to increase safety.)  UE Dressing: Setup; Moderate assistance(donning clean hosp gown)  LE Dressing: Setup;Maximum assistance(to thread BLE's in hosp pants and assist with all clothing mgt up/tying as well as donning clean pair of B  socks)  Toileting: Unable to assess-pt with no needs   Additional Comments: O2 sats monitored and at 88-94% on high flow with ADL routine and RN was informed. *Pt was edu/demo on pursed lip breathing tech as well as EC/WS tech of pacing, resting and taking breaks as needed, and sitting vs standing as able. Pt will need further edu to carry over into function. Balance  Sitting Balance: Contact guard assistance  Standing Balance: Minimal assistance(with RW)  Standing Balance  Time: stand josie < 30 seconds with RW for selfcare tasks  Functional Mobility  Functional - Mobility Device: Rolling Walker  Activity: (bed to bedside chair)  Assist Level: Minimal assistance  Functional Mobility Comments: MOD verbal instruction/tactile assist for upright posture, pursed lip breathing, weight shifting, RW safety, scanning and awareness/assist with lines to increase safety/reduce fall risk as able. O2 sats decreased to 84% with mobility and pt needed approx 3 mins to recover back to WFL's/RN was notified. Pt was edu/demo on pursed lip breathing tech. Toilet Transfers  Toilet Transfers Comments: N/T and pt with no needs  Bed mobility  Supine to Sit: Moderate assistance  Sit to Supine: Unable to assess(pt agreed to sit up in the chair and RN okayed)  Comment: MOD verbal instruction/tactile assist for pursed lip breathing, proper log rolling tech, hand placement on bed rail and awareness/assist with lines to increase safety.   Transfers  Stand Step Transfers: Minimal assistance(with RW bed to bedside chair)  Sit to stand: Minimal assistance  Stand to sit: Minimal assistance  Transfer Comments: MOD verbal instruction/tactile assist for B hand placement, nose over toes as able, pursed lip breathing, scanning, upright posture, RW safety, controlled stand to sit as well as awareness/assist with all lines to  increase safety/reduce falls. Cognition  Overall Cognitive Status: Exceptions  Arousal/Alertness: Appropriate responses to stimuli  Following Commands: Follows multistep commands with increased time; Follows multistep commands with repitition  Attention Span: Attends with cues to redirect  Memory: Decreased short term memory  Safety Judgement: Decreased awareness of need for assistance;Decreased awareness of need for safety  Problem Solving: Assistance required to correct errors made;Assistance required to identify errors made;Decreased awareness of errors;Assistance required to generate solutions;Assistance required to implement solutions  Insights: Decreased awareness of deficits  Initiation: Requires cues for some  Sequencing: Requires cues for some                                         Plan   Plan  Times per week: 4-5x/week 1x/day as josie  Current Treatment Recommendations: Strengthening, Balance Training, Functional Mobility Training, Safety Education & Training, Positioning, Endurance Training, Neuromuscular Re-education, Patient/Caregiver Education & Training, Equipment Evaluation, Education, & procurement, Self-Care / ADL, Home Management Training, Cognitive/Perceptual Training                                               AM-PAC Score   15         Goals  Short term goals  Time Frame for Short term goals: by discharge, pt to demo  Short term goal 1: bed mob tech with use of rail as needed to CG. Short term goal 2: increase in BUE strength by a 1/2 grade to assist with ADL tasks and be I with simple BUE HEP with use of handouts. Short term goal 3: UB ADL to set up and LB ADL to CG with use of AE/AD as needed. Short term goal 4: toileting tasks with use of BSC/AD and grab bar as needed to CG. Short term goal 5: ADL transfers and functional mob with AD as needed to CG.   Long term goals  Long term goal 1: Pt to stand with SBA and AD josie > 5 mins as able to reduce falls with functional tasks. Long term goal 2: Caregivers to be I with EC/WS and fall prevention tech, pressure relief, AE/DME recommendations with use of handouts. Patient Goals   Patient goals : I just want to be able to breath better and get home!        Therapy Time   Individual Concurrent Group Co-treatment   Time In 0827         Time Out 0912         Minutes [address] [place] [address]

## 2021-02-23 NOTE — PROGRESS NOTES
[address] Hospitalist        2/23/2021   12:59 PM    Name:  Danielle Ko  MRN:    [id number]     Acct:     [de-identified]   Room:  [address] [place] [address] Day: 13     Admit Date: 2/8/2021  6:28 PM  PCP: Casie Faustin MD    C/C:   Chief Complaint   Patient presents with    Shortness of Breath       Assessment:      Acute on chronic hypoxemic respiratory failure  Acute on chronic diastolic congestive heart failure  Pulmonary edema /diffuse ground-glass opacities  Aspiration pneumonia  Acute kidney injury- resolved, recurrent  Coronary artery disease status post CABG in 2016  Chronic atrial fibrillation  Chronic anticoagulation with Eliquis  Essential hypertension  Mixed hyperlipidemia  Mild pulmonary hypertension  Depression with anxiety  Former smoker  GERD  Hypernatremia   Oral candidiasis          Plan:       patient is admitted to progressive unit   Oxygen keep SpO2 more than 90%, patient is requiring high-flow nasal cannula oxygen  BiPAP as needed   Blood cultures remain negative   COVID-19 testing is negative  Patient has completed IV antibiotic therapy with IV cefepime  Completed oral azithromycin   IV diuresis, IV Lasix   IV Solu-Medrol  DuoNeb breathing treatment   Oral nystatin  Continue Eliquis, Imdur  Continue metoprolol  Continue pravastatin   Continue Zoloft  Pulmonary Critical Care following  Cardiology consult appreciated  Off amiodarone  Nephrology consult appreciated   Discussed with , patient remains on high-flow nasal cannula oxygen, remains on IV Lasix therapy with IV Solu-Medrol patient will be evaluated for long-term acute care facility for discharge planning        Scheduled Meds:   furosemide  60 mg Intravenous BID    metoprolol succinate  25 mg Oral Daily    methylPREDNISolone  40 mg Intravenous Q12H    nystatin  5 mL Oral TID    apixaban  2.5 mg Oral BID    isosorbide mononitrate  30 mg Oral QAM    oxybutynin  5 mg Oral Every Other Day    pantoprazole  40 mg Oral QAM AC    pravastatin  40 mg Oral Daily    sertraline  100 mg Oral Daily    latanoprost  1 drop Both Eyes Nightly    sodium chloride flush  10 mL Intravenous 2 times per day    ipratropium-albuterol  1 ampule Inhalation Q4H WA     Continuous Infusions:    PRN Meds:      nitroGLYCERIN, 0.4 mg, Q5 Min PRN      sodium chloride, 1 spray, TID PRN      sodium chloride flush, 10 mL, PRN      promethazine, 12.5 mg, Q6H PRN    Or      ondansetron, 4 mg, Q6H PRN      nicotine, 1 patch, Daily PRN      polyethylene glycol, 17 g, Daily PRN      acetaminophen, 650 mg, Q6H PRN    Or      acetaminophen, 650 mg, Q6H PRN      albuterol, 2.5 mg, Q2H PRN      hydrALAZINE, 20 mg, Q6H PRN            Subjective:     Patient seen and examined at bedside. No overnight events. No acute complaints today. Afebrile  Pt. Denies any CP, palpitation, HA, dizziness, chills, cough, cold, changes in urination, BM or skin changes or any pain. ROS:  A 10 point system reviewed and negative otherwise mentioned above. Physical Examination:      Vitals:  BP (!) 121/51   Pulse 59   Temp 97.3 °F (36.3 °C) (Oral)   Resp 18   Ht 5' 5\" (1.651 m)   Wt 136 lb 14.4 oz (62.1 kg)   SpO2 95%   BMI 22.78 kg/m²   Temp (24hrs), Av.5 °F (36.4 °C), Min:97.2 °F (36.2 °C), Max:98 °F (36.7 °C)    Weight:   Wt Readings from Last 3 Encounters:   21 136 lb 14.4 oz (62.1 kg)   20 141 lb 6.4 oz (64.1 kg)   20 154 lb (69.9 kg)     I/O last 3 completed shifts:  I/O last 3 completed shifts: In: 480 [P.O.:480]  Out: 450 [Urine:450]     No results for input(s): POCGLU in the last 72 hours.       General appearance - alert, well appearing,  Mild distress  Mental status - oriented to person, place, and time with normal affect  Head - normocephalic and atraumatic  Eyes - pupils equal and reactive, extraocular eye movements intact, conjunctiva clear  Ears - hearing appears to be intact  Nose - no drainage noted  Mouth - mucous membranes moist  Neck - supple, no carotid bruits, thyroid not palpable  Chest -  Bilateral decreased air entryto auscultation, normal effort  Heart - normal rate, regular rhythm, no murmur  Abdomen - soft, nontender, nondistended, bowel sounds present all four quadrants, no masses, hepatomegaly or splenomegaly  Neurological - normal speech, no focal findings or movement disorder noted, cranial nerves II through XII grossly intact  Extremities - peripheral pulses palpable, no pedal edema or calf pain with palpation  Skin - no gross lesions, rashes, or induration noted        Medications: Allergies:    Allergies   Allergen Reactions    Seasonal        Current Meds:   Current Facility-Administered Medications:     furosemide (LASIX) injection 60 mg, 60 mg, Intravenous, BID, Gaylon Mortimer, MD    metoprolol succinate (TOPROL XL) extended release tablet 25 mg, 25 mg, Oral, Daily, Gaylon Mortimer, MD, 25 mg at 02/23/21 0915    nitroGLYCERIN (NITROSTAT) SL tablet 0.4 mg, 0.4 mg, Sublingual, Q5 Min PRN, Minnie Beal MD, 0.4 mg at 02/21/21 0602    methylPREDNISolone sodium (SOLU-MEDROL) injection 40 mg, 40 mg, Intravenous, Q12H, Rajeev Harmon, APRN - CNP, 40 mg at 02/23/21 0915    nystatin (MYCOSTATIN) 706328 UNIT/ML suspension 500,000 Units, 5 mL, Oral, TID, Tucker Arvizu MD, 500,000 Units at 02/23/21 0913    sodium chloride (OCEAN, BABY AYR) 0.65 % nasal spray 1 spray, 1 spray, Each Nostril, TID PRN, Tucker Arvizu MD    apixaban (ELIQUIS) tablet 2.5 mg, 2.5 mg, Oral, BID, Tucker Arvizu MD, 2.5 mg at 02/23/21 0915    isosorbide mononitrate (IMDUR) extended release tablet 30 mg, 30 mg, Oral, QAM, Tucker Arvizu MD, 30 mg at 02/23/21 0913    oxybutynin (DITROPAN) tablet 5 mg, 5 mg, Oral, Every Other Day, Tucker Arvizu MD, 5 mg at 02/23/21 0914    pantoprazole (PROTONIX) tablet 40 mg, 40 mg, Oral, MORENITA AC, Tucker Arvizu MD, 40 mg at 02/23/21 0640    pravastatin (PRAVACHOL) tablet 40 mg, 40 mg, Oral, Daily, Tucker Arvizu MD, 40 mg at 02/23/21 0914    sertraline (ZOLOFT) tablet 100 mg, 100 mg, Oral, Daily, Tucker Arvizu MD, 100 mg at 02/23/21 0914    latanoprost (XALATAN) 0.005 % ophthalmic solution 1 drop, 1 drop, Both Eyes, Nightly, Tucker Arvizu MD, 1 drop at 02/22/21 2134    sodium chloride flush 0.9 % injection 10 mL, 10 mL, Intravenous, 2 times per day, Aruna Bridges MD, 10 mL at 02/23/21 0916    sodium chloride flush 0.9 % injection 10 mL, 10 mL, Intravenous, PRN, Tucker Arvizu MD, 10 mL at 02/21/21 0425    promethazine (PHENERGAN) tablet 12.5 mg, 12.5 mg, Oral, Q6H PRN **OR** ondansetron (ZOFRAN) injection 4 mg, 4 mg, Intravenous, Q6H PRN, Tucker Arvizu MD, 4 mg at 02/09/21 1209    nicotine (NICODERM CQ) 21 MG/24HR 1 patch, 1 patch, Transdermal, Daily PRN, Tucker Arvizu MD    polyethylene glycol (GLYCOLAX) packet 17 g, 17 g, Oral, Daily PRN, Tucker Arvizu MD    acetaminophen (TYLENOL) tablet 650 mg, 650 mg, Oral, Q6H PRN, 650 mg at 02/21/21 2206 **OR** acetaminophen (TYLENOL) suppository 650 mg, 650 mg, Rectal, Q6H PRN, Tucker Arvizu MD    albuterol (PROVENTIL) nebulizer solution 2.5 mg, 2.5 mg, Nebulization, Q2H PRN, Tucker Arvizu MD    ipratropium-albuterol (DUONEB) nebulizer solution 1 ampule, 1 ampule, Inhalation, Q4H WA, Tucker Arvizu MD, 1 ampule at 02/23/21 1056    hydrALAZINE (APRESOLINE) injection 20 mg, 20 mg, Intravenous, Q6H PRN, Tucker Arvizu MD, 20 mg at 02/09/21 1210      I/O (24Hr):     Intake/Output Summary (Last 24 hours) at 2/23/2021 1259  Last data filed at 2/23/2021 1149  Gross per 24 hour   Intake 240 ml   Output 650 ml   Net -410 ml       Data:           Labs:    Hematology:  Recent Labs     02/21/21  0532 02/22/21  0509 02/23/21  0544   WBC 21.7* 23.9* 21.0*   RBC 3.99 3.92* 4.10   HGB 10.6* 10.3* 10.8*   HCT 34.7* 34.0* 35.6*   MCV 87.0 86.7 86.8   MCH 26.6 26.3 26.3   MCHC 30.5 30.3 30.3   RDW 16.2* 16.3* 16.3*    314 301   MPV 12.4 11.8 11.6     Chemistry:  Recent Labs     02/21/21  0532 02/21/21  0532 02/21/21  2353 02/22/21  0245 02/22/21  0509 02/23/21  0544     --   --   --  139 141   K 4.5  --   --   --  4.9 4.6   CL 99  --   --   --  98 98   CO2 34*  --   --   --  35* 35*   GLUCOSE 166*  --   --   --  174* 162*   BUN 42*  --   --   --  57* 57*   CREATININE 0.78  --   --   --  1.08* 0.94*   MG  --   --   --   --   --  2.1   ANIONGAP 7*  --   --   --  6* 8*   LABGLOM >60  --   --   --  48* 56*   GFRAA >60  --   --   --  58* >60   CALCIUM 8.1*  --   --   --  8.0* 8.0*   PHOS 3.8  --   --   --  4.1 3.6   TROPHS 15*   < > 18* 16* 15*  --     < > = values in this interval not displayed. No results for input(s): PROT, LABALBU, LABA1C, H0VDVCX, F0YNQEM, FT4, TSH, AST, ALT, LDH, GGT, ALKPHOS, LABGGT, BILITOT, BILIDIR, AMMONIA, AMYLASE, LIPASE, LACTATE, CHOL, HDL, LDLCHOLESTEROL, CHOLHDLRATIO, TRIG, VLDL, MXB47LA, PHENYTOIN, PHENYF, URICACID, POCGLU in the last 72 hours. Lab Results   Component Value Date/Time    SPECIAL NOT REPORTED 02/08/2021 07:45 PM    SPECIAL NOT REPORTED 02/08/2021 07:45 PM     Lab Results   Component Value Date/Time    CULTURE NO GROWTH 6 DAYS 02/08/2021 07:45 PM    CULTURE NO GROWTH 6 DAYS 02/08/2021 07:45 PM       Lab Results   Component Value Date    POCPH 7.35 11/09/2016    POCPCO2 41 11/09/2016    POCPO2 85 11/09/2016    POCHCO3 22.4 11/09/2016    NBEA 3 11/09/2016    PBEA NOT REPORTED 11/09/2016    HAC5WLE 24 11/09/2016    RSNP8JAH 96 11/09/2016    FIO2 NOT REPORTED 10/02/2019       Radiology:    Ct Chest Wo Contrast    Result Date: 2/16/2021  Severe diffuse ground-glass opacities indeterminate for COVID-19. This demonstrates interval progression since the previous exam.  No peripheral honeycombing to suggest typical idiopathic fibrosis. Other interstitial lung disease not excluded. Status post CABG. Left thyroid lesion as above. Recommend follow-up nonemergent thyroid ultrasound.      Xr Chest Portable    Result Date: 2/21/2021  Bilateral lung opacities are slightly improved. Xr Chest Portable    Result Date: 2/19/2021  Bilateral airspace disease again demonstrated, with improved aeration of the right upper lobe. All radiological studies reviewed  Code Status:  DNR-CCA        Electronically signed by Ebonie Betancourt MD on 2/23/2021 at 12:59 PM    This note was created with the assistance of a speech-recognition program.  Although the intention is to generate a document that actually reflects the content of the visit, no guarantees can be provided that every mistake has been identified and corrected by editing. Note was updated later by me after  physical examination and  completion of the assessment.

## 2021-02-23 NOTE — PROGRESS NOTES
ophthalmic solution Place 1 drop into the right eye nightly   Yes Historical Provider, MD   isosorbide mononitrate (IMDUR) 30 MG extended release tablet Take 30 mg by mouth every morning   Yes Historical Provider, MD   sertraline (ZOLOFT) 100 MG tablet Take 100 mg by mouth daily    Yes Historical Provider, MD   oxybutynin (DITROPAN) 5 MG tablet Take 5 mg by mouth every other day    Yes Historical Provider, MD   pravastatin (PRAVACHOL) 40 MG tablet Take 40 mg by mouth daily    Yes Historical Provider, MD   pantoprazole (PROTONIX) 40 MG tablet Take 40 mg by mouth every morning (before breakfast)    Yes Historical Provider, MD   aspirin 81 MG EC tablet Take 81 mg by mouth daily    Historical Provider, MD       Scheduled Meds:   metoprolol succinate  25 mg Oral Daily    furosemide  40 mg Intravenous BID    methylPREDNISolone  40 mg Intravenous Q12H    nystatin  5 mL Oral TID    apixaban  2.5 mg Oral BID    cefepime  1,000 mg Intravenous Q12H    isosorbide mononitrate  30 mg Oral QAM    oxybutynin  5 mg Oral Every Other Day    pantoprazole  40 mg Oral QAM AC    pravastatin  40 mg Oral Daily    sertraline  100 mg Oral Daily    latanoprost  1 drop Both Eyes Nightly    sodium chloride flush  10 mL Intravenous 2 times per day    ipratropium-albuterol  1 ampule Inhalation Q4H WA     Continuous Infusions:  PRN Meds:nitroGLYCERIN, sodium chloride, sodium chloride flush, promethazine **OR** ondansetron, nicotine, polyethylene glycol, acetaminophen **OR** acetaminophen, albuterol, hydrALAZINE     Physical Exam:  Vitals:    02/23/21 0450 02/23/21 0500 02/23/21 0613 02/23/21 0720   BP: (!) 135/58   (!) 144/66   Pulse: 55   51   Resp: 12   20   Temp: 97.3 °F (36.3 °C)   98 °F (36.7 °C)   TempSrc: Axillary   Oral   SpO2: (!) 89%  98%    Weight:  136 lb 14.4 oz (62.1 kg)     Height:         I/O last 3 completed shifts:   In: 480 [P.O.:480]  Out: 450 [Urine:450]    General:  Awake, alert, not in distress, and comfortable on high flow. Appears to be stated age. HEENT: Atraumatic, normocephalic. Anicteric sclera. Pink and moist oral mucosa. Neck supple. No JVD. Chest: Bilateral air entry, clear to auscultation, no wheezing, rhonchi or rales. Cardiovascular: RRR, S1S2, no murmur, rub or gallop. No lower extremity edema. Abdomen: Soft, non tender to palpation. Musculoskeletal:  No cyanosis or clubbing. Integumentary: Pink, warm and dry. Free from rash or lesions. Skin turgor normal.  CNS: Oriented to person, place and time. Speech clear. Face symmetrical. No tremor.      Data:    CBC:   Lab Results   Component Value Date    WBC 21.0 (H) 02/23/2021    HGB 10.8 (L) 02/23/2021    HCT 35.6 (L) 02/23/2021    MCV 86.8 02/23/2021     02/23/2021     BMP:    Lab Results   Component Value Date     02/23/2021     02/22/2021     02/21/2021    K 4.6 02/23/2021    K 4.9 02/22/2021    K 4.5 02/21/2021    CL 98 02/23/2021    CL 98 02/22/2021    CL 99 02/21/2021    CO2 35 (H) 02/23/2021    CO2 35 (H) 02/22/2021    CO2 34 (H) 02/21/2021    BUN 57 (H) 02/23/2021    BUN 57 (H) 02/22/2021    BUN 42 (H) 02/21/2021    CREATININE 0.94 (H) 02/23/2021    CREATININE 1.08 (H) 02/22/2021    CREATININE 0.78 02/21/2021    GLUCOSE 162 (H) 02/23/2021    GLUCOSE 174 (H) 02/22/2021    GLUCOSE 166 (H) 02/21/2021     CMP:   Lab Results   Component Value Date     02/23/2021    K 4.6 02/23/2021    CL 98 02/23/2021    CO2 35 02/23/2021    BUN 57 02/23/2021    CREATININE 0.94 02/23/2021    GLUCOSE 162 02/23/2021    CALCIUM 8.0 02/23/2021    PROT 6.1 05/01/2020    LABALBU 3.3 05/01/2020    BILITOT 0.71 05/01/2020    ALKPHOS 72 05/01/2020    AST 16 05/01/2020    ALT 8 05/01/2020      Hepatic:   Lab Results   Component Value Date    AST 16 05/01/2020    AST 67 (H) 10/02/2019    AST 18 11/07/2016    ALT 8 05/01/2020    ALT 63 (H) 10/02/2019    ALT 12 11/07/2016    BILITOT 0.71 05/01/2020    BILITOT 0.82 10/02/2019    BILITOT 0.39 11/07/2016    ALKPHOS 72 05/01/2020    ALKPHOS 103 10/02/2019    ALKPHOS 70 11/07/2016     BNP: No results found for: BNP  Lipids:   Lab Results   Component Value Date    CHOL 109 12/08/2020    HDL 38 (L) 12/08/2020     INR:   Lab Results   Component Value Date    INR 1.1 10/02/2019    INR 1.2 11/11/2016    INR 1.2 11/10/2016     PTH: No results found for: PTH  Phosphorus:    Lab Results   Component Value Date    PHOS 3.6 02/23/2021     Ionized Calcium: No results found for: IONCA  Magnesium:   Lab Results   Component Value Date    MG 2.1 02/23/2021     Albumin:   Lab Results   Component Value Date    LABALBU 3.3 05/01/2020     Last 3 CK, CKMB, Troponin: @LABRCNT(CKTOTAL:3,CKMB:3,TROPONINI:3)       URINE:)No results found for: Leo Dakin    Radiology:   Reviewed. Assessment:  Acute Kidney Injury, likely hemodynamically related. FeNa 0.26%. Cr is better. Baseline Creatinine 0.6. Hypernatremia. Hyperkalemia  Hypertension with recent hypotension. CHF exacerbation. Multifocal pneumonia. Hypoxic respiratory failure. Plan: Will increase Lasix to 60 mg iv BID. Avoid lisinopril for now. BP stable. SNA stable  Maintain on low K diet for now. Strict I&O. Antibiotics per primary and pulmonary. Avoid nephrotoxic drugs and IV contrast exposure. Follow up chemistries in the AM.    Please do not hesitate to contact us for any further questions/concerns. We will continue to follow along with you. Electronically signed by Margaret Figueredo MD on 2/23/2021 at [facility].   Ph: [phone]

## 2021-02-23 NOTE — PLAN OF CARE
Problem: Safety:  Goal: Free from accidental physical injury  Description: Free from accidental physical injury  Outcome: Ongoing     Problem: Daily Care:  Goal: Daily care needs are met  Description: Daily care needs are met  2/23/2021 1246 by Virginia Hawk RN  Outcome: Ongoing     Problem: Pain:  Goal: Control of acute pain  Description: Control of acute pain  Outcome: Ongoing

## 2021-02-23 NOTE — PROGRESS NOTES
Progress note  [facility]    Adult Hospitalist      Name: Davi Sarkar  MRN: [id number]     Acct: [de-identified]  Room: [address] [place] [address]    Admit Date: 2/8/2021  6:28 PM  PCP: Lauren Rodriguez MD    Primary Problem  Active Problems:    Acute left-sided CHF (congestive heart failure) ([facility] [address]  Resolved Problems:    * No resolved hospital problems.  *        Assesment:   Acute on chronic hypoxemic respiratory failure  Acute on chronic diastolic congestive heart failure  Pulmonary edema  Aspiration pneumonia  Acute kidney injury- resolved, recurrent  Coronary artery disease status post CABG in 2016  Chronic atrial fibrillation  Chronic anticoagulation with Eliquis  Essential hypertension  Mixed hyperlipidemia  Mild pulmonary hypertension  Depression with anxiety  Former smoker  GERD  Covid ruled out  Hypernatremia   Oral candidiasis       Plan:   Admit patient to progressive unit  Oxygen keep SPO2 more than 90%  BiPAP as needed  Blood culture no growth  BiPAP as needed  Oral azithromycin, completed  Appreciate Nephrology assistance, IV diuretics have been increased to Lasix 20 IV b.i.d.   DuoNeb breathing treatment  Continue Eliquis  Continue lisinopril, metoprolol, Imdur, amiodarone  Continue pravastatin  Continue Zoloft  Pulmonology on consult, appreciated  Consult cardiology, appreciated  [address] nephrology assistance  Patient continues on high-flow nasal cannula oxygen, 30 L with 50% FiO2, requirement has come down since yesterday  IV cefepime  D/w Pt   Pureed food now  Continue other medication as below  Nystatin soln - w thickened food  Off Amiodarone   Await bed [facility]      Code status [id number]      Scheduled Meds:   metoprolol succinate  25 mg Oral Daily    furosemide  40 mg Intravenous BID    methylPREDNISolone  40 mg Intravenous Q12H    nystatin  5 mL Oral TID    apixaban  2.5 mg Oral BID    cefepime  1,000 mg Intravenous Q12H    isosorbide mononitrate  30 mg Oral QAM    oxybutynin  5 mg Oral Every Other Day    pantoprazole  40 mg Oral QAM AC    pravastatin  40 mg Oral Daily    sertraline  100 mg Oral Daily    latanoprost  1 drop Both Eyes Nightly    sodium chloride flush  10 mL Intravenous 2 times per day    ipratropium-albuterol  1 ampule Inhalation Q4H WA     Continuous Infusions:    PRN Meds:      nitroGLYCERIN, 0.4 mg, Q5 Min PRN      sodium chloride, 1 spray, TID PRN      sodium chloride flush, 10 mL, PRN      promethazine, 12.5 mg, Q6H PRN    Or      ondansetron, 4 mg, Q6H PRN      nicotine, 1 patch, Daily PRN      polyethylene glycol, 17 g, Daily PRN      acetaminophen, 650 mg, Q6H PRN    Or      acetaminophen, 650 mg, Q6H PRN      albuterol, 2.5 mg, Q2H PRN      hydrALAZINE, 20 mg, Q6H PRN        Chief Complaint:     Chief Complaint   Patient presents with    Shortness of Breath         History of Present Illness: Jaycob Carranza is a 80 y.o.  female who presents with Shortness of Breath    Patient continues on high flow nasal cannula oxygen   Sitting up in chair   Afebrile  Leucocytosis worse   ELICEO  Nephrology on board  Patient denies any chest pain, palpitation, headache, dizziness, nausea, vomiting, abdominal pain, pain, changes in urination or bowel habit or rash. HPI  This is a 51-year-old female is been admitted to emergency room, patient came to the ER with a complaint of having shortness of breath, further patient is been having some shortness of breath for past couple of days, patient does have history of congestive heart failure in view of oxygen at home, also history of coronary disease, hypertension hyperlipidemia and CABG in the past, patient initial testing in the emergency room is consistent with congestive heart failure, admitted for further management    I have personally reviewed the past medical history, past surgical history, medications, social history, and family history, and summarized in the note.     Review of Systems:     All 10 point system is reviewed and negative otherwise mentioned in HPI. Past Medical History:     Past Medical History:   Diagnosis Date    Acute on chronic diastolic CHF (congestive heart failure) (HCC) 10/2/2019    Bradycardia     CAD (coronary artery disease)     Cancer (HCC)     Skin CA on nose    Depression     Gallstones     GERD (gastroesophageal reflux disease)     Hiatal hernia     History of cardiac cath 10/2016    CAD    Hyperlipidemia     Hypertension     Insomnia     MI (myocardial infarction) ([address]     PNA (pneumonia)     SOB (shortness of breath)     UTI (urinary tract infection)         Past Surgical History:     Past Surgical History:   Procedure Laterality Date    APPENDECTOMY      CARDIAC SURGERY  11/08/2016    CABG x 3; LIMA-LAD, SVG-PDA, SVG-OM    CARDIOVERSION  12/17/2019    CHOLECYSTECTOMY      COLONOSCOPY      CORONARY ANGIOPLASTY WITH STENT PLACEMENT      CORONARY ARTERY BYPASS GRAFT  11/08/2016    X 3    HYSTERECTOMY          Medications Prior to Admission:       Prior to Admission medications    Medication Sig Start Date End Date Taking?  Authorizing Provider   lisinopril (PRINIVIL;ZESTRIL) 2.5 MG tablet Take 1 tablet by mouth daily 12/12/20  Yes Al Reveles MD   furosemide (LASIX) 40 MG tablet Take 1 tablet by mouth daily 12/11/20  Yes SERA Gay - CNP   metoprolol tartrate (LOPRESSOR) 25 MG tablet Take 25 mg by mouth 2 times daily  11/4/19  Yes Historical Provider, MD   amiodarone (CORDARONE) 200 MG tablet Take 200 mg by mouth daily   Yes Historical Provider, MD   apixaban (ELIQUIS) 5 MG TABS tablet Take 1 tablet by mouth 2 times daily 10/5/19  Yes Maninder Singh MD   travoprost, benzalkonium, (TRAVATAN) 0.004 % ophthalmic solution Place 1 drop into the right eye nightly   Yes Historical Provider, MD   isosorbide mononitrate (IMDUR) 30 MG extended release tablet Take 30 mg by mouth every morning   Yes Historical Provider, MD   sertraline (ZOLOFT) 100 MG tablet Take 100 mg by mouth daily    Yes Historical Provider, MD   oxybutynin (DITROPAN) 5 MG tablet Take 5 mg by mouth every other day    Yes Historical Provider, MD   pravastatin (PRAVACHOL) 40 MG tablet Take 40 mg by mouth daily    Yes Historical Provider, MD   pantoprazole (PROTONIX) 40 MG tablet Take 40 mg by mouth every morning (before breakfast)    Yes Historical Provider, MD   aspirin 81 MG EC tablet Take 81 mg by mouth daily    Historical Provider, MD        Allergies:       Seasonal    Social History:     Tobacco:    reports that she quit smoking about 49 years ago. Her smoking use included cigarettes. She has never used smokeless tobacco.  Alcohol:      reports current alcohol use. Drug Use:  reports no history of drug use.     Family History:     Family History   Problem Relation Age of Onset    Heart Disease Mother     Cancer Mother     Heart Disease Father          Physical Exam:     Vitals:  BP (!) 116/56   Pulse 60   Temp 97.2 °F (36.2 °C) (Oral)   Resp 20   Ht 5' 5\" (1.651 m)   Wt 136 lb 12.8 oz (62.1 kg)   SpO2 95%   BMI 22.76 kg/m²   Temp (24hrs), Av.6 °F (36.4 °C), Min:97.2 °F (36.2 °C), Max:97.9 °F (36.6 °C)          General appearance - alert, malnourished, and in mild acute distress  Mental status - oriented to person, place, and time with normal affect  Head - normocephalic and atraumatic  Eyes - pupils equal and reactive, extraocular eye movements intact, conjunctiva clear  Ears - hearing appears to be intact  Nose - no drainage noted  Mouth - mucous membranes moist  Neck - supple, no carotid bruits, thyroid not palpable  Chest - BL basilar crackles, increased effort  Heart - normal rate, regular rhythm,  Abdomen - soft, nontender, nondistended, bowel sounds present all four quadrants, no masses, hepatomegaly or splenomegaly  Neurological - normal speech, no focal findings or movement disorder noted, cranial nerves II through XII grossly intact  Extremities - peripheral pulses palpable, no pedal edema or calf pain with palpation  Skin - no gross lesions, rashes, or induration noted        Data:     Labs:    Hematology:  Recent Labs     02/20/21  0533 02/21/21  0532 02/22/21  0509   WBC 19.3* 21.7* 23.9*   RBC 3.73* 3.99 3.92*   HGB 10.1* 10.6* 10.3*   HCT 32.5* 34.7* 34.0*   MCV 87.1 87.0 86.7   MCH 27.1 26.6 26.3   MCHC 31.1 30.5 30.3   RDW 16.0* 16.2* 16.3*    297 314   MPV 11.8 12.4 11.8     Chemistry:  Recent Labs     02/20/21 0533 02/21/21  0532 02/21/21  0532 02/21/21  2353 02/22/21  0245 02/22/21  0509    140  --   --   --  139   K 5.0 4.5  --   --   --  4.9    99  --   --   --  98   CO2 34* 34*  --   --   --  35*   GLUCOSE 161* 166*  --   --   --  174*   BUN 37* 42*  --   --   --  57*   CREATININE 0.64 0.78  --   --   --  1.08*   ANIONGAP 6* 7*  --   --   --  6*   LABGLOM >60 >60  --   --   --  48*   GFRAA >60 >60  --   --   --  58*   CALCIUM 8.1* 8.1*  --   --   --  8.0*   PHOS 3.5 3.8  --   --   --  4.1   TROPHS  --  15*   < > 18* 16* 15*    < > = values in this interval not displayed. No results for input(s): PROT, LABALBU, LABA1C, A1ZMPWW, Y7NVYWR, FT4, TSH, AST, ALT, LDH, GGT, ALKPHOS, LABGGT, BILITOT, BILIDIR, AMMONIA, AMYLASE, LIPASE, LACTATE, CHOL, HDL, LDLCHOLESTEROL, CHOLHDLRATIO, TRIG, VLDL, [id number], PHENYTOIN, PHENYF, URICACID, POCGLU in the last 72 hours.     Lab Results   Component Value Date    INR 1.1 10/02/2019    INR 1.2 11/11/2016    INR 1.2 11/10/2016    PROTIME 11.2 10/02/2019    PROTIME 12.3 (H) 11/11/2016    PROTIME 12.7 (H) 11/10/2016       Lab Results   Component Value Date/Time    SPECIAL NOT REPORTED 02/08/2021 07:45 PM    SPECIAL NOT REPORTED 02/08/2021 07:45 PM     Lab Results   Component Value Date/Time    CULTURE NO GROWTH 6 DAYS 02/08/2021 07:45 PM    CULTURE NO GROWTH 6 DAYS 02/08/2021 07:45 PM       Lab Results   Component Value Date    POCPH 7.35 11/09/2016    POCPCO2 41 11/09/2016    POCPO2 85 11/09/2016    POCHCO3 22.4

## 2021-02-23 NOTE — PROGRESS NOTES
Section of Cardiology  Progress Note      Date:  2/23/2021  Patient: Mahogany Ferrera  Admission:  2/8/2021  6:28 PM  Admit DX: Acute left-sided CHF (congestive heart failure) ([facility] [address] [I50.1]  Age:  80 y.o., 3/26/1932     LOS: 15 days     Reason for evaluation:   atrial fibrillation, CHF and coronary artery disease      SUBJECTIVE:     The patient was seen and examined. Notes and labs reviewed. Patient is bradycardic and fatigued. No overnight issues. No chest pain or shortness of breath. She is sitting in a chair and daughter is at the bedside. OBJECTIVE:      EXAM:   Vitals:    VITALS:  BP (!) 144/66   Pulse 51   Temp 98 °F (36.7 °C) (Oral)   Resp 20   Ht 5' 5\" (1.651 m)   Wt 136 lb 14.4 oz (62.1 kg)   SpO2 98%   BMI 22.78 kg/m²   24HR INTAKE/OUTPUT:      Intake/Output Summary (Last 24 hours) at 2/23/2021 0933  Last data filed at 2/23/2021 0424  Gross per 24 hour   Intake 240 ml   Output 450 ml   Net -210 ml       CONSTITUTIONAL: Awake, sitting in the chair, no signs of acute distress  HEENT: Normal jugular venous pulsations  LUNGS: Dry bibasilar rales otherwise clear,  CARDIOVASCULAR:  regular rate and rhythm, normal S1 and S2, no S3 or S4, and 1/6 early systolic murmur at the aortic area   SKIN: Warm and dry.   EXTREMITIES: No leg edema    Current Inpatient Medications:   furosemide  60 mg Intravenous BID    metoprolol succinate  25 mg Oral Daily    methylPREDNISolone  40 mg Intravenous Q12H    nystatin  5 mL Oral TID    apixaban  2.5 mg Oral BID    cefepime  1,000 mg Intravenous Q12H    isosorbide mononitrate  30 mg Oral QAM    oxybutynin  5 mg Oral Every Other Day    pantoprazole  40 mg Oral QAM AC    pravastatin  40 mg Oral Daily    sertraline  100 mg Oral Daily    latanoprost  1 drop Both Eyes Nightly    sodium chloride flush  10 mL Intravenous 2 times per day    ipratropium-albuterol  1 ampule Inhalation Q4H WA       IV Infusions (if any):      Diagnostics:   Telemetry: Sinus bradycardia  Labs:   CBC:  Recent Labs     02/22/21  0509 02/23/21  0544   WBC 23.9* 21.0*   HGB 10.3* 10.8*   HCT 34.0* 35.6*    301     Magnesium:  Recent Labs     02/23/21  0544   MG 2.1     BMP:  Recent Labs     02/22/21  0509 02/23/21  0544    141   K 4.9 4.6   CALCIUM 8.0* 8.0*   CO2 35* 35*   BUN 57* 57*   CREATININE 1.08* 0.94*   LABGLOM 48* 56*   GLUCOSE 174* 162*     BNP:No results for input(s): BNP, PROBNP in the last 72 hours. PT/INR:No results for input(s): PROTIME, INR in the last 72 hours. APTT:No results for input(s): APTT in the last 72 hours. CARDIAC ENZYMES:  Recent Labs     02/21/21  2353 02/22/21  0245 02/22/21  0509   TROPHS 18* 16* 15*   TROPONINT NOT REPORTED NOT REPORTED NOT REPORTED     FASTING LIPID PANEL:  Lab Results   Component Value Date    HDL 38 12/08/2020    TRIG 78 12/08/2020     LIVER PROFILE:No results for input(s): AST, ALT, LABALBU, ALKPHOS, BILITOT, BILIDIR, IBILI, PROT, GLOB, ALBUMIN in the last 72 hours. ASSESSMENT:    · Acute on chronic diastolic heart failure, mild, on IV diuretics, managed by nephrology  · Respiratory failure/possible pneumonia on high flow nasal cannula oxygen, managed by others, patient is frustrated as she is not making significant progress regarding her breathing. · CAD with prior CABG in 2016-denies chest pain. · Paroxysmal atrial fibrillation, maintaining sinus rhythm with history of cardioversion, amiodarone was stopped this admission and patient is maintaining sinus rhythm and remains on apixaban  · Hypertension-well-controlled  · Dyslipidemia-treated    PLAN:  Continue current cardiac medications. Continue with diuretic therapy. Continue Eliquis. Keep patient off amiodarone, continue apixaban. Continue toprol xl 25mg daily  Diuretic management as per nephrology. Replete lytes. I discussed above with the patient, and nursing.       Darrius Faye MD

## 2021-02-23 NOTE — CONSULTS
[facility]  Initial Consult Note  Date: 2/23/2021    Hospital day :15     Impression:   1. Acute on chronic hypoxic respiratory failure currently requiring high flow O2 by nasal cannula  2. Decompensated diastolic heart failure  3. Pulmonary edema  4. Severe diffuse groundglass opacities-concern for interstitial lung disease  5. Aspiration pneumonia  6. Mild secondary pulmonary hypertension  7. Leukocytosis without any infectious etiology-likely steroid-induced    Recommendations   · The patient has been on antimicrobial therapy since admission 6-day course of Rocephin, and is on the 10th day of cefepime  · I will plan on discontinuing the antimicrobial therapy at this time  · The patient has chronic respiratory issues and again my concern would be for developing interstitial lung disease  · From infectious disease standpoint there is nothing further for me to add here  · I will sign off    Chief complaint/reason for consultation:   Leukocytosis    History of Present Illness: Jose Curry is a 80y.o.-year-old female who was initially admitted on 2/8/2021. Everett Washburn is seen in consultation on hospital day #15 for leukocytosis. Everett Washburn has a history of coronary artery disease, hypertension, hyperlipidemia, depression, chronic diastolic congestive heart failure and is on home O2. She presented to the emergency department with shortness of breath and was admitted with what was felt to be mostly pulmonary edema but there was concern for superimposed pneumonia. The patient was seen by the primary service who felt this was mostly CHF she was started on diuresis meds also received Rocephin and azithromycin. The patient was admitted to the intensive care unit has been seen by multiple consultants including pulmonology, cardiology, nephrology,  She was on supplemental oxygen but did require high flow O2 by nasal cannula.   The patient's acute kidney injury did worsen requiring the lisinopril and Lasix to be discontinued. The patient  finished a 5-day course of azithromycin 2/8 - 2/12 and was on Rocephin for 6 days from 2/8 - 2/13 and switched to cefepime 2/14 until present with today being day #10 of therapy. A CT of the chest done 2/16/2021 showed severe groundglass opacities and Covid testing was negative  The patient has not needed BiPAP while asleep and continues on high flow O2 by nasal cannula and has been on IV Solu-Medrol which is weaning. The patient has been found to have severe pneumonia and heart failure with slow improvement and the plan is for discharge to [facility]. I was asked to evaluate for leukocytosis. Results for Broderick Weems (MRN [id number]) as of 2/23/2021 09:15   Ref. Range 2/10/2021 04:58 2/11/2021 05:49 2/12/2021 05:02 2/13/2021 06:00 2/14/2021 05:26 2/15/2021 05:18 2/16/2021 06:08 2/17/2021 05:31 2/18/2021 05:05 2/19/2021 05:41 2/20/2021 05:33 2/21/2021 05:32 2/22/2021 05:09 2/23/2021 05:44   WBC Latest Ref Range: 3.5 - 11.3 k/uL 12.0 (H) 10.2 10.8 11.8 (H) 9.2 9.2 12.7 (H) 15.7 (H) 16.0 (H) 19.7 (H) 19.3 (H) 21.7 (H) 23.9 (H) 21.0 (H)       The patient continues to report shortness of breath especially at night she feels that she has worsening respiratory parameters. She reports intermittent pressure-like chest pain that is typically fleeting. She does still have intermittent nonproductive cough. No subjective fevers or chills. I have personally reviewed the past medical history, past surgical history, medications, social history, and family history, and I have updated the database accordingly.   Past Medical History:     Past Medical History:   Diagnosis Date    Acute on chronic diastolic CHF (congestive heart failure) ([facility] [address] 10/2/2019    Bradycardia     CAD (coronary artery disease)     Cancer (HCC)     Skin CA on nose    Depression     Gallstones     GERD (gastroesophageal reflux disease)     Hiatal hernia     History of cardiac cath 10/2016    CAD    Hyperlipidemia     Hypertension     Insomnia     MI (myocardial infarction) ([facility] [address]     PNA (pneumonia)     SOB (shortness of breath)     UTI (urinary tract infection)      Past Surgical  History:     Past Surgical History:   Procedure Laterality Date    APPENDECTOMY      CARDIAC SURGERY  2016    CABG x 3; LIMA-LAD, SVG-PDA, SVG-OM    CARDIOVERSION  2019    CHOLECYSTECTOMY      COLONOSCOPY      CORONARY ANGIOPLASTY WITH STENT PLACEMENT      CORONARY ARTERY BYPASS GRAFT  11/08/2016    X 3    HYSTERECTOMY       Medications:      metoprolol succinate  25 mg Oral Daily    furosemide  40 mg Intravenous BID    methylPREDNISolone  40 mg Intravenous Q12H    nystatin  5 mL Oral TID    apixaban  2.5 mg Oral BID    cefepime  1,000 mg Intravenous Q12H    isosorbide mononitrate  30 mg Oral QAM    oxybutynin  5 mg Oral Every Other Day    pantoprazole  40 mg Oral QAM AC    pravastatin  40 mg Oral Daily    sertraline  100 mg Oral Daily    latanoprost  1 drop Both Eyes Nightly    sodium chloride flush  10 mL Intravenous 2 times per day    ipratropium-albuterol  1 ampule Inhalation Q4H WA     Social History:     Social History     Socioeconomic History    Marital status:       Spouse name: Not on file    Number of children: Not on file    Years of education: Not on file    Highest education level: Not on file   Occupational History    Not on file   Social Needs    Financial resource strain: Not on file    Food insecurity     Worry: Not on file     Inability: Not on file    Transportation needs     Medical: Not on file     Non-medical: Not on file   Tobacco Use    Smoking status: Former Smoker     Types: Cigarettes     Quit date: 1971     Years since quittin.2    Smokeless tobacco: Never Used    Tobacco comment: quit smoking yrs ago   Substance and Sexual Activity    Alcohol use: Yes     Comment: Rarely    Drug use: No    Sexual activity: Not on file   Lifestyle    Physical activity     Days per week: Not on file     Minutes per session: Not on file    Stress: Not on file   Relationships    Social connections     Talks on phone: Not on file     Gets together: Not on file     Attends [employer] service: Not on file     Active member of club or organization: Not on file     Attends meetings of clubs or organizations: Not on file     Relationship status: Not on file    Intimate partner violence     Fear of current or ex partner: Not on file     Emotionally abused: Not on file     Physically abused: Not on file     Forced sexual activity: Not on file   Other Topics Concern    Not on file   Social History Narrative    Not on file     Family History:     Family History   Problem Relation Age of Onset    Heart Disease Mother     Cancer Mother     Heart Disease Father       Allergies:   Seasonal     Review of Systems:   General: No fevers, chills, does have generalized malaise/fatigue. Eyes: No double vision or blurry vision. ENT: No sore throat or runny nose. Cardiovascular: No chest pain or palpitations. Lung: The patient has some shortness of breath and cough intermittently  Abdomen: No nausea, vomiting, diarrhea, or abdominal pain. Genitourinary: No increased urinary frequency, or dysuria. Musculoskeletal: No muscle aches or pains. Hematologic: No bleeding or bruising. Neurologic: No headache, weakness, numbness, or tingling.     Physical Examination :   BP (!) 144/66   Pulse 51   Temp 98 °F (36.7 °C) (Oral)   Resp 20   Ht 5' 5\" (1.651 m)   Wt 136 lb 14.4 oz (62.1 kg)   SpO2 98%   BMI 22.78 kg/m²     Temperature Range: Temp: 98 °F (36.7 °C) Temp  Av.6 °F (36.4 °C)  Min: 97.2 °F (36.2 °C)  Max: 98 °F (36.7 °C)  General Appearance: Awake, alert, and in no apparent distress  Head: Normocephalic, without obvious abnormality, atraumatic  Eyes: Pupils equal, round, reactive, to light and accommodation; extraocular movements intact; sclera anicteric; conjunctivae pink  ENT: Oropharynx clear, without erythema, exudate, or thrush. Neck: Supple, without lymphadenopathy. Pulmonary/Chest: The patient has Velcro rales appreciated diffusely anteriorly and posteriorly  Cardiovascular: Regular rate and rhythm soft systolic murmur, without any rubs or gallops. Abdomen: Soft, nontender, nondistended. Extremities: No cyanosis, clubbing, edema, or effusions. Neurologic: No gross sensory or motor deficits. Skin: Warm and dry with no rash. Medical Decision Making:   I have independently reviewed/ordered the following labs:  CBC with Differential:   Recent Labs     02/22/21  0509 02/23/21  0544   WBC 23.9* 21.0*   HGB 10.3* 10.8*   HCT 34.0* 35.6*    301     BMP:   Recent Labs     02/22/21  0509 02/23/21  0544    141   K 4.9 4.6   CL 98 98   CO2 35* 35*   BUN 57* 57*   CREATININE 1.08* 0.94*   MG  --  2.1     Hepatic Function Panel: No results for input(s): PROT, LABALBU, BILIDIR, IBILI, BILITOT, ALKPHOS, ALT, AST in the last 72 hours. Lab Results   Component Value Date    PROCAL 0.13 02/19/2021    PROCAL 0.16 02/14/2021    PROCAL 0.20 02/13/2021       Lab Results   Component Value Date    CRP <0.3 04/15/2016     Lab Results   Component Value Date    FERRITIN 55 04/29/2020     No results found for: FIBRINOGEN  Lab Results   Component Value Date    DDIMER 0.39 12/10/2020    DDIMER 0.89 10/11/2019     Lab Results   Component Value Date     04/29/2020       Lab Results   Component Value Date    SEDRATE 22 (H) 04/15/2016       Lab Results   Component Value Date    COVID19 Not Detected 02/17/2021    COVID19 Not Detected 02/12/2021    COVID19 Not Detected 02/08/2021    COVID19 Not Detected 12/11/2020    COVID19 Not Detected 12/07/2020    COVID19 Not Detected 04/29/2020     No results found for requested labs within last 30 days.        Imaging Studies:   Ct Chest Wo Contrast    Result Date: 2/16/2021  EXAMINATION: CT OF THE CHEST WITHOUT CONTRAST 2/16/2021 8:34 pm TECHNIQUE: CT of the chest was performed without the administration of intravenous contrast. Multiplanar reformatted images are provided for review. Dose modulation, iterative reconstruction, and/or weight based adjustment of the mA/kV was utilized to reduce the radiation dose to as low as reasonably achievable. COMPARISON: April 29, 2020 CT chest HISTORY: ORDERING SYSTEM PROVIDED HISTORY: Possible fibrosis TECHNOLOGIST PROVIDED HISTORY: Possible fibrosis Reason for Exam: Possible fibrosis. Pt c/o sob Acuity: Acute Type of Exam: Initial Relevant Medical/Surgical History: Hx of HTN, MI, cardiac cath, CABG x 3 FINDINGS: Mediastinum: 15 mm hypodense lesion left lobe of the thyroid. Prominent nonspecific mediastinal lymphadenopathy measuring up to 17 mm. Moderate cardiomegaly and calcific coronary artery disease. Lungs/pleura: Patchy ground-glass opacities bilaterally. Upper Abdomen: Normal Soft Tissues/Bones: Sternotomy wires and moderate kyphosis. Severe diffuse ground-glass opacities indeterminate for COVID-19. This demonstrates interval progression since the previous exam.  No peripheral honeycombing to suggest typical idiopathic fibrosis. Other interstitial lung disease not excluded. Status post CABG. Left thyroid lesion as above. Recommend follow-up nonemergent thyroid ultrasound. Xr Chest Portable    Result Date: 2/21/2021  EXAMINATION: ONE XRAY VIEW OF THE CHEST 2/21/2021 11:26 am COMPARISON: February 19, 2021 HISTORY: ORDERING SYSTEM PROVIDED HISTORY: resp failure TECHNOLOGIST PROVIDED HISTORY: resp failure Reason for Exam: resp failure Acuity: Unknown Type of Exam: Unknown FINDINGS: Diffuse bilateral lung opacities have minimally improved. Sternotomy. Cardiomegaly. Bilateral lung opacities are slightly improved.      Xr Chest Portable    Result Date: 2/19/2021  EXAMINATION: ONE XRAY VIEW OF THE CHEST 2/19/2021 9:06 am COMPARISON: 02/15/2021, 02/14/2021 HISTORY: ORDERING SYSTEM PROVIDED HISTORY: pneumonia TECHNOLOGIST PROVIDED HISTORY: pneumonia Reason for Exam: pneumonia, SOB Acuity: Unknown Type of Exam: Unknown FINDINGS: The cardiac and mediastinal contours appear unchanged. Bilateral patchy airspace disease again demonstrated with interval improved aeration of the right upper lung field. No new airspace disease identified in the interval. No evidence for pneumothorax. Bilateral airspace disease again demonstrated, with improved aeration of the right upper lobe. Fl Modified Barium Swallow W Video    Result Date: 2/16/2021  EXAMINATION: MODIFIED BARIUM SWALLOW WAS PERFORMED IN CONJUNCTION WITH SPEECH PATHOLOGY SERVICES TECHNIQUE: Fluoroscopic evaluation of the swallowing mechanism was performed with multiple consistency of barium product. FLUOROSCOPY DOSE AND TYPE OR TIME AND EXPOSURES: 1.5 minutes fluoro time, 7.622 mGy, 16 cine series COMPARISON: None HISTORY: ORDERING SYSTEM PROVIDED HISTORY: pneumonia TECHNOLOGIST PROVIDED HISTORY: pneumonia Reason for Exam: SOB, pneumonia Acuity: Unknown Type of Exam: Unknown FINDINGS: The study was performed under the supervision of the speech therapist. Honey liquid: No penetration or aspiration. Nectar thick liquid: Flash penetration. No aspiration. Thin liquid: Positive penetration and aspiration. No elicited cough reflex. Puree: No aspiration or penetration. Minimal-to-moderate vallecular residue reduced by additional swallow. Soft solid: Minimal vallecular residue. No penetration or aspiration. Cookie: No penetration or aspiration. Moderate extended mastication. Minimal vallecular residue. 1.  Flash penetration with nectar thick liquid. 2.  Positive penetration and aspiration with thin liquid. No elicited cough reflex. 3.  Residue with puree, soft solid and cookie. Please see separate speech pathology report for full discussion of findings and recommendations.        Cultures:     Respiratory Panel, Molecular, with COVID-19 (Restricted: peds pts or suitable admitted adults) [[id number]] Collected: 02/17/21 0851   Order Status: Completed Specimen: Nasopharyngeal Swab Updated: 02/17/21 1232    Specimen Description . NASOPHARYNGEAL SWAB    Adenovirus PCR Not Detected    Coronavirus 229E PCR Not Detected    Coronavirus HKU1 PCR Not Detected    Coronavirus NL63 PCR Not Detected    Coronavirus OC43 PCR Not Detected    SARS-CoV-2, PCR Not Detected    Comment: This test has been authorized by the FDA under an Emergency Use Authorization (EUA) for use   by authorized laboratories. This test is only authorized for the duration of the time of declaration that circumstances   exist justifying the authorization of the emergency use of in vitro diagnostic tests for   detection of the SARS-CoV-2 virusand/or diagnosis of COVID-19 infection under section 564   (b)(1) of the Act,21 U.S.C.bbb-1(b)(1), unless the authorization is terminated or revoked   sooner.         Patient Fact Sheet:   Sadi         Provider Fact Sheet:   Sadi         METHODOLGY: Multiplex PCR        Human Metapneumovirus PCR Not Detected    Rhino/Enterovirus PCR Not Detected    Influenza A by PCR Not Detected    Influenza A H1 PCR NOT REPORTED    Influenza A H1 (2009) PCR NOT REPORTED    Influenza A H3 PCR NOT REPORTED    Influenza B by PCR Not Detected    Parainfluenza 1 PCR Not Detected    Parainfluenza 2 PCR Not Detected    Parainfluenza 3 PCR Not Detected    Parainfluenza 4 PCR Not Detected    Resp Syncytial Virus PCR Not Detected    Bordetella Parapertussis Not Detected    B Pertussis by PCR Not Detected    Chlamydia pneumoniae By PCR Not Detected    Mycoplasma pneumo by PCR Not Detected    Comment: Performed by multiplexed nucleic acid assay.       Culture, Blood 1 [[id number]] Collected: 02/08/21 1945   Order Status: Completed Specimen: Blood Updated: 02/14/21 0830    Specimen

## 2021-02-23 NOTE — PROGRESS NOTES
hours  · CVD work up in process. Negative thus far however ANCA and GBM are pending still. · Cefepime  · Diuresis with IV Lasix 40 mg twice daily  · Dysphagia diet as per speech recommendations  · Nephrology and cardiology following  · Labs in am   · DVT prophylaxis on Eliquis   · DNR CCA  · Palliative care following  · LTAC evaluation, awaiting [id number]. Secondary to patient's severe pneumonia and heart failure and slow improvement she will need at minimum 2 weeks of continued treatment with IV diuretics and weaning of her oxygen requirements.   · Will follow with you    Electronically signed by     Maria Esther Kaba MD on 2/23/2021 at 11:55 AM  Pulmonary Critical Care and Sleep Medicine,  [facility]  Cell: [phone]  Office: [phone]

## 2021-02-24 ENCOUNTER — HOSPITAL ENCOUNTER (INPATIENT)
Age: 86
LOS: 1 days | Discharge: ANOTHER HOSPITAL | DRG: 951 | End: 2021-02-25
Attending: FAMILY MEDICINE | Admitting: FAMILY MEDICINE
Payer: COMMERCIAL

## 2021-02-24 VITALS
TEMPERATURE: 97.4 F | WEIGHT: 136.9 LBS | DIASTOLIC BLOOD PRESSURE: 88 MMHG | BODY MASS INDEX: 22.81 KG/M2 | RESPIRATION RATE: 20 BRPM | HEART RATE: 63 BPM | OXYGEN SATURATION: 92 % | SYSTOLIC BLOOD PRESSURE: 143 MMHG | HEIGHT: 65 IN

## 2021-02-24 PROBLEM — J96.91 RESPIRATORY FAILURE WITH HYPOXIA (HCC): Status: ACTIVE | Noted: 2021-02-24

## 2021-02-24 LAB
ANION GAP SERPL CALCULATED.3IONS-SCNC: 6 MMOL/L (ref 9–17)
BUN BLDV-MCNC: 54 MG/DL (ref 8–23)
BUN/CREAT BLD: 64 (ref 9–20)
CALCIUM SERPL-MCNC: 8.2 MG/DL (ref 8.6–10.4)
CHLORIDE BLD-SCNC: 100 MMOL/L (ref 98–107)
CO2: 37 MMOL/L (ref 20–31)
CREAT SERPL-MCNC: 0.85 MG/DL (ref 0.5–0.9)
GFR AFRICAN AMERICAN: >60 ML/MIN
GFR NON-AFRICAN AMERICAN: >60 ML/MIN
GFR SERPL CREATININE-BSD FRML MDRD: ABNORMAL ML/MIN/{1.73_M2}
GFR SERPL CREATININE-BSD FRML MDRD: ABNORMAL ML/MIN/{1.73_M2}
GLUCOSE BLD-MCNC: 156 MG/DL (ref 70–99)
HCT VFR BLD CALC: 33.2 % (ref 36.3–47.1)
HEMOGLOBIN: 10.2 G/DL (ref 11.9–15.1)
MAGNESIUM: 2.2 MG/DL (ref 1.6–2.6)
MCH RBC QN AUTO: 26.5 PG (ref 25.2–33.5)
MCHC RBC AUTO-ENTMCNC: 30.7 G/DL (ref 28.4–34.8)
MCV RBC AUTO: 86.2 FL (ref 82.6–102.9)
NRBC AUTOMATED: 0 PER 100 WBC
PDW BLD-RTO: 16.6 % (ref 11.8–14.4)
PLATELET # BLD: 263 K/UL (ref 138–453)
PMV BLD AUTO: 11 FL (ref 8.1–13.5)
POTASSIUM SERPL-SCNC: 4.9 MMOL/L (ref 3.7–5.3)
RBC # BLD: 3.85 M/UL (ref 3.95–5.11)
SODIUM BLD-SCNC: 143 MMOL/L (ref 135–144)
WBC # BLD: 18.3 K/UL (ref 3.5–11.3)

## 2021-02-24 PROCEDURE — 6370000000 HC RX 637 (ALT 250 FOR IP): Performed by: FAMILY MEDICINE

## 2021-02-24 PROCEDURE — 94660 CPAP INITIATION&MGMT: CPT

## 2021-02-24 PROCEDURE — 6360000002 HC RX W HCPCS: Performed by: NURSE PRACTITIONER

## 2021-02-24 PROCEDURE — 1250000000 HC SEMI PRIVATE HOSPICE R&B

## 2021-02-24 PROCEDURE — 36415 COLL VENOUS BLD VENIPUNCTURE: CPT

## 2021-02-24 PROCEDURE — 2700000000 HC OXYGEN THERAPY PER DAY

## 2021-02-24 PROCEDURE — 85027 COMPLETE CBC AUTOMATED: CPT

## 2021-02-24 PROCEDURE — 6360000002 HC RX W HCPCS: Performed by: INTERNAL MEDICINE

## 2021-02-24 PROCEDURE — 83735 ASSAY OF MAGNESIUM: CPT

## 2021-02-24 PROCEDURE — 6370000000 HC RX 637 (ALT 250 FOR IP): Performed by: INTERNAL MEDICINE

## 2021-02-24 PROCEDURE — 94761 N-INVAS EAR/PLS OXIMETRY MLT: CPT

## 2021-02-24 PROCEDURE — 2580000003 HC RX 258: Performed by: FAMILY MEDICINE

## 2021-02-24 PROCEDURE — 80048 BASIC METABOLIC PNL TOTAL CA: CPT

## 2021-02-24 PROCEDURE — 6360000002 HC RX W HCPCS: Performed by: FAMILY MEDICINE

## 2021-02-24 PROCEDURE — 94640 AIRWAY INHALATION TREATMENT: CPT

## 2021-02-24 RX ORDER — IPRATROPIUM BROMIDE AND ALBUTEROL SULFATE 2.5; .5 MG/3ML; MG/3ML
1 SOLUTION RESPIRATORY (INHALATION)
Status: CANCELLED | OUTPATIENT
Start: 2021-02-24

## 2021-02-24 RX ORDER — ACETAMINOPHEN 325 MG/1
650 TABLET ORAL EVERY 6 HOURS PRN
Status: DISCONTINUED | OUTPATIENT
Start: 2021-02-24 | End: 2021-02-25 | Stop reason: HOSPADM

## 2021-02-24 RX ORDER — PRAVASTATIN SODIUM 40 MG
40 TABLET ORAL DAILY
Status: CANCELLED | OUTPATIENT
Start: 2021-02-25

## 2021-02-24 RX ORDER — ISOSORBIDE MONONITRATE 30 MG/1
30 TABLET, EXTENDED RELEASE ORAL EVERY MORNING
Status: DISCONTINUED | OUTPATIENT
Start: 2021-02-25 | End: 2021-02-25 | Stop reason: HOSPADM

## 2021-02-24 RX ORDER — METHYLPREDNISOLONE SODIUM SUCCINATE 40 MG/ML
40 INJECTION, POWDER, LYOPHILIZED, FOR SOLUTION INTRAMUSCULAR; INTRAVENOUS EVERY 12 HOURS
Status: DISCONTINUED | OUTPATIENT
Start: 2021-02-24 | End: 2021-02-25 | Stop reason: HOSPADM

## 2021-02-24 RX ORDER — LORAZEPAM 2 MG/ML
1 INJECTION INTRAMUSCULAR EVERY 4 HOURS PRN
Status: CANCELLED | OUTPATIENT
Start: 2021-02-24

## 2021-02-24 RX ORDER — PANTOPRAZOLE SODIUM 40 MG/1
40 TABLET, DELAYED RELEASE ORAL
Status: CANCELLED | OUTPATIENT
Start: 2021-02-25

## 2021-02-24 RX ORDER — FUROSEMIDE 10 MG/ML
60 INJECTION INTRAMUSCULAR; INTRAVENOUS 2 TIMES DAILY
Status: CANCELLED | OUTPATIENT
Start: 2021-02-25

## 2021-02-24 RX ORDER — ALBUTEROL SULFATE 2.5 MG/3ML
2.5 SOLUTION RESPIRATORY (INHALATION)
Status: CANCELLED | OUTPATIENT
Start: 2021-02-24

## 2021-02-24 RX ORDER — LATANOPROST 50 UG/ML
1 SOLUTION/ DROPS OPHTHALMIC NIGHTLY
Status: CANCELLED | OUTPATIENT
Start: 2021-02-24

## 2021-02-24 RX ORDER — ACETAMINOPHEN 650 MG/1
650 SUPPOSITORY RECTAL EVERY 6 HOURS PRN
Status: CANCELLED | OUTPATIENT
Start: 2021-02-24

## 2021-02-24 RX ORDER — MORPHINE SULFATE 2 MG/ML
2 INJECTION, SOLUTION INTRAMUSCULAR; INTRAVENOUS EVERY 4 HOURS PRN
Status: CANCELLED | OUTPATIENT
Start: 2021-02-24

## 2021-02-24 RX ORDER — HYDRALAZINE HYDROCHLORIDE 20 MG/ML
20 INJECTION INTRAMUSCULAR; INTRAVENOUS EVERY 6 HOURS PRN
Status: CANCELLED | OUTPATIENT
Start: 2021-02-24

## 2021-02-24 RX ORDER — ISOSORBIDE MONONITRATE 30 MG/1
30 TABLET, EXTENDED RELEASE ORAL EVERY MORNING
Status: CANCELLED | OUTPATIENT
Start: 2021-02-25

## 2021-02-24 RX ORDER — ACETAMINOPHEN 650 MG/1
650 SUPPOSITORY RECTAL EVERY 6 HOURS PRN
Status: DISCONTINUED | OUTPATIENT
Start: 2021-02-24 | End: 2021-02-25 | Stop reason: HOSPADM

## 2021-02-24 RX ORDER — LATANOPROST 50 UG/ML
1 SOLUTION/ DROPS OPHTHALMIC NIGHTLY
Status: DISCONTINUED | OUTPATIENT
Start: 2021-02-24 | End: 2021-02-25 | Stop reason: HOSPADM

## 2021-02-24 RX ORDER — SODIUM CHLORIDE 0.9 % (FLUSH) 0.9 %
10 SYRINGE (ML) INJECTION EVERY 12 HOURS SCHEDULED
Status: DISCONTINUED | OUTPATIENT
Start: 2021-02-24 | End: 2021-02-25 | Stop reason: HOSPADM

## 2021-02-24 RX ORDER — HYDRALAZINE HYDROCHLORIDE 20 MG/ML
20 INJECTION INTRAMUSCULAR; INTRAVENOUS EVERY 6 HOURS PRN
Status: DISCONTINUED | OUTPATIENT
Start: 2021-02-24 | End: 2021-02-25 | Stop reason: HOSPADM

## 2021-02-24 RX ORDER — IPRATROPIUM BROMIDE AND ALBUTEROL SULFATE 2.5; .5 MG/3ML; MG/3ML
1 SOLUTION RESPIRATORY (INHALATION)
Status: DISCONTINUED | OUTPATIENT
Start: 2021-02-25 | End: 2021-02-25 | Stop reason: HOSPADM

## 2021-02-24 RX ORDER — OXYBUTYNIN CHLORIDE 5 MG/1
5 TABLET ORAL EVERY OTHER DAY
Status: CANCELLED | OUTPATIENT
Start: 2021-02-25

## 2021-02-24 RX ORDER — ALBUTEROL SULFATE 2.5 MG/3ML
2.5 SOLUTION RESPIRATORY (INHALATION)
Status: DISCONTINUED | OUTPATIENT
Start: 2021-02-24 | End: 2021-02-25 | Stop reason: HOSPADM

## 2021-02-24 RX ORDER — PROMETHAZINE HYDROCHLORIDE 12.5 MG/1
12.5 TABLET ORAL EVERY 6 HOURS PRN
Status: CANCELLED | OUTPATIENT
Start: 2021-02-24

## 2021-02-24 RX ORDER — POLYETHYLENE GLYCOL 3350 17 G/17G
17 POWDER, FOR SOLUTION ORAL DAILY PRN
Status: CANCELLED | OUTPATIENT
Start: 2021-02-24

## 2021-02-24 RX ORDER — PRAVASTATIN SODIUM 40 MG
40 TABLET ORAL DAILY
Status: DISCONTINUED | OUTPATIENT
Start: 2021-02-25 | End: 2021-02-25 | Stop reason: HOSPADM

## 2021-02-24 RX ORDER — SERTRALINE HYDROCHLORIDE 100 MG/1
100 TABLET, FILM COATED ORAL DAILY
Status: DISCONTINUED | OUTPATIENT
Start: 2021-02-25 | End: 2021-02-25 | Stop reason: HOSPADM

## 2021-02-24 RX ORDER — ONDANSETRON 2 MG/ML
4 INJECTION INTRAMUSCULAR; INTRAVENOUS EVERY 6 HOURS PRN
Status: CANCELLED | OUTPATIENT
Start: 2021-02-24

## 2021-02-24 RX ORDER — PANTOPRAZOLE SODIUM 40 MG/1
40 TABLET, DELAYED RELEASE ORAL
Status: DISCONTINUED | OUTPATIENT
Start: 2021-02-25 | End: 2021-02-25 | Stop reason: HOSPADM

## 2021-02-24 RX ORDER — PROMETHAZINE HYDROCHLORIDE 12.5 MG/1
12.5 TABLET ORAL EVERY 6 HOURS PRN
Status: DISCONTINUED | OUTPATIENT
Start: 2021-02-24 | End: 2021-02-25 | Stop reason: HOSPADM

## 2021-02-24 RX ORDER — LORAZEPAM 2 MG/ML
1 INJECTION INTRAMUSCULAR EVERY 4 HOURS PRN
Status: DISCONTINUED | OUTPATIENT
Start: 2021-02-24 | End: 2021-02-25 | Stop reason: HOSPADM

## 2021-02-24 RX ORDER — SERTRALINE HYDROCHLORIDE 100 MG/1
100 TABLET, FILM COATED ORAL DAILY
Status: CANCELLED | OUTPATIENT
Start: 2021-02-25

## 2021-02-24 RX ORDER — OXYBUTYNIN CHLORIDE 5 MG/1
5 TABLET ORAL EVERY OTHER DAY
Status: DISCONTINUED | OUTPATIENT
Start: 2021-02-25 | End: 2021-02-25 | Stop reason: HOSPADM

## 2021-02-24 RX ORDER — POLYETHYLENE GLYCOL 3350 17 G/17G
17 POWDER, FOR SOLUTION ORAL DAILY PRN
Status: DISCONTINUED | OUTPATIENT
Start: 2021-02-24 | End: 2021-02-25 | Stop reason: HOSPADM

## 2021-02-24 RX ORDER — METHYLPREDNISOLONE SODIUM SUCCINATE 40 MG/ML
40 INJECTION, POWDER, LYOPHILIZED, FOR SOLUTION INTRAMUSCULAR; INTRAVENOUS EVERY 12 HOURS
Status: CANCELLED | OUTPATIENT
Start: 2021-02-24

## 2021-02-24 RX ORDER — ONDANSETRON 2 MG/ML
4 INJECTION INTRAMUSCULAR; INTRAVENOUS EVERY 6 HOURS PRN
Status: DISCONTINUED | OUTPATIENT
Start: 2021-02-24 | End: 2021-02-25 | Stop reason: HOSPADM

## 2021-02-24 RX ORDER — ACETAMINOPHEN 325 MG/1
650 TABLET ORAL EVERY 6 HOURS PRN
Status: CANCELLED | OUTPATIENT
Start: 2021-02-24

## 2021-02-24 RX ORDER — FUROSEMIDE 10 MG/ML
60 INJECTION INTRAMUSCULAR; INTRAVENOUS 2 TIMES DAILY
Status: DISCONTINUED | OUTPATIENT
Start: 2021-02-25 | End: 2021-02-25 | Stop reason: HOSPADM

## 2021-02-24 RX ORDER — SODIUM CHLORIDE 0.9 % (FLUSH) 0.9 %
10 SYRINGE (ML) INJECTION EVERY 12 HOURS SCHEDULED
Status: CANCELLED | OUTPATIENT
Start: 2021-02-24

## 2021-02-24 RX ORDER — MORPHINE SULFATE 2 MG/ML
2 INJECTION, SOLUTION INTRAMUSCULAR; INTRAVENOUS EVERY 4 HOURS PRN
Status: DISCONTINUED | OUTPATIENT
Start: 2021-02-24 | End: 2021-02-25 | Stop reason: HOSPADM

## 2021-02-24 RX ADMIN — APIXABAN 2.5 MG: 2.5 TABLET, FILM COATED ORAL at 08:01

## 2021-02-24 RX ADMIN — SODIUM CHLORIDE, PRESERVATIVE FREE 10 ML: 5 INJECTION INTRAVENOUS at 08:03

## 2021-02-24 RX ADMIN — LORAZEPAM 1 MG: 2 INJECTION INTRAMUSCULAR; INTRAVENOUS at 23:37

## 2021-02-24 RX ADMIN — PRAVASTATIN SODIUM 40 MG: 40 TABLET ORAL at 08:02

## 2021-02-24 RX ADMIN — METOPROLOL SUCCINATE 25 MG: 25 TABLET, EXTENDED RELEASE ORAL at 08:02

## 2021-02-24 RX ADMIN — NYSTATIN 500000 UNITS: 100000 SUSPENSION ORAL at 08:02

## 2021-02-24 RX ADMIN — METHYLPREDNISOLONE SODIUM SUCCINATE 40 MG: 40 INJECTION, POWDER, FOR SOLUTION INTRAMUSCULAR; INTRAVENOUS at 11:19

## 2021-02-24 RX ADMIN — PANTOPRAZOLE SODIUM 40 MG: 40 TABLET, DELAYED RELEASE ORAL at 08:02

## 2021-02-24 RX ADMIN — FUROSEMIDE 60 MG: 10 INJECTION, SOLUTION INTRAMUSCULAR; INTRAVENOUS at 18:42

## 2021-02-24 RX ADMIN — IPRATROPIUM BROMIDE AND ALBUTEROL SULFATE 1 AMPULE: .5; 3 SOLUTION RESPIRATORY (INHALATION) at 14:05

## 2021-02-24 RX ADMIN — ISOSORBIDE MONONITRATE 30 MG: 30 TABLET ORAL at 08:01

## 2021-02-24 RX ADMIN — SERTRALINE HYDROCHLORIDE 100 MG: 50 TABLET ORAL at 08:01

## 2021-02-24 RX ADMIN — FUROSEMIDE 60 MG: 10 INJECTION, SOLUTION INTRAMUSCULAR; INTRAVENOUS at 08:02

## 2021-02-24 RX ADMIN — IPRATROPIUM BROMIDE AND ALBUTEROL SULFATE 1 AMPULE: .5; 3 SOLUTION RESPIRATORY (INHALATION) at 10:24

## 2021-02-24 RX ADMIN — IPRATROPIUM BROMIDE AND ALBUTEROL SULFATE 1 AMPULE: .5; 3 SOLUTION RESPIRATORY (INHALATION) at 06:05

## 2021-02-24 RX ADMIN — NYSTATIN 500000 UNITS: 100000 SUSPENSION ORAL at 13:19

## 2021-02-24 NOTE — PLAN OF CARE
Problem: Skin Integrity:  Goal: Absence of new skin breakdown  Description: Absence of new skin breakdown  Outcome: Ongoing  Note: Waffle mattress in place. Patient assisted with turns every 2 hours. Problem: Falls - Risk of:  Goal: Will remain free from falls  Description: Will remain free from falls  Outcome: Ongoing  Note: Patient alert, oriented, and calls out appropriately. Bed alarm on. Bed locked and in lowest position. Call light in reach. Patient hourly rounded on to assess needs. Problem: Daily Care:  Goal: Daily care needs are met  Description: Daily care needs are met  2/23/2021 2258 by Sanjuana Smart RN  Outcome: Ongoing  Note: Patient hourly rounded on to assess needs.       Problem: Pain:  Goal: Patient's pain/discomfort is manageable  Description: Patient's pain/discomfort is manageable  Outcome: Ongoing

## 2021-02-24 NOTE — PROGRESS NOTES
Occupational Therapy  DATE: 2021    NAME: Swapnil Bateman  MRN: [id number]   : 3/26/1932    Patient not seen this date for Occupational Therapy due to:  [] Blood transfusion in progress  [] Cancel by RN  [] Hemodialysis  [x]  Refusal by Patient- pt declined tx this date and ABBEY Lopez Fast in room with pt upon enter and pt tearful and frustrated with situation and declining all activity this date; RN okayed pt's refusal   [] Spine Precautions   [] Strict Bedrest  [] Surgery  [] Testing      [] Other        [] PT being discontinued at this time. Patient independent. No further needs. [] PT being discontinued at this time as the patient has been transferred to hospice care. No further needs.     Pilar Martin, OT

## 2021-02-24 NOTE — PROGRESS NOTES
Physical Therapy  DATE: 2021    NAME: Sarah Moss  MRN: [id number]   : 3/26/1932    Patient not seen this date for Physical Therapy due to:  [] Blood transfusion in progress  [] Cancel by RN  [] Hemodialysis  [x]  Refusal by Patient(pt tearful and frustrated with situation and declining all activity this date, & Hospice meeting scheduled for later this PM)   [] Spine Precautions   [] Strict Bedrest  [] Surgery  [] Testing      [] Other        [] PT being discontinued at this time. Patient independent. No further needs. [] PT being discontinued at this time as the patient has been transferred to hospice care. No further needs.     [address], PT

## 2021-02-24 NOTE — PROGRESS NOTES
[address] Hospitalist        2/24/2021   5:03 PM    Name:  Mahogany Ferrera  MRN:    [id number]     Acct:     [de-identified]   Room:  [address] [place] [address] Day: 12     Admit Date: 2/8/2021  6:28 PM  PCP: Melanie Mancilla MD    C/C:   Chief Complaint   Patient presents with    Shortness of Breath       Assessment:      Acute on chronic hypoxemic respiratory failure  Acute on chronic diastolic congestive heart failure  Pulmonary edema /diffuse ground-glass opacities  Aspiration pneumonia  Acute kidney injury- resolved, recurrent  Coronary artery disease status post CABG in 2016  Chronic atrial fibrillation  Chronic anticoagulation with Eliquis  Essential hypertension  Mixed hyperlipidemia  Mild pulmonary hypertension  Depression with anxiety  Former smoker  GERD  Hypernatremia   Oral candidiasis          Plan:      Patient is admitted to progressive unit   Oxygen keep SpO2 more than 90%, patient is requiring high-flow nasal cannula oxygen  BiPAP as needed   Blood cultures remain negative   COVID-19 testing is negative  Patient has completed IV antibiotic therapy with IV cefepime  Completed oral azithromycin   IV diuresis, IV Lasix   IV Solu-Medrol  DuoNeb breathing treatment   Oral nystatin  Continue Eliquis, Imdur  Continue metoprolol  Continue pravastatin   Continue Zoloft  Pulmonary Critical Care following  Cardiology consult appreciated  Off amiodarone  Nephrology consult appreciated   Discussed with , patient remains on high-flow nasal cannula oxygen, remains on IV Lasix therapy with IV Solu-Medrol patient will be evaluated for long-term acute care facility for discharge planning  Patient further requested for comfort care and hospice/palliative medicine, hospice is consulted      Scheduled Meds:   furosemide  60 mg Intravenous BID    methylPREDNISolone  40 mg Intravenous Q12H    nystatin  5 mL Oral TID    apixaban  2.5 mg Oral BID    isosorbide mononitrate  30 mg Oral QAM    oxybutynin  5 mg Oral Every Other Day    pantoprazole  40 mg Oral QAM AC    pravastatin  40 mg Oral Daily    sertraline  100 mg Oral Daily    latanoprost  1 drop Both Eyes Nightly    sodium chloride flush  10 mL Intravenous 2 times per day    ipratropium-albuterol  1 ampule Inhalation Q4H WA     Continuous Infusions:    PRN Meds:      nitroGLYCERIN, 0.4 mg, Q5 Min PRN      sodium chloride, 1 spray, TID PRN      sodium chloride flush, 10 mL, PRN      promethazine, 12.5 mg, Q6H PRN    Or      ondansetron, 4 mg, Q6H PRN      nicotine, 1 patch, Daily PRN      polyethylene glycol, 17 g, Daily PRN      acetaminophen, 650 mg, Q6H PRN    Or      acetaminophen, 650 mg, Q6H PRN      albuterol, 2.5 mg, Q2H PRN      hydrALAZINE, 20 mg, Q6H PRN            Subjective:     I have seen and examined patient today, patient last 24 hours events were reviewed also discussed with nursing staff  No new complaints  Overnight patient did not had any acute issues  No nausea vomiting diarrhea  Afebrile  Pt. Denies any CP, palpitation, HA, dizziness, chills, cough, cold, changes in urination, BM or skin changes . Patient continues to have shortness of breath, continues to require high flow nasal cannula oxygen    ROS:  A 10 point system reviewed and negative otherwise mentioned above. Physical Examination:      Vitals:  BP (!) 143/88   Pulse 63   Temp 97.4 °F (36.3 °C) (Axillary)   Resp 20   Ht 5' 5\" (1.651 m)   Wt 136 lb 14.4 oz (62.1 kg)   SpO2 92%   BMI 22.78 kg/m²   Temp (24hrs), Av.6 °F (36.4 °C), Min:97.4 °F (36.3 °C), Max:98 °F (36.7 °C)    Weight:   Wt Readings from Last 3 Encounters:   21 136 lb 14.4 oz (62.1 kg)   20 141 lb 6.4 oz (64.1 kg)   20 154 lb (69.9 kg)     I/O last 3 completed shifts:  I/O last 3 completed shifts:  In: -   Out: 300 [Urine:300]     No results for input(s): POCGLU in the last 72 hours.       General appearance - alert, well appearing,  Mild distress  Mental status - oriented to person, place, and time with normal affect  Head - normocephalic and atraumatic  Eyes - pupils equal and reactive, extraocular eye movements intact, conjunctiva clear  Ears - hearing appears to be intact  Nose - no drainage noted  Mouth - mucous membranes moist  Neck - supple, no carotid bruits, thyroid not palpable  Chest -  Bilateral decreased air entryto auscultation, normal effort  Heart - normal rate, regular rhythm, no murmur  Abdomen - soft, nontender, nondistended, bowel sounds present all four quadrants, no masses, hepatomegaly or splenomegaly  Neurological - normal speech, no focal findings or movement disorder noted, cranial nerves II through XII grossly intact  Extremities - peripheral pulses palpable, no pedal edema or calf pain with palpation  Skin - no gross lesions, rashes, or induration noted        Medications: Allergies:    Allergies   Allergen Reactions    Seasonal        Current Meds:   Current Facility-Administered Medications:     furosemide (LASIX) injection 60 mg, 60 mg, Intravenous, BID, Gaylon Mortimer, MD, 60 mg at 02/24/21 0802    nitroGLYCERIN (NITROSTAT) SL tablet 0.4 mg, 0.4 mg, Sublingual, Q5 Min PRN, Minnie Beal MD, 0.4 mg at 02/21/21 0602    methylPREDNISolone sodium (SOLU-MEDROL) injection 40 mg, 40 mg, Intravenous, Q12H, Rajeev Harmon, SERA - CNP, 40 mg at 02/24/21 1119    nystatin (MYCOSTATIN) 021867 UNIT/ML suspension 500,000 Units, 5 mL, Oral, TID, Tucker Arvizu MD, 500,000 Units at 02/24/21 1319    sodium chloride (OCEAN, BABY AYR) 0.65 % nasal spray 1 spray, 1 spray, Each Nostril, TID PRN, Tucker Arvizu MD, 1 spray at 02/23/21 2056    apixaban (ELIQUIS) tablet 2.5 mg, 2.5 mg, Oral, BID, Tucker Arvizu MD, 2.5 mg at 02/24/21 0801    isosorbide mononitrate (IMDUR) extended release tablet 30 mg, 30 mg, Oral, QAM, Tucker Arvizu MD, 30 mg at 02/24/21 0801    oxybutynin (DITROPAN) tablet 5 mg, 5 mg, Oral, Every Other Day, Molly Barclay MD, 5 mg at 02/23/21 0914    pantoprazole (PROTONIX) tablet 40 mg, 40 mg, Oral, QAM AC, Tucker Arvizu MD, 40 mg at 02/24/21 0802    pravastatin (PRAVACHOL) tablet 40 mg, 40 mg, Oral, Daily, Molly Barclay MD, 40 mg at 02/24/21 0802    sertraline (ZOLOFT) tablet 100 mg, 100 mg, Oral, Daily, Molly Barclay MD, 100 mg at 02/24/21 0801    latanoprost (XALATAN) 0.005 % ophthalmic solution 1 drop, 1 drop, Both Eyes, Nightly, Molly Barclay MD, 1 drop at 02/23/21 2056    sodium chloride flush 0.9 % injection 10 mL, 10 mL, Intravenous, 2 times per day, Molly Barclay MD, 10 mL at 02/24/21 0803    sodium chloride flush 0.9 % injection 10 mL, 10 mL, Intravenous, PRN, Molly Barclay MD, 10 mL at 02/21/21 0425    promethazine (PHENERGAN) tablet 12.5 mg, 12.5 mg, Oral, Q6H PRN **OR** ondansetron (ZOFRAN) injection 4 mg, 4 mg, Intravenous, Q6H PRN, Tucker Arvizu MD, 4 mg at 02/09/21 1209    nicotine (NICODERM CQ) 21 MG/24HR 1 patch, 1 patch, Transdermal, Daily PRN, Tucker Arvizu MD    polyethylene glycol (GLYCOLAX) packet 17 g, 17 g, Oral, Daily PRN, Tucker Arvizu MD    acetaminophen (TYLENOL) tablet 650 mg, 650 mg, Oral, Q6H PRN, 650 mg at 02/21/21 2206 **OR** acetaminophen (TYLENOL) suppository 650 mg, 650 mg, Rectal, Q6H PRN, Tucker Arvizu MD    albuterol (PROVENTIL) nebulizer solution 2.5 mg, 2.5 mg, Nebulization, Q2H PRN, Molly Barclay MD    ipratropium-albuterol (DUONEB) nebulizer solution 1 ampule, 1 ampule, Inhalation, Q4H WA, Tucker Arvizu MD, 1 ampule at 02/24/21 1405    hydrALAZINE (APRESOLINE) injection 20 mg, 20 mg, Intravenous, Q6H PRN, Tucker Arvizu MD, 20 mg at 02/09/21 1210      I/O (24Hr):     Intake/Output Summary (Last 24 hours) at 2/24/2021 1703  Last data filed at 2/24/2021 0931  Gross per 24 hour   Intake --   Output 300 ml   Net -300 ml       Data:           Labs:    Hematology:  Recent Labs     02/22/21  0509 02/23/21  0544 02/24/21  0537   WBC 23.9* 21.0* 18.3*   RBC 3.92* 4.10 3.85*   HGB 10.3* 10.8* 10.2*   HCT 34.0* 35.6* 33.2*   MCV 86.7 86.8 86.2   MCH 26.3 26.3 26.5   MCHC 30.3 30.3 30.7   RDW 16.3* 16.3* 16.6*    301 263   MPV 11.8 11.6 11.0     Chemistry:  Recent Labs     02/21/21  2353 02/22/21  0245 02/22/21  0509 02/23/21  0544 02/24/21  0537   NA  --   --  139 141 143   K  --   --  4.9 4.6 4.9   CL  --   --  98 98 100   CO2  --   --  35* 35* 37*   GLUCOSE  --   --  174* 162* 156*   BUN  --   --  57* 57* 54*   CREATININE  --   --  1.08* 0.94* 0.85   MG  --   --   --  2.1 2.2   ANIONGAP  --   --  6* 8* 6*   LABGLOM  --   --  48* 56* >60   GFRAA  --   --  58* >60 >60   CALCIUM  --   --  8.0* 8.0* 8.2*   PHOS  --   --  4.1 3.6  --    TROPHS 18* 16* 15*  --   --      No results for input(s): PROT, LABALBU, LABA1C, J9PORIN, X9KGJFX, FT4, TSH, AST, ALT, LDH, GGT, ALKPHOS, LABGGT, BILITOT, BILIDIR, AMMONIA, AMYLASE, LIPASE, LACTATE, CHOL, HDL, LDLCHOLESTEROL, CHOLHDLRATIO, TRIG, VLDL, NXS76TH, PHENYTOIN, PHENYF, URICACID, POCGLU in the last 72 hours. Lab Results   Component Value Date/Time    SPECIAL NOT REPORTED 02/08/2021 07:45 PM    SPECIAL NOT REPORTED 02/08/2021 07:45 PM     Lab Results   Component Value Date/Time    CULTURE NO GROWTH 6 DAYS 02/08/2021 07:45 PM    CULTURE NO GROWTH 6 DAYS 02/08/2021 07:45 PM       Lab Results   Component Value Date    POCPH 7.35 11/09/2016    POCPCO2 41 11/09/2016    POCPO2 85 11/09/2016    POCHCO3 22.4 11/09/2016    NBEA 3 11/09/2016    PBEA NOT REPORTED 11/09/2016    QWZ3QJL 24 11/09/2016    QHNI5BUZ 96 11/09/2016    FIO2 NOT REPORTED 10/02/2019       Radiology:    Ct Chest Wo Contrast    Result Date: 2/16/2021  Severe diffuse ground-glass opacities indeterminate for COVID-19. This demonstrates interval progression since the previous exam.  No peripheral honeycombing to suggest typical idiopathic fibrosis. Other interstitial lung disease not excluded. Status post CABG. Left thyroid lesion as above.   Recommend follow-up

## 2021-02-24 NOTE — PROGRESS NOTES
Progress Note    Reason for Consult: ELICEO    Requesting Physician:  July Katz MD    INTERVAL HISTORY:   Her weight is stable from yesterday. BP stable. Creatinine better at 0.85  Electrolytes ok. Hospice meeting planned later today. HISTORY OF PRESENT ILLNESS:    The patient is a 80 y.o. female who presents with worsening shortness of breath. Initial chest x-ray showed progressive pulmonary vascular congestion with possible superimposed bilateral pneumonia. Chest x-ray today showed bilateral airspace disease slightly increased at the right apex compared to prior. Creatinine baseline 0.6 which she was at on admission and yesterday. Creatinine elevated to 1.46 today. Hypokalemia on admission and yesterday. Potassium was 3.8 today. Recent hemoglobin 9.6. WBC was 14.4 today and today 12.0. SBP was originally trending as high as 214/75 on day of admission. She became hypotensive at 99/78 about 8 hours later. SBP trending 100s to 130s today. She originally was requiring nasal cannula and is now on high flow. Home medications include lisinopril, Lasix, metoprolol, amiodarone. She is currently on Lasix 20 mg IV twice daily, lisinopril, metoprolol, amiodarone. She has been up to the bedside commode voiding. No urinary retention on bladder scan       Prior to Admission medications    Medication Sig Start Date End Date Taking?  Authorizing Provider   lisinopril (PRINIVIL;ZESTRIL) 2.5 MG tablet Take 1 tablet by mouth daily 12/12/20  Yes July Katz MD   furosemide (LASIX) 40 MG tablet Take 1 tablet by mouth daily 12/11/20  Yes SERA Ely - CNP   metoprolol tartrate (LOPRESSOR) 25 MG tablet Take 25 mg by mouth 2 times daily  11/4/19  Yes Historical Provider, MD   amiodarone (CORDARONE) 200 MG tablet Take 200 mg by mouth daily   Yes Historical Provider, MD   apixaban (ELIQUIS) 5 MG TABS tablet Take 1 tablet by mouth 2 times daily 10/5/19  Yes Christos Reid MD   travoprost, benzalkonium, (TRAVATAN) 0.004 % ophthalmic solution Place 1 drop into the right eye nightly   Yes Historical Provider, MD   isosorbide mononitrate (IMDUR) 30 MG extended release tablet Take 30 mg by mouth every morning   Yes Historical Provider, MD   sertraline (ZOLOFT) 100 MG tablet Take 100 mg by mouth daily    Yes Historical Provider, MD   oxybutynin (DITROPAN) 5 MG tablet Take 5 mg by mouth every other day    Yes Historical Provider, MD   pravastatin (PRAVACHOL) 40 MG tablet Take 40 mg by mouth daily    Yes Historical Provider, MD   pantoprazole (PROTONIX) 40 MG tablet Take 40 mg by mouth every morning (before breakfast)    Yes Historical Provider, MD   aspirin 81 MG EC tablet Take 81 mg by mouth daily    Historical Provider, MD       Scheduled Meds:   furosemide  60 mg Intravenous BID    methylPREDNISolone  40 mg Intravenous Q12H    nystatin  5 mL Oral TID    apixaban  2.5 mg Oral BID    isosorbide mononitrate  30 mg Oral QAM    oxybutynin  5 mg Oral Every Other Day    pantoprazole  40 mg Oral QAM AC    pravastatin  40 mg Oral Daily    sertraline  100 mg Oral Daily    latanoprost  1 drop Both Eyes Nightly    sodium chloride flush  10 mL Intravenous 2 times per day    ipratropium-albuterol  1 ampule Inhalation Q4H WA     Continuous Infusions:  PRN Meds:nitroGLYCERIN, sodium chloride, sodium chloride flush, promethazine **OR** ondansetron, nicotine, polyethylene glycol, acetaminophen **OR** acetaminophen, albuterol, hydrALAZINE     Physical Exam:  Vitals:    02/24/21 1026 02/24/21 1133 02/24/21 1143 02/24/21 1520   BP:   (!) 132/59 (!) 143/88   Pulse:   57 63   Resp:  24 22 20   Temp:   97.5 °F (36.4 °C) 97.4 °F (36.3 °C)   TempSrc:   Axillary Axillary   SpO2: 95%  91% 92%   Weight:       Height:         I/O last 3 completed shifts:  In: -   Out: 300 [Urine:300]    General:  Awake, alert, not in distress, and comfortable on high flow. Appears to be stated age. HEENT: Atraumatic, normocephalic. 11/07/2016     BNP: No results found for: BNP  Lipids:   Lab Results   Component Value Date    CHOL 109 12/08/2020    HDL 38 (L) 12/08/2020     INR:   Lab Results   Component Value Date    INR 1.1 10/02/2019    INR 1.2 11/11/2016    INR 1.2 11/10/2016     PTH: No results found for: PTH  Phosphorus:    Lab Results   Component Value Date    PHOS 3.6 02/23/2021     Ionized Calcium: No results found for: IONCA  Magnesium:   Lab Results   Component Value Date    MG 2.2 02/24/2021     Albumin:   Lab Results   Component Value Date    LABALBU 3.3 05/01/2020     Last 3 CK, CKMB, Troponin: @LABRCNT(CKTOTAL:3,CKMB:3,TROPONINI:3)       URINE:)No results found for: Sakina Gonzalez    Radiology:   Reviewed. Assessment:  Acute Kidney Injury, likely hemodynamically related. FeNa 0.26%. Cr is better. Baseline Creatinine 0.6. Hypernatremia. Hyperkalemia  Hypertension with recent hypotension. CHF exacerbation. Multifocal pneumonia. Hypoxic respiratory failure. Plan:  Continue Lasix to 60 mg IV BID. Avoid lisinopril for now. BP stable. SNA stable  Maintain on low K diet for now. K stable. Strict I&O. Antibiotics per primary and pulmonary. Avoid nephrotoxic drugs and IV contrast exposure. Follow up chemistries in the AM.    Please do not hesitate to contact us for any further questions/concerns. We will continue to follow along with you. Pt seen in collaboration with Dr. Tyrese Acevedo. Electronically signed by SERA Gabriel CNP on 2/24/2021 at 3:36 PM  [facility]. Ph: [phone]    Physician Addendum  I have seen and examined pt at bed side.    I reviewed and agree with CNP's note. I performed all key and critical portions of this evaluation. I agree with the plan of care as noted above.   Plans for hospice meeting today noted  Nephrology service will sign off     Electronically signed by Sari Cho MD on 02/24/21 4:42 PM

## 2021-02-24 NOTE — PROGRESS NOTES
Went to go and see Vira Keshiacarmelo and introduced myself and my palliative care role. Patient was found sitting up in chair with high flow oxygen on. Patient Daughter and nephew at bedside. Awaiting hospice from [address] to come and talk with patient and family later tonight. I asked patient and family if there were any questions that I could answer while I was there and they did not have any. I provided them with paper and pen and encouraged them to write any questions they come up with after talking with hospice and we would follow up in the morning. Emotional support provided to patient and family and Palliative care will continue to follow. I updated patient nurse Sonya Oshea on the patient and my discussion.     Alan Francisco [facility]  Palliative Care

## 2021-02-24 NOTE — PROGRESS NOTES
Section of Cardiology  Progress Note      Date:  2/24/2021  Patient: January Green  Admission:  2/8/2021  6:28 PM  Admit DX: Acute left-sided [facility]congestive heart failure[facility] [I50.1]  Age:  80 y.o., 3/26/1932     LOS: 16 days     Reason for evaluation:   atrial fibrillation, CHF and coronary artery disease      SUBJECTIVE:     The patient was seen and examined. Notes and labs reviewed. Patient is bradycardic and fatigued. No overnight issues. No chest pain or shortness of breath. OBJECTIVE:      EXAM:   Vitals:    VITALS:  BP (!) 151/52   Pulse (!) 49   Temp 98 °F (36.7 °C) (Oral)   Resp 20   Ht 5' 5\" (1.651 m)   Wt 136 lb 14.4 oz (62.1 kg)   SpO2 96%   BMI 22.78 kg/m²   24HR INTAKE/OUTPUT:      Intake/Output Summary (Last 24 hours) at 2/24/2021 3320  Last data filed at 2/23/2021 1149  Gross per 24 hour   Intake --   Output 200 ml   Net -200 ml       CONSTITUTIONAL: Awake, sitting in the chair, no signs of acute distress. She is still on high flow oxygen. HEENT: Normal jugular venous pulsations  LUNGS: Dry bibasilar rales otherwise clear,  CARDIOVASCULAR:  regular rate and rhythm, normal S1 and S2, no S3 or S4, and 1/6 early systolic murmur at the aortic area   SKIN: Warm and dry.   EXTREMITIES: No leg edema    Current Inpatient Medications:   furosemide  60 mg Intravenous BID    metoprolol succinate  25 mg Oral Daily    methylPREDNISolone  40 mg Intravenous Q12H    nystatin  5 mL Oral TID    apixaban  2.5 mg Oral BID    isosorbide mononitrate  30 mg Oral QAM    oxybutynin  5 mg Oral Every Other Day    pantoprazole  40 mg Oral QAM AC    pravastatin  40 mg Oral Daily    sertraline  100 mg Oral Daily    latanoprost  1 drop Both Eyes Nightly    sodium chloride flush  10 mL Intravenous 2 times per day    ipratropium-albuterol  1 ampule Inhalation Q4H WA       IV Infusions (if any):      Diagnostics:   Telemetry: Sinus bradycardia  Labs:   CBC:  Recent Labs     02/23/21  0544 02/24/21  0537   WBC 21.0* 18.3*   HGB 10.8* 10.2*   HCT 35.6* 33.2*    263     Magnesium:  Recent Labs     02/23/21  0544 02/24/21  0537   MG 2.1 2.2     BMP:  Recent Labs     02/23/21  0544 02/24/21  0537    143   K 4.6 4.9   CALCIUM 8.0* 8.2*   CO2 35* 37*   BUN 57* 54*   CREATININE 0.94* 0.85   LABGLOM 56* >60   GLUCOSE 162* 156*     BNP:No results for input(s): BNP, PROBNP in the last 72 hours. PT/INR:No results for input(s): PROTIME, INR in the last 72 hours. APTT:No results for input(s): APTT in the last 72 hours. CARDIAC ENZYMES:  Recent Labs     02/21/21  2353 02/22/21  0245 02/22/21  0509   TROPHS 18* 16* 15*   TROPONINT NOT REPORTED NOT REPORTED NOT REPORTED     FASTING LIPID PANEL:  Lab Results   Component Value Date    HDL 38 12/08/2020    TRIG 78 12/08/2020     LIVER PROFILE:No results for input(s): AST, ALT, LABALBU, ALKPHOS, BILITOT, BILIDIR, IBILI, PROT, GLOB, ALBUMIN in the last 72 hours. ASSESSMENT:    · Acute on chronic diastolic heart failure, mild, on IV diuretics, managed by nephrology  · Respiratory failure/possible pneumonia on high flow nasal cannula oxygen, managed by others, patient is frustrated as she is not making significant progress regarding her breathing. · CAD with prior CABG in 2016-denies chest pain. · Paroxysmal atrial fibrillation, maintaining sinus rhythm with history of cardioversion, amiodarone was stopped this admission and patient is maintaining sinus rhythm and remains on apixaban  · Hypertension-well-controlled  · Dyslipidemia-treated    PLAN:  Continue current cardiac medications. Continue with diuretic therapy. Continue Eliquis. Keep patient off amiodarone, continue apixaban. Discontinue toprol. No indication for this medication since she is in sinus rhythm. Diuretic management as per nephrology. I discussed above with the patient, and nursing.       Maya Ya MD

## 2021-02-24 NOTE — PROGRESS NOTES
Pulmonary Critical Care Progress Note  Eva Cueva CNP / Mireya Sorto MD     Patient seen for the follow up of acute on chronic hypoxic respiratory failure, diastolic heart failure, pulmonary edema, aspiration pneumonia, mild secondary pulmonary hypertension    Subjective:  She is resting in bed. She remains on high flow, 25 L / 40% FiO2, she just wore BiPAP for a few hours and is coming off to eat her lunch. She has occasional nonproductive cough, denies chest pain. She feels her shortness of breath is worse today. She reports feeling tired and does not want to be put through any of this anymore. Examination:  Vitals: BP (!) 151/52   Pulse (!) 49   Temp 98 °F (36.7 °C) (Oral)   Resp 20   Ht 5' 5\" (1.651 m)   Wt 136 lb 14.4 oz (62.1 kg)   SpO2 95%   BMI 22.78 kg/m²   General appearance: In bed, on HFNC,  Neck: No JVD  Lungs: Moderate air exchange, positive crackles  Heart: regular rate and rhythm, S1, S2 normal, no gallop  Abdomen: Soft, non tender, + BS  Extremities: no cyanosis or clubbing.   Edema of left hand    LABs:  CBC:   Recent Labs     02/23/21  0544 02/24/21  0537   WBC 21.0* 18.3*   HGB 10.8* 10.2*   HCT 35.6* 33.2*    263     BMP:   Recent Labs     02/23/21  0544 02/24/21  0537    143   K 4.6 4.9   CO2 35* 37*   BUN 57* 54*   CREATININE 0.94* 0.85   LABGLOM 56* >60   GLUCOSE 162* 156*       Radiology:  2/21/2021 2/16/21      Impression:  · Acute on chronic hypoxic respiratory failure  · Decompensated diastolic heart failure  · Pulmonary edema  · Severe diffuse groundglass opacities  · COVID-19 PCR negative  · Aspiration pneumonia  · Mild secondary pulmonary hypertension, RVSP 37 mmHg  · History of CAD, HTN, HLD, CABG in 2016  · Dysphagia/aspiration  · Thyroid lesion    Recommendations:  · Oxygen via high flow nasal cannula, wean as able, keep SPO2 90% or greater  · BiPAP while asleep and as needed during the day  · Incentive spirometry every hour while awake  · Albuterol and ipratropium every 4 hours and as needed  · IV Solu-Medrol 40 mg every 12 hours  · CVD work up negative   · Monitor off antibiotics, ID input noted   · Diuresis with IV Lasix 60 mg twice daily  · Dysphagia diet as per speech recommendations  · Nephrology and cardiology following  · DVT prophylaxis on Eliquis   · DNR CCA  · Palliative care following  · She has been evaluated for LTAC, awaiting [id number]. Secondary to patient's severe pneumonia and heart failure and slow improvement she will need at minimum 2 weeks of continued treatment with IV diuretics and weaning of her oxygen requirements. · Patient is requesting hospice. Discussed with patient and her daughter at length. Will ask hospice to come evaluate.      Electronically signed by     SERA Zheng CNP on 2/24/2021 at 11:11 AM  Pulmonary Critical Care and Sleep Medicine,  [facility] [place]: [phone]

## 2021-02-24 NOTE — CARE COORDINATION
Social work: Spoke with Maldonado/Maday, he is faxing over form for Dr. Vani Harmon to sign so that [place] can initiate expedited appeal.  Writer also informed patient has voiced she is giving up and wants hospice care. Joyce/RN sent perfect serve to palliative care to see. Await outcome of PC visit to pursue LTACH.

## 2021-02-25 VITALS
WEIGHT: 124.9 LBS | SYSTOLIC BLOOD PRESSURE: 111 MMHG | DIASTOLIC BLOOD PRESSURE: 46 MMHG | RESPIRATION RATE: 20 BRPM | HEART RATE: 60 BPM | TEMPERATURE: 98.1 F | BODY MASS INDEX: 20.78 KG/M2 | OXYGEN SATURATION: 97 %

## 2021-02-25 LAB
SARS-COV-2, RAPID: NOT DETECTED
SPECIMEN DESCRIPTION: NORMAL

## 2021-02-25 PROCEDURE — U0002 COVID-19 LAB TEST NON-CDC: HCPCS

## 2021-02-25 PROCEDURE — 94660 CPAP INITIATION&MGMT: CPT

## 2021-02-25 PROCEDURE — 94761 N-INVAS EAR/PLS OXIMETRY MLT: CPT

## 2021-02-25 PROCEDURE — 94640 AIRWAY INHALATION TREATMENT: CPT

## 2021-02-25 PROCEDURE — 2580000003 HC RX 258: Performed by: FAMILY MEDICINE

## 2021-02-25 PROCEDURE — 6370000000 HC RX 637 (ALT 250 FOR IP): Performed by: FAMILY MEDICINE

## 2021-02-25 PROCEDURE — 6360000002 HC RX W HCPCS: Performed by: FAMILY MEDICINE

## 2021-02-25 PROCEDURE — 2700000000 HC OXYGEN THERAPY PER DAY

## 2021-02-25 RX ADMIN — PANTOPRAZOLE SODIUM 40 MG: 40 TABLET, DELAYED RELEASE ORAL at 06:30

## 2021-02-25 RX ADMIN — OXYBUTYNIN CHLORIDE 5 MG: 5 TABLET ORAL at 09:41

## 2021-02-25 RX ADMIN — LORAZEPAM 1 MG: 2 INJECTION INTRAMUSCULAR; INTRAVENOUS at 15:55

## 2021-02-25 RX ADMIN — SERTRALINE 100 MG: 100 TABLET, FILM COATED ORAL at 09:39

## 2021-02-25 RX ADMIN — NYSTATIN 500000 UNITS: 100000 SUSPENSION ORAL at 09:39

## 2021-02-25 RX ADMIN — FUROSEMIDE 60 MG: 10 INJECTION, SOLUTION INTRAMUSCULAR; INTRAVENOUS at 09:38

## 2021-02-25 RX ADMIN — METHYLPREDNISOLONE SODIUM SUCCINATE 40 MG: 40 INJECTION, POWDER, FOR SOLUTION INTRAMUSCULAR; INTRAVENOUS at 09:39

## 2021-02-25 RX ADMIN — SODIUM CHLORIDE, PRESERVATIVE FREE 10 ML: 5 INJECTION INTRAVENOUS at 09:40

## 2021-02-25 RX ADMIN — IPRATROPIUM BROMIDE AND ALBUTEROL SULFATE 1 AMPULE: .5; 3 SOLUTION RESPIRATORY (INHALATION) at 06:04

## 2021-02-25 RX ADMIN — HYDRALAZINE HYDROCHLORIDE 20 MG: 20 INJECTION INTRAMUSCULAR; INTRAVENOUS at 04:51

## 2021-02-25 RX ADMIN — IPRATROPIUM BROMIDE AND ALBUTEROL SULFATE 1 AMPULE: .5; 3 SOLUTION RESPIRATORY (INHALATION) at 10:15

## 2021-02-25 RX ADMIN — APIXABAN 2.5 MG: 2.5 TABLET, FILM COATED ORAL at 09:39

## 2021-02-25 RX ADMIN — ISOSORBIDE MONONITRATE 30 MG: 30 TABLET ORAL at 09:39

## 2021-02-25 RX ADMIN — PRAVASTATIN SODIUM 40 MG: 40 TABLET ORAL at 09:39

## 2021-02-25 NOTE — CARE COORDINATION
Edd Mccormack liaison in to see pt, she will need to test pt on a NRB for 1 hour to evaluate if pt is able to tolerate in transport, she will also need a repeat covid test, and they are unable to tell writer if they have the HF machine at this time.

## 2021-02-25 NOTE — PROGRESS NOTES
Spoke to hospice, they stated they were sending another RN out at [address] [place] [address] to reevaluate patient.

## 2021-02-25 NOTE — PROGRESS NOTES
Discussed taking medications with patient she states \"doesn't want any more\". Went over pros and cons and patient denies at this time. Discussed available medications for anxiety and difficulty breathing. Patient states she feels \"ok right now\". Sats in high 90's.

## 2021-02-25 NOTE — PROGRESS NOTES
Spoke to [employer] from hospice [address] [place] [address]. They ordered a high flow oxygen for the [facility] inpatient unit and are anticipating admission tomorrow 2/25/21. RN from hospice will be by in the morning for another hospice visit.

## 2021-02-25 NOTE — PROGRESS NOTES
Pt moved to nonrebreather for an hour to see how she tolerates. Place on a cont. Pulse ox to monitor status for an hour, to be prepared for transport.

## 2021-02-25 NOTE — CARE COORDINATION
Social work: Writer informed [facility] will do another onsite visit at [facility] faxed transportation request to [employer] for will-call pickup (15L non-rebreather). When arrangements can be made, call [employer] at [phone].

## 2021-02-25 NOTE — PROGRESS NOTES
.. PALLIATIVE CARE TEAM    Patient: Davi Sarkar  Room: [facility]1026-01    Reason For Consult   Goals of care evaluation  Distress management  Symptom Management  Guidance and support  Facilitate communications  Assistance in coordinating care  Recommendations for the above    Impression: Davi Sarkar is a 80y.o. year old female with  has a past medical history of Acute on chronic diastolic CHF (congestive heart failure) ([facility] Bradycardia, CAD (coronary artery disease), Cancer ([facility] Depression, Gallstones, GERD (gastroesophageal reflux disease), Hiatal hernia, History of cardiac cath, Hyperlipidemia, Hypertension, Insomnia, MI (myocardial infarction) ([address] PNA (pneumonia), SOB (shortness of breath), and UTI (urinary tract infection). .  Currently hospitalized for the management of Respiratory failure. The Palliative Care Team is following to assist with goals of care and support. Code Status  DNR-CC    Vital Signs:  BP (!) 143/52   Pulse 59   Temp 97.9 °F (36.6 °C) (Oral)   Resp 22   Wt 124 lb 14.4 oz (56.7 kg)   SpO2 98%   BMI 20.78 kg/m²     Patient Active Problem List   Diagnosis    Symptomatic bradycardia    Suspected sleep apnea    Unexplained night sweats    Pneumonia    GERD (gastroesophageal reflux disease)    Hyperlipidemia    Hypertension    Chronic atrial fibrillation (HCC)    Acute on chronic diastolic CHF (congestive heart failure) (HCC)    Generalized anxiety disorder    Depressive disorder    Nonrheumatic tricuspid valve disorder    Atherosclerotic heart disease of native coronary artery without angina pectoris    Heart failure, diastolic, acute on chronic (HCC)    Suspected COVID-19 virus infection    Acute left-sided CHF (congestive heart failure) (HCC)    Mild malnutrition ([address]    Respiratory failure with hypoxia ([facility]       Palliative Interaction:I get an update from the  and SW. Patient has qualified for inpatient hospice with [facility] [address] [place] [address].  The

## 2021-02-25 NOTE — DISCHARGE SUMMARY
4 Merged with [facility].,    Adult Hospitalist      Patient ID: Jake Juarez  MRN: [id number]     Acct:  [de-identified]       Patient's PCP: Nia iMles MD    Admit Date: 2/24/2021     Discharge Date:   2/24/2021    Admitting Physician: Marcial Wilson MD    Discharge Physician: Lizabeth Guevara MD     CONSULTANTS: Patient Care Team:  Nia Miles MD as PCP - Kashif Carr MD as Referring Physician (Cardiology)  Warner Mcardle, MD as Surgeon (Cardiothoracic Surgery)  Torsten Hodges DPM as Surgeon (Podiatry)      Active Discharge Diagnoses:  Acute on chronic hypoxemic respiratory failure  Acute on chronic diastolic congestive heart failure  Pulmonary edema /diffuse ground-glass opacities  Aspiration pneumonia  Acute kidney injury- resolved, recurrent  Coronary artery disease status post CABG in 2016  Chronic atrial fibrillation  Chronic anticoagulation with Eliquis  Essential hypertension  Mixed hyperlipidemia  Mild pulmonary hypertension  Depression with anxiety  Former smoker  GERD  Hypernatremia   Oral candidiasis           Hospital Course:    This is a 80-year-old female is been admitted to emergency room, patient came to the ER with a complaint of having shortness of breath, further patient is been having some shortness of breath for past couple of days, patient does have history of congestive heart failure in view of oxygen at home, also history of coronary disease, hypertension hyperlipidemia and CABG in the past, patient initial testing in the emergency room is consistent with congestive heart failure, admitted for further management, admitted for acute on chronic hypoxemic respiratory failure secondary to acute on chronic diastolic congestive heart failure noticed to have pulmonary edema and diffuse groundglass opacities, some concern for aspiration pneumonia patient was started on aggressive IV diuresis, IV Solu-Medrol, DuoNeb breathing treatment, also started on IV PROTIME 12.7 (H) 11/10/2016     Lab Results   Component Value Date/Time    SPECIAL NOT REPORTED 2021 07:45 PM    SPECIAL NOT REPORTED 2021 07:45 PM     Lab Results   Component Value Date/Time    CULTURE NO GROWTH 6 DAYS 2021 07:45 PM    CULTURE NO GROWTH 6 DAYS 2021 07:45 PM       Lab Results   Component Value Date    POCPH 7.35 2016    POCPCO2 41 2016    POCPO2 85 2016    POCHCO3 22.4 2016    NBEA 3 2016    PBEA NOT REPORTED 2016    UQA5SQL 24 2016    YKUH8ZEV 96 2016    FIO2 NOT REPORTED 10/02/2019       Radiology:    Xr Chest Portable    Result Date: 2021  Bilateral lung opacities are slightly improved. Xr Chest Portable    Result Date: 2021  Bilateral airspace disease again demonstrated, with improved aeration of the right upper lobe.          All radiological studies reviewed      Reviews of Symptoms:    A 10 point system is reviewed and  negative except described in hospital course    Physical Exam:    Vitals:  BP (!) 143/49   Pulse (!) 36   Temp 98 °F (36.7 °C)   Resp 22   Wt 124 lb 14.4 oz (56.7 kg)   SpO2 98%   BMI 20.78 kg/m²   Temp (24hrs), Av.7 °F (36.5 °C), Min:97.4 °F (36.3 °C), Max:98 °F (36.7 °C)      General appearance - alert, well appearing, moderate distress  Mental status - oriented to person, place, and time with normal affect  Head - normocephalic and atraumatic  Eyes - pupils equal and reactive, extraocular eye movements intact, conjunctiva clear  Ears - hearing appears to be intact  Nose - no drainage noted  Mouth - mucous membranes moist  Neck - supple, no carotid bruits, thyroid not palpable  Chest -bilateral decreased air entry to auscultation, normal effort  Heart - normal rate, regular rhythm, no murmur  Abdomen - soft, nontender, nondistended, bowel sounds present all four quadrants, no masses, hepatomegaly or splenomegaly  Neurological - normal speech, no focal findings or movement disorder noted, cranial nerves II through XII grossly intact  Extremities - peripheral pulses palpable, no pedal edema or calf pain with palpation  Skin - no gross lesions, rashes, or induration noted      Consults:  IP CONSULT TO HOSPICE  IP CONSULT TO INTERNAL MEDICINE    Disposition: Hospice    Discharged Condition: Stable    Follow Up: No follow-up provider specified. Lab Frequency Next Occurrence   BIPAP PRN    Heated/ Humidified High Flow Nasal Cannula CONTINUOUS WITH Q4H RT CHECKS    HHN Treatment EVERY 4 HOURS WHILE AWAKE    Initiate Oxygen Therapy Protocol DAILY (RT)    Nasal Cannula Oxygen DAILY (RT)          Diet: DIET CARDIAC; Dental Soft; Mildly Thick (Nectar);  Daily Fluid Restriction: 1500 ml; Low Potassium  Dietary Nutrition Supplements: Frozen Oral Supplement    Discharge Medications:    Latisha Arevalo   [place] Medication Instructions [id number]    Printed on:02/25/21 0108   Medication Information                      apixaban (ELIQUIS) 5 MG TABS tablet  Take 1 tablet by mouth 2 times daily             aspirin 81 MG EC tablet  Take 81 mg by mouth daily             furosemide (LASIX) 40 MG tablet  Take 1 tablet by mouth daily             isosorbide mononitrate (IMDUR) 30 MG extended release tablet  Take 30 mg by mouth every morning             lisinopril (PRINIVIL;ZESTRIL) 2.5 MG tablet  Take 1 tablet by mouth daily             metoprolol tartrate (LOPRESSOR) 25 MG tablet  Take 25 mg by mouth 2 times daily              oxybutynin (DITROPAN) 5 MG tablet  Take 5 mg by mouth every other day              pantoprazole (PROTONIX) 40 MG tablet  Take 40 mg by mouth every morning (before breakfast)              pravastatin (PRAVACHOL) 40 MG tablet  Take 40 mg by mouth daily              sertraline (ZOLOFT) 100 MG tablet  Take 100 mg by mouth daily              travoprost, benzalkonium, (TRAVATAN) 0.004 % ophthalmic solution  Place 1 drop into the right eye nightly                 Code Status:  DNR-CC    Time Spent on discharge is  35 mins in patient examination, evaluation, counseling as well as medication reconciliation, prescriptions for required medications, discharge plan and follow up. Electronically signed by Debra Veliz MD on 2/25/2021 at 1:56 PM     Thank you Dr. Elder Huerta MD for the opportunity to be involved in this patient's care. This note was created with the assistance of a speech-recognition program.  Although the intention is to generate a document that actually reflects the content of the visit, no guarantees can be provided that every mistake has been identified and corrected by editing. Note was updated later by me after  physical examination and  completion of the assessment.

## 2021-02-25 NOTE — DISCHARGE INSTR - COC
Continuity of Care Form    Patient Name: Amanda Cline   :  3/26/1932  MRN:  [id number]    Admit date:  2021  Discharge date:  2021      Code Status Order: DNR-CC   Advance Directives:      Admitting Physician:  Gladis Alcaraz MD  PCP: Sherrill Schmidt MD    Discharging Nurse: [facility] Unit/Room#: 1026/1026-01  Discharging Unit Phone Number: ***    Emergency Contact:   Extended Emergency Contact Information  Primary Emergency Contact: Argentina Waldron Phone: [phone]  Work Phone: [phone]  Relation: Child  Secondary Emergency Contact: [address] Phone: [phone]  Work Phone: [phone]  Relation: Child    Past Surgical History:  Past Surgical History:   Procedure Laterality Date    APPENDECTOMY      CARDIAC SURGERY  2016    CABG x 3; LIMA-LAD, SVG-PDA, SVG-OM    CARDIOVERSION  2019    CHOLECYSTECTOMY      COLONOSCOPY      CORONARY ANGIOPLASTY WITH STENT PLACEMENT      CORONARY ARTERY BYPASS GRAFT  11/08/2016    X 3    HYSTERECTOMY         Immunization History: There is no immunization history on file for this patient.     Active Problems:  Patient Active Problem List   Diagnosis Code    Symptomatic bradycardia R00.1    Suspected sleep apnea R29.818    Unexplained night sweats R61    Pneumonia J18.9    GERD (gastroesophageal reflux disease) K21.9    Hyperlipidemia E78.5    Hypertension I10    Chronic atrial fibrillation (HCC) I48.20    Acute on chronic diastolic CHF (congestive heart failure) ([facility]) I50.33    Generalized anxiety disorder F41.1    Depressive disorder F32.9    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    Atherosclerotic heart disease of native coronary artery without angina pectoris I25.10    Heart failure, diastolic, acute on chronic (HCC) I50.33    Suspected COVID-19 virus infection Z20.822    Acute left-sided CHF (congestive heart failure) (HCC) I50.1    Mild malnutrition (HCC) E44.1    Respiratory failure with hypoxia [facility] J96.91       Isolation/Infection:   Isolation            No Isolation          Patient Infection Status       Infection Onset Added Last Indicated Last Indicated By Review Planned Expiration Resolved Resolved By    None active    Resolved    COVID-19 Rule Out 02/25/21 02/25/21 02/25/21 COVID-19, Rapid (Ordered)   02/25/21 Rule-Out Test Resulted    COVID-19 Rule Out 02/17/21 02/17/21 02/17/21 Respiratory Panel, Molecular, with COVID-19 (Restricted: peds pts or suitable admitted adults) (Ordered)   02/17/21 Rule-Out Test Resulted    COVID-19 Rule Out 02/12/21 02/12/21 02/12/21 COVID-19 (Ordered)   02/12/21 Rule-Out Test Resulted    COVID-19 Rule Out 02/08/21 02/08/21 02/08/21 Respiratory Panel, Molecular, with COVID-19 (Restricted: peds pts or suitable admitted adults) (Ordered)   02/09/21 Rule-Out Test Resulted    COVID-19 Rule Out 12/10/20 12/10/20 12/11/20 COVID-19 (Ordered)   12/11/20 Rule-Out Test Resulted    COVID-19 Rule Out 12/07/20 12/07/20 12/07/20 COVID-19 (Ordered)   12/07/20 Rule-Out Test Resulted    COVID-19 Rule Out 04/29/20 04/29/20 04/29/20 COVID-19 (Ordered)   04/30/20 Rule-Out Test Resulted            Nurse Assessment:  Last Vital Signs: BP (!) 143/49   Pulse (!) 36   Temp 98 °F (36.7 °C)   Resp 22   Wt 124 lb 14.4 oz (56.7 kg)   SpO2 98%   BMI 20.78 kg/m²     Last documented pain score (0-10 scale): Pain Level: 0  Last Weight:   Wt Readings from Last 1 Encounters:   02/25/21 124 lb 14.4 oz (56.7 kg)     Mental Status:  able to concentrate and follow conversation    IV Access:  - None    Nursing Mobility/ADLs:  Walking   Assisted  Transfer  Assisted  Bathing  Assisted  Dressing  Assisted  Toileting  Assisted  Feeding  Assisted  Med Admin  Independent  Med Delivery   none    Wound Care Documentation and Therapy:        Elimination:  Continence:   · Bowel:  Yes  · Bladder: Yes  Urinary Catheter: None   Colostomy/Ileostomy/Ileal Conduit: No       Date of Last BM: 02/23/2021  No intake or output data in the 24 hours ending 02/25/21 1355  No intake/output data recorded. Safety Concerns: At Risk for Falls    Impairments/Disabilities:      None    Nutrition Therapy:  Current Nutrition Therapy:   - Oral Diet:  General and Dysphagia 1 pureed    Routes of Feeding: Oral  Liquids: Nectar Thick Liquids  Daily Fluid Restriction: no  Last Modified Barium Swallow with Video (Video Swallowing Test): not done    Treatments at the Time of Hospital Discharge:   Respiratory Treatments: ***  Oxygen Therapy:  is on oxygen at 15 L/min per nasal cannula. Ventilator:    - BiPAP   IPAP: 12 cmH20, CPAP/EPAP: 8 cmH2O device from facility    Rehab Therapies: Physical Therapy and Occupational Therapy  Weight Bearing Status/Restrictions: No weight bearing restirctions  Other Medical Equipment (for information only, NOT a DME order):  walker  Other Treatments: ***    Patient's personal belongings (please select all that are sent with patient):  Dentures upper and lower    RN SIGNATURE:  Electronically signed by Marisela Dow on 2/25/21 at 5:56 PM EST    CASE MANAGEMENT/SOCIAL WORK SECTION    Inpatient Status Date: ***    Readmission Risk Assessment Score:  Readmission Risk              Risk of Unplanned Readmission:        27           Discharging to Facility/ Agency   · Name:   · Address:  · Phone:  · Fax:    Dialysis Facility (if applicable)   · Name:  · Address:  · Dialysis Schedule:  · Phone:  · Fax:    / signature: {Esignature:[id number]:::0}    PHYSICIAN SECTION    Prognosis: Good    Condition at Discharge: Terminal    Rehab Potential (if transferring to Rehab): Fair    Recommended Labs or Other Treatments After Discharge:     Physician Certification: I certify the above information and transfer of Sparkle Prasad  is necessary for the continuing treatment of the diagnosis listed and that she requires Hospice for less 30 days.      Update Admission H&P: No change in H&P    PHYSICIAN SIGNATURE:  Electronically signed by Holli Reza MD on 2/25/21 at 1:55 PM EST

## 2021-02-26 NOTE — PROGRESS NOTES
Patient discharged via [facility]. Report provided to squad personal all belongings sent home with [employer] including medications.

## 2021-04-20 NOTE — DISCHARGE SUMMARY
[address] [place] [address],    Adult Hospitalist        Patient ID: Shereen Haro  MRN: [id number]                                     Acct:  [de-identified]                 Patient's PCP: Caesar Mohr MD     Admit Date:    2/8/2021      Discharge Date:   2/24/2021     Admitting Physician: Suni Doll MD     Discharge Physician: Marin Carrillo MD      CONSULTANTS: Patient Care Team:  Caesar Mohr MD as PCP - Sebas Pappas MD as Referring Physician (Cardiology)  Aristeo Chicas MD as Surgeon (Cardiothoracic Surgery)  Dave Jorgensen DPM as Surgeon (Podiatry)        Active Discharge Diagnoses:  Acute on chronic hypoxemic respiratory failure  Acute on chronic diastolic congestive heart failure  Pulmonary edema /diffuse ground-glass opacities  Aspiration pneumonia  Acute kidney injury- resolved, recurrent  Coronary artery disease status post CABG in 2016  Chronic atrial fibrillation  Chronic anticoagulation with Eliquis  Essential hypertension  Mixed hyperlipidemia  Mild pulmonary hypertension  Depression with anxiety  Former smoker  GERD  Hypernatremia   Oral candidiasis               Hospital Course:    This is a 80-year-old female is been admitted to emergency room, patient came to the ER with a complaint of having shortness of breath, further patient is been having some shortness of breath for past couple of days, patient does have history of congestive heart failure in view of oxygen at home, also history of coronary disease, hypertension hyperlipidemia and CABG in the past, patient initial testing in the emergency room is consistent with congestive heart failure, admitted for further management, admitted for acute on chronic hypoxemic respiratory failure secondary to acute on chronic diastolic congestive heart failure noticed to have pulmonary edema and diffuse groundglass opacities, some concern for aspiration pneumonia patient was started on aggressive IV diuresis, IV Solu-Medrol, DuoNeb breathing treatment, also started on IV antibiotics with IV Rocephin oral azithromycin, patient was continued on high flow nasal cannula oxygen, multispecialty including pulmonary critical care, nephrology, cardiology was involved, patient was taken off of amiodarone, but over the course patient symptoms did not improve with maximal therapy further her antibiotics were escalated to IV cefepime, but still she had minimal response to it, patient COVID-19 testing was negative, patient was continued on maximal therapy but remains on high flow nasal cannula oxygen, as she was doing poorly patient and the family decided to go with palliative and hospice they were consulted and patient decided to go with hospice, discharge from the hospital     The plan was discussed in detail with patient who agreed with the plan and verbalized understanding .     The patient was seen and examined on day of discharge and this discharge summary is in conjunction with any daily progress note from day of discharge.  [facility] Data:     Labs:     Hematology:       Recent Labs     02/23/21  0544 02/24/21  0537   WBC 21.0* 18.3*   RBC 4.10 3.85*   HGB 10.8* 10.2*   HCT 35.6* 33.2*   MCV 86.8 86.2   MCH 26.3 26.5   MCHC 30.3 30.7   RDW 16.3* 16.6*    263   MPV 11.6 11.0      Chemistry:  Recent Labs     02/23/21  0544 02/24/21  0537    143   K 4.6 4.9   CL 98 100   CO2 35* 37*   GLUCOSE 162* 156*   BUN 57* 54*   CREATININE 0.94* 0.85   MG 2.1 2.2   ANIONGAP 8* 6*   LABGLOM 56* >60   GFRAA >60 >60   CALCIUM 8.0* 8.2*   PHOS 3.6  --       No results for input(s): PROT, LABALBU, LABA1C, F0OHHFA, O9UWCQI, FT4, TSH, AST, ALT, LDH, GGT, ALKPHOS, LABGGT, BILITOT, BILIDIR, AMMONIA, AMYLASE, LIPASE, LACTATE, CHOL, HDL, LDLCHOLESTEROL, CHOLHDLRATIO, TRIG, VLDL, MBS98SZ, PHENYTOIN, PHENYF, URICACID, POCGLU in the last 72 hours.         Lab Results   Component Value Date     INR 1.1 10/02/2019     INR 1.2 11/11/2016     INR 1.2 11/10/2016     PROTIME 11.2 10/02/2019     PROTIME 12.3 (H) 2016     PROTIME 12.7 (H) 11/10/2016      Lab Results   Component Value Date/Time     SPECIAL NOT REPORTED 2021 07:45 PM     SPECIAL NOT REPORTED 2021 07:45 PM            Lab Results   Component Value Date/Time     CULTURE NO GROWTH 6 DAYS 2021 07:45 PM     CULTURE NO GROWTH 6 DAYS 2021 07:45 PM               Lab Results   Component Value Date     POCPH 7.35 2016     POCPCO2 41 2016     POCPO2 85 2016     POCHCO3 22.4 2016     NBEA 3 2016     PBEA NOT REPORTED 2016     YZU9VPQ 24 2016     UVCU2FQP 96 2016     FIO2 NOT REPORTED 10/02/2019         Radiology:     Xr Chest Portable     Result Date: 2021  Bilateral lung opacities are slightly improved.      Xr Chest Portable     Result Date: 2021  Bilateral airspace disease again demonstrated, with improved aeration of the right upper lobe.             All radiological studies reviewed        Reviews of Symptoms:     A 10 point system is reviewed and  negative except described in hospital course     Physical Exam:     Vitals:  BP (!) 143/49   Pulse (!) 36   Temp 98 °F (36.7 °C)   Resp 22   Wt 124 lb 14.4 oz (56.7 kg)   SpO2 98%   BMI 20.78 kg/m²   Temp (24hrs), Av.7 °F (36.5 °C), Min:97.4 °F (36.3 °C), Max:98 °F (36.7 °C)        General appearance - alert, well appearing, moderate distress  Mental status - oriented to person, place, and time with normal affect  Head - normocephalic and atraumatic  Eyes - pupils equal and reactive, extraocular eye movements intact, conjunctiva clear  Ears - hearing appears to be intact  Nose - no drainage noted  Mouth - mucous membranes moist  Neck - supple, no carotid bruits, thyroid not palpable  Chest -bilateral decreased air entry to auscultation, normal effort  Heart - normal rate, regular rhythm, no murmur  Abdomen - soft, nontender, nondistended, bowel sounds present all four quadrants, no masses, hepatomegaly or splenomegaly  Neurological - normal speech, no focal findings or movement disorder noted, cranial nerves II through XII grossly intact  Extremities - peripheral pulses palpable, no pedal edema or calf pain with palpation  Skin - no gross lesions, rashes, or induration noted        Consults:  IP CONSULT TO HOSPICE  IP CONSULT TO INTERNAL MEDICINE     Disposition: Hospice     Discharged Condition: Stable     Follow Up: No follow-up provider specified.     Lab Frequency Next Occurrence   BIPAP PRN     Heated/ Humidified High Flow Nasal Cannula CONTINUOUS WITH Q4H RT CHECKS     HHN Treatment EVERY 4 HOURS WHILE AWAKE     Initiate Oxygen Therapy Protocol DAILY (RT)     Nasal Cannula Oxygen DAILY (RT)              Diet: DIET CARDIAC; Dental Soft; Mildly Thick (Nectar);  Daily Fluid Restriction: 1500 ml; Low Potassium  Dietary Nutrition Supplements: Frozen Oral Supplement     Discharge Medications:                 Melba Crabtree   [place] Medication Instructions FAO:[id number]     Printed on:02/25/21 5569   Medication Information                                       apixaban (ELIQUIS) 5 MG TABS tablet  Take 1 tablet by mouth 2 times daily                      aspirin 81 MG EC tablet  Take 81 mg by mouth daily                      furosemide (LASIX) 40 MG tablet  Take 1 tablet by mouth daily                      isosorbide mononitrate (IMDUR) 30 MG extended release tablet  Take 30 mg by mouth every morning                      lisinopril (PRINIVIL;ZESTRIL) 2.5 MG tablet  Take 1 tablet by mouth daily                      metoprolol tartrate (LOPRESSOR) 25 MG tablet  Take 25 mg by mouth 2 times daily                       oxybutynin (DITROPAN) 5 MG tablet  Take 5 mg by mouth every other day                       pantoprazole (PROTONIX) 40 MG tablet  Take 40 mg by mouth every morning (before breakfast)                       pravastatin (PRAVACHOL) 40 MG tablet  Take 40 mg by mouth daily                     sertraline (ZOLOFT) 100 MG tablet  Take 100 mg by mouth daily                       travoprost, benzalkonium, (TRAVATAN) 0.004 % ophthalmic solution  Place 1 drop into the right eye nightly                            Code Status:  DNR-CC     Time Spent on discharge is  35 mins in patient examination, evaluation, counseling as well as medication reconciliation, prescriptions for required medications, discharge plan and follow up.     Electronically signed by Ebonie Betancourt MD on 4/20/2021 at 9:45 PM      Thank you Dr. Ebonie Willis MD for the opportunity to be involved in this patient's care.     This note was created with the assistance of a speech-recognition program.  Although the intention is to generate a document that actually reflects the content of the visit, no guarantees can be provided that every mistake has been identified and corrected by editing.      Note was updated later by me after  physical examination and  completion of the assessment.

## 2023-02-15 NOTE — PROGRESS NOTES
Lopressor held due to heart rate maintaining in the 50's except with exertion. dry, intact, no bleeding and no hematoma.
